# Patient Record
Sex: FEMALE | Race: BLACK OR AFRICAN AMERICAN | Employment: OTHER | URBAN - METROPOLITAN AREA
[De-identification: names, ages, dates, MRNs, and addresses within clinical notes are randomized per-mention and may not be internally consistent; named-entity substitution may affect disease eponyms.]

---

## 2017-08-08 ENCOUNTER — OP HISTORICAL/CONVERTED ENCOUNTER (OUTPATIENT)
Dept: OTHER | Age: 67
End: 2017-08-08

## 2019-10-24 ENCOUNTER — OP HISTORICAL/CONVERTED ENCOUNTER (OUTPATIENT)
Dept: OTHER | Age: 69
End: 2019-10-24

## 2020-10-26 ENCOUNTER — HOSPITAL ENCOUNTER (EMERGENCY)
Age: 70
Discharge: HOME | End: 2020-10-26
Attending: EMERGENCY MEDICINE
Payer: MEDICARE

## 2020-10-26 VITALS
RESPIRATION RATE: 16 BRPM | HEART RATE: 82 BPM | TEMPERATURE: 98 F | HEIGHT: 60 IN | WEIGHT: 130 LBS | OXYGEN SATURATION: 99 % | SYSTOLIC BLOOD PRESSURE: 158 MMHG | BODY MASS INDEX: 25.52 KG/M2 | DIASTOLIC BLOOD PRESSURE: 86 MMHG

## 2020-10-26 DIAGNOSIS — M54.50 ACUTE LEFT-SIDED LOW BACK PAIN WITHOUT SCIATICA: Primary | ICD-10-CM

## 2020-10-26 PROCEDURE — 74011250637 HC RX REV CODE- 250/637: Performed by: EMERGENCY MEDICINE

## 2020-10-26 PROCEDURE — 99283 EMERGENCY DEPT VISIT LOW MDM: CPT

## 2020-10-26 PROCEDURE — 74011000250 HC RX REV CODE- 250: Performed by: EMERGENCY MEDICINE

## 2020-10-26 RX ORDER — LIDOCAINE 4 G/100G
1 PATCH TOPICAL DAILY
Status: DISCONTINUED | OUTPATIENT
Start: 2020-10-26 | End: 2020-10-26 | Stop reason: HOSPADM

## 2020-10-26 RX ORDER — CLOPIDOGREL BISULFATE 75 MG/1
TABLET ORAL
COMMUNITY

## 2020-10-26 RX ORDER — BUDESONIDE AND FORMOTEROL FUMARATE DIHYDRATE 160; 4.5 UG/1; UG/1
AEROSOL RESPIRATORY (INHALATION)
COMMUNITY
Start: 2020-09-17

## 2020-10-26 RX ORDER — ROSUVASTATIN CALCIUM 40 MG/1
TABLET, COATED ORAL
COMMUNITY

## 2020-10-26 RX ORDER — LIDOCAINE 4 G/100G
1 PATCH TOPICAL EVERY 24 HOURS
Status: DISCONTINUED | OUTPATIENT
Start: 2020-10-26 | End: 2020-10-26

## 2020-10-26 RX ORDER — CARVEDILOL 25 MG/1
25 TABLET ORAL 2 TIMES DAILY WITH MEALS
COMMUNITY
Start: 2020-09-25

## 2020-10-26 RX ORDER — MIRTAZAPINE 15 MG/1
15 TABLET, FILM COATED ORAL
COMMUNITY
Start: 2020-10-04

## 2020-10-26 RX ORDER — CYPROHEPTADINE HYDROCHLORIDE 4 MG/1
4 TABLET ORAL 3 TIMES DAILY
COMMUNITY
Start: 2020-08-14 | End: 2022-09-22

## 2020-10-26 RX ORDER — LIDOCAINE 4 G/100G
1 PATCH TOPICAL DAILY
Status: DISCONTINUED | OUTPATIENT
Start: 2020-10-27 | End: 2020-10-26

## 2020-10-26 RX ORDER — DONEPEZIL HYDROCHLORIDE 10 MG/1
TABLET, FILM COATED ORAL
COMMUNITY

## 2020-10-26 RX ORDER — IBUPROFEN 600 MG/1
600 TABLET ORAL
Status: COMPLETED | OUTPATIENT
Start: 2020-10-26 | End: 2020-10-26

## 2020-10-26 RX ORDER — LISINOPRIL 10 MG/1
20 TABLET ORAL DAILY
COMMUNITY

## 2020-10-26 RX ORDER — DILTIAZEM HYDROCHLORIDE 240 MG/1
240 CAPSULE, COATED, EXTENDED RELEASE ORAL DAILY
COMMUNITY
Start: 2020-10-04

## 2020-10-26 RX ADMIN — IBUPROFEN 600 MG: 600 TABLET, FILM COATED ORAL at 09:06

## 2020-10-26 NOTE — ED PROVIDER NOTES
HPI   Patient reports mild to moderate left low back pain for the past 2 days. Worsened with certain positioning and ambulation and relieved by rest.  It does not radiate or migrate. Patient has not attempted any form of relief. She denies anterior pain specifically abdominal and  pain. Denies urinary symptoms and constitutional symptoms. Denies fall and trauma and rash. Past Medical History:   Diagnosis Date    CAD (coronary artery disease)     MI    Hyperlipidemia     Hypertension        History reviewed. No pertinent surgical history. History reviewed. No pertinent family history.     Social History     Socioeconomic History    Marital status: SINGLE     Spouse name: Not on file    Number of children: Not on file    Years of education: Not on file    Highest education level: Not on file   Occupational History    Not on file   Social Needs    Financial resource strain: Not on file    Food insecurity     Worry: Not on file     Inability: Not on file    Transportation needs     Medical: Not on file     Non-medical: Not on file   Tobacco Use    Smoking status: Current Every Day Smoker     Packs/day: 0.50    Smokeless tobacco: Never Used   Substance and Sexual Activity    Alcohol use: Never     Frequency: Never    Drug use: Never    Sexual activity: Not on file   Lifestyle    Physical activity     Days per week: Not on file     Minutes per session: Not on file    Stress: Not on file   Relationships    Social connections     Talks on phone: Not on file     Gets together: Not on file     Attends Rastafarian service: Not on file     Active member of club or organization: Not on file     Attends meetings of clubs or organizations: Not on file     Relationship status: Not on file    Intimate partner violence     Fear of current or ex partner: Not on file     Emotionally abused: Not on file     Physically abused: Not on file     Forced sexual activity: Not on file   Other Topics Concern    Not on file   Social History Narrative    Not on file         ALLERGIES: Patient has no known allergies. Review of Systems   Constitutional: Negative. Eyes: Negative. Respiratory: Negative. Cardiovascular: Negative. Gastrointestinal: Negative. Endocrine: Negative. Genitourinary: Negative. Musculoskeletal: Positive for back pain. Allergic/Immunologic: Negative. Neurological: Negative. Hematological: Negative. Psychiatric/Behavioral: Negative. All other systems reviewed and are negative. Vitals:    10/26/20 0845   BP: (!) 178/98   Pulse: 79   Resp: 18   Temp: 98 °F (36.7 °C)   SpO2: 99%   Weight: 59 kg (130 lb)   Height: 5' (1.524 m)            Physical Exam  Vitals signs and nursing note reviewed. Constitutional:       General: She is not in acute distress. Appearance: Normal appearance. She is normal weight. She is not ill-appearing, toxic-appearing or diaphoretic. HENT:      Head: Normocephalic and atraumatic. Nose: Nose normal.      Mouth/Throat:      Mouth: Mucous membranes are moist.   Eyes:      Extraocular Movements: Extraocular movements intact. Pupils: Pupils are equal, round, and reactive to light. Pulmonary:      Effort: Pulmonary effort is normal. No respiratory distress. Abdominal:      General: Abdomen is flat. There is no distension. Musculoskeletal: Normal range of motion. General: No swelling, tenderness, deformity or signs of injury. Right lower leg: No edema. Left lower leg: No edema. Skin:     General: Skin is warm and dry. Findings: No rash. Neurological:      General: No focal deficit present. Mental Status: She is alert and oriented to person, place, and time. Motor: No weakness. Gait: Gait normal.   Psychiatric:         Mood and Affect: Mood normal.         Behavior: Behavior normal.          MDM     This appears to be minor musculoskeletal lumbar pain.   Paresthesias or focal motor deficits, there is no anterior component to the pain, there are no rashes or lesions to suggest zoster or cellulitis. Okay for discharge with OTC remedies.     Procedures

## 2020-10-26 NOTE — ED TRIAGE NOTES
C/O back pain that started yesterday and has gotten worse today. Denies injury or lifting anything heavy. Denies burning with urination. Has not taken any meds OTC.

## 2021-01-15 ENCOUNTER — HOSPITAL ENCOUNTER (EMERGENCY)
Age: 71
Discharge: HOME | End: 2021-01-15
Attending: EMERGENCY MEDICINE
Payer: MEDICARE

## 2021-01-15 VITALS
BODY MASS INDEX: 22.25 KG/M2 | HEIGHT: 63 IN | HEART RATE: 76 BPM | RESPIRATION RATE: 16 BRPM | OXYGEN SATURATION: 97 % | DIASTOLIC BLOOD PRESSURE: 93 MMHG | TEMPERATURE: 98.5 F | SYSTOLIC BLOOD PRESSURE: 188 MMHG | WEIGHT: 125.6 LBS

## 2021-01-15 DIAGNOSIS — H10.32 ACUTE CONJUNCTIVITIS OF LEFT EYE, UNSPECIFIED ACUTE CONJUNCTIVITIS TYPE: Primary | ICD-10-CM

## 2021-01-15 PROCEDURE — 74011250637 HC RX REV CODE- 250/637: Performed by: EMERGENCY MEDICINE

## 2021-01-15 PROCEDURE — 99283 EMERGENCY DEPT VISIT LOW MDM: CPT

## 2021-01-15 RX ORDER — IBUPROFEN 600 MG/1
600 TABLET ORAL
Status: COMPLETED | OUTPATIENT
Start: 2021-01-15 | End: 2021-01-15

## 2021-01-15 RX ORDER — IBUPROFEN 600 MG/1
600 TABLET ORAL
Qty: 20 TAB | Refills: 0 | Status: SHIPPED | OUTPATIENT
Start: 2021-01-15 | End: 2021-10-27

## 2021-01-15 RX ORDER — MOXIFLOXACIN 5 MG/ML
1 SOLUTION/ DROPS OPHTHALMIC 3 TIMES DAILY
Qty: 1 BOTTLE | Refills: 0 | Status: SHIPPED | OUTPATIENT
Start: 2021-01-15 | End: 2021-01-22

## 2021-01-15 RX ADMIN — IBUPROFEN 600 MG: 600 TABLET, FILM COATED ORAL at 18:01

## 2021-01-15 NOTE — ED PROVIDER NOTES
EMERGENCY DEPARTMENT HISTORY AND PHYSICAL EXAM      Date: 1/15/2021  Patient Name: Mary Beth Dey    History of Presenting Illness     Chief Complaint   Patient presents with    Eye Pain     pt c/o pain, redness to L eye onset this AM; pt denies injury; pt reports using eye drops with no relief; pt AOx4; pt reports pain 10/10    Red Eye       History Provided By: Patient    HPI: Mary Beth Dey, 72 y.o. female with a past medical history significant hypertension presents to the ED with cc of left eye pain draining fluid when patient awoke this morning; complaint of periorbital pain and sensitivity to the light; denies any vision changes    There are no other complaints, changes, or physical findings at this time. PCP: Tho Pereyra NP    No current facility-administered medications on file prior to encounter. Current Outpatient Medications on File Prior to Encounter   Medication Sig Dispense Refill    aspirin-calcium carbonate 81 mg-300 mg calcium(777 mg) tab aspirin 81 mg tablet   Take by oral route.  Symbicort 160-4.5 mcg/actuation HFAA       carvediloL (COREG) 25 mg tablet       clopidogreL (PLAVIX) 75 mg tab clopidogrel 75 mg tablet   Take 1 tablet every day by oral route.  cyproheptadine (PERIACTIN) 4 mg tablet       dilTIAZem ER (CARDIZEM CD) 240 mg capsule       donepeziL (ARICEPT) 10 mg tablet donepezil 10 mg tablet   Take 1 tablet every day by oral route.  linaCLOtide (Linzess) 145 mcg cap capsule Linzess 145 mcg capsule   Take 1 capsule every day by oral route.  lisinopriL (PRINIVIL, ZESTRIL) 10 mg tablet lisinopril 10 mg tablet   Take 1 tablet every day by oral route.  mirtazapine (REMERON) 15 mg tablet       rosuvastatin (CRESTOR) 40 mg tablet rosuvastatin 40 mg tablet   Take 1 tablet every day by oral route.          Past History     Past Medical History:  Past Medical History:   Diagnosis Date    CAD (coronary artery disease)     MI    Hyperlipidemia     Hypertension        Past Surgical History:  Past Surgical History:   Procedure Laterality Date    HX ORTHOPAEDIC      R leg       Family History:  History reviewed. No pertinent family history. Social History:  Social History     Tobacco Use    Smoking status: Current Every Day Smoker     Packs/day: 0.50    Smokeless tobacco: Never Used   Substance Use Topics    Alcohol use: Never     Frequency: Never    Drug use: Never       Allergies:  No Known Allergies      Review of Systems     Review of Systems   Constitutional: Negative for chills and fever. HENT: Negative for rhinorrhea and sore throat. Eyes: Positive for photophobia, pain, discharge, redness and itching. Negative for visual disturbance. Respiratory: Negative for cough and shortness of breath. Cardiovascular: Negative for chest pain and leg swelling. Gastrointestinal: Negative for abdominal pain and vomiting. Endocrine: Negative for polydipsia and polyuria. Genitourinary: Negative for dysuria and urgency. Musculoskeletal: Negative for back pain and neck stiffness. Skin: Negative for color change and pallor. Neurological: Negative for weakness and numbness. Psychiatric/Behavioral: Negative. Physical Exam     Physical Exam  Vitals signs and nursing note reviewed. Constitutional:       Appearance: Normal appearance. HENT:      Head: Normocephalic and atraumatic. Comments: Right periorbital tenderness     Nose: Nose normal.      Mouth/Throat:      Mouth: Mucous membranes are moist.   Eyes:      General: Vision grossly intact. Extraocular Movements: Extraocular movements intact. Conjunctiva/sclera:      Right eye: No exudate. Left eye: Left conjunctiva is injected. No exudate. Pupils: Pupils are equal, round, and reactive to light. Comments: Serous drainage left eye   Neck:      Musculoskeletal: Normal range of motion and neck supple.    Cardiovascular:      Rate and Rhythm: Normal rate and regular rhythm. Pulses: Normal pulses. Heart sounds: Normal heart sounds. Pulmonary:      Effort: Pulmonary effort is normal.      Breath sounds: Normal breath sounds. Abdominal:      General: Bowel sounds are normal.      Palpations: Abdomen is soft. Musculoskeletal: Normal range of motion. General: No tenderness or signs of injury. Lymphadenopathy:      Cervical: No cervical adenopathy. Skin:     General: Skin is warm and dry. Capillary Refill: Capillary refill takes less than 2 seconds. Neurological:      General: No focal deficit present. Mental Status: She is alert and oriented to person, place, and time. Psychiatric:         Mood and Affect: Mood normal.         Behavior: Behavior normal.         Lab and Diagnostic Study Results     Labs -   No results found for this or any previous visit (from the past 12 hour(s)). Radiologic Studies -   @lastxrresult@  CT Results  (Last 48 hours)    None        CXR Results  (Last 48 hours)    None            Medical Decision Making   - I am the first provider for this patient. - I reviewed the vital signs, available nursing notes, past medical history, past surgical history, family history and social history. - Initial assessment performed. The patients presenting problems have been discussed, and they are in agreement with the care plan formulated and outlined with them. I have encouraged them to ask questions as they arise throughout their visit. Vital Signs-Reviewed the patient's vital signs. Patient Vitals for the past 12 hrs:   Temp Pulse Resp BP SpO2   01/15/21 1650 98.5 °F (36.9 °C) 80 16 (!) 188/93 96 %       Records Reviewed: Nursing Notes    The patient presents with left eye pain with a differential diagnosis of periorbital cellulitis, conjunctivitis, foreign body      ED Course:          Provider Notes (Medical Decision Making):      MDM       Procedures   Medical Decision Makingedical Decision Making  Performed by: Verner Kanner, MD  PROCEDURES:  Procedures       Disposition   Disposition: Condition stable and improved  DC- Adult Discharges: All of the diagnostic tests were reviewed and questions answered. Diagnosis, care plan and treatment options were discussed. The patient understands the instructions and will follow up as directed. The patients results have been reviewed with them. They have been counseled regarding their diagnosis. The patient verbally convey understanding and agreement of the signs, symptoms, diagnosis, treatment and prognosis and additionally agrees to follow up as recommended with their PCP in 24 - 48 hours. They also agree with the care-plan and convey that all of their questions have been answered. I have also put together some discharge instructions for them that include: 1) educational information regarding their diagnosis, 2) how to care for their diagnosis at home, as well a 3) list of reasons why they would want to return to the ED prior to their follow-up appointment, should their condition change. DISCHARGE PLAN:  1. Current Discharge Medication List      CONTINUE these medications which have NOT CHANGED    Details   aspirin-calcium carbonate 81 mg-300 mg calcium(777 mg) tab aspirin 81 mg tablet   Take by oral route. Symbicort 160-4.5 mcg/actuation HFAA       carvediloL (COREG) 25 mg tablet       clopidogreL (PLAVIX) 75 mg tab clopidogrel 75 mg tablet   Take 1 tablet every day by oral route. cyproheptadine (PERIACTIN) 4 mg tablet       dilTIAZem ER (CARDIZEM CD) 240 mg capsule       donepeziL (ARICEPT) 10 mg tablet donepezil 10 mg tablet   Take 1 tablet every day by oral route. linaCLOtide (Linzess) 145 mcg cap capsule Linzess 145 mcg capsule   Take 1 capsule every day by oral route. lisinopriL (PRINIVIL, ZESTRIL) 10 mg tablet lisinopril 10 mg tablet   Take 1 tablet every day by oral route.       mirtazapine (REMERON) 15 mg tablet rosuvastatin (CRESTOR) 40 mg tablet rosuvastatin 40 mg tablet   Take 1 tablet every day by oral route. 2.   Follow-up Information    None       3. Return to ED if worse   4. Current Discharge Medication List            Diagnosis     Clinical Impression: No diagnosis found. Attestations:    Loco Sena MD    Please note that this dictation was completed with Meitu, the computer voice recognition software. Quite often unanticipated grammatical, syntax, homophones, and other interpretive errors are inadvertently transcribed by the computer software. Please disregard these errors. Please excuse any errors that have escaped final proofreading. Thank you.

## 2021-07-13 ENCOUNTER — TRANSCRIBE ORDER (OUTPATIENT)
Dept: SCHEDULING | Age: 71
End: 2021-07-13

## 2021-07-13 DIAGNOSIS — Z12.31 VISIT FOR SCREENING MAMMOGRAM: Primary | ICD-10-CM

## 2021-07-21 ENCOUNTER — HOSPITAL ENCOUNTER (OUTPATIENT)
Dept: MAMMOGRAPHY | Age: 71
Discharge: HOME | End: 2021-07-21
Payer: MEDICARE

## 2021-07-21 DIAGNOSIS — Z12.31 VISIT FOR SCREENING MAMMOGRAM: ICD-10-CM

## 2021-07-21 PROCEDURE — 77067 SCR MAMMO BI INCL CAD: CPT

## 2021-09-11 PROBLEM — E78.5 HYPERLIPIDEMIA: Status: ACTIVE | Noted: 2021-09-11

## 2021-09-11 PROBLEM — E55.9 VITAMIN D DEFICIENCY: Status: ACTIVE | Noted: 2021-09-11

## 2021-09-11 PROBLEM — I11.9 HYPERTENSIVE HEART DISEASE WITHOUT CONGESTIVE HEART FAILURE: Status: ACTIVE | Noted: 2021-09-11

## 2021-09-11 PROBLEM — I10 HTN (HYPERTENSION): Status: ACTIVE | Noted: 2021-09-11

## 2021-09-11 PROBLEM — Z86.010 PERSONAL HISTORY OF COLONIC POLYPS: Status: ACTIVE | Noted: 2021-09-11

## 2021-09-11 PROBLEM — I65.29 CAROTID ARTERY STENOSIS: Status: ACTIVE | Noted: 2021-09-11

## 2021-09-11 PROBLEM — K59.00 CONSTIPATION: Status: ACTIVE | Noted: 2021-09-11

## 2021-09-11 PROBLEM — R63.4 ABNORMAL WEIGHT LOSS: Status: ACTIVE | Noted: 2021-09-11

## 2021-09-11 PROBLEM — M54.2 CERVICALGIA: Status: ACTIVE | Noted: 2021-09-11

## 2021-09-11 PROBLEM — I25.10 ATHEROSCLEROSIS OF NATIVE CORONARY ARTERY WITHOUT ANGINA PECTORIS: Status: ACTIVE | Noted: 2021-09-11

## 2021-09-16 ENCOUNTER — OFFICE VISIT (OUTPATIENT)
Dept: GASTROENTEROLOGY | Age: 71
End: 2021-09-16
Payer: MEDICARE

## 2021-09-16 VITALS
WEIGHT: 119.2 LBS | RESPIRATION RATE: 14 BRPM | BODY MASS INDEX: 21.12 KG/M2 | HEIGHT: 63 IN | OXYGEN SATURATION: 98 % | TEMPERATURE: 97.8 F | SYSTOLIC BLOOD PRESSURE: 120 MMHG | HEART RATE: 67 BPM | DIASTOLIC BLOOD PRESSURE: 60 MMHG

## 2021-09-16 DIAGNOSIS — K57.30 DIVERTICULAR DISEASE OF COLON: ICD-10-CM

## 2021-09-16 DIAGNOSIS — Z86.010 PERSONAL HISTORY OF COLONIC POLYPS: Primary | ICD-10-CM

## 2021-09-16 DIAGNOSIS — R63.4 ABNORMAL WEIGHT LOSS: ICD-10-CM

## 2021-09-16 PROCEDURE — G8427 DOCREV CUR MEDS BY ELIG CLIN: HCPCS | Performed by: INTERNAL MEDICINE

## 2021-09-16 PROCEDURE — 99204 OFFICE O/P NEW MOD 45 MIN: CPT | Performed by: INTERNAL MEDICINE

## 2021-09-16 PROCEDURE — 1101F PT FALLS ASSESS-DOCD LE1/YR: CPT | Performed by: INTERNAL MEDICINE

## 2021-09-16 PROCEDURE — G8510 SCR DEP NEG, NO PLAN REQD: HCPCS | Performed by: INTERNAL MEDICINE

## 2021-09-16 PROCEDURE — 1090F PRES/ABSN URINE INCON ASSESS: CPT | Performed by: INTERNAL MEDICINE

## 2021-09-16 PROCEDURE — 3017F COLORECTAL CA SCREEN DOC REV: CPT | Performed by: INTERNAL MEDICINE

## 2021-09-16 PROCEDURE — G8420 CALC BMI NORM PARAMETERS: HCPCS | Performed by: INTERNAL MEDICINE

## 2021-09-16 PROCEDURE — G8536 NO DOC ELDER MAL SCRN: HCPCS | Performed by: INTERNAL MEDICINE

## 2021-09-16 PROCEDURE — G8400 PT W/DXA NO RESULTS DOC: HCPCS | Performed by: INTERNAL MEDICINE

## 2021-09-16 PROCEDURE — G9899 SCRN MAM PERF RSLTS DOC: HCPCS | Performed by: INTERNAL MEDICINE

## 2021-09-16 RX ORDER — POLYETHYLENE GLYCOL 3350 17 G/17G
POWDER, FOR SOLUTION ORAL
Qty: 510 G | Refills: 0 | Status: SHIPPED | OUTPATIENT
Start: 2021-09-16 | End: 2021-10-05

## 2021-09-16 RX ORDER — CLONIDINE HYDROCHLORIDE 0.1 MG/1
0.1 TABLET ORAL 2 TIMES DAILY
COMMUNITY
Start: 2021-08-02

## 2021-09-16 RX ORDER — FERROUS SULFATE TAB 325 MG (65 MG ELEMENTAL FE) 325 (65 FE) MG
TAB ORAL
COMMUNITY
Start: 2021-09-02

## 2021-09-16 RX ORDER — ALBUTEROL SULFATE 90 UG/1
AEROSOL, METERED RESPIRATORY (INHALATION)
COMMUNITY
Start: 2021-08-18 | End: 2022-08-07

## 2021-09-16 NOTE — PROGRESS NOTES
1. Have you been to the ER, urgent care clinic since your last visit? Hospitalized since your last visit? NO    2. Have you seen or consulted any other health care providers outside of the [address] [place] [address] since your last visit? Include any pap smears or colon screening.  NO   Chief Complaint   Patient presents with    Colon Polyps     hx of colon polyps- last colonoscopy 2-     Visit Vitals  /60 (BP 1 Location: Left upper arm, BP Patient Position: Sitting, BP Cuff Size: Adult)   Pulse 67   Temp 97.8 °F (36.6 °C) (Temporal)   Resp 14   Ht 5' 3\" (1.6 m)   Wt 54.1 kg (119 lb 3.2 oz)   SpO2 98% Comment: room air   BMI 21.12 kg/m²

## 2021-09-16 NOTE — PROGRESS NOTES
Ozzy Redd is a 80 y.o. female who presents today for the following:  Chief Complaint   Patient presents with    Colon Polyps     hx of colon polyps- last colonoscopy 2-         No Known Allergies    Current Outpatient Medications   Medication Sig    cloNIDine HCL (CATAPRES) 0.1 mg tablet Take 0.1 mg by mouth two (2) times a day.  Ventolin HFA 90 mcg/actuation inhaler TAKE 2 PUFFS EVERY 6 HOURS AS NEEDED FOR WHEEZING    FeroSuL 325 mg (65 mg iron) tablet     polyethylene glycol (MIRALAX) 17 gram/dose powder Use as directed  Indications: emptying of the bowel    aspirin-calcium carbonate 81 mg-300 mg calcium(777 mg) tab aspirin 81 mg tablet   Take by oral route.  Symbicort 160-4.5 mcg/actuation HFAA     carvediloL (COREG) 25 mg tablet Take 25 mg by mouth two (2) times daily (with meals).  clopidogreL (PLAVIX) 75 mg tab clopidogrel 75 mg tablet   Take 1 tablet every day by oral route.  cyproheptadine (PERIACTIN) 4 mg tablet Take 4 mg by mouth three (3) times daily. For appetite    dilTIAZem ER (CARDIZEM CD) 240 mg capsule Take 240 mg by mouth daily.  donepeziL (ARICEPT) 10 mg tablet donepezil 10 mg tablet   Take 1 tablet every day by oral route.  linaCLOtide (Linzess) 145 mcg cap capsule Linzess 145 mcg capsule   Take 1 capsule every day by oral route.  lisinopriL (PRINIVIL, ZESTRIL) 10 mg tablet lisinopril 10 mg tablet   Take 1 tablet every day by oral route.  mirtazapine (REMERON) 15 mg tablet Take 15 mg by mouth nightly.  rosuvastatin (CRESTOR) 40 mg tablet rosuvastatin 40 mg tablet   Take 1 tablet every day by oral route.  ibuprofen (MOTRIN) 600 mg tablet Take 1 Tab by mouth every six (6) hours as needed for Pain. (Patient not taking: Reported on 9/16/2021)     No current facility-administered medications for this visit.        Past Medical History:   Diagnosis Date    Abnormal weight loss 9/11/2021    Atherosclerosis of native coronary artery without angina pectoris 9/11/2021    CAD (coronary artery disease)     MI    Carotid artery stenosis 9/11/2021    Cervicalgia 9/11/2021    Colon polyps     Constipation 9/11/2021    HTN (hypertension) 9/11/2021    Hyperlipidemia     Hypertension     Hypertensive heart disease without congestive heart failure 9/11/2021    Personal history of colonic polyps 9/11/2021    Vitamin D deficiency 9/11/2021       Past Surgical History:   Procedure Laterality Date    COLONOSCOPY  2015    HX BIV-IMPLANTABLE CARDIOVERTER DEFIBRILLATOR  09/16/2013    HX HEART CATHETERIZATION      X 3    HX ORTHOPAEDIC      R leg    HX OTHER SURGICAL      R CAROTID ENDORECTOMY 11-4-05,   L CAROTID ENDARECTOMY 11-6-2006    HX OTHER SURGICAL      BILAT LOWER EXTREM VASC BYPASS SURGERY       Family History   Problem Relation Age of Onset    Cancer Mother     Hypertension Mother        Social History     Socioeconomic History    Marital status: SINGLE     Spouse name: Not on file    Number of children: Not on file    Years of education: Not on file    Highest education level: Not on file   Occupational History    Not on file   Tobacco Use    Smoking status: Current Every Day Smoker     Packs/day: 0.50    Smokeless tobacco: Never Used   Substance and Sexual Activity    Alcohol use: Never    Drug use: Never    Sexual activity: Not on file   Other Topics Concern    Not on file   Social History Narrative    Not on file     Social Determinants of Health     Financial Resource Strain:     Difficulty of Paying Living Expenses:    Food Insecurity:     Worried About Running Out of Food in the Last Year:     [address] in the Last Year:    Transportation Needs:     Lack of Transportation (Medical):      Lack of Transportation (Non-Medical):    Physical Activity:     Days of Exercise per Week:     Minutes of Exercise per Session:    Stress:     Feeling of Stress :    Social Connections:     Frequency of Communication with Friends and Family:  Frequency of Social Gatherings with Friends and Family:     Attends Anabaptist Services:     Active Member of Clubs or Organizations:     Attends Club or Organization Meetings:     Marital Status:    Intimate Partner Violence:     Fear of Current or Ex-Partner:     Emotionally Abused:     Physically Abused:     Sexually Abused:          HPI  76-year-old female with history of hypertensive atherosclerotic cardiovascular disease, hyperlipidemia,. AICD, carotid artery disease, diverticular disease, and colon polyps who comes in for evaluation. Patient states she has been doing okay. No recent problems with her abdomen. She however has a poor appetite. He has been started on an appetite stimulant without much help so far. She has lost some weight. She has a problem with constipation and does take Linzess, but not every day. She has been on Plavix for a long time no gross GI bleeding. Patient does take iron therapy daily so her stools may be dark. Review of Systems   Constitutional: Positive for weight loss. HENT: Negative. Negative for nosebleeds. Eyes: Negative. Respiratory: Negative. Cardiovascular: Negative. Gastrointestinal: Positive for constipation. Negative for abdominal pain, blood in stool, diarrhea, heartburn, melena, nausea and vomiting. Genitourinary: Negative. Musculoskeletal: Negative. Skin: Negative. Neurological: Negative. Endo/Heme/Allergies: Negative. Psychiatric/Behavioral: Negative. All other systems reviewed and are negative. Visit Vitals  /60 (BP 1 Location: Left upper arm, BP Patient Position: Sitting, BP Cuff Size: Adult)   Pulse 67   Temp 97.8 °F (36.6 °C) (Temporal)   Resp 14   Ht 5' 3\" (1.6 m)   Wt 54.1 kg (119 lb 3.2 oz)   SpO2 98% Comment: room air   BMI 21.12 kg/m²     Physical Exam  Vitals and nursing note reviewed. Exam conducted with a chaperone present (Daughter present).    Constitutional:       Appearance: Normal appearance. She is normal weight. HENT:      Head: Normocephalic and atraumatic. Nose: Nose normal.      Mouth/Throat:      Mouth: Mucous membranes are moist.      Pharynx: Oropharynx is clear. Eyes:      General: No scleral icterus. Conjunctiva/sclera: Conjunctivae normal.      Pupils: Pupils are equal, round, and reactive to light. Cardiovascular:      Rate and Rhythm: Normal rate and regular rhythm. Pulses: Normal pulses. Heart sounds: Normal heart sounds. Pulmonary:      Effort: Pulmonary effort is normal.      Breath sounds: Normal breath sounds. Abdominal:      General: Bowel sounds are normal. There is no distension. Palpations: Abdomen is soft. There is no mass. Tenderness: There is no abdominal tenderness. There is no right CVA tenderness, left CVA tenderness, guarding or rebound. Hernia: No hernia is present. Musculoskeletal:         General: Normal range of motion. Cervical back: Normal range of motion and neck supple. Skin:     General: Skin is warm and dry. Coloration: Skin is not jaundiced. Neurological:      General: No focal deficit present. Mental Status: She is alert and oriented to person, place, and time. Psychiatric:         Mood and Affect: Mood normal.         Behavior: Behavior normal.         Thought Content: Thought content normal.         Judgment: Judgment normal.            1. Personal history of colonic polyps  Schedule patient for a surveillance colonoscopy. - COLONOSCOPY,DIAGNOSTIC; Future  - polyethylene glycol (MIRALAX) 17 gram/dose powder; Use as directed  Indications: emptying of the bowel  Dispense: 510 g; Refill: 0    2. Diverticular disease of colon      3.  Abnormal weight loss

## 2021-09-16 NOTE — PROGRESS NOTES
COLONOSCOPY IS SCHEDULED FOR 10-5-2021 AT 9:30 AM.  COVID TEST IS SCHEDULED FOR 10-1-2021.   PENDING Keaton Nguyen

## 2021-09-16 NOTE — H&P (VIEW-ONLY)
Berenice Finn is a 80 y.o. female who presents today for the following:  Chief Complaint   Patient presents with    Colon Polyps     hx of colon polyps- last colonoscopy 2-         No Known Allergies    Current Outpatient Medications   Medication Sig    cloNIDine HCL (CATAPRES) 0.1 mg tablet Take 0.1 mg by mouth two (2) times a day.  Ventolin HFA 90 mcg/actuation inhaler TAKE 2 PUFFS EVERY 6 HOURS AS NEEDED FOR WHEEZING    FeroSuL 325 mg (65 mg iron) tablet     polyethylene glycol (MIRALAX) 17 gram/dose powder Use as directed  Indications: emptying of the bowel    aspirin-calcium carbonate 81 mg-300 mg calcium(777 mg) tab aspirin 81 mg tablet   Take by oral route.  Symbicort 160-4.5 mcg/actuation HFAA     carvediloL (COREG) 25 mg tablet Take 25 mg by mouth two (2) times daily (with meals).  clopidogreL (PLAVIX) 75 mg tab clopidogrel 75 mg tablet   Take 1 tablet every day by oral route.  cyproheptadine (PERIACTIN) 4 mg tablet Take 4 mg by mouth three (3) times daily. For appetite    dilTIAZem ER (CARDIZEM CD) 240 mg capsule Take 240 mg by mouth daily.  donepeziL (ARICEPT) 10 mg tablet donepezil 10 mg tablet   Take 1 tablet every day by oral route.  linaCLOtide (Linzess) 145 mcg cap capsule Linzess 145 mcg capsule   Take 1 capsule every day by oral route.  lisinopriL (PRINIVIL, ZESTRIL) 10 mg tablet lisinopril 10 mg tablet   Take 1 tablet every day by oral route.  mirtazapine (REMERON) 15 mg tablet Take 15 mg by mouth nightly.  rosuvastatin (CRESTOR) 40 mg tablet rosuvastatin 40 mg tablet   Take 1 tablet every day by oral route.  ibuprofen (MOTRIN) 600 mg tablet Take 1 Tab by mouth every six (6) hours as needed for Pain. (Patient not taking: Reported on 9/16/2021)     No current facility-administered medications for this visit.        Past Medical History:   Diagnosis Date    Abnormal weight loss 9/11/2021    Atherosclerosis of native coronary artery without angina pectoris 9/11/2021    CAD (coronary artery disease)     MI    Carotid artery stenosis 9/11/2021    Cervicalgia 9/11/2021    Colon polyps     Constipation 9/11/2021    HTN (hypertension) 9/11/2021    Hyperlipidemia     Hypertension     Hypertensive heart disease without congestive heart failure 9/11/2021    Personal history of colonic polyps 9/11/2021    Vitamin D deficiency 9/11/2021       Past Surgical History:   Procedure Laterality Date    COLONOSCOPY  2015    HX BIV-IMPLANTABLE CARDIOVERTER DEFIBRILLATOR  09/16/2013    HX HEART CATHETERIZATION      X 3    HX ORTHOPAEDIC      R leg    HX OTHER SURGICAL      R CAROTID ENDORECTOMY 11-4-05,   L CAROTID ENDARECTOMY 11-6-2006    HX OTHER SURGICAL      BILAT LOWER EXTREM VASC BYPASS SURGERY       Family History   Problem Relation Age of Onset    Cancer Mother     Hypertension Mother        Social History     Socioeconomic History    Marital status: SINGLE     Spouse name: Not on file    Number of children: Not on file    Years of education: Not on file    Highest education level: Not on file   Occupational History    Not on file   Tobacco Use    Smoking status: Current Every Day Smoker     Packs/day: 0.50    Smokeless tobacco: Never Used   Substance and Sexual Activity    Alcohol use: Never    Drug use: Never    Sexual activity: Not on file   Other Topics Concern    Not on file   Social History Narrative    Not on file     Social Determinants of Health     Financial Resource Strain:     Difficulty of Paying Living Expenses:    Food Insecurity:     Worried About Running Out of Food in the Last Year:     [address] in the Last Year:    Transportation Needs:     Lack of Transportation (Medical):      Lack of Transportation (Non-Medical):    Physical Activity:     Days of Exercise per Week:     Minutes of Exercise per Session:    Stress:     Feeling of Stress :    Social Connections:     Frequency of Communication with Friends and Family:  Frequency of Social Gatherings with Friends and Family:     Attends Druze Services:     Active Member of Clubs or Organizations:     Attends Club or Organization Meetings:     Marital Status:    Intimate Partner Violence:     Fear of Current or Ex-Partner:     Emotionally Abused:     Physically Abused:     Sexually Abused:          HPI  78-year-old female with history of hypertensive atherosclerotic cardiovascular disease, hyperlipidemia,. AICD, carotid artery disease, diverticular disease, and colon polyps who comes in for evaluation. Patient states she has been doing okay. No recent problems with her abdomen. She however has a poor appetite. He has been started on an appetite stimulant without much help so far. She has lost some weight. She has a problem with constipation and does take Linzess, but not every day. She has been on Plavix for a long time no gross GI bleeding. Patient does take iron therapy daily so her stools may be dark. Review of Systems   Constitutional: Positive for weight loss. HENT: Negative. Negative for nosebleeds. Eyes: Negative. Respiratory: Negative. Cardiovascular: Negative. Gastrointestinal: Positive for constipation. Negative for abdominal pain, blood in stool, diarrhea, heartburn, melena, nausea and vomiting. Genitourinary: Negative. Musculoskeletal: Negative. Skin: Negative. Neurological: Negative. Endo/Heme/Allergies: Negative. Psychiatric/Behavioral: Negative. All other systems reviewed and are negative. Visit Vitals  /60 (BP 1 Location: Left upper arm, BP Patient Position: Sitting, BP Cuff Size: Adult)   Pulse 67   Temp 97.8 °F (36.6 °C) (Temporal)   Resp 14   Ht 5' 3\" (1.6 m)   Wt 54.1 kg (119 lb 3.2 oz)   SpO2 98% Comment: room air   BMI 21.12 kg/m²     Physical Exam  Vitals and nursing note reviewed. Exam conducted with a chaperone present (Daughter present).    Constitutional:       Appearance: Normal appearance. She is normal weight. HENT:      Head: Normocephalic and atraumatic. Nose: Nose normal.      Mouth/Throat:      Mouth: Mucous membranes are moist.      Pharynx: Oropharynx is clear. Eyes:      General: No scleral icterus. Conjunctiva/sclera: Conjunctivae normal.      Pupils: Pupils are equal, round, and reactive to light. Cardiovascular:      Rate and Rhythm: Normal rate and regular rhythm. Pulses: Normal pulses. Heart sounds: Normal heart sounds. Pulmonary:      Effort: Pulmonary effort is normal.      Breath sounds: Normal breath sounds. Abdominal:      General: Bowel sounds are normal. There is no distension. Palpations: Abdomen is soft. There is no mass. Tenderness: There is no abdominal tenderness. There is no right CVA tenderness, left CVA tenderness, guarding or rebound. Hernia: No hernia is present. Musculoskeletal:         General: Normal range of motion. Cervical back: Normal range of motion and neck supple. Skin:     General: Skin is warm and dry. Coloration: Skin is not jaundiced. Neurological:      General: No focal deficit present. Mental Status: She is alert and oriented to person, place, and time. Psychiatric:         Mood and Affect: Mood normal.         Behavior: Behavior normal.         Thought Content: Thought content normal.         Judgment: Judgment normal.            1. Personal history of colonic polyps  Schedule patient for a surveillance colonoscopy. - COLONOSCOPY,DIAGNOSTIC; Future  - polyethylene glycol (MIRALAX) 17 gram/dose powder; Use as directed  Indications: emptying of the bowel  Dispense: 510 g; Refill: 0    2. Diverticular disease of colon      3.  Abnormal weight loss

## 2021-10-01 ENCOUNTER — HOSPITAL ENCOUNTER (OUTPATIENT)
Dept: PREADMISSION TESTING | Age: 71
Discharge: HOME | End: 2021-10-01
Payer: MEDICARE

## 2021-10-01 LAB — SARS-COV-2, COV2: NORMAL

## 2021-10-01 PROCEDURE — U0003 INFECTIOUS AGENT DETECTION BY NUCLEIC ACID (DNA OR RNA); SEVERE ACUTE RESPIRATORY SYNDROME CORONAVIRUS 2 (SARS-COV-2) (CORONAVIRUS DISEASE [COVID-19]), AMPLIFIED PROBE TECHNIQUE, MAKING USE OF HIGH THROUGHPUT TECHNOLOGIES AS DESCRIBED BY CMS-2020-01-R: HCPCS

## 2021-10-02 LAB — SARS-COV-2, COV2NT: NOT DETECTED

## 2021-10-05 ENCOUNTER — ANESTHESIA EVENT (OUTPATIENT)
Dept: ENDOSCOPY | Age: 71
End: 2021-10-05
Payer: MEDICARE

## 2021-10-05 ENCOUNTER — ANESTHESIA (OUTPATIENT)
Dept: ENDOSCOPY | Age: 71
End: 2021-10-05
Payer: MEDICARE

## 2021-10-05 ENCOUNTER — HOSPITAL ENCOUNTER (OUTPATIENT)
Age: 71
Setting detail: OUTPATIENT SURGERY
Discharge: HOME | End: 2021-10-05
Attending: INTERNAL MEDICINE | Admitting: INTERNAL MEDICINE
Payer: MEDICARE

## 2021-10-05 VITALS
TEMPERATURE: 98 F | HEIGHT: 60 IN | RESPIRATION RATE: 18 BRPM | OXYGEN SATURATION: 99 % | WEIGHT: 114 LBS | BODY MASS INDEX: 22.38 KG/M2 | HEART RATE: 79 BPM | SYSTOLIC BLOOD PRESSURE: 206 MMHG | DIASTOLIC BLOOD PRESSURE: 109 MMHG

## 2021-10-05 PROCEDURE — 74011250636 HC RX REV CODE- 250/636: Performed by: NURSE ANESTHETIST, CERTIFIED REGISTERED

## 2021-10-05 PROCEDURE — 2709999900 HC NON-CHARGEABLE SUPPLY: Performed by: INTERNAL MEDICINE

## 2021-10-05 PROCEDURE — 74011000258 HC RX REV CODE- 258: Performed by: NURSE ANESTHETIST, CERTIFIED REGISTERED

## 2021-10-05 PROCEDURE — G0105 COLORECTAL SCRN; HI RISK IND: HCPCS | Performed by: INTERNAL MEDICINE

## 2021-10-05 PROCEDURE — 74011250636 HC RX REV CODE- 250/636: Performed by: INTERNAL MEDICINE

## 2021-10-05 PROCEDURE — 76040000007: Performed by: INTERNAL MEDICINE

## 2021-10-05 PROCEDURE — 76060000032 HC ANESTHESIA 0.5 TO 1 HR: Performed by: INTERNAL MEDICINE

## 2021-10-05 RX ORDER — SODIUM CHLORIDE 9 MG/ML
125 INJECTION, SOLUTION INTRAVENOUS CONTINUOUS
Status: DISCONTINUED | OUTPATIENT
Start: 2021-10-05 | End: 2021-10-05 | Stop reason: HOSPADM

## 2021-10-05 RX ORDER — SODIUM CHLORIDE 9 MG/ML
INJECTION, SOLUTION INTRAVENOUS
Status: DISCONTINUED | OUTPATIENT
Start: 2021-10-05 | End: 2021-10-05 | Stop reason: HOSPADM

## 2021-10-05 RX ORDER — SODIUM CHLORIDE 0.9 % (FLUSH) 0.9 %
5-40 SYRINGE (ML) INJECTION EVERY 8 HOURS
Status: DISCONTINUED | OUTPATIENT
Start: 2021-10-05 | End: 2021-10-05 | Stop reason: HOSPADM

## 2021-10-05 RX ORDER — SODIUM CHLORIDE 9 MG/ML
50 INJECTION, SOLUTION INTRAVENOUS CONTINUOUS
Status: DISCONTINUED | OUTPATIENT
Start: 2021-10-05 | End: 2021-10-05 | Stop reason: HOSPADM

## 2021-10-05 RX ORDER — PROPOFOL 10 MG/ML
INJECTION, EMULSION INTRAVENOUS AS NEEDED
Status: DISCONTINUED | OUTPATIENT
Start: 2021-10-05 | End: 2021-10-05 | Stop reason: HOSPADM

## 2021-10-05 RX ORDER — SODIUM CHLORIDE 0.9 % (FLUSH) 0.9 %
5-40 SYRINGE (ML) INJECTION AS NEEDED
Status: DISCONTINUED | OUTPATIENT
Start: 2021-10-05 | End: 2021-10-05 | Stop reason: HOSPADM

## 2021-10-05 RX ADMIN — SODIUM CHLORIDE: 9 INJECTION, SOLUTION INTRAVENOUS at 09:35

## 2021-10-05 RX ADMIN — PROPOFOL 50 MG: 10 INJECTION, EMULSION INTRAVENOUS at 09:50

## 2021-10-05 RX ADMIN — PROPOFOL 50 MG: 10 INJECTION, EMULSION INTRAVENOUS at 09:42

## 2021-10-05 RX ADMIN — PROPOFOL 50 MG: 10 INJECTION, EMULSION INTRAVENOUS at 09:40

## 2021-10-05 RX ADMIN — SODIUM CHLORIDE 50 ML/HR: 9 INJECTION, SOLUTION INTRAVENOUS at 08:31

## 2021-10-05 NOTE — INTERVAL H&P NOTE
Update History & Physical    The Patient's History and Physical of October 5, 2021,  was reviewed with the patient and I examined the patient. There was no change. The surgical site was confirmed by the patient and me. Plan:  The risk, benefits, expected outcome, and alternative to the recommended procedure have been discussed with the patient. Patient understands and wants to proceed with the procedure.     Electronically signed by Darci Ascencio MD on 10/5/2021 at 8:45 AM

## 2021-10-05 NOTE — ANESTHESIA POSTPROCEDURE EVALUATION
Procedure(s):  COLONOSCOPY ( T I V A).     total IV anesthesia    Anesthesia Post Evaluation      Multimodal analgesia: multimodal analgesia not used between 6 hours prior to anesthesia start to PACU discharge  Patient location during evaluation: PACU  Patient participation: complete - patient participated  Level of consciousness: sleepy but conscious  Pain score: 0  Pain management: adequate  Anesthetic complications: no  Cardiovascular status: acceptable and stable  Respiratory status: acceptable and room air  Hydration status: acceptable  Post anesthesia nausea and vomiting:  none  Final Post Anesthesia Temperature Assessment:  Normothermia (36.0-37.5 degrees C)      INITIAL Post-op Vital signs:   Vitals Value Taken Time   /70 10/05/21 1004   Temp 36.7 °C (98 °F) 10/05/21 1004   Pulse 77 10/05/21 1004   Resp 18 10/05/21 1004   SpO2 99 % 10/05/21 1004

## 2021-10-05 NOTE — OP NOTES
Colonoscopy Procedure Note      Patient: Isma Rodarte MRN: [id number]  SSN: [id number]    YOB: 1950  Age: 80 y.o. Sex: female      Date of Procedure: 10/5/2021  Date/Time:  10/5/2021 10:06 AM       IMPRESSION:     1. Normal colon to cecum         RECOMMENDATIONS:     1) Repeat colonoscopy in 5 years. INDICATION: History of colon polyps     PROCEDURE PERFORMED: Colonoscopy      DESCRIPTION OF PROCEDURE: An informed consent was obtained. The patient was placed in left lateral position. Perianal inspection and a digital rectal exam was performed. Video colonoscope was introduced into the rectum and advanced under direct vision up to the terminal ileum. With adequate insufflation and maneuvering of the withdrawing scope, the colonic mucosa was visualized carefully. Retroflexion was performed in the rectum to see the anorectum and also in the ascending colon to look behind the folds. Vital signs, pulse oximetry, single lead cardiac monitor were monitored throughout the procedure as the sedation was titrated to the desired effect ensuring patient comfort and safety. The patient tolerated the procedure very well and was transferred to the recovery area. Following is the summary of findings: No lesions were noted throughout the entire colon. ENDOSCOPIST: Ivelisse Starkey MD      ENDOSCOPE: Olympus video colonoscope     ASSISTANT:Circ-1: Ruben Silva              Scrub Tech-1: Mitchell Thomason     ANESTHESIA: TIVA      QUALITY OF PREPARATION: Good      FINDINGS:   1.  Normal colon to cecum       Complications: None     EBL: None     SPECIMENS: * No specimens in log *          Ivelisse Starkey MD  October 5, 2021  10:06 AM

## 2021-10-05 NOTE — DISCHARGE INSTRUCTIONS

## 2021-10-05 NOTE — ANESTHESIA PREPROCEDURE EVALUATION
Relevant Problems   No relevant active problems       Anesthetic History   No history of anesthetic complications  Other anesthesia complications          Review of Systems / Medical History  Patient summary reviewed, nursing notes reviewed and pertinent labs reviewed    Pulmonary  Within defined limits                 Neuro/Psych   Within defined limits           Cardiovascular  Within defined limits  Hypertension          CAD         GI/Hepatic/Renal  Within defined limits              Endo/Other  Within defined limits           Other Findings              Physical Exam    Airway  Mallampati: II  TM Distance: 4 - 6 cm  Neck ROM: normal range of motion        Cardiovascular    Rhythm: regular  Rate: normal         Dental  No notable dental hx       Pulmonary  Breath sounds clear to auscultation               Abdominal  Abdominal exam normal       Other Findings            Anesthetic Plan    ASA: 3  Anesthesia type: total IV anesthesia            Anesthetic plan and risks discussed with: Patient

## 2021-10-21 ENCOUNTER — HOSPITAL ENCOUNTER (EMERGENCY)
Age: 71
Discharge: HOME | End: 2021-10-21
Attending: EMERGENCY MEDICINE
Payer: MEDICARE

## 2021-10-21 VITALS
BODY MASS INDEX: 20.38 KG/M2 | OXYGEN SATURATION: 98 % | DIASTOLIC BLOOD PRESSURE: 89 MMHG | SYSTOLIC BLOOD PRESSURE: 135 MMHG | HEIGHT: 63 IN | RESPIRATION RATE: 18 BRPM | TEMPERATURE: 98 F | WEIGHT: 115 LBS | HEART RATE: 87 BPM

## 2021-10-21 DIAGNOSIS — N39.0 URINARY TRACT INFECTION WITHOUT HEMATURIA, SITE UNSPECIFIED: Primary | ICD-10-CM

## 2021-10-21 DIAGNOSIS — R63.4 WEIGHT LOSS: ICD-10-CM

## 2021-10-21 DIAGNOSIS — N93.9 VAGINAL BLEEDING: ICD-10-CM

## 2021-10-21 LAB
ALBUMIN SERPL-MCNC: 3.4 G/DL (ref 3.5–5)
ALBUMIN/GLOB SERPL: 1 {RATIO} (ref 1.1–2.2)
ALP SERPL-CCNC: 80 U/L (ref 45–117)
ALT SERPL-CCNC: 19 U/L (ref 12–78)
ANION GAP SERPL CALC-SCNC: 12 MMOL/L (ref 5–15)
APPEARANCE UR: CLEAR
AST SERPL W P-5'-P-CCNC: 16 U/L (ref 15–37)
BACTERIA URNS QL MICRO: ABNORMAL /HPF
BASOPHILS # BLD: 0.1 K/UL (ref 0–0.2)
BASOPHILS NFR BLD: 1 % (ref 0–2.5)
BILIRUB SERPL-MCNC: 0.5 MG/DL (ref 0.2–1)
BILIRUB UR QL: NEGATIVE
BUN SERPL-MCNC: 9 MG/DL (ref 6–20)
BUN/CREAT SERPL: 7 (ref 12–20)
CA-I BLD-MCNC: 9.3 MG/DL (ref 8.5–10.1)
CHLORIDE SERPL-SCNC: 106 MMOL/L (ref 97–108)
CO2 SERPL-SCNC: 26 MMOL/L (ref 21–32)
COLOR UR: ABNORMAL
CREAT SERPL-MCNC: 1.27 MG/DL (ref 0.55–1.02)
EOSINOPHIL # BLD: 0.1 K/UL (ref 0–0.7)
EOSINOPHIL NFR BLD: 1 % (ref 0.9–2.9)
ERYTHROCYTE [DISTWIDTH] IN BLOOD BY AUTOMATED COUNT: 13 % (ref 11.5–14.5)
GLOBULIN SER CALC-MCNC: 3.3 G/DL (ref 2–4)
GLUCOSE SERPL-MCNC: 138 MG/DL (ref 65–100)
GLUCOSE UR STRIP.AUTO-MCNC: NEGATIVE MG/DL
HCT VFR BLD AUTO: 39 % (ref 36–46)
HGB BLD-MCNC: 13.2 G/DL (ref 13.5–17.5)
HGB UR QL STRIP: ABNORMAL
KETONES UR QL STRIP.AUTO: NEGATIVE MG/DL
LEUKOCYTE ESTERASE UR QL STRIP.AUTO: ABNORMAL
LYMPHOCYTES # BLD: 2.9 K/UL (ref 1–4.8)
LYMPHOCYTES NFR BLD: 29 % (ref 20.5–51.1)
MCH RBC QN AUTO: 34.9 PG (ref 31–34)
MCHC RBC AUTO-ENTMCNC: 33.8 G/DL (ref 31–36)
MCV RBC AUTO: 103.1 FL (ref 80–100)
MONOCYTES # BLD: 0.8 K/UL (ref 0.2–2.4)
MONOCYTES NFR BLD: 8 % (ref 1.7–9.3)
NEUTS SEG # BLD: 6.1 K/UL (ref 1.8–7.7)
NEUTS SEG NFR BLD: 61 % (ref 42–75)
NITRITE UR QL STRIP.AUTO: NEGATIVE
NRBC # BLD: 0.01 K/UL
NRBC BLD-RTO: 0.1 PER 100 WBC
PH UR STRIP: 6.5 [PH] (ref 5–8)
PLATELET # BLD AUTO: 143 K/UL (ref 150–400)
PMV BLD AUTO: 9.4 FL (ref 6.5–11.5)
POTASSIUM SERPL-SCNC: 3.4 MMOL/L (ref 3.5–5.1)
PROT SERPL-MCNC: 6.7 G/DL (ref 6.4–8.2)
PROT UR STRIP-MCNC: ABNORMAL MG/DL
RBC # BLD AUTO: 3.78 M/UL (ref 4.5–5.9)
RBC #/AREA URNS HPF: ABNORMAL /HPF (ref 0–3)
SODIUM SERPL-SCNC: 144 MMOL/L (ref 136–145)
SP GR UR REFRACTOMETRY: 1.01 (ref 1–1.03)
UROBILINOGEN UR QL STRIP.AUTO: 1 EU/DL (ref 0.2–1)
WBC # BLD AUTO: 10.2 K/UL (ref 4.4–11.3)
WBC URNS QL MICRO: ABNORMAL /HPF (ref 0–5)

## 2021-10-21 PROCEDURE — 99283 EMERGENCY DEPT VISIT LOW MDM: CPT

## 2021-10-21 PROCEDURE — 80053 COMPREHEN METABOLIC PANEL: CPT

## 2021-10-21 PROCEDURE — 74011250637 HC RX REV CODE- 250/637: Performed by: EMERGENCY MEDICINE

## 2021-10-21 PROCEDURE — 81001 URINALYSIS AUTO W/SCOPE: CPT

## 2021-10-21 PROCEDURE — 85025 COMPLETE CBC W/AUTO DIFF WBC: CPT

## 2021-10-21 RX ORDER — SULFAMETHOXAZOLE AND TRIMETHOPRIM 800; 160 MG/1; MG/1
1 TABLET ORAL EVERY 12 HOURS
Status: DISCONTINUED | OUTPATIENT
Start: 2021-10-21 | End: 2021-10-21

## 2021-10-21 RX ORDER — SULFAMETHOXAZOLE AND TRIMETHOPRIM 800; 160 MG/1; MG/1
1 TABLET ORAL ONCE
Status: COMPLETED | OUTPATIENT
Start: 2021-10-21 | End: 2021-10-21

## 2021-10-21 RX ORDER — SULFAMETHOXAZOLE AND TRIMETHOPRIM 800; 160 MG/1; MG/1
1 TABLET ORAL 2 TIMES DAILY
Qty: 6 TABLET | Refills: 0 | Status: SHIPPED | OUTPATIENT
Start: 2021-10-21 | End: 2021-10-24

## 2021-10-21 RX ORDER — GUAIFENESIN 100 MG/5ML
LIQUID (ML) ORAL
COMMUNITY

## 2021-10-21 RX ADMIN — SULFAMETHOXAZOLE AND TRIMETHOPRIM 1 TABLET: 800; 160 TABLET ORAL at 15:35

## 2021-10-21 NOTE — ED PROVIDER NOTES
HPI 46-year-old female brought to the ED by family complaining of a small amount of vaginal bleeding noted today. Underlying dementia precludes detailed history she denies abdominal pain urinary symptoms fever nausea vomiting or diarrhea. Her family notes weight loss over the last several months which is actually being evaluated by her PCP Gardenia Chapa she had a colonoscopy last month which was normal multiple medical problems are listed below. No previous history of postmenopausal bleeding, GYN surgery or GYN malignancy.     Past Medical History:   Diagnosis Date    Abnormal weight loss 9/11/2021    Atherosclerosis of native coronary artery without angina pectoris 9/11/2021    CAD (coronary artery disease)     MI    Carotid artery stenosis 9/11/2021    Cervicalgia 9/11/2021    Colon polyps     Constipation 9/11/2021    HTN (hypertension) 9/11/2021    Hyperlipidemia     Hypertension     Hypertensive heart disease without congestive heart failure 9/11/2021    Personal history of colonic polyps 9/11/2021    Vitamin D deficiency 9/11/2021       Past Surgical History:   Procedure Laterality Date    COLONOSCOPY  2015    COLONOSCOPY N/A 10/5/2021    COLONOSCOPY ( T I V A) performed by Alondra Degroot MD at [facility], [place] ENDOSCOPY    HX BIV-IMPLANTABLE CARDIOVERTER DEFIBRILLATOR  09/16/2013    HX HEART CATHETERIZATION      X 3    HX ORTHOPAEDIC      R leg    HX OTHER SURGICAL      R CAROTID ENDORECTOMY 11-4-05,   L CAROTID ENDARECTOMY 11-6-2006    HX OTHER SURGICAL      BILAT LOWER EXTREM VASC BYPASS SURGERY    HX PACEMAKER      6 0r 7 years ago         Family History:   Problem Relation Age of Onset   [facility] Cancer Mother     Hypertension Mother     No Known Problems Father        Social History     Socioeconomic History    Marital status:      Spouse name: Not on file    Number of children: Not on file    Years of education: Not on file    Highest education level: Not on file   Occupational History    Not on file   Tobacco Use    Smoking status: Current Every Day Smoker     Packs/day: 0.50    Smokeless tobacco: Never Used   Substance and Sexual Activity    Alcohol use: Never    Drug use: Never    Sexual activity: Not on file   Other Topics Concern    Not on file   Social History Narrative    Not on file     Social Determinants of Health     Financial Resource Strain:     Difficulty of Paying Living Expenses:    Food Insecurity:     Worried About Running Out of Food in the Last Year:     [address] in the Last Year:    Transportation Needs:     Lack of Transportation (Medical):  Lack of Transportation (Non-Medical):    Physical Activity:     Days of Exercise per Week:     Minutes of Exercise per Session:    Stress:     Feeling of Stress :    Social Connections:     Frequency of Communication with Friends and Family:     Frequency of Social Gatherings with Friends and Family:     Attends Mu-ism Services:     Active Member of Clubs or Organizations:     Attends Club or Organization Meetings:     Marital Status:    Intimate Partner Violence:     Fear of Current or Ex-Partner:     Emotionally Abused:     Physically Abused:     Sexually Abused: ALLERGIES: Patient has no known allergies. Review of systems somewhat limited by mild dementia  Review of Systems   Constitutional: Negative. HENT: Negative. Eyes: Negative. Respiratory: Negative for cough, chest tightness, shortness of breath and wheezing. Cardiovascular: Negative for chest pain, palpitations and leg swelling. Gastrointestinal: Negative for abdominal distention, abdominal pain, blood in stool, constipation, diarrhea, nausea and vomiting. Endocrine: Negative. Genitourinary: Negative for difficulty urinating, dysuria, flank pain, frequency and hematuria. Musculoskeletal: Negative. Neurological: Negative for dizziness, seizures, speech difficulty, weakness and numbness. Hematological: Negative. Psychiatric/Behavioral: Negative. Vitals:    10/21/21 1158 10/21/21 1200   BP: (!) 154/68 135/89   Pulse: 77 87   Resp: 18 18   Temp: 98 °F (36.7 °C)    SpO2: 99% 98%   Weight: 52.2 kg (115 lb)    Height: 5' 3\" (1.6 m)           Thin elderly AA female in no acute distress  Physical Exam  Vitals and nursing note reviewed. Constitutional:       General: She is not in acute distress. Appearance: Normal appearance. She is not ill-appearing, toxic-appearing or diaphoretic. HENT:      Head: Normocephalic and atraumatic. Nose: Nose normal.      Mouth/Throat:      Mouth: Mucous membranes are moist.   Eyes:      Extraocular Movements: Extraocular movements intact. Conjunctiva/sclera: Conjunctivae normal.   Cardiovascular:      Rate and Rhythm: Normal rate and regular rhythm. Pulses: Normal pulses. Heart sounds: Normal heart sounds. No murmur heard. Pulmonary:      Effort: Pulmonary effort is normal. No respiratory distress. Breath sounds: Normal breath sounds. No wheezing, rhonchi or rales. Abdominal:      General: Abdomen is flat. Bowel sounds are normal. There is no distension. Palpations: Abdomen is soft. There is no mass. Tenderness: There is no abdominal tenderness. There is no right CVA tenderness, left CVA tenderness, guarding or rebound. Hernia: No hernia is present. Genitourinary:     General: Normal vulva. Vagina: No vaginal discharge. Comments: Speculum exam shows atrophic vaginal mucosa small telegentectasias are noted though no active bleeding cervix feels normal uterus is atrophic nontender and appropriate size there is no adnexal mass palpable no cervical motion tenderness  Musculoskeletal:         General: No swelling, tenderness, deformity or signs of injury. Normal range of motion. Cervical back: Normal range of motion and neck supple. No rigidity or tenderness. Right lower leg: No edema. Left lower leg: No edema. Lymphadenopathy:      Cervical: No cervical adenopathy. Skin:     General: Skin is warm and dry. Capillary Refill: Capillary refill takes less than 2 seconds. Findings: No erythema, lesion or rash. Neurological:      General: No focal deficit present. Mental Status: She is alert and oriented to person, place, and time. Mental status is at baseline. Cranial Nerves: No cranial nerve deficit. Sensory: No sensory deficit. Psychiatric:         Mood and Affect: Mood normal.         Behavior: Behavior normal.         Thought Content:  Thought content normal.          MDM urinalysis shows UTI no obvious source for vaginal bleeding will treat urinary tract infection/just follow-up with PCP in 2 days       Procedures

## 2021-10-27 ENCOUNTER — APPOINTMENT (OUTPATIENT)
Dept: CT IMAGING | Age: 71
End: 2021-10-27
Attending: EMERGENCY MEDICINE
Payer: MEDICARE

## 2021-10-27 ENCOUNTER — HOSPITAL ENCOUNTER (EMERGENCY)
Age: 71
Discharge: HOME | End: 2021-10-27
Attending: EMERGENCY MEDICINE
Payer: MEDICARE

## 2021-10-27 VITALS
DIASTOLIC BLOOD PRESSURE: 70 MMHG | HEART RATE: 72 BPM | OXYGEN SATURATION: 100 % | WEIGHT: 111 LBS | HEIGHT: 63 IN | TEMPERATURE: 97.9 F | SYSTOLIC BLOOD PRESSURE: 153 MMHG | RESPIRATION RATE: 18 BRPM | BODY MASS INDEX: 19.67 KG/M2

## 2021-10-27 DIAGNOSIS — M48.061 SPINAL STENOSIS OF LUMBAR REGION, UNSPECIFIED WHETHER NEUROGENIC CLAUDICATION PRESENT: Primary | ICD-10-CM

## 2021-10-27 LAB
ALBUMIN SERPL-MCNC: 3.5 G/DL (ref 3.5–5)
ALBUMIN/GLOB SERPL: 1 {RATIO} (ref 1.1–2.2)
ALP SERPL-CCNC: 71 U/L (ref 45–117)
ALT SERPL-CCNC: 20 U/L (ref 12–78)
ANION GAP SERPL CALC-SCNC: 12 MMOL/L (ref 5–15)
APPEARANCE UR: CLEAR
AST SERPL W P-5'-P-CCNC: 17 U/L (ref 15–37)
BACTERIA URNS QL MICRO: ABNORMAL /HPF
BASOPHILS # BLD: 0.1 K/UL (ref 0–0.2)
BASOPHILS NFR BLD: 1 % (ref 0–2.5)
BILIRUB SERPL-MCNC: 0.5 MG/DL (ref 0.2–1)
BILIRUB UR QL: NEGATIVE
BUN SERPL-MCNC: 12 MG/DL (ref 6–20)
BUN/CREAT SERPL: 10 (ref 12–20)
CA-I BLD-MCNC: 9.5 MG/DL (ref 8.5–10.1)
CHLORIDE SERPL-SCNC: 108 MMOL/L (ref 97–108)
CO2 SERPL-SCNC: 22 MMOL/L (ref 21–32)
COLOR UR: ABNORMAL
CREAT SERPL-MCNC: 1.15 MG/DL (ref 0.55–1.02)
EOSINOPHIL # BLD: 0.1 K/UL (ref 0–0.7)
EOSINOPHIL NFR BLD: 1 % (ref 0.9–2.9)
ERYTHROCYTE [DISTWIDTH] IN BLOOD BY AUTOMATED COUNT: 12.9 % (ref 11.5–14.5)
GLOBULIN SER CALC-MCNC: 3.6 G/DL (ref 2–4)
GLUCOSE SERPL-MCNC: 102 MG/DL (ref 65–100)
GLUCOSE UR STRIP.AUTO-MCNC: NEGATIVE MG/DL
HCT VFR BLD AUTO: 38.3 % (ref 36–46)
HGB BLD-MCNC: 12.9 G/DL (ref 13.5–17.5)
HGB UR QL STRIP: ABNORMAL
KETONES UR QL STRIP.AUTO: NEGATIVE MG/DL
LEUKOCYTE ESTERASE UR QL STRIP.AUTO: NEGATIVE
LYMPHOCYTES # BLD: 2.7 K/UL (ref 1–4.8)
LYMPHOCYTES NFR BLD: 27 % (ref 20.5–51.1)
MCH RBC QN AUTO: 35.1 PG (ref 31–34)
MCHC RBC AUTO-ENTMCNC: 33.8 G/DL (ref 31–36)
MCV RBC AUTO: 103.9 FL (ref 80–100)
MONOCYTES # BLD: 0.8 K/UL (ref 0.2–2.4)
MONOCYTES NFR BLD: 8 % (ref 1.7–9.3)
NEUTS SEG # BLD: 6.2 K/UL (ref 1.8–7.7)
NEUTS SEG NFR BLD: 63 % (ref 42–75)
NITRITE UR QL STRIP.AUTO: NEGATIVE
NRBC # BLD: 0.01 K/UL
NRBC BLD-RTO: 0.1 PER 100 WBC
PH UR STRIP: 7 [PH] (ref 5–8)
PLATELET # BLD AUTO: 145 K/UL (ref 150–400)
PMV BLD AUTO: 9.2 FL (ref 6.5–11.5)
POTASSIUM SERPL-SCNC: 3.7 MMOL/L (ref 3.5–5.1)
PROT SERPL-MCNC: 7.1 G/DL (ref 6.4–8.2)
PROT UR STRIP-MCNC: NEGATIVE MG/DL
RBC # BLD AUTO: 3.68 M/UL (ref 4.5–5.9)
RBC #/AREA URNS HPF: ABNORMAL /HPF (ref 0–3)
SODIUM SERPL-SCNC: 142 MMOL/L (ref 136–145)
SP GR UR REFRACTOMETRY: 1.01 (ref 1–1.03)
TSH SERPL DL<=0.05 MIU/L-ACNC: 1.14 UIU/ML (ref 0.36–3.74)
UROBILINOGEN UR QL STRIP.AUTO: 0.2 EU/DL (ref 0.2–1)
WBC # BLD AUTO: 9.9 K/UL (ref 4.4–11.3)
WBC URNS QL MICRO: ABNORMAL /HPF (ref 0–5)

## 2021-10-27 PROCEDURE — 74011250637 HC RX REV CODE- 250/637: Performed by: EMERGENCY MEDICINE

## 2021-10-27 PROCEDURE — 80053 COMPREHEN METABOLIC PANEL: CPT

## 2021-10-27 PROCEDURE — 36415 COLL VENOUS BLD VENIPUNCTURE: CPT

## 2021-10-27 PROCEDURE — 99284 EMERGENCY DEPT VISIT MOD MDM: CPT

## 2021-10-27 PROCEDURE — 84443 ASSAY THYROID STIM HORMONE: CPT

## 2021-10-27 PROCEDURE — 85025 COMPLETE CBC W/AUTO DIFF WBC: CPT

## 2021-10-27 PROCEDURE — 81001 URINALYSIS AUTO W/SCOPE: CPT

## 2021-10-27 PROCEDURE — 72131 CT LUMBAR SPINE W/O DYE: CPT

## 2021-10-27 RX ORDER — IBUPROFEN 200 MG
400 TABLET ORAL
Qty: 20 TABLET | Refills: 0 | Status: SHIPPED | OUTPATIENT
Start: 2021-10-27

## 2021-10-27 RX ORDER — IBUPROFEN 400 MG/1
400 TABLET ORAL ONCE
Status: COMPLETED | OUTPATIENT
Start: 2021-10-27 | End: 2021-10-27

## 2021-10-27 RX ADMIN — IBUPROFEN 400 MG: 400 TABLET, FILM COATED ORAL at 11:34

## 2021-10-27 NOTE — ED PROVIDER NOTES
HPI 73-year-old female brought to ED by  complaining of low lumbar back pain this is a chronic intermittent problem worse over the last 24 hours. There is been no fall injury. Pain is nonradiating worse with movement. Denies urinary symptoms. Multiple medical problems listed below apparently with relatively new onset dementia and some unspecified weight loss. Patient was seen in the ED 6 days ago by myself work-up then showed a urinary tract infection no other significant findings have suggested follow-up with PCP-unable to access today.   Colonoscopy approximately 3 weeks ago was normal    Past Medical History:   Diagnosis Date    Abnormal weight loss 9/11/2021    Atherosclerosis of native coronary artery without angina pectoris 9/11/2021    CAD (coronary artery disease)     MI    Carotid artery stenosis 9/11/2021    Cervicalgia 9/11/2021    Colon polyps     Constipation 9/11/2021    HTN (hypertension) 9/11/2021    Hyperlipidemia     Hypertension     Hypertensive heart disease without congestive heart failure 9/11/2021    Personal history of colonic polyps 9/11/2021    Vitamin D deficiency 9/11/2021       Past Surgical History:   Procedure Laterality Date    COLONOSCOPY  2015    COLONOSCOPY N/A 10/5/2021    COLONOSCOPY ( T I V A) performed by Cintia Torres MD at [facility] END[facility]COPY    HX BIV-IMPLANTABLE CARDIOVERTER DEFIBRILLATOR  09/16/2013    HX HEART CATHETERIZATION      X 3    HX ORTHOPAEDIC      R leg    HX OTHER SURGICAL      R CAROTID ENDORECTOMY 11-4-05,   L CAROTID ENDARECTOMY 11-6-2006    HX OTHER SURGICAL      BILAT LOWER EXTREM VASC BYPASS SURGERY    HX PACEMAKER      6 0r 7 years ago         Family History:   Problem Relation Age of Onset    Cancer Mother     Hypertension Mother     No Known Problems Father        Social History     Socioeconomic History    Marital status:      Spouse name: Not on file    Number of children: Not on file    Years of education: Not on file    Highest education level: Not on file   Occupational History    Not on file   Tobacco Use    Smoking status: Current Every Day Smoker     Packs/day: 0.50    Smokeless tobacco: Never Used   Substance and Sexual Activity    Alcohol use: Never    Drug use: Never    Sexual activity: Not on file   Other Topics Concern    Not on file   Social History Narrative    Not on file     Social Determinants of Health     Financial Resource Strain:     Difficulty of Paying Living Expenses:    Food Insecurity:     Worried About Running Out of Food in the Last Year:     [address] in the Last Year:    Transportation Needs:     Lack of Transportation (Medical):  Lack of Transportation (Non-Medical):    Physical Activity:     Days of Exercise per Week:     Minutes of Exercise per Session:    Stress:     Feeling of Stress :    Social Connections:     Frequency of Communication with Friends and Family:     Frequency of Social Gatherings with Friends and Family:     Attends [employer] Services:     Active Member of Clubs or Organizations:     Attends Club or Organization Meetings:     Marital Status:    Intimate Partner Violence:     Fear of Current or Ex-Partner:     Emotionally Abused:     Physically Abused:     Sexually Abused: ALLERGIES: Patient has no known allergies. Review of Systems   Constitutional: Positive for appetite change and fatigue. Negative for chills and fever. HENT: Negative. Eyes: Negative. Respiratory: Negative for cough, chest tightness, shortness of breath and wheezing. Cardiovascular: Negative for chest pain, palpitations and leg swelling. Gastrointestinal: Negative for abdominal distention, abdominal pain, blood in stool, constipation, diarrhea, nausea and vomiting. Endocrine: Negative. Genitourinary: Negative for difficulty urinating, dysuria, flank pain, frequency and hematuria. Musculoskeletal: Positive for back pain. Negative for gait problem, joint swelling and neck pain. Neurological: Negative for dizziness, seizures, speech difficulty, weakness and numbness. Hematological: Negative. Psychiatric/Behavioral: Negative. Vitals:    10/27/21 1023 10/27/21 1113 10/27/21 1135   BP: (!) 142/60 (!) 148/67 (!) 157/66   Pulse: 77 65    Resp: 18 18    Temp: 97.9 °F (36.6 °C)     SpO2: 95% 100% 100%   Weight: 50.3 kg (111 lb)     Height: 5' 3\" (1.6 m)              Physical Exam  Vitals and nursing note reviewed. Constitutional:       General: She is not in acute distress. Appearance: She is not ill-appearing, toxic-appearing or diaphoretic. HENT:      Head: Normocephalic and atraumatic. Nose: Nose normal.      Mouth/Throat:      Mouth: Mucous membranes are moist.   Eyes:      Extraocular Movements: Extraocular movements intact. Conjunctiva/sclera: Conjunctivae normal.   Cardiovascular:      Rate and Rhythm: Normal rate and regular rhythm. Pulses: Normal pulses. Heart sounds: Normal heart sounds. No murmur heard. Pulmonary:      Effort: Pulmonary effort is normal. No respiratory distress. Breath sounds: Normal breath sounds. No wheezing, rhonchi or rales. Abdominal:      General: Bowel sounds are normal. There is no distension. Palpations: Abdomen is soft. There is no mass. Tenderness: There is no abdominal tenderness. There is no right CVA tenderness, left CVA tenderness, guarding or rebound. Hernia: No hernia is present. Musculoskeletal:         General: Tenderness present. No swelling, deformity or signs of injury. Normal range of motion. Arms:       Cervical back: Normal range of motion and neck supple. No rigidity or tenderness. Right lower leg: No edema. Left lower leg: No edema.       Comments: Mild tenderness diffusely in the low lumbar back without deformity gross anatomic abnormality no CVA tenderness   Lymphadenopathy:      Cervical: No cervical adenopathy. Skin:     General: Skin is warm and dry. Capillary Refill: Capillary refill takes less than 2 seconds. Findings: No erythema, lesion or rash. Neurological:      General: No focal deficit present. Mental Status: She is alert and oriented to person, place, and time. Mental status is at baseline. Cranial Nerves: No cranial nerve deficit. Sensory: No sensory deficit. Motor: No weakness. Deep Tendon Reflexes: Reflexes normal.      Comments: No neurologic deficit of the lower extremities; pulses are 2+ in the feet bilaterally   Psychiatric:         Mood and Affect: Mood normal.         Behavior: Behavior normal.         Thought Content: Thought content normal.          MDM elderly female  Presents with lower back pain decreased appetite and unspecified weight loss CT of the lower spine shows some spinal stenosis some degenerative changes at multiple vertebrae. We will treat with ibuprofen for now and suggest PCP follow-up. Laboratory evaluations were essentially normal and UTI from visit 6 days ago is cleared.   Procedures

## 2021-11-03 ENCOUNTER — TRANSCRIBE ORDER (OUTPATIENT)
Dept: SCHEDULING | Age: 71
End: 2021-11-03

## 2021-11-03 DIAGNOSIS — R63.4 LOSS OF WEIGHT: Primary | ICD-10-CM

## 2022-03-18 PROBLEM — K59.00 CONSTIPATION: Status: ACTIVE | Noted: 2021-09-11

## 2022-03-18 PROBLEM — R63.4 ABNORMAL WEIGHT LOSS: Status: ACTIVE | Noted: 2021-09-11

## 2022-03-18 PROBLEM — I65.29 CAROTID ARTERY STENOSIS: Status: ACTIVE | Noted: 2021-09-11

## 2022-03-18 PROBLEM — I11.9 HYPERTENSIVE HEART DISEASE WITHOUT CONGESTIVE HEART FAILURE: Status: ACTIVE | Noted: 2021-09-11

## 2022-03-19 PROBLEM — Z86.010 PERSONAL HISTORY OF COLONIC POLYPS: Status: ACTIVE | Noted: 2021-09-11

## 2022-03-19 PROBLEM — I25.10 ATHEROSCLEROSIS OF NATIVE CORONARY ARTERY WITHOUT ANGINA PECTORIS: Status: ACTIVE | Noted: 2021-09-11

## 2022-03-19 PROBLEM — E55.9 VITAMIN D DEFICIENCY: Status: ACTIVE | Noted: 2021-09-11

## 2022-03-19 PROBLEM — Z86.0100 PERSONAL HISTORY OF COLONIC POLYPS: Status: ACTIVE | Noted: 2021-09-11

## 2022-03-19 PROBLEM — I10 HTN (HYPERTENSION): Status: ACTIVE | Noted: 2021-09-11

## 2022-03-19 PROBLEM — E78.5 HYPERLIPIDEMIA: Status: ACTIVE | Noted: 2021-09-11

## 2022-03-20 PROBLEM — M54.2 CERVICALGIA: Status: ACTIVE | Noted: 2021-09-11

## 2022-03-26 ENCOUNTER — HOSPITAL ENCOUNTER (EMERGENCY)
Age: 72
Discharge: HOME | End: 2022-03-26
Attending: EMERGENCY MEDICINE
Payer: MEDICARE

## 2022-03-26 VITALS
RESPIRATION RATE: 18 BRPM | BODY MASS INDEX: 20.91 KG/M2 | DIASTOLIC BLOOD PRESSURE: 81 MMHG | TEMPERATURE: 98.2 F | HEART RATE: 70 BPM | OXYGEN SATURATION: 97 % | SYSTOLIC BLOOD PRESSURE: 154 MMHG | HEIGHT: 63 IN | WEIGHT: 118 LBS

## 2022-03-26 DIAGNOSIS — H00.014 HORDEOLUM EXTERNUM OF LEFT UPPER EYELID: Primary | ICD-10-CM

## 2022-03-26 PROCEDURE — 74011250637 HC RX REV CODE- 250/637: Performed by: EMERGENCY MEDICINE

## 2022-03-26 PROCEDURE — 74011000250 HC RX REV CODE- 250: Performed by: EMERGENCY MEDICINE

## 2022-03-26 PROCEDURE — 99283 EMERGENCY DEPT VISIT LOW MDM: CPT

## 2022-03-26 RX ORDER — TETRACAINE HYDROCHLORIDE 5 MG/ML
1 SOLUTION OPHTHALMIC
Status: DISCONTINUED | OUTPATIENT
Start: 2022-03-26 | End: 2022-03-26 | Stop reason: HOSPADM

## 2022-03-26 RX ORDER — CEPHALEXIN 250 MG/1
500 CAPSULE ORAL
Status: COMPLETED | OUTPATIENT
Start: 2022-03-26 | End: 2022-03-26

## 2022-03-26 RX ORDER — CEPHALEXIN 500 MG/1
500 CAPSULE ORAL 3 TIMES DAILY
Qty: 21 CAPSULE | Refills: 0 | Status: SHIPPED | OUTPATIENT
Start: 2022-03-26 | End: 2022-04-02

## 2022-03-26 RX ADMIN — CEPHALEXIN 500 MG: 250 CAPSULE ORAL at 10:23

## 2022-03-26 RX ADMIN — FLUORESCEIN SODIUM 1 STRIP: 1 STRIP OPHTHALMIC at 10:23

## 2022-03-26 NOTE — ED PROVIDER NOTES
HPI 10year-old female presents the ED with left eye irritation beginning 2 days ago discomfort localized to the left upper lid. Denies foreign body injury to eye. No known exposure to conjunctivitis. No contact lenses no change in vision.     Past Medical History:   Diagnosis Date    Abnormal weight loss 9/11/2021    Atherosclerosis of native coronary artery without angina pectoris 9/11/2021    CAD (coronary artery disease)     MI    Carotid artery stenosis 9/11/2021    Cervicalgia 9/11/2021    Colon polyps     Constipation 9/11/2021    HTN (hypertension) 9/11/2021    Hyperlipidemia     Hypertension     Hypertensive heart disease without congestive heart failure 9/11/2021    Personal history of colonic polyps 9/11/2021    Vitamin D deficiency 9/11/2021       Past Surgical History:   Procedure Laterality Date    COLONOSCOPY  2015    COLONOSCOPY N/A 10/5/2021    COLONOSCOPY ( T I V A) performed by Olivia Rosas MD at [facility] ENDOSCOPY    HX BIV-IMPLANTABLE CARDIOVERTER DEFIBRILLATOR  09/16/2013    HX HEART CATHETERIZATION      X 3    HX ORTHOPAEDIC      R leg    HX OTHER SURGICAL      R CAROTID ENDORECTOMY 11-4-05,   L CAROTID ENDARECTOMY 11-6-2006    HX OTHER SURGICAL      BILAT LOWER EXTREM VASC BYPASS SURGERY    HX PACEMAKER      6 0r 7 years ago         Family History:   Problem Relation Age of Onset   Velasquez Cancer Mother     Hypertension Mother     No Known Problems Father        Social History     Socioeconomic History    Marital status:      Spouse name: Not on file    Number of children: Not on file    Years of education: Not on file    Highest education level: Not on file   Occupational History    Not on file   Tobacco Use    Smoking status: Current Every Day Smoker     Packs/day: 0.50    Smokeless tobacco: Never Used   Substance and Sexual Activity    Alcohol use: Never    Drug use: Never    Sexual activity: Not on file   Other Topics Concern    Not on file   Social History Narrative    Not on file     Social Determinants of Health     Financial Resource Strain:     Difficulty of Paying Living Expenses: Not on file   Food Insecurity:     Worried About Running Out of Food in the Last Year: Not on file    Marely of Food in the Last Year: Not on file   Transportation Needs:     Lack of Transportation (Medical): Not on file    Lack of Transportation (Non-Medical): Not on file   Physical Activity:     Days of Exercise per Week: Not on file    Minutes of Exercise per Session: Not on file   Stress:     Feeling of Stress : Not on file   Social Connections:     Frequency of Communication with Friends and Family: Not on file    Frequency of Social Gatherings with Friends and Family: Not on file    Attends Confucianism Services: Not on file    Active Member of [address] [place] [address] [employer] or Organizations: Not on file    Attends Club or Organization Meetings: Not on file    Marital Status: Not on file   Intimate Partner Violence:     Fear of Current or Ex-Partner: Not on file    Emotionally Abused: Not on file    Physically Abused: Not on file    Sexually Abused: Not on file   Housing Stability:     Unable to Pay for Housing in the Last Year: Not on file    Number of [place] in the Last Year: Not on file    Unstable Housing in the Last Year: Not on file         ALLERGIES: Patient has no known allergies. Review of Systems   Eyes: Positive for redness. Negative for photophobia, pain, discharge, itching and visual disturbance. All other systems reviewed and are negative. Vitals:    03/26/22 0956   BP: (!) 162/91   Pulse: 82   Resp: 18   Temp: 98.2 °F (36.8 °C)   SpO2: 99%   Weight: 53.5 kg (118 lb)   Height: 5' 3\" (1.6 m)          Pleasant elderly AA female no acute distress  Physical Exam  Vitals and nursing note reviewed. Constitutional:       Appearance: Normal appearance. HENT:      Head: Normocephalic and atraumatic.       Nose: Nose normal.      Mouth/Throat:      Mouth: Mucous membranes are moist.   Eyes:      General: Lids are everted, no foreign bodies appreciated. Vision grossly intact. Gaze aligned appropriately. No allergic shiner, visual field deficit or scleral icterus. Right eye: No discharge. Left eye: No discharge. Extraocular Movements: Extraocular movements intact. Right eye: Normal extraocular motion. Left eye: Normal extraocular motion. Conjunctiva/sclera:      Right eye: Right conjunctiva is not injected. No chemosis or exudate. Left eye: Left conjunctiva is injected. No chemosis, exudate or hemorrhage. Pupils: Pupils are equal, round, and reactive to light. Comments: Stye left upper lid lateral mild inflammation   Neurological:      Mental Status: She is alert.           MDM   Instructed warm soaks Keflex 500 3 times daily  Procedures

## 2022-08-03 ENCOUNTER — TRANSCRIBE ORDER (OUTPATIENT)
Dept: SCHEDULING | Age: 72
End: 2022-08-03

## 2022-08-03 DIAGNOSIS — Z12.31 ENCOUNTER FOR SCREENING MAMMOGRAM FOR MALIGNANT NEOPLASM OF BREAST: Primary | ICD-10-CM

## 2022-08-07 ENCOUNTER — HOSPITAL ENCOUNTER (EMERGENCY)
Age: 72
Discharge: HOME | End: 2022-08-07
Attending: EMERGENCY MEDICINE
Payer: MEDICARE

## 2022-08-07 ENCOUNTER — APPOINTMENT (OUTPATIENT)
Dept: GENERAL RADIOLOGY | Age: 72
End: 2022-08-07
Attending: EMERGENCY MEDICINE
Payer: MEDICARE

## 2022-08-07 VITALS
TEMPERATURE: 98.2 F | HEART RATE: 75 BPM | HEIGHT: 63 IN | BODY MASS INDEX: 20.91 KG/M2 | OXYGEN SATURATION: 100 % | WEIGHT: 118 LBS | SYSTOLIC BLOOD PRESSURE: 153 MMHG | DIASTOLIC BLOOD PRESSURE: 73 MMHG | RESPIRATION RATE: 16 BRPM

## 2022-08-07 DIAGNOSIS — J44.1 COPD EXACERBATION (HCC): Primary | ICD-10-CM

## 2022-08-07 PROCEDURE — 74011250637 HC RX REV CODE- 250/637: Performed by: EMERGENCY MEDICINE

## 2022-08-07 PROCEDURE — 74011250636 HC RX REV CODE- 250/636: Performed by: EMERGENCY MEDICINE

## 2022-08-07 PROCEDURE — 94640 AIRWAY INHALATION TREATMENT: CPT

## 2022-08-07 PROCEDURE — 99284 EMERGENCY DEPT VISIT MOD MDM: CPT

## 2022-08-07 PROCEDURE — 74011000250 HC RX REV CODE- 250: Performed by: EMERGENCY MEDICINE

## 2022-08-07 PROCEDURE — 96372 THER/PROPH/DIAG INJ SC/IM: CPT

## 2022-08-07 PROCEDURE — 71045 X-RAY EXAM CHEST 1 VIEW: CPT

## 2022-08-07 RX ORDER — DOXYCYCLINE HYCLATE 100 MG
100 TABLET ORAL 2 TIMES DAILY
Qty: 14 TABLET | Refills: 0 | Status: SHIPPED | OUTPATIENT
Start: 2022-08-07 | End: 2022-08-14

## 2022-08-07 RX ORDER — ALBUTEROL SULFATE 90 UG/1
2 AEROSOL, METERED RESPIRATORY (INHALATION)
Qty: 6.7 G | Refills: 0 | Status: SHIPPED | OUTPATIENT
Start: 2022-08-07

## 2022-08-07 RX ORDER — IBUPROFEN 800 MG/1
800 TABLET ORAL
Status: COMPLETED | OUTPATIENT
Start: 2022-08-07 | End: 2022-08-07

## 2022-08-07 RX ORDER — PREDNISONE 20 MG/1
20 TABLET ORAL DAILY
Qty: 10 TABLET | Refills: 0 | Status: SHIPPED | OUTPATIENT
Start: 2022-08-07 | End: 2022-08-17

## 2022-08-07 RX ORDER — IPRATROPIUM BROMIDE AND ALBUTEROL SULFATE 2.5; .5 MG/3ML; MG/3ML
3 SOLUTION RESPIRATORY (INHALATION)
Status: COMPLETED | OUTPATIENT
Start: 2022-08-07 | End: 2022-08-07

## 2022-08-07 RX ORDER — AMOXICILLIN AND CLAVULANATE POTASSIUM 875; 125 MG/1; MG/1
1 TABLET, FILM COATED ORAL
Status: COMPLETED | OUTPATIENT
Start: 2022-08-07 | End: 2022-08-07

## 2022-08-07 RX ADMIN — METHYLPREDNISOLONE SODIUM SUCCINATE 125 MG: 125 INJECTION, POWDER, FOR SOLUTION INTRAMUSCULAR; INTRAVENOUS at 12:47

## 2022-08-07 RX ADMIN — IPRATROPIUM BROMIDE AND ALBUTEROL SULFATE 3 ML: .5; 3 SOLUTION RESPIRATORY (INHALATION) at 12:39

## 2022-08-07 RX ADMIN — IBUPROFEN 800 MG: 800 TABLET, FILM COATED ORAL at 13:54

## 2022-08-07 RX ADMIN — AMOXICILLIN AND CLAVULANATE POTASSIUM 1 TABLET: 875; 125 TABLET, FILM COATED ORAL at 12:47

## 2022-08-07 NOTE — ED PROVIDER NOTES
EMERGENCY DEPARTMENT HISTORY AND PHYSICAL EXAM      Date: 8/7/2022  Patient Name: Sarah Mckeon    History of Presenting Illness     Chief Complaint   Patient presents with    Fatigue       History Provided By: Patient    HPI: Sarah Mckeon, 71 y.o. female with a significant past medical history of hypertension coronary artery disease, COPD, smoker presents to the ED with increased fatigue decreased appetite over the past 7 months, patient reports 26 pound weight loss over the last 6 months, patient seen by PCP last week presenting with same complaints patient states blood work was drawn urine check with no abnormal findings, today patient complains of a cough for last 2 days productive of white/clear phlegm denies shortness of breath no chest pains no nausea no vomiting no diarrhea    There are no other complaints, changes, or physical findings at this time. PCP: Alberto Snyder NP    No current facility-administered medications on file prior to encounter. Current Outpatient Medications on File Prior to Encounter   Medication Sig Dispense Refill    ibuprofen (MOTRIN) 200 mg tablet Take 2 Tablets by mouth every eight (8) hours as needed for Pain. 20 Tablet 0    aspirin (Terry Chewable Aspirin) 81 mg chewable tablet aspirin 81 mg tablet   Take by oral route. cloNIDine HCL (CATAPRES) 0.1 mg tablet Take 0.1 mg by mouth two (2) times a day. Ventolin HFA 90 mcg/actuation inhaler TAKE 2 PUFFS EVERY 6 HOURS AS NEEDED FOR WHEEZING      FeroSuL 325 mg (65 mg iron) tablet       aspirin-calcium carbonate 81 mg-300 mg calcium(777 mg) tab aspirin 81 mg tablet   Take by oral route. Symbicort 160-4.5 mcg/actuation HFAA       carvediloL (COREG) 25 mg tablet Take 25 mg by mouth two (2) times daily (with meals). clopidogreL (PLAVIX) 75 mg tab clopidogrel 75 mg tablet   Take 1 tablet every day by oral route. cyproheptadine (PERIACTIN) 4 mg tablet Take 4 mg by mouth three (3) times daily.  For appetite (Patient not taking: Reported on 10/5/2021)      dilTIAZem ER (CARDIZEM CD) 240 mg capsule Take 240 mg by mouth daily. donepeziL (ARICEPT) 10 mg tablet donepezil 10 mg tablet   Take 1 tablet every day by oral route. linaCLOtide (Linzess) 145 mcg cap capsule Linzess 145 mcg capsule   Take 1 capsule every day by oral route. lisinopriL (PRINIVIL, ZESTRIL) 10 mg tablet lisinopril 10 mg tablet   Take 1 tablet every day by oral route. mirtazapine (REMERON) 15 mg tablet Take 15 mg by mouth nightly. rosuvastatin (CRESTOR) 40 mg tablet rosuvastatin 40 mg tablet   Take 1 tablet every day by oral route.          Past History     Past Medical History:  Past Medical History:   Diagnosis Date    Abnormal weight loss 9/11/2021    Atherosclerosis of native coronary artery without angina pectoris 9/11/2021    CAD (coronary artery disease)     MI    Carotid artery stenosis 9/11/2021    Cervicalgia 9/11/2021    Colon polyps     Constipation 9/11/2021    HTN (hypertension) 9/11/2021    Hyperlipidemia     Hypertension     Hypertensive heart disease without congestive heart failure 9/11/2021    Personal history of colonic polyps 9/11/2021    Vitamin D deficiency 9/11/2021       Past Surgical History:  Past Surgical History:   Procedure Laterality Date    COLONOSCOPY  2015    COLONOSCOPY N/A 10/5/2021    COLONOSCOPY ( T I V A) performed by Emil Lim MD at [facility] ENDOSCOPY    HX BIV-IMPLANTABLE CARDIOVERTER DEFIBRILLATOR  09/16/2013    HX HEART CATHETERIZATION      X 3    HX ORTHOPAEDIC      R leg    HX OTHER SURGICAL      R CAROTID ENDORECTOMY 11-4-05,   L CAROTID ENDARECTOMY 11-6-2006    HX OTHER SURGICAL      BILAT LOWER EXTREM VASC BYPASS SURGERY    HX PACEMAKER      6 0r 7 years ago       Family History:  Family History   Problem Relation Age of Onset    Cancer Mother     Hypertension Mother     No Known Problems Father        Social History:  Social History     Tobacco Use    Smoking status: Every Day     Packs/day: 0.50     Types: Cigarettes    Smokeless tobacco: Never   Substance Use Topics    Alcohol use: Never    Drug use: Never       Allergies:  No Known Allergies    Review of Systems   Review of Systems   Constitutional:  Positive for appetite change, fatigue and unexpected weight change. Negative for chills and fever. HENT:  Negative for rhinorrhea and sore throat. Eyes:  Negative for pain and visual disturbance. Respiratory:  Positive for cough. Negative for choking, chest tightness, shortness of breath and wheezing. Cardiovascular:  Negative for chest pain and leg swelling. Gastrointestinal:  Negative for abdominal pain and vomiting. Endocrine: Negative for polydipsia and polyuria. Genitourinary:  Negative for dysuria and hematuria. Musculoskeletal:  Negative for back pain and neck pain. Skin:  Negative for color change and pallor. Neurological:  Negative for weakness and headaches. Psychiatric/Behavioral:  Negative for agitation and suicidal ideas. Physical Exam   Physical Exam  Vitals and nursing note reviewed. Constitutional:       General: She is not in acute distress. Appearance: She is not ill-appearing, toxic-appearing or diaphoretic. HENT:      Head: Normocephalic and atraumatic. Right Ear: Tympanic membrane normal.      Left Ear: Tympanic membrane normal.      Nose: Nose normal. No congestion. Mouth/Throat:      Mouth: Mucous membranes are moist.      Pharynx: Oropharynx is clear. Eyes:      Extraocular Movements: Extraocular movements intact. Conjunctiva/sclera: Conjunctivae normal.      Pupils: Pupils are equal, round, and reactive to light. Cardiovascular:      Rate and Rhythm: Normal rate and regular rhythm. Pulses: Normal pulses. Heart sounds: Normal heart sounds. Pulmonary:      Effort: Pulmonary effort is normal. No prolonged expiration or respiratory distress. Breath sounds: Normal air entry.  Examination of the left-lower field reveals rales. Rales present. No decreased breath sounds, wheezing or rhonchi. Abdominal:      General: Bowel sounds are normal.      Palpations: Abdomen is soft. Tenderness: There is no abdominal tenderness. Musculoskeletal:         General: No tenderness, deformity or signs of injury. Normal range of motion. Cervical back: Normal range of motion and neck supple. No rigidity or tenderness. Lymphadenopathy:      Cervical: No cervical adenopathy. Skin:     General: Skin is warm and dry. Capillary Refill: Capillary refill takes less than 2 seconds. Findings: No rash. Neurological:      General: No focal deficit present. Mental Status: She is alert and oriented to person, place, and time. Cranial Nerves: No cranial nerve deficit. Sensory: No sensory deficit. Psychiatric:         Mood and Affect: Mood normal.         Behavior: Behavior normal.       Lab and Diagnostic Study Results   Labs -   No results found for this or any previous visit (from the past 12 hour(s)). Radiologic Studies -   @lastxrresult@  CT Results  (Last 48 hours)      None          CXR Results  (Last 48 hours)      None            Medical Decision Making and ED Course   - I am the first provider for this patient. I reviewed the vital signs, available nursing notes, past medical history, past surgical history, family history and social history. - Initial assessment performed. The patients presenting problems have been discussed, and they are in agreement with the care plan formulated and outlined with them. I have encouraged them to ask questions as they arise throughout their visit. Vital Signs-Reviewed the patient's vital signs.   Patient Vitals for the past 12 hrs:   Temp Pulse Resp BP SpO2   08/07/22 1043 98.2 °F (36.8 °C) 75 16 (!) 144/92 95 %       Differential Diagnosis & Medical Decision Making Provider Note:   Cough DDx COPD, CHF, pneumonia  MDM       ED Course: TOBACCO COUNSELING: Upon evaluation, pt expressed that they are a current tobacco user. For approximately 10 minutes, pt has been counseled on the dangers of smoking and was encouraged to quit as soon as possible in order to decrease further risks to their health. Pt has conveyed their understanding of the risks involved should they continue to use tobacco products. and HYPERTENSION COUNSELING: Education was provided to the patient today regarding their hypertension. Patient is made aware of their elevated blood pressure and is instructed to follow up this week with their Primary Care for a recheck. Patient is counseled regarding consequences of chronic, uncontrolled hypertension including kidney disease, heart disease, stroke or even death. Patient states their understanding and agrees to follow up this week. Additionally, during their visit, I discussed sodium restriction, maintaining ideal body weight and regular exercise program as physiologic means to achieve blood pressure control. The patient will strive towards this. Procedures   Performed by: Samson Marquez MD  Procedures      Disposition   Disposition: Condition stable and improved  DC- Adult Discharges: All of the diagnostic tests were reviewed and questions answered. Diagnosis, care plan and treatment options were discussed. The patient understands the instructions and will follow up as directed. The patients results have been reviewed with them. They have been counseled regarding their diagnosis. The patient verbally convey understanding and agreement of the signs, symptoms, diagnosis, treatment and prognosis and additionally agrees to follow up as recommended with their PCP in 24 - 48 hours. They also agree with the care-plan and convey that all of their questions have been answered.   I have also put together some discharge instructions for them that include: 1) educational information regarding their diagnosis, 2) how to care for their diagnosis at home, as well a 3) list of reasons why they would want to return to the ED prior to their follow-up appointment, should their condition change. DISCHARGE PLAN:  1. Current Discharge Medication List        CONTINUE these medications which have NOT CHANGED    Details   ibuprofen (MOTRIN) 200 mg tablet Take 2 Tablets by mouth every eight (8) hours as needed for Pain. Qty: 20 Tablet, Refills: 0      aspirin (Terry Chewable Aspirin) 81 mg chewable tablet aspirin 81 mg tablet   Take by oral route. cloNIDine HCL (CATAPRES) 0.1 mg tablet Take 0.1 mg by mouth two (2) times a day. Ventolin HFA 90 mcg/actuation inhaler TAKE 2 PUFFS EVERY 6 HOURS AS NEEDED FOR WHEEZING      FeroSuL 325 mg (65 mg iron) tablet       aspirin-calcium carbonate 81 mg-300 mg calcium(777 mg) tab aspirin 81 mg tablet   Take by oral route. Symbicort 160-4.5 mcg/actuation HFAA       carvediloL (COREG) 25 mg tablet Take 25 mg by mouth two (2) times daily (with meals). clopidogreL (PLAVIX) 75 mg tab clopidogrel 75 mg tablet   Take 1 tablet every day by oral route. cyproheptadine (PERIACTIN) 4 mg tablet Take 4 mg by mouth three (3) times daily. For appetite      dilTIAZem ER (CARDIZEM CD) 240 mg capsule Take 240 mg by mouth daily. donepeziL (ARICEPT) 10 mg tablet donepezil 10 mg tablet   Take 1 tablet every day by oral route. linaCLOtide (Linzess) 145 mcg cap capsule Linzess 145 mcg capsule   Take 1 capsule every day by oral route. lisinopriL (PRINIVIL, ZESTRIL) 10 mg tablet lisinopril 10 mg tablet   Take 1 tablet every day by oral route. mirtazapine (REMERON) 15 mg tablet Take 15 mg by mouth nightly. rosuvastatin (CRESTOR) 40 mg tablet rosuvastatin 40 mg tablet   Take 1 tablet every day by oral route. 2.   Follow-up Information    None       3. Return to ED if worse   4.    Current Discharge Medication List         Remove if admitted/transferred    Diagnosis/Clinical Impression     Clinical Impression: No diagnosis found. Attestations: Merly PRUETT MD, am the primary clinician of record. Please note that this dictation was completed with [employer], the computer voice recognition software. Quite often unanticipated grammatical, syntax, homophones, and other interpretive errors are inadvertently transcribed by the computer software. Please disregard these errors. Please excuse any errors that have escaped final proofreading. Thank you.

## 2022-08-19 ENCOUNTER — APPOINTMENT (OUTPATIENT)
Dept: CT IMAGING | Age: 72
End: 2022-08-19
Attending: EMERGENCY MEDICINE
Payer: MEDICARE

## 2022-08-19 ENCOUNTER — HOSPITAL ENCOUNTER (EMERGENCY)
Age: 72
Discharge: HOME | End: 2022-08-19
Attending: EMERGENCY MEDICINE
Payer: MEDICARE

## 2022-08-19 VITALS
RESPIRATION RATE: 18 BRPM | HEART RATE: 71 BPM | OXYGEN SATURATION: 95 % | BODY MASS INDEX: 20.91 KG/M2 | WEIGHT: 118 LBS | DIASTOLIC BLOOD PRESSURE: 82 MMHG | HEIGHT: 63 IN | TEMPERATURE: 98.9 F | SYSTOLIC BLOOD PRESSURE: 164 MMHG

## 2022-08-19 DIAGNOSIS — R10.9 FLANK PAIN: Primary | ICD-10-CM

## 2022-08-19 DIAGNOSIS — I71.40 ABDOMINAL AORTIC ANEURYSM (AAA) WITHOUT RUPTURE: ICD-10-CM

## 2022-08-19 LAB
APPEARANCE UR: CLEAR
BACTERIA URNS QL MICRO: ABNORMAL /HPF
BILIRUB UR QL: NEGATIVE
COLOR UR: ABNORMAL
GLUCOSE UR STRIP.AUTO-MCNC: NEGATIVE MG/DL
HGB UR QL STRIP: ABNORMAL
KETONES UR QL STRIP.AUTO: NEGATIVE MG/DL
LEUKOCYTE ESTERASE UR QL STRIP.AUTO: NEGATIVE
NITRITE UR QL STRIP.AUTO: NEGATIVE
PH UR STRIP: 6 [PH] (ref 5–8)
PROT UR STRIP-MCNC: 30 MG/DL
RBC #/AREA URNS HPF: ABNORMAL /HPF (ref 0–3)
SP GR UR REFRACTOMETRY: 1.02 (ref 1–1.03)
UROBILINOGEN UR QL STRIP.AUTO: 1 EU/DL (ref 0.2–1)
WBC URNS QL MICRO: ABNORMAL /HPF (ref 0–5)

## 2022-08-19 PROCEDURE — 96372 THER/PROPH/DIAG INJ SC/IM: CPT

## 2022-08-19 PROCEDURE — 99284 EMERGENCY DEPT VISIT MOD MDM: CPT

## 2022-08-19 PROCEDURE — 81001 URINALYSIS AUTO W/SCOPE: CPT

## 2022-08-19 PROCEDURE — 74011250636 HC RX REV CODE- 250/636: Performed by: EMERGENCY MEDICINE

## 2022-08-19 PROCEDURE — 74176 CT ABD & PELVIS W/O CONTRAST: CPT

## 2022-08-19 RX ORDER — KETOROLAC TROMETHAMINE 30 MG/ML
15 INJECTION, SOLUTION INTRAMUSCULAR; INTRAVENOUS ONCE
Status: COMPLETED | OUTPATIENT
Start: 2022-08-19 | End: 2022-08-19

## 2022-08-19 RX ADMIN — KETOROLAC TROMETHAMINE 15 MG: 30 INJECTION, SOLUTION INTRAMUSCULAR at 16:23

## 2022-08-19 NOTE — ED PROVIDER NOTES
EMERGENCY DEPARTMENT HISTORY AND PHYSICAL EXAM      Date: 8/19/2022  Patient Name: Elmer Ojeda    History of Presenting Illness     Chief Complaint   Patient presents with    Flank Pain       History Provided By: Patient    HPI: Elmer Ojeda, 71 y.o. female with a significant past medical history of hypertension, coronary artery disease presents to the ED with right flank pain started last night current pain intensity 8/10 no shortness of breath no chest pain no vomiting no diarrhea    There are no other complaints, changes, or physical findings at this time. PCP: Lawence Area, NP    No current facility-administered medications on file prior to encounter. Current Outpatient Medications on File Prior to Encounter   Medication Sig Dispense Refill    albuterol (PROVENTIL HFA, VENTOLIN HFA, PROAIR HFA) 90 mcg/actuation inhaler Take 2 Puffs by inhalation every four (4) hours as needed for Wheezing or Shortness of Breath. 6.7 g 0    ibuprofen (MOTRIN) 200 mg tablet Take 2 Tablets by mouth every eight (8) hours as needed for Pain. 20 Tablet 0    aspirin (Terry Chewable Aspirin) 81 mg chewable tablet aspirin 81 mg tablet   Take by oral route. cloNIDine HCL (CATAPRES) 0.1 mg tablet Take 0.1 mg by mouth two (2) times a day. FeroSuL 325 mg (65 mg iron) tablet       aspirin-calcium carbonate 81 mg-300 mg calcium(777 mg) tab aspirin 81 mg tablet   Take by oral route. Symbicort 160-4.5 mcg/actuation HFAA       carvediloL (COREG) 25 mg tablet Take 25 mg by mouth two (2) times daily (with meals). clopidogreL (PLAVIX) 75 mg tab clopidogrel 75 mg tablet   Take 1 tablet every day by oral route. cyproheptadine (PERIACTIN) 4 mg tablet Take 4 mg by mouth three (3) times daily. For appetite (Patient not taking: Reported on 10/5/2021)      dilTIAZem ER (CARDIZEM CD) 240 mg capsule Take 240 mg by mouth daily.       donepeziL (ARICEPT) 10 mg tablet donepezil 10 mg tablet   Take 1 tablet every day by oral route. linaCLOtide (Linzess) 145 mcg cap capsule Linzess 145 mcg capsule   Take 1 capsule every day by oral route. lisinopriL (PRINIVIL, ZESTRIL) 10 mg tablet lisinopril 10 mg tablet   Take 1 tablet every day by oral route. mirtazapine (REMERON) 15 mg tablet Take 15 mg by mouth nightly. rosuvastatin (CRESTOR) 40 mg tablet rosuvastatin 40 mg tablet   Take 1 tablet every day by oral route.          Past History     Past Medical History:  Past Medical History:   Diagnosis Date    Abnormal weight loss 9/11/2021    Atherosclerosis of native coronary artery without angina pectoris 9/11/2021    CAD (coronary artery disease)     MI    Carotid artery stenosis 9/11/2021    Cervicalgia 9/11/2021    Colon polyps     Constipation 9/11/2021    HTN (hypertension) 9/11/2021    Hyperlipidemia     Hypertension     Hypertensive heart disease without congestive heart failure 9/11/2021    Personal history of colonic polyps 9/11/2021    Vitamin D deficiency 9/11/2021       Past Surgical History:  Past Surgical History:   Procedure Laterality Date    COLONOSCOPY  2015    COLONOSCOPY N/A 10/5/2021    COLONOSCOPY ( T I V A) performed by Jerrod Marcum MD at [facility] ENDOSCOPY    HX BIV-IMPLANTABLE CARDIOVERTER DEFIBRILLATOR  09/16/2013    HX HEART CATHETERIZATION      X 3    HX ORTHOPAEDIC      R leg    HX OTHER SURGICAL      R CAROTID ENDORECTOMY 11-4-05,   L CAROTID ENDARECTOMY 11-6-2006    HX OTHER SURGICAL      BILAT LOWER EXTREM VASC BYPASS SURGERY    HX PACEMAKER      6 0r 7 years ago       Family History:  Family History   Problem Relation Age of Onset    Cancer Mother     Hypertension Mother     No Known Problems Father        Social History:  Social History     Tobacco Use    Smoking status: Every Day     Packs/day: 0.50     Types: Cigarettes    Smokeless tobacco: Never   Substance Use Topics    Alcohol use: Never    Drug use: Never       Allergies:  No Known Allergies    Review of Systems   Review of Systems   Constitutional:  Negative for chills and fever. HENT:  Negative for rhinorrhea and sore throat. Eyes:  Negative for pain and visual disturbance. Respiratory:  Negative for cough and shortness of breath. Cardiovascular:  Negative for chest pain and leg swelling. Gastrointestinal:  Negative for abdominal pain and vomiting. Endocrine: Negative for polydipsia and polyuria. Genitourinary:  Positive for flank pain. Negative for dysuria and hematuria. Musculoskeletal:  Negative for back pain and neck pain. Skin:  Negative for color change and pallor. Neurological:  Negative for weakness and headaches. Psychiatric/Behavioral:  Negative for agitation and suicidal ideas. Physical Exam   Physical Exam  Vitals and nursing note reviewed. Constitutional:       General: She is not in acute distress. Appearance: She is not ill-appearing, toxic-appearing or diaphoretic. HENT:      Head: Normocephalic and atraumatic. Right Ear: Tympanic membrane normal.      Left Ear: Tympanic membrane normal.      Nose: Nose normal. No congestion. Mouth/Throat:      Mouth: Mucous membranes are moist.      Pharynx: Oropharynx is clear. Eyes:      Extraocular Movements: Extraocular movements intact. Conjunctiva/sclera: Conjunctivae normal.      Pupils: Pupils are equal, round, and reactive to light. Cardiovascular:      Rate and Rhythm: Normal rate and regular rhythm. Pulses: Normal pulses. Heart sounds: Normal heart sounds. Pulmonary:      Effort: Pulmonary effort is normal.      Breath sounds: Normal breath sounds. Abdominal:      General: Bowel sounds are normal.      Palpations: Abdomen is soft. Tenderness: There is no abdominal tenderness. There is no right CVA tenderness or left CVA tenderness. Musculoskeletal:         General: No deformity or signs of injury. Normal range of motion. Cervical back: Normal, normal range of motion and neck supple.  No rigidity or tenderness. Thoracic back: Normal.      Lumbar back: Tenderness present. Comments: Left paraspinal tenderness   Lymphadenopathy:      Cervical: No cervical adenopathy. Skin:     General: Skin is warm and dry. Capillary Refill: Capillary refill takes less than 2 seconds. Findings: No rash. Neurological:      General: No focal deficit present. Mental Status: She is alert and oriented to person, place, and time. Cranial Nerves: No cranial nerve deficit. Sensory: No sensory deficit. Psychiatric:         Mood and Affect: Mood normal.         Behavior: Behavior normal.       Lab and Diagnostic Study Results   Labs -   No results found for this or any previous visit (from the past 12 hour(s)). Radiologic Studies -   @lastxrresult@  CT Results  (Last 48 hours)      None          CXR Results  (Last 48 hours)      None            Medical Decision Making and ED Course   Differential Diagnosis & Medical Decision Making Provider Note:   Flank pain DDx UTI, nephrolithiasis, aortic dissection    - I am the first provider for this patient. I reviewed the vital signs, available nursing notes, past medical history, past surgical history, family history and social history. The patients presenting problems have been discussed, and they are in agreement with the care plan formulated and outlined with them. I have encouraged them to ask questions as they arise throughout their visit. Vital Signs-Reviewed the patient's vital signs. Patient Vitals for the past 12 hrs:   Temp Pulse Resp BP SpO2   08/19/22 1606 98.9 °F (37.2 °C) 71 18 (!) 164/82 95 %       ED Course:        HYPERTENSION COUNSELING: Education was provided to the patient today regarding their hypertension. Patient is made aware of their elevated blood pressure and is instructed to follow up this week with their Primary Care for a recheck.  Patient is counseled regarding consequences of chronic, uncontrolled hypertension including kidney disease, heart disease, stroke or even death. Patient states their understanding and agrees to follow up this week. Additionally, during their visit, I discussed sodium restriction, maintaining ideal body weight and regular exercise program as physiologic means to achieve blood pressure control. The patient will strive towards this. Procedures   Performed by: Nora Marin MD  Procedures      Disposition   Disposition: Condition stable and improved  DC- Adult Discharges: All of the diagnostic tests were reviewed and questions answered. Diagnosis, care plan and treatment options were discussed. The patient understands the instructions and will follow up as directed. The patients results have been reviewed with them. They have been counseled regarding their diagnosis. The patient verbally convey understanding and agreement of the signs, symptoms, diagnosis, treatment and prognosis and additionally agrees to follow up as recommended with their PCP in 24 - 48 hours. They also agree with the care-plan and convey that all of their questions have been answered. I have also put together some discharge instructions for them that include: 1) educational information regarding their diagnosis, 2) how to care for their diagnosis at home, as well a 3) list of reasons why they would want to return to the ED prior to their follow-up appointment, should their condition change. DISCHARGE PLAN:  1. Current Discharge Medication List        CONTINUE these medications which have NOT CHANGED    Details   albuterol (PROVENTIL HFA, VENTOLIN HFA, PROAIR HFA) 90 mcg/actuation inhaler Take 2 Puffs by inhalation every four (4) hours as needed for Wheezing or Shortness of Breath. Qty: 6.7 g, Refills: 0      ibuprofen (MOTRIN) 200 mg tablet Take 2 Tablets by mouth every eight (8) hours as needed for Pain.   Qty: 20 Tablet, Refills: 0      aspirin (Terry Chewable Aspirin) 81 mg chewable tablet aspirin 81 mg tablet   Take by oral route. cloNIDine HCL (CATAPRES) 0.1 mg tablet Take 0.1 mg by mouth two (2) times a day. FeroSuL 325 mg (65 mg iron) tablet       aspirin-calcium carbonate 81 mg-300 mg calcium(777 mg) tab aspirin 81 mg tablet   Take by oral route. Symbicort 160-4.5 mcg/actuation HFAA       carvediloL (COREG) 25 mg tablet Take 25 mg by mouth two (2) times daily (with meals). clopidogreL (PLAVIX) 75 mg tab clopidogrel 75 mg tablet   Take 1 tablet every day by oral route. cyproheptadine (PERIACTIN) 4 mg tablet Take 4 mg by mouth three (3) times daily. For appetite      dilTIAZem ER (CARDIZEM CD) 240 mg capsule Take 240 mg by mouth daily. donepeziL (ARICEPT) 10 mg tablet donepezil 10 mg tablet   Take 1 tablet every day by oral route. linaCLOtide (Linzess) 145 mcg cap capsule Linzess 145 mcg capsule   Take 1 capsule every day by oral route. lisinopriL (PRINIVIL, ZESTRIL) 10 mg tablet lisinopril 10 mg tablet   Take 1 tablet every day by oral route. mirtazapine (REMERON) 15 mg tablet Take 15 mg by mouth nightly. rosuvastatin (CRESTOR) 40 mg tablet rosuvastatin 40 mg tablet   Take 1 tablet every day by oral route. 2.   Follow-up Information    None       3. Return to ED if worse   4. Current Discharge Medication List         Remove if admitted/transferred    Diagnosis/Clinical Impression     Clinical Impression: No diagnosis found. Attestations: Tanya PRUETT MD, am the primary clinician of record. Please note that this dictation was completed with [employer] the [employer] voice recognition software. Quite often unanticipated grammatical, syntax, homophones, and other interpretive errors are inadvertently transcribed by the computer software. Please disregard these errors. Please excuse any errors that have escaped final proofreading. Thank you.

## 2022-09-08 ENCOUNTER — HOSPITAL ENCOUNTER (OUTPATIENT)
Dept: MAMMOGRAPHY | Age: 72
Discharge: HOME | End: 2022-09-08
Payer: MEDICARE

## 2022-09-08 DIAGNOSIS — Z12.31 ENCOUNTER FOR SCREENING MAMMOGRAM FOR MALIGNANT NEOPLASM OF BREAST: ICD-10-CM

## 2022-09-08 PROCEDURE — 77063 BREAST TOMOSYNTHESIS BI: CPT

## 2022-09-16 ENCOUNTER — OFFICE VISIT (OUTPATIENT)
Dept: GASTROENTEROLOGY | Age: 72
End: 2022-09-16
Payer: MEDICARE

## 2022-09-16 VITALS
TEMPERATURE: 98.4 F | RESPIRATION RATE: 18 BRPM | SYSTOLIC BLOOD PRESSURE: 114 MMHG | HEIGHT: 63 IN | OXYGEN SATURATION: 98 % | HEART RATE: 78 BPM | WEIGHT: 118 LBS | DIASTOLIC BLOOD PRESSURE: 62 MMHG | BODY MASS INDEX: 20.91 KG/M2

## 2022-09-16 DIAGNOSIS — Z86.010 PERSONAL HISTORY OF COLONIC POLYPS: ICD-10-CM

## 2022-09-16 DIAGNOSIS — R63.0 POOR APPETITE: ICD-10-CM

## 2022-09-16 DIAGNOSIS — R63.4 ABNORMAL WEIGHT LOSS: Primary | ICD-10-CM

## 2022-09-16 DIAGNOSIS — D50.9 IRON DEFICIENCY ANEMIA, UNSPECIFIED IRON DEFICIENCY ANEMIA TYPE: ICD-10-CM

## 2022-09-16 PROCEDURE — 3017F COLORECTAL CA SCREEN DOC REV: CPT | Performed by: INTERNAL MEDICINE

## 2022-09-16 PROCEDURE — G8400 PT W/DXA NO RESULTS DOC: HCPCS | Performed by: INTERNAL MEDICINE

## 2022-09-16 PROCEDURE — 1090F PRES/ABSN URINE INCON ASSESS: CPT | Performed by: INTERNAL MEDICINE

## 2022-09-16 PROCEDURE — G8754 DIAS BP LESS 90: HCPCS | Performed by: INTERNAL MEDICINE

## 2022-09-16 PROCEDURE — 99214 OFFICE O/P EST MOD 30 MIN: CPT | Performed by: INTERNAL MEDICINE

## 2022-09-16 PROCEDURE — 1101F PT FALLS ASSESS-DOCD LE1/YR: CPT | Performed by: INTERNAL MEDICINE

## 2022-09-16 PROCEDURE — G8427 DOCREV CUR MEDS BY ELIG CLIN: HCPCS | Performed by: INTERNAL MEDICINE

## 2022-09-16 PROCEDURE — G8510 SCR DEP NEG, NO PLAN REQD: HCPCS | Performed by: INTERNAL MEDICINE

## 2022-09-16 PROCEDURE — 1123F ACP DISCUSS/DSCN MKR DOCD: CPT | Performed by: INTERNAL MEDICINE

## 2022-09-16 PROCEDURE — G9899 SCRN MAM PERF RSLTS DOC: HCPCS | Performed by: INTERNAL MEDICINE

## 2022-09-16 PROCEDURE — G8420 CALC BMI NORM PARAMETERS: HCPCS | Performed by: INTERNAL MEDICINE

## 2022-09-16 PROCEDURE — G8752 SYS BP LESS 140: HCPCS | Performed by: INTERNAL MEDICINE

## 2022-09-16 PROCEDURE — G8536 NO DOC ELDER MAL SCRN: HCPCS | Performed by: INTERNAL MEDICINE

## 2022-09-16 NOTE — PROGRESS NOTES
Chief Complaint   Patient presents with    Weight Loss     C/o weight loss, poor appetite      1. Have you been to the ER, urgent care clinic since your last visit? Hospitalized since your last visit? No    2. Have you seen or consulted any other health care providers outside of the [address] [place] [address] since your last visit? Include any pap smears or colon screening. No  Visit Vitals  /62 (BP 1 Location: Left arm, BP Patient Position: Sitting, BP Cuff Size: Adult)   Pulse 78   Temp 98.4 °F (36.9 °C) (Oral)   Resp 18   Ht 5' 3\" (1.6 m)   Wt 53.5 kg (118 lb)   SpO2 98%   BMI 20.90 kg/m²     Patient seen by Dr Timothy Bardales and EGD ordered. Scheduled for September 22, 2022. She will arrive at 8 am for 9 am procedure. Instructions given. Patient has [employer] and pre-auth approved ref # [id number].

## 2022-09-17 NOTE — PROGRESS NOTES
Jeanne Iniguez is a 71 y.o. female who presents today for the following:  Chief Complaint   Patient presents with    Weight Loss     C/o weight loss, poor appetite          No Known Allergies    Current Outpatient Medications   Medication Sig    ibuprofen (MOTRIN) 200 mg tablet Take 2 Tablets by mouth every eight (8) hours as needed for Pain. aspirin 81 mg chewable tablet aspirin 81 mg tablet   Take by oral route. cloNIDine HCL (CATAPRES) 0.1 mg tablet Take 0.1 mg by mouth two (2) times a day. FeroSuL 325 mg (65 mg iron) tablet     aspirin-calcium carbonate 81 mg-300 mg calcium(777 mg) tab aspirin 81 mg tablet   Take by oral route. Symbicort 160-4.5 mcg/actuation HFAA     carvediloL (COREG) 25 mg tablet Take 25 mg by mouth two (2) times daily (with meals). clopidogreL (PLAVIX) 75 mg tab clopidogrel 75 mg tablet   Take 1 tablet every day by oral route. dilTIAZem ER (CARDIZEM CD) 240 mg capsule Take 240 mg by mouth daily. donepeziL (ARICEPT) 10 mg tablet donepezil 10 mg tablet   Take 1 tablet every day by oral route. linaCLOtide (LINZESS) 145 mcg cap capsule Linzess 145 mcg capsule   Take 1 capsule every day by oral route. lisinopriL (PRINIVIL, ZESTRIL) 10 mg tablet 20 mg daily. mirtazapine (REMERON) 15 mg tablet Take 15 mg by mouth nightly. rosuvastatin (CRESTOR) 40 mg tablet rosuvastatin 40 mg tablet   Take 1 tablet every day by oral route. albuterol (PROVENTIL HFA, VENTOLIN HFA, PROAIR HFA) 90 mcg/actuation inhaler Take 2 Puffs by inhalation every four (4) hours as needed for Wheezing or Shortness of Breath. cyproheptadine (PERIACTIN) 4 mg tablet Take 4 mg by mouth three (3) times daily. For appetite (Patient not taking: No sig reported)     No current facility-administered medications for this visit.        Past Medical History:   Diagnosis Date    Abnormal weight loss 09/11/2021    Atherosclerosis of native coronary artery without angina pectoris 09/11/2021    CAD (coronary artery disease)     MI    Carotid artery stenosis 09/11/2021    Cervicalgia 09/11/2021    Colon polyps     Constipation 09/11/2021    HTN (hypertension) 09/11/2021    Hyperlipidemia     Hypertension     Hypertensive heart disease without congestive heart failure 09/11/2021    Menopause     Personal history of colonic polyps 09/11/2021    Vitamin D deficiency 09/11/2021       Past Surgical History:   Procedure Laterality Date    COLONOSCOPY  2015    COLONOSCOPY N/A 10/5/2021    COLONOSCOPY ( T I V A) performed by Patricio Corrigan MD at [facility] ENDOSCOPY    HX BIV-IMPLANTABLE CARDIOVERTER DEFIBRILLATOR  09/16/2013    HX HEART CATHETERIZATION      X 3    HX ORTHOPAEDIC      R leg    HX OTHER SURGICAL      R CAROTID ENDORECTOMY 11-4-05,   L CAROTID ENDARECTOMY 11-6-2006    HX OTHER SURGICAL      BILAT LOWER EXTREM VASC BYPASS SURGERY    HX PACEMAKER      6 0r 7 years ago       Family History   Problem Relation Age of Onset    Cancer Mother     Hypertension Mother     No Known Problems Father        Social History     Socioeconomic History    Marital status:      Spouse name: Not on file    Number of children: Not on file    Years of education: Not on file    Highest education level: Not on file   Occupational History    Not on file   Tobacco Use    Smoking status: Every Day     Packs/day: 0.50     Types: Cigarettes    Smokeless tobacco: Never   Substance and Sexual Activity    Alcohol use: Never    Drug use: Never    Sexual activity: Not on file   Other Topics Concern    Not on file   Social History Narrative    Not on file     Social Determinants of Health     Financial Resource Strain: Not on file   Food Insecurity: Not on file   Transportation Needs: Not on file   Physical Activity: Not on file   Stress: Not on file   Social Connections: Not on file   Intimate Partner Violence: Not on file   Housing Stability: Not on file         HPI  78-year-old female with history of hypertensive atherosclerotic cardiovascular disease, hyperlipidemia, congestive heart failure, carotid artery disease, vitamin D deficiency, chronic back pain, and colonic polyps who comes in for evaluation of her weight loss. Patient states she has no appetite. No nausea or vomiting. She has pain across her lower back she has received an injection in the past.  Her bowel movements are regular and formed. No gross GI bleeding. Stools are black but this was felt to be secondary to her iron therapy. Patient last had a colonoscopy on 10/5/2021. Review of Systems   Constitutional: Negative. HENT: Negative. Negative for nosebleeds. Eyes: Negative. Respiratory: Negative. Cardiovascular: Negative. Gastrointestinal: Negative. Negative for abdominal pain, blood in stool, constipation, diarrhea, heartburn, melena, nausea and vomiting. Genitourinary: Negative. Musculoskeletal:  Positive for back pain and joint pain. Skin: Negative. Neurological: Negative. Endo/Heme/Allergies: Negative. Psychiatric/Behavioral: Negative. All other systems reviewed and are negative. Visit Vitals  /62 (BP 1 Location: Left arm, BP Patient Position: Sitting, BP Cuff Size: Adult)   Pulse 78   Temp 98.4 °F (36.9 °C) (Oral)   Resp 18   Ht 5' 3\" (1.6 m)   Wt 53.5 kg (118 lb)   SpO2 98%   BMI 20.90 kg/m²     Physical Exam  Vitals and nursing note reviewed. Constitutional:       Appearance: Normal appearance. HENT:      Head: Normocephalic and atraumatic. Nose: Nose normal.      Mouth/Throat:      Mouth: Mucous membranes are moist.      Pharynx: Oropharynx is clear. Eyes:      General: No scleral icterus. Conjunctiva/sclera: Conjunctivae normal.      Pupils: Pupils are equal, round, and reactive to light. Cardiovascular:      Rate and Rhythm: Normal rate and regular rhythm. Pulses: Normal pulses. Heart sounds: Normal heart sounds.    Pulmonary:      Effort: Pulmonary effort is normal.      Breath sounds: Normal breath sounds. Abdominal:      General: Bowel sounds are normal. There is no distension. Palpations: Abdomen is soft. There is no mass. Tenderness: There is no abdominal tenderness. There is no right CVA tenderness, left CVA tenderness, guarding or rebound. Hernia: No hernia is present. Musculoskeletal:         General: Normal range of motion. Cervical back: Normal range of motion and neck supple. Skin:     General: Skin is warm and dry. Coloration: Skin is not jaundiced. Neurological:      General: No focal deficit present. Mental Status: She is alert and oriented to person, place, and time. Psychiatric:         Mood and Affect: Mood normal.         Behavior: Behavior normal.         Thought Content: Thought content normal.         Judgment: Judgment normal.          1. Abnormal weight loss  Patient for an upper endoscopy to further evaluate the cause for her weight loss. She just had an colonoscopy 1 year ago so that test is not needed at this time. - UPPER GI ENDOSCOPY,DIAGNOSIS; Future    2. Poor appetite    - UPPER GI ENDOSCOPY,DIAGNOSIS; Future    3. Iron deficiency anemia, unspecified iron deficiency anemia type      4.  Personal history of colonic polyps

## 2022-09-17 NOTE — H&P (VIEW-ONLY)
Rose  is a 71 y.o. female who presents today for the following:  Chief Complaint   Patient presents with    Weight Loss     C/o weight loss, poor appetite          No Known Allergies    Current Outpatient Medications   Medication Sig    ibuprofen (MOTRIN) 200 mg tablet Take 2 Tablets by mouth every eight (8) hours as needed for Pain. aspirin 81 mg chewable tablet aspirin 81 mg tablet   Take by oral route. cloNIDine HCL (CATAPRES) 0.1 mg tablet Take 0.1 mg by mouth two (2) times a day. FeroSuL 325 mg (65 mg iron) tablet     aspirin-calcium carbonate 81 mg-300 mg calcium(777 mg) tab aspirin 81 mg tablet   Take by oral route. Symbicort 160-4.5 mcg/actuation HFAA     carvediloL (COREG) 25 mg tablet Take 25 mg by mouth two (2) times daily (with meals). clopidogreL (PLAVIX) 75 mg tab clopidogrel 75 mg tablet   Take 1 tablet every day by oral route. dilTIAZem ER (CARDIZEM CD) 240 mg capsule Take 240 mg by mouth daily. donepeziL (ARICEPT) 10 mg tablet donepezil 10 mg tablet   Take 1 tablet every day by oral route. linaCLOtide (LINZESS) 145 mcg cap capsule Linzess 145 mcg capsule   Take 1 capsule every day by oral route. lisinopriL (PRINIVIL, ZESTRIL) 10 mg tablet 20 mg daily. mirtazapine (REMERON) 15 mg tablet Take 15 mg by mouth nightly. rosuvastatin (CRESTOR) 40 mg tablet rosuvastatin 40 mg tablet   Take 1 tablet every day by oral route. albuterol (PROVENTIL HFA, VENTOLIN HFA, PROAIR HFA) 90 mcg/actuation inhaler Take 2 Puffs by inhalation every four (4) hours as needed for Wheezing or Shortness of Breath. cyproheptadine (PERIACTIN) 4 mg tablet Take 4 mg by mouth three (3) times daily. For appetite (Patient not taking: No sig reported)     No current facility-administered medications for this visit.        Past Medical History:   Diagnosis Date    Abnormal weight loss 09/11/2021    Atherosclerosis of native coronary artery without angina pectoris 09/11/2021    CAD (coronary artery disease)     MI    Carotid artery stenosis 09/11/2021    Cervicalgia 09/11/2021    Colon polyps     Constipation 09/11/2021    HTN (hypertension) 09/11/2021    Hyperlipidemia     Hypertension     Hypertensive heart disease without congestive heart failure 09/11/2021    Menopause     Personal history of colonic polyps 09/11/2021    Vitamin D deficiency 09/11/2021       Past Surgical History:   Procedure Laterality Date    COLONOSCOPY  2015    COLONOSCOPY N/A 10/5/2021    COLONOSCOPY ( T I V A) performed by Jovanni Aden MD at [facility] ENDOSCOPY    HX BIV-IMPLANTABLE CARDIOVERTER DEFIBRILLATOR  09/16/2013    HX HEART CATHETERIZATION      X 3    HX ORTHOPAEDIC      R leg    HX OTHER SURGICAL      R CAROTID ENDORECTOMY 11-4-05,   L CAROTID ENDARECTOMY 11-6-2006    HX OTHER SURGICAL      BILAT LOWER EXTREM VASC BYPASS SURGERY    HX PACEMAKER      6 0r 7 years ago       Family History   Problem Relation Age of Onset    Cancer Mother     Hypertension Mother     No Known Problems Father        Social History     Socioeconomic History    Marital status:      Spouse name: Not on file    Number of children: Not on file    Years of education: Not on file    Highest education level: Not on file   Occupational History    Not on file   Tobacco Use    Smoking status: Every Day     Packs/day: 0.50     Types: Cigarettes    Smokeless tobacco: Never   Substance and Sexual Activity    Alcohol use: Never    Drug use: Never    Sexual activity: Not on file   Other Topics Concern    Not on file   Social History Narrative    Not on file     Social Determinants of Health     Financial Resource Strain: Not on file   Food Insecurity: Not on file   Transportation Needs: Not on file   Physical Activity: Not on file   Stress: Not on file   Social Connections: Not on file   Intimate Partner Violence: Not on file   Housing Stability: Not on file         HPI  52-year-old female with history of hypertensive atherosclerotic cardiovascular disease, hyperlipidemia, congestive heart failure, carotid artery disease, vitamin D deficiency, chronic back pain, and colonic polyps who comes in for evaluation of her weight loss. Patient states she has no appetite. No nausea or vomiting. She has pain across her lower back she has received an injection in the past.  Her bowel movements are regular and formed. No gross GI bleeding. Stools are black but this was felt to be secondary to her iron therapy. Patient last had a colonoscopy on 10/5/2021. Review of Systems   Constitutional: Negative. HENT: Negative. Negative for nosebleeds. Eyes: Negative. Respiratory: Negative. Cardiovascular: Negative. Gastrointestinal: Negative. Negative for abdominal pain, blood in stool, constipation, diarrhea, heartburn, melena, nausea and vomiting. Genitourinary: Negative. Musculoskeletal:  Positive for back pain and joint pain. Skin: Negative. Neurological: Negative. Endo/Heme/Allergies: Negative. Psychiatric/Behavioral: Negative. All other systems reviewed and are negative. Visit Vitals  /62 (BP 1 Location: Left arm, BP Patient Position: Sitting, BP Cuff Size: Adult)   Pulse 78   Temp 98.4 °F (36.9 °C) (Oral)   Resp 18   Ht 5' 3\" (1.6 m)   Wt 53.5 kg (118 lb)   SpO2 98%   BMI 20.90 kg/m²     Physical Exam  Vitals and nursing note reviewed. Constitutional:       Appearance: Normal appearance. HENT:      Head: Normocephalic and atraumatic. Nose: Nose normal.      Mouth/Throat:      Mouth: Mucous membranes are moist.      Pharynx: Oropharynx is clear. Eyes:      General: No scleral icterus. Conjunctiva/sclera: Conjunctivae normal.      Pupils: Pupils are equal, round, and reactive to light. Cardiovascular:      Rate and Rhythm: Normal rate and regular rhythm. Pulses: Normal pulses. Heart sounds: Normal heart sounds.    Pulmonary:      Effort: Pulmonary effort is normal.      Breath sounds: Normal breath sounds. Abdominal:      General: Bowel sounds are normal. There is no distension. Palpations: Abdomen is soft. There is no mass. Tenderness: There is no abdominal tenderness. There is no right CVA tenderness, left CVA tenderness, guarding or rebound. Hernia: No hernia is present. Musculoskeletal:         General: Normal range of motion. Cervical back: Normal range of motion and neck supple. Skin:     General: Skin is warm and dry. Coloration: Skin is not jaundiced. Neurological:      General: No focal deficit present. Mental Status: She is alert and oriented to person, place, and time. Psychiatric:         Mood and Affect: Mood normal.         Behavior: Behavior normal.         Thought Content: Thought content normal.         Judgment: Judgment normal.          1. Abnormal weight loss  Patient for an upper endoscopy to further evaluate the cause for her weight loss. She just had an colonoscopy 1 year ago so that test is not needed at this time. - UPPER GI ENDOSCOPY,DIAGNOSIS; Future    2. Poor appetite    - UPPER GI ENDOSCOPY,DIAGNOSIS; Future    3. Iron deficiency anemia, unspecified iron deficiency anemia type      4.  Personal history of colonic polyps

## 2022-09-22 ENCOUNTER — HOSPITAL ENCOUNTER (OUTPATIENT)
Age: 72
Setting detail: OUTPATIENT SURGERY
Discharge: HOME | End: 2022-09-22
Attending: INTERNAL MEDICINE | Admitting: INTERNAL MEDICINE
Payer: MEDICARE

## 2022-09-22 ENCOUNTER — ANESTHESIA EVENT (OUTPATIENT)
Dept: ENDOSCOPY | Age: 72
End: 2022-09-22
Payer: MEDICARE

## 2022-09-22 ENCOUNTER — ANESTHESIA (OUTPATIENT)
Dept: ENDOSCOPY | Age: 72
End: 2022-09-22
Payer: MEDICARE

## 2022-09-22 VITALS
HEART RATE: 82 BPM | HEIGHT: 62 IN | WEIGHT: 117 LBS | SYSTOLIC BLOOD PRESSURE: 145 MMHG | OXYGEN SATURATION: 100 % | DIASTOLIC BLOOD PRESSURE: 69 MMHG | RESPIRATION RATE: 18 BRPM | TEMPERATURE: 98.5 F | BODY MASS INDEX: 21.53 KG/M2

## 2022-09-22 DIAGNOSIS — K29.50 CHRONIC GASTRITIS WITHOUT BLEEDING, UNSPECIFIED GASTRITIS TYPE: Primary | ICD-10-CM

## 2022-09-22 PROCEDURE — 74011250636 HC RX REV CODE- 250/636

## 2022-09-22 PROCEDURE — 76060000031 HC ANESTHESIA FIRST 0.5 HR: Performed by: INTERNAL MEDICINE

## 2022-09-22 PROCEDURE — 74011000250 HC RX REV CODE- 250

## 2022-09-22 PROCEDURE — 77030021593 HC FCPS BIOP ENDOSC BSC -A: Performed by: INTERNAL MEDICINE

## 2022-09-22 PROCEDURE — 74011250636 HC RX REV CODE- 250/636: Performed by: NURSE ANESTHETIST, CERTIFIED REGISTERED

## 2022-09-22 PROCEDURE — 74011000250 HC RX REV CODE- 250: Performed by: NURSE ANESTHETIST, CERTIFIED REGISTERED

## 2022-09-22 PROCEDURE — 43239 EGD BIOPSY SINGLE/MULTIPLE: CPT | Performed by: INTERNAL MEDICINE

## 2022-09-22 PROCEDURE — 74011250636 HC RX REV CODE- 250/636: Performed by: INTERNAL MEDICINE

## 2022-09-22 PROCEDURE — 76040000019: Performed by: INTERNAL MEDICINE

## 2022-09-22 PROCEDURE — 88305 TISSUE EXAM BY PATHOLOGIST: CPT

## 2022-09-22 PROCEDURE — 2709999900 HC NON-CHARGEABLE SUPPLY: Performed by: INTERNAL MEDICINE

## 2022-09-22 RX ORDER — SODIUM CHLORIDE 9 MG/ML
INJECTION, SOLUTION INTRAVENOUS
Status: DISCONTINUED | OUTPATIENT
Start: 2022-09-22 | End: 2022-09-22 | Stop reason: HOSPADM

## 2022-09-22 RX ORDER — SODIUM CHLORIDE 9 MG/ML
125 INJECTION, SOLUTION INTRAVENOUS CONTINUOUS
Status: DISCONTINUED | OUTPATIENT
Start: 2022-09-22 | End: 2022-09-22 | Stop reason: HOSPADM

## 2022-09-22 RX ORDER — SODIUM CHLORIDE 0.9 % (FLUSH) 0.9 %
5-40 SYRINGE (ML) INJECTION EVERY 8 HOURS
Status: DISCONTINUED | OUTPATIENT
Start: 2022-09-22 | End: 2022-09-22 | Stop reason: HOSPADM

## 2022-09-22 RX ORDER — SODIUM CHLORIDE 0.9 % (FLUSH) 0.9 %
5-40 SYRINGE (ML) INJECTION AS NEEDED
Status: DISCONTINUED | OUTPATIENT
Start: 2022-09-22 | End: 2022-09-22 | Stop reason: HOSPADM

## 2022-09-22 RX ORDER — PROPOFOL 10 MG/ML
INJECTION, EMULSION INTRAVENOUS AS NEEDED
Status: DISCONTINUED | OUTPATIENT
Start: 2022-09-22 | End: 2022-09-22 | Stop reason: HOSPADM

## 2022-09-22 RX ORDER — PANTOPRAZOLE SODIUM 40 MG/1
40 TABLET, DELAYED RELEASE ORAL DAILY
Qty: 30 TABLET | Refills: 5 | Status: SHIPPED | OUTPATIENT
Start: 2022-09-22

## 2022-09-22 RX ORDER — GLYCOPYRROLATE 0.2 MG/ML
INJECTION INTRAMUSCULAR; INTRAVENOUS AS NEEDED
Status: DISCONTINUED | OUTPATIENT
Start: 2022-09-22 | End: 2022-09-22 | Stop reason: HOSPADM

## 2022-09-22 RX ORDER — SODIUM CHLORIDE 9 MG/ML
150 INJECTION, SOLUTION INTRAVENOUS CONTINUOUS
Status: DISCONTINUED | OUTPATIENT
Start: 2022-09-22 | End: 2022-09-22 | Stop reason: HOSPADM

## 2022-09-22 RX ADMIN — TOPICAL ANESTHETIC 1 SPRAY: 200 SPRAY DENTAL; PERIODONTAL at 10:01

## 2022-09-22 RX ADMIN — PROPOFOL 50 MG: 10 INJECTION, EMULSION INTRAVENOUS at 10:06

## 2022-09-22 RX ADMIN — GLYCOPYRROLATE 0.2 MG: 0.2 INJECTION INTRAMUSCULAR; INTRAVENOUS at 10:02

## 2022-09-22 RX ADMIN — PROPOFOL 50 MG: 10 INJECTION, EMULSION INTRAVENOUS at 10:12

## 2022-09-22 RX ADMIN — SODIUM CHLORIDE: 9 INJECTION, SOLUTION INTRAVENOUS at 10:00

## 2022-09-22 NOTE — ANESTHESIA PREPROCEDURE EVALUATION
Relevant Problems   CARDIOVASCULAR   (+) Atherosclerosis of native coronary artery without angina pectoris   (+) HTN (hypertension)       Anesthetic History   No history of anesthetic complications  Other anesthesia complications          Review of Systems / Medical History  Patient summary reviewed, nursing notes reviewed and pertinent labs reviewed    Pulmonary  Within defined limits                 Neuro/Psych   Within defined limits           Cardiovascular  Within defined limits  Hypertension          CAD         GI/Hepatic/Renal  Within defined limits              Endo/Other  Within defined limits           Other Findings              Physical Exam    Airway  Mallampati: II  TM Distance: 4 - 6 cm  Neck ROM: normal range of motion        Cardiovascular    Rhythm: regular  Rate: normal         Dental    Dentition: Edentulous     Pulmonary  Breath sounds clear to auscultation               Abdominal  Abdominal exam normal       Other Findings            Anesthetic Plan    ASA: 3  Anesthesia type: total IV anesthesia            Anesthetic plan and risks discussed with: Patient

## 2022-09-22 NOTE — OP NOTES
EGD Procedure Note        Patient: Pedro Westbrook MRN: [id number]  SSN: [id number]    YOB: 1950  Age: 71 y.o. Sex: female        Date/Time:  9/22/2022 10:17 AM         IMPRESSION:       Antral gastritis with erosions  Duodenitis (Bulb) with healed ulcerations       RECOMMENDATIONS:    Check biopsy results. Pantoprazole 40mg daily. Patient should avoid use of NSAIDs which may be the underlying cause for the patient's disease. Procedure: Esophagogastroduodenoscopy with cold biopsies    Indication: Poor appetite, weight loss    Endoscopist:  Thais Closs, MD    Referring Provider:   Nacho Roper NP    History: The history and physical exam were reviewed and updated. Endoscope: GIF H190 Olympus video endoscope    Extent of Exam: second portion of the duodenum    ASA: ASA 2 - Patient with mild systemic disease with no functional limitations    Anethesia/Sedation:  TIVA    Description of the procedure: The procedure was discussed with the patient including risks, benefits, alternatives including risks of iv sedation, bleeding, perforation and aspiration. A safety timeout was performed. The patient was placed in the left lateral decubitus position. A bite block was placed. The patient was using standard protocol. The patients vital signs were monitored at all times including heart rate/rhythm, blood pressure and oxygen saturation. The endoscope was then passed under direct visualization to the second portion of the duodenum. The endoscope was then slowly withdrawn while visualizing the mucosa. In the stomach a retroflexion was performed and gastric fundus and cardia visualized. The patient was then transferred to recovery in stable condition. Findings:   Esophagus: The esophageal mucosa was normal with no ulceration, mass or stricture. There was no evidence of Simon's esophagus or reflux esophagitis. Stomach:  The gastric mucosa was inflamed throughout the gastric antrum with areas of increased erythema and erosions. Multiple biopsies were taken there. .   Duodenum: The duodenum mucosa was in the duodenal bulb wall large numbers\denuded areas throughout the region consistent with healed bleeding triggers rations in that area. .     Therapies:  None    Specimens:   ID Type Source Tests Collected by Time Destination   1 : gastric antrum Preservative   Gomez Pabon MD 9/22/2022 1008 Pathology       Assistants: Yann Lopes           EBL:Minimal    Complications:   None; patient tolerated the procedure well.      Implants: None    Discharge disposition:  Out of the recovery area when discharge criteria met         Nicole Moralez MD  September 22, 2022  10:17 AM

## 2022-09-22 NOTE — DISCHARGE INSTRUCTIONS

## 2022-09-22 NOTE — ANESTHESIA POSTPROCEDURE EVALUATION
Procedure(s):  ESOPHAGOGASTRODUODENOSCOPY (EGD) (TIVA).     total IV anesthesia    Anesthesia Post Evaluation      Multimodal analgesia: multimodal analgesia used between 6 hours prior to anesthesia start to PACU discharge  Patient location during evaluation: PACU  Patient participation: complete - patient participated  Pain management: adequate  Airway patency: patent  Anesthetic complications: no  Cardiovascular status: acceptable and stable  Respiratory status: acceptable and room air  Hydration status: acceptable  Post anesthesia nausea and vomiting:  none  Final Post Anesthesia Temperature Assessment:  Normothermia (36.0-37.5 degrees C)      INITIAL Post-op Vital signs:   Vitals Value Taken Time   /61 09/22/22 1016   Temp 36.9 °C (98.5 °F) 09/22/22 1016   Pulse 70 09/22/22 1016   Resp 16 09/22/22 1016   SpO2 100 % 09/22/22 1016

## 2022-09-22 NOTE — INTERVAL H&P NOTE
Update History & Physical    The Patient's History and Physical of September 22, 2022 was reviewed with the patient and I examined the patient. There was no change. The surgical site was confirmed by the patient and me. Plan:  The risk, benefits, expected outcome, and alternative to the recommended procedure have been discussed with the patient. Patient understands and wants to proceed with the procedure.     Electronically signed by Sarah Elliott MD on 9/22/2022 at 9:54 AM

## 2022-09-24 ENCOUNTER — HOSPITAL ENCOUNTER (EMERGENCY)
Age: 72
Discharge: HOME | End: 2022-09-24
Attending: EMERGENCY MEDICINE
Payer: MEDICARE

## 2022-09-24 VITALS
HEART RATE: 93 BPM | BODY MASS INDEX: 21.53 KG/M2 | OXYGEN SATURATION: 100 % | SYSTOLIC BLOOD PRESSURE: 184 MMHG | HEIGHT: 62 IN | DIASTOLIC BLOOD PRESSURE: 87 MMHG | WEIGHT: 117 LBS | TEMPERATURE: 98 F | RESPIRATION RATE: 16 BRPM

## 2022-09-24 DIAGNOSIS — R10.31 GROIN PAIN, RIGHT: Primary | ICD-10-CM

## 2022-09-24 LAB
ALBUMIN SERPL-MCNC: 3.4 G/DL (ref 3.5–5)
ALBUMIN/GLOB SERPL: 1 {RATIO} (ref 1.1–2.2)
ALP SERPL-CCNC: 70 U/L (ref 45–117)
ALT SERPL-CCNC: 13 U/L (ref 12–78)
AMPHET UR QL SCN: NEGATIVE
ANION GAP SERPL CALC-SCNC: 12 MMOL/L (ref 5–15)
APPEARANCE UR: CLEAR
AST SERPL W P-5'-P-CCNC: 16 U/L (ref 15–37)
BACTERIA URNS QL MICRO: ABNORMAL /HPF
BARBITURATES UR QL SCN: NEGATIVE
BASOPHILS # BLD: 0.1 K/UL (ref 0–0.2)
BASOPHILS NFR BLD: 1 % (ref 0–2.5)
BENZODIAZ UR QL: NEGATIVE
BILIRUB SERPL-MCNC: 0.4 MG/DL (ref 0.2–1)
BILIRUB UR QL: NEGATIVE
BUN SERPL-MCNC: 27 MG/DL (ref 6–20)
BUN/CREAT SERPL: 32 (ref 12–20)
CA-I BLD-MCNC: 9 MG/DL (ref 8.5–10.1)
CANNABINOIDS UR QL SCN: NEGATIVE
CHLORIDE SERPL-SCNC: 103 MMOL/L (ref 97–108)
CO2 SERPL-SCNC: 23 MMOL/L (ref 21–32)
COCAINE UR QL SCN: NEGATIVE
COLOR UR: ABNORMAL
CREAT SERPL-MCNC: 0.85 MG/DL (ref 0.55–1.02)
DRUG SCRN COMMENT,DRGCM: NORMAL
ECSTASY, ECST: NEGATIVE
EOSINOPHIL # BLD: 0 K/UL (ref 0–0.7)
EOSINOPHIL NFR BLD: 0 % (ref 0.9–2.9)
ERYTHROCYTE [DISTWIDTH] IN BLOOD BY AUTOMATED COUNT: 13.3 % (ref 11.5–14.5)
GLOBULIN SER CALC-MCNC: 3.3 G/DL (ref 2–4)
GLUCOSE SERPL-MCNC: 105 MG/DL (ref 65–100)
GLUCOSE UR STRIP.AUTO-MCNC: NEGATIVE MG/DL
HCT VFR BLD AUTO: 33.9 % (ref 36–46)
HGB BLD-MCNC: 11.2 G/DL (ref 13.5–17.5)
HGB UR QL STRIP: ABNORMAL
INR PPP: 1.2 (ref 0.9–1.1)
KETONES UR QL STRIP.AUTO: NEGATIVE MG/DL
LEUKOCYTE ESTERASE UR QL STRIP.AUTO: NEGATIVE
LYMPHOCYTES # BLD: 1.9 K/UL (ref 1–4.8)
LYMPHOCYTES NFR BLD: 20 % (ref 20.5–51.1)
MCH RBC QN AUTO: 33.9 PG (ref 31–34)
MCHC RBC AUTO-ENTMCNC: 33.1 G/DL (ref 31–36)
MCV RBC AUTO: 102.4 FL (ref 80–100)
METHADONE UR QL: NEGATIVE
MONOCYTES # BLD: 0.6 K/UL (ref 0.2–2.4)
MONOCYTES NFR BLD: 7 % (ref 1.7–9.3)
NEUTS SEG # BLD: 6.9 K/UL (ref 1.8–7.7)
NEUTS SEG NFR BLD: 72 % (ref 42–75)
NITRITE UR QL STRIP.AUTO: NEGATIVE
NRBC # BLD: 0.01 K/UL
NRBC BLD-RTO: 0.1 PER 100 WBC
OPIATES UR QL: NEGATIVE
PCP UR QL: NEGATIVE
PH UR STRIP: 6 [PH] (ref 5–8)
PLATELET # BLD AUTO: 116 K/UL (ref 150–400)
PMV BLD AUTO: 9.9 FL (ref 6.5–11.5)
POTASSIUM SERPL-SCNC: 3.4 MMOL/L (ref 3.5–5.1)
PROT SERPL-MCNC: 6.7 G/DL (ref 6.4–8.2)
PROT UR STRIP-MCNC: NEGATIVE MG/DL
PROTHROMBIN TIME: 14.3 SEC (ref 11.9–14.6)
RBC # BLD AUTO: 3.31 M/UL (ref 4.5–5.9)
RBC #/AREA URNS HPF: ABNORMAL /HPF (ref 0–3)
SODIUM SERPL-SCNC: 138 MMOL/L (ref 136–145)
SP GR UR REFRACTOMETRY: <1.005 (ref 1–1.03)
UROBILINOGEN UR QL STRIP.AUTO: 0.2 EU/DL (ref 0.2–1)
WBC # BLD AUTO: 9.5 K/UL (ref 4.4–11.3)
WBC URNS QL MICRO: ABNORMAL /HPF (ref 0–5)

## 2022-09-24 PROCEDURE — 85610 PROTHROMBIN TIME: CPT

## 2022-09-24 PROCEDURE — 85025 COMPLETE CBC W/AUTO DIFF WBC: CPT

## 2022-09-24 PROCEDURE — 80307 DRUG TEST PRSMV CHEM ANLYZR: CPT

## 2022-09-24 PROCEDURE — 36415 COLL VENOUS BLD VENIPUNCTURE: CPT

## 2022-09-24 PROCEDURE — 81001 URINALYSIS AUTO W/SCOPE: CPT

## 2022-09-24 PROCEDURE — 74011250637 HC RX REV CODE- 250/637: Performed by: EMERGENCY MEDICINE

## 2022-09-24 PROCEDURE — 80053 COMPREHEN METABOLIC PANEL: CPT

## 2022-09-24 PROCEDURE — 99284 EMERGENCY DEPT VISIT MOD MDM: CPT

## 2022-09-24 PROCEDURE — 93005 ELECTROCARDIOGRAM TRACING: CPT

## 2022-09-24 RX ORDER — HYDROCODONE BITARTRATE AND ACETAMINOPHEN 5; 325 MG/1; MG/1
1 TABLET ORAL ONCE
Status: COMPLETED | OUTPATIENT
Start: 2022-09-24 | End: 2022-09-24

## 2022-09-24 RX ORDER — ACETAMINOPHEN 325 MG/1
650 TABLET ORAL
Qty: 20 TABLET | Refills: 0 | Status: SHIPPED | OUTPATIENT
Start: 2022-09-24

## 2022-09-24 RX ADMIN — HYDROCODONE BITARTRATE AND ACETAMINOPHEN 1 TABLET: 5; 325 TABLET ORAL at 09:33

## 2022-09-24 NOTE — ED PROVIDER NOTES
EMERGENCY DEPARTMENT HISTORY AND PHYSICAL EXAM      Date: 9/24/2022  Patient Name: Manju Gee    History of Presenting Illness   No chief complaint on file. History Provided By: Patient    HPI: Manju Gee, 71 y.o. female past medical history significant for hypertension, CHF, and yesterday had upper endoscopy where tissue samples were taken, patient presents complaining of right lower quadrant pain that is described as sharp achy pain with pain intensity of 5/10 patient denies any heavy lifting, patient states during the procedure she laid on her left side, denies any shortness of breath no nausea no vomiting    There are no other complaints, changes, or physical findings at this time. PCP: Gabby Restrepo NP    No current facility-administered medications on file prior to encounter. Current Outpatient Medications on File Prior to Encounter   Medication Sig Dispense Refill    pantoprazole (PROTONIX) 40 mg tablet Take 1 Tablet by mouth daily. Indications: gastroesophageal reflux disease 30 Tablet 5    albuterol (PROVENTIL HFA, VENTOLIN HFA, PROAIR HFA) 90 mcg/actuation inhaler Take 2 Puffs by inhalation every four (4) hours as needed for Wheezing or Shortness of Breath. 6.7 g 0    ibuprofen (MOTRIN) 200 mg tablet Take 2 Tablets by mouth every eight (8) hours as needed for Pain. 20 Tablet 0    aspirin 81 mg chewable tablet aspirin 81 mg tablet   Take by oral route.  cloNIDine HCL (CATAPRES) 0.1 mg tablet Take 0.1 mg by mouth two (2) times a day.  FeroSuL 325 mg (65 mg iron) tablet       aspirin-calcium carbonate 81 mg-300 mg calcium(777 mg) tab aspirin 81 mg tablet   Take by oral route.  Symbicort 160-4.5 mcg/actuation HFAA       carvediloL (COREG) 25 mg tablet Take 25 mg by mouth two (2) times daily (with meals).  clopidogreL (PLAVIX) 75 mg tab clopidogrel 75 mg tablet   Take 1 tablet every day by oral route.       dilTIAZem ER (CARDIZEM CD) 240 mg capsule Take 240 mg by mouth daily.  donepeziL (ARICEPT) 10 mg tablet donepezil 10 mg tablet   Take 1 tablet every day by oral route.  linaCLOtide (LINZESS) 145 mcg cap capsule Linzess 145 mcg capsule   Take 1 capsule every day by oral route.  lisinopriL (PRINIVIL, ZESTRIL) 10 mg tablet 20 mg daily.  mirtazapine (REMERON) 15 mg tablet Take 15 mg by mouth nightly.  rosuvastatin (CRESTOR) 40 mg tablet rosuvastatin 40 mg tablet   Take 1 tablet every day by oral route.          Past History     Past Medical History:  Past Medical History:   Diagnosis Date    Abnormal weight loss 09/11/2021    Atherosclerosis of native coronary artery without angina pectoris 09/11/2021    CAD (coronary artery disease)     MI    Carotid artery stenosis 09/11/2021    Cervicalgia 09/11/2021    Colon polyps     Constipation 09/11/2021    HTN (hypertension) 09/11/2021    Hyperlipidemia     Hypertension     Hypertensive heart disease without congestive heart failure 09/11/2021    Menopause     Personal history of colonic polyps 09/11/2021    Vitamin D deficiency 09/11/2021       Past Surgical History:  Past Surgical History:   Procedure Laterality Date    COLONOSCOPY  2015    COLONOSCOPY N/A 10/5/2021    COLONOSCOPY ( T I V A) performed by Leonor Wiley MD at [facility] ENDOSCOPY    HX BIV-IMPLANTABLE CARDIOVERTER DEFIBRILLATOR  09/16/2013    HX HEART CATHETERIZATION      X 3    HX ORTHOPAEDIC      R leg    HX OTHER SURGICAL      R CAROTID ENDORECTOMY 11-4-05,   L CAROTID ENDARECTOMY 11-6-2006    HX OTHER SURGICAL      BILAT LOWER EXTREM VASC BYPASS SURGERY    HX PACEMAKER      6 0r 7 years ago       Family History:  Family History   Problem Relation Age of Onset    Cancer Mother     Hypertension Mother     No Known Problems Father        Social History:  Social History     Tobacco Use    Smoking status: Every Day     Packs/day: 0.50     Types: Cigarettes    Smokeless tobacco: Never   Vaping Use    Vaping Use: Never used   Substance Use Topics    Alcohol use: Never    Drug use: Never       Allergies:  No Known Allergies    Review of Systems   Review of Systems   Constitutional:  Negative for chills and fever. HENT:  Negative for rhinorrhea and sore throat. Eyes:  Negative for pain and visual disturbance. Respiratory:  Negative for cough and shortness of breath. Cardiovascular:  Negative for chest pain and leg swelling. Gastrointestinal:  Positive for abdominal pain. Negative for vomiting. Endocrine: Negative for polydipsia and polyuria. Genitourinary:  Negative for dysuria and hematuria. Musculoskeletal:  Negative for back pain and neck pain. Skin:  Negative for color change and pallor. Neurological:  Negative for weakness and headaches. Psychiatric/Behavioral:  Negative for agitation and suicidal ideas. Physical Exam   Physical Exam  Vitals and nursing note reviewed. Constitutional:       General: She is not in acute distress. Appearance: She is not ill-appearing, toxic-appearing or diaphoretic. HENT:      Head: Normocephalic and atraumatic. Right Ear: Tympanic membrane normal.      Left Ear: Tympanic membrane normal.      Nose: Nose normal. No congestion. Mouth/Throat:      Mouth: Mucous membranes are moist.      Pharynx: Oropharynx is clear. Eyes:      Extraocular Movements: Extraocular movements intact. Conjunctiva/sclera: Conjunctivae normal.      Pupils: Pupils are equal, round, and reactive to light. Cardiovascular:      Rate and Rhythm: Normal rate and regular rhythm. Pulses: Normal pulses. Heart sounds: Normal heart sounds. Pulmonary:      Effort: Pulmonary effort is normal.      Breath sounds: Normal breath sounds. Abdominal:      General: Bowel sounds are normal.      Palpations: Abdomen is soft. Tenderness: There is abdominal tenderness in the right lower quadrant. There is no guarding.  Negative signs include Isaacs's sign and McBurney's sign. Hernia: There is no hernia in the right femoral area or right inguinal area. Musculoskeletal:         General: No tenderness, deformity or signs of injury. Normal range of motion. Cervical back: Normal range of motion and neck supple. No rigidity or tenderness. Lymphadenopathy:      Cervical: No cervical adenopathy. Skin:     General: Skin is warm and dry. Capillary Refill: Capillary refill takes less than 2 seconds. Findings: No rash. Neurological:      General: No focal deficit present. Mental Status: She is alert and oriented to person, place, and time. Cranial Nerves: No cranial nerve deficit. Sensory: No sensory deficit. Psychiatric:         Mood and Affect: Mood normal.         Behavior: Behavior normal.       Lab and Diagnostic Study Results   Labs -     Recent Results (from the past 12 hour(s))   EKG, 12 LEAD, INITIAL    Collection Time: 09/24/22  8:17 AM   Result Value Ref Range    Ventricular Rate 88 BPM    Atrial Rate 88 BPM    P-R Interval 208 ms    QRS Duration 101 ms    Q-T Interval 329 ms    QTC Calculation (Bezet) 398 ms    Calculated P Axis 52 degrees    Calculated R Axis -11 degrees    Calculated T Axis -16 degrees    Diagnosis       Sinus rhythm  Ventricular premature complex  Left ventricular hypertrophy  Inferior infarct, old  Lateral leads are also involved  Baseline wander in lead(s) I,III,aVL         Radiologic Studies -   @lastxrresult@  CT Results  (Last 48 hours)      None          CXR Results  (Last 48 hours)      None            Medical Decision Making and ED Course   Differential Diagnosis & Medical Decision Making Provider Note:   Weakness DDx anemia, UTI, hypovolemia    - I am the first provider for this patient. I reviewed the vital signs, available nursing notes, past medical history, past surgical history, family history and social history.  The patients presenting problems have been discussed, and they are in agreement with the care plan formulated and outlined with them. I have encouraged them to ask questions as they arise throughout their visit. Vital Signs-Reviewed the patient's vital signs. Patient Vitals for the past 12 hrs:   Temp Pulse Resp BP SpO2   09/24/22 0818 98 °F (36.7 °C) 93 16 (!) 184/87 100 %       ED Course:   EKG shows sinus rhythm rate 88 no acute ischemic changes, left ventricular hypertrophy    HYPERTENSION COUNSELING: Education was provided to the patient today regarding their hypertension. Patient is made aware of their elevated blood pressure and is instructed to follow up this week with their Primary Care for a recheck. Patient is counseled regarding consequences of chronic, uncontrolled hypertension including kidney disease, heart disease, stroke or even death. Patient states their understanding and agrees to follow up this week. Additionally, during their visit, I discussed sodium restriction, maintaining ideal body weight and regular exercise program as physiologic means to achieve blood pressure control. The patient will strive towards this. Procedures   Performed by: Ladonna Lopez MD  Procedures      Disposition   Disposition: Condition stable and improved  DC- Adult Discharges: All of the diagnostic tests were reviewed and questions answered. Diagnosis, care plan and treatment options were discussed. The patient understands the instructions and will follow up as directed. The patients results have been reviewed with them. They have been counseled regarding their diagnosis. The patient verbally convey understanding and agreement of the signs, symptoms, diagnosis, treatment and prognosis and additionally agrees to follow up as recommended with their PCP in 24 - 48 hours. They also agree with the care-plan and convey that all of their questions have been answered.   I have also put together some discharge instructions for them that include: 1) educational information regarding their diagnosis, 2) how to care for their diagnosis at home, as well a 3) list of reasons why they would want to return to the ED prior to their follow-up appointment, should their condition change. DISCHARGE PLAN:  1. Current Discharge Medication List        CONTINUE these medications which have NOT CHANGED    Details   pantoprazole (PROTONIX) 40 mg tablet Take 1 Tablet by mouth daily. Indications: gastroesophageal reflux disease  Qty: 30 Tablet, Refills: 5    Associated Diagnoses: Chronic gastritis without bleeding, unspecified gastritis type      albuterol (PROVENTIL HFA, VENTOLIN HFA, PROAIR HFA) 90 mcg/actuation inhaler Take 2 Puffs by inhalation every four (4) hours as needed for Wheezing or Shortness of Breath. Qty: 6.7 g, Refills: 0      ibuprofen (MOTRIN) 200 mg tablet Take 2 Tablets by mouth every eight (8) hours as needed for Pain. Qty: 20 Tablet, Refills: 0      aspirin 81 mg chewable tablet aspirin 81 mg tablet   Take by oral route. cloNIDine HCL (CATAPRES) 0.1 mg tablet Take 0.1 mg by mouth two (2) times a day. FeroSuL 325 mg (65 mg iron) tablet       aspirin-calcium carbonate 81 mg-300 mg calcium(777 mg) tab aspirin 81 mg tablet   Take by oral route. Symbicort 160-4.5 mcg/actuation HFAA       carvediloL (COREG) 25 mg tablet Take 25 mg by mouth two (2) times daily (with meals). clopidogreL (PLAVIX) 75 mg tab clopidogrel 75 mg tablet   Take 1 tablet every day by oral route. dilTIAZem ER (CARDIZEM CD) 240 mg capsule Take 240 mg by mouth daily. donepeziL (ARICEPT) 10 mg tablet donepezil 10 mg tablet   Take 1 tablet every day by oral route. linaCLOtide (LINZESS) 145 mcg cap capsule Linzess 145 mcg capsule   Take 1 capsule every day by oral route. lisinopriL (PRINIVIL, ZESTRIL) 10 mg tablet 20 mg daily. mirtazapine (REMERON) 15 mg tablet Take 15 mg by mouth nightly.       rosuvastatin (CRESTOR) 40 mg tablet rosuvastatin 40 mg tablet   Take 1 tablet every day by oral route. 2.   Follow-up Information    None       3. Return to ED if worse   4. Current Discharge Medication List         Remove if admitted/transferred    Diagnosis/Clinical Impression     Clinical Impression: No diagnosis found. Attestations: Padmini PRUETT MD, am the primary clinician of record. Please note that this dictation was completed with [employer], the computer voice recognition software. Quite often unanticipated grammatical, syntax, homophones, and other interpretive errors are inadvertently transcribed by the computer software. Please disregard these errors. Please excuse any errors that have escaped final proofreading. Thank you.

## 2022-09-24 NOTE — ED TRIAGE NOTES
Pt reports overall weakness x2 days after endoscopy. Pt denies pain. Pt states she's been eating small amounts.

## 2022-09-26 LAB
ATRIAL RATE: 88 BPM
CALCULATED P AXIS, ECG09: 52 DEGREES
CALCULATED R AXIS, ECG10: -11 DEGREES
CALCULATED T AXIS, ECG11: -16 DEGREES
DIAGNOSIS, 93000: NORMAL
P-R INTERVAL, ECG05: 208 MS
Q-T INTERVAL, ECG07: 329 MS
QRS DURATION, ECG06: 101 MS
QTC CALCULATION (BEZET), ECG08: 398 MS
VENTRICULAR RATE, ECG03: 88 BPM

## 2022-09-27 NOTE — PROGRESS NOTES
Patient that the biopsies taken and her stomach showed gastritis/inflammation. No infection was noted. Continue the pantoprazole 40 mg daily. Continue to avoid use of NSAIDs.

## 2022-09-28 ENCOUNTER — TELEPHONE (OUTPATIENT)
Dept: GASTROENTEROLOGY | Age: 72
End: 2022-09-28

## 2022-09-28 NOTE — TELEPHONE ENCOUNTER
----- Message from Sapphire Henry MD sent at 9/26/2022 11:06 PM EDT -----  Patient that the biopsies taken and her stomach showed gastritis/inflammation. No infection was noted. Continue the pantoprazole 40 mg daily. Continue to avoid use of NSAIDs.

## 2022-09-28 NOTE — TELEPHONE ENCOUNTER
Patient returned call, I explained her test showed gastritis, no infection. Continue pantoprazole, avoid NSAIDS. She said ok. She is better.

## 2022-09-28 NOTE — TELEPHONE ENCOUNTER
Patient returned call, I explained her test showed gastritis, no infection. Continue Pantoprazole to controll acid. Avoid NSAIDS. She said ok. She was doing better.

## 2022-09-28 NOTE — TELEPHONE ENCOUNTER
----- Message from Irina Felton MD sent at 9/26/2022 11:06 PM EDT -----  Patient that the biopsies taken and her stomach showed gastritis/inflammation. No infection was noted. Continue the pantoprazole 40 mg daily. Continue to avoid use of NSAIDs.

## 2022-12-07 ENCOUNTER — APPOINTMENT (OUTPATIENT)
Dept: GENERAL RADIOLOGY | Age: 72
End: 2022-12-07
Attending: EMERGENCY MEDICINE
Payer: MEDICARE

## 2022-12-07 ENCOUNTER — HOSPITAL ENCOUNTER (EMERGENCY)
Age: 72
Discharge: HOME | End: 2022-12-07
Attending: EMERGENCY MEDICINE
Payer: MEDICARE

## 2022-12-07 ENCOUNTER — APPOINTMENT (OUTPATIENT)
Dept: CT IMAGING | Age: 72
End: 2022-12-07
Attending: EMERGENCY MEDICINE
Payer: MEDICARE

## 2022-12-07 VITALS
WEIGHT: 117 LBS | TEMPERATURE: 97.5 F | SYSTOLIC BLOOD PRESSURE: 172 MMHG | HEIGHT: 62 IN | OXYGEN SATURATION: 99 % | RESPIRATION RATE: 18 BRPM | HEART RATE: 78 BPM | DIASTOLIC BLOOD PRESSURE: 92 MMHG | BODY MASS INDEX: 21.53 KG/M2

## 2022-12-07 DIAGNOSIS — M25.551 RIGHT HIP PAIN: Primary | ICD-10-CM

## 2022-12-07 DIAGNOSIS — I77.89 ECTASIA OF ARTERY (HCC): ICD-10-CM

## 2022-12-07 LAB
APPEARANCE UR: CLEAR
BACTERIA URNS QL MICRO: ABNORMAL /HPF
BILIRUB UR QL: NEGATIVE
COLOR UR: ABNORMAL
GLUCOSE UR STRIP.AUTO-MCNC: NEGATIVE MG/DL
HGB UR QL STRIP: ABNORMAL
KETONES UR QL STRIP.AUTO: NEGATIVE MG/DL
LEUKOCYTE ESTERASE UR QL STRIP.AUTO: NEGATIVE
NITRITE UR QL STRIP.AUTO: NEGATIVE
PH UR STRIP: 6 [PH] (ref 5–8)
PROT UR STRIP-MCNC: 100 MG/DL
RBC #/AREA URNS HPF: ABNORMAL /HPF (ref 0–3)
SP GR UR REFRACTOMETRY: 1.02 (ref 1–1.03)
UROBILINOGEN UR QL STRIP.AUTO: 1 EU/DL (ref 0.2–1)
WBC URNS QL MICRO: ABNORMAL /HPF (ref 0–5)

## 2022-12-07 PROCEDURE — 81001 URINALYSIS AUTO W/SCOPE: CPT

## 2022-12-07 PROCEDURE — 72170 X-RAY EXAM OF PELVIS: CPT

## 2022-12-07 PROCEDURE — 74011250637 HC RX REV CODE- 250/637: Performed by: EMERGENCY MEDICINE

## 2022-12-07 PROCEDURE — 74176 CT ABD & PELVIS W/O CONTRAST: CPT

## 2022-12-07 PROCEDURE — 99284 EMERGENCY DEPT VISIT MOD MDM: CPT

## 2022-12-07 RX ORDER — HYDROCODONE BITARTRATE AND ACETAMINOPHEN 7.5; 325 MG/1; MG/1
1 TABLET ORAL ONCE
Status: COMPLETED | OUTPATIENT
Start: 2022-12-07 | End: 2022-12-07

## 2022-12-07 RX ORDER — NAPROXEN 375 MG/1
375 TABLET ORAL ONCE
Status: COMPLETED | OUTPATIENT
Start: 2022-12-07 | End: 2022-12-07

## 2022-12-07 RX ORDER — ACETAMINOPHEN 325 MG/1
650 TABLET ORAL
Qty: 20 TABLET | Refills: 0 | Status: SHIPPED | OUTPATIENT
Start: 2022-12-07

## 2022-12-07 RX ORDER — CYCLOBENZAPRINE HCL 5 MG
5 TABLET ORAL
Qty: 20 TABLET | Refills: 0 | Status: SHIPPED | OUTPATIENT
Start: 2022-12-07

## 2022-12-07 RX ORDER — IBUPROFEN 400 MG/1
400 TABLET ORAL
Qty: 20 TABLET | Refills: 0 | Status: SHIPPED | OUTPATIENT
Start: 2022-12-07

## 2022-12-07 RX ORDER — OXYCODONE HYDROCHLORIDE 5 MG/1
5 TABLET ORAL
Qty: 6 TABLET | Refills: 0 | Status: SHIPPED | OUTPATIENT
Start: 2022-12-07 | End: 2022-12-10

## 2022-12-07 RX ORDER — ACETAMINOPHEN 325 MG/1
650 TABLET ORAL ONCE
Status: COMPLETED | OUTPATIENT
Start: 2022-12-07 | End: 2022-12-07

## 2022-12-07 RX ADMIN — HYDROCODONE BITARTRATE AND ACETAMINOPHEN 1 TABLET: 7.5; 325 TABLET ORAL at 08:16

## 2022-12-07 RX ADMIN — NAPROXEN 375 MG: 375 TABLET ORAL at 08:16

## 2022-12-07 RX ADMIN — ACETAMINOPHEN 650 MG: 325 TABLET, FILM COATED ORAL at 08:16

## 2022-12-07 NOTE — ED PROVIDER NOTES
EMERGENCY DEPARTMENT HISTORY AND PHYSICAL EXAM  ?    Date: 12/7/2022  Patient Name: Rob Espinoza    History of Presenting Illness    Patient presents with:  Flank Pain      History Provided By: Patient    HPI: Rob Espinoza, 71 y.o. female with a past medical history significant for hypertension, hyperlipidemia, and CAD, abdominal aortic aneurysm, presents to the ED with cc of right pelvic iliac pain started yesterday. No associated injury or trauma. Pain does not radiate. Patient reports pain is 10 out of 10. At onset it was 8 out of 10. She denies dysuria, back pain or flank pain. Pain is not worse with range of motion of the hip. There are no other complaints, changes, or physical findings at this time. PCP: Sridhar Hernandez NP    No current facility-administered medications on file prior to encounter. Current Outpatient Medications on File Prior to Encounter:  acetaminophen (TYLENOL) 325 mg tablet, Take 2 Tablets by mouth every four (4) hours as needed for Pain., Disp: 20 Tablet, Rfl: 0  pantoprazole (PROTONIX) 40 mg tablet, Take 1 Tablet by mouth daily.  Indications: gastroesophageal reflux disease, Disp: 30 Tablet, Rfl: 5  albuterol (PROVENTIL HFA, VENTOLIN HFA, PROAIR HFA) 90 mcg/actuation inhaler, Take 2 Puffs by inhalation every four (4) hours as needed for Wheezing or Shortness of Breath., Disp: 6.7 g, Rfl: 0  ibuprofen (MOTRIN) 200 mg tablet, Take 2 Tablets by mouth every eight (8) hours as needed for Pain., Disp: 20 Tablet, Rfl: 0  aspirin 81 mg chewable tablet, aspirin 81 mg tablet Take by oral route., Disp: , Rfl:   cloNIDine HCL (CATAPRES) 0.1 mg tablet, Take 0.1 mg by mouth two (2) times a day., Disp: , Rfl:   FeroSuL 325 mg (65 mg iron) tablet, , Disp: , Rfl:   aspirin-calcium carbonate 81 mg-300 mg calcium(777 mg) tab, aspirin 81 mg tablet  Take by oral route., Disp: , Rfl:   Symbicort 160-4.5 mcg/actuation HFAA, , Disp: , Rfl:   carvediloL (COREG) 25 mg tablet, Take 25 mg by mouth two (2) times daily (with meals). , Disp: , Rfl:   clopidogreL (PLAVIX) 75 mg tab, clopidogrel 75 mg tablet Take 1 tablet every day by oral route., Disp: , Rfl:   dilTIAZem ER (CARDIZEM CD) 240 mg capsule, Take 240 mg by mouth daily. , Disp: , Rfl:   donepeziL (ARICEPT) 10 mg tablet, donepezil 10 mg tablet Take 1 tablet every day by oral route., Disp: , Rfl:   linaCLOtide (LINZESS) 145 mcg cap capsule, Linzess 145 mcg capsule Take 1 capsule every day by oral route., Disp: , Rfl:   lisinopriL (PRINIVIL, ZESTRIL) 10 mg tablet, 20 mg daily. , Disp: , Rfl:   mirtazapine (REMERON) 15 mg tablet, Take 15 mg by mouth nightly., Disp: , Rfl:   rosuvastatin (CRESTOR) 40 mg tablet, rosuvastatin 40 mg tablet Take 1 tablet every day by oral route., Disp: , Rfl:         Past History    Past Medical History:  Past Medical History:  09/11/2021: Abnormal weight loss  09/11/2021:  Atherosclerosis of native coronary artery without angina   pectoris  No date: CAD (coronary artery disease)      Comment:  MI  09/11/2021: Carotid artery stenosis  09/11/2021: Cervicalgia  No date: Colon polyps  09/11/2021: Constipation  09/11/2021: HTN (hypertension)  No date: Hyperlipidemia  No date: Hypertension  09/11/2021: Hypertensive heart disease without congestive heart   failure  No date: Menopause  09/11/2021: Personal history of colonic polyps  09/11/2021: Vitamin D deficiency    Past Surgical History:  Past Surgical History:  2015: COLONOSCOPY  10/5/2021: COLONOSCOPY; N/A      Comment:  COLONOSCOPY ( T I V A) performed by Agatha Talley MD at [facility] END[facility]COPY  09/16/2013: HX BIV-IMPLANTABLE CARDIOVERTER DEFIBRILLATOR  No date: HX HEART CATHETERIZATION      Comment:  X 3  No date: HX ORTHOPAEDIC      Comment:  R leg  No date: HX OTHER SURGICAL      Comment:  R CAROTID ENDORECTOMY 11-4-05,   L CAROTID ENDARECTOMY                11-6-2006  No date: HX OTHER SURGICAL      Comment:  BILAT LOWER EXTREM VASC BYPASS SURGERY  No date: HX PACEMAKER      Comment:  6 0r 7 years ago    Family History:  Review of patient's family history indicates:  Problem: Cancer      Relation: Mother          Age of Onset: (Not Specified)  Problem: Hypertension      Relation: Mother          Age of Onset: (Not Specified)  Problem: No Known Problems      Relation: Father          Age of Onset: (Not Specified)      Social History:  Social History    Tobacco Use      Smoking status: Every Day        Packs/day: 0.50        Types: Cigarettes      Smokeless tobacco: Never    Vaping Use      Vaping Use: Never used    Alcohol use: Never    Drug use: Never      Allergies:  No Known Allergies      Review of Systems  @[facility]@    Physical Exam  @[facility]@    Diagnostic Study Results    Labs -   Recent Results (from the past 12 hour(s))  -URINALYSIS W/ RFLX MICROSCOPIC:   Collection Time: 12/07/22  7:14 AM       Result                      Value             Ref Range           Color                       Yellow/Straw                          Appearance                  Clear             Clear               Specific gravity            1.025             1.003 - 1.03*       pH (UA)                     6.0               5.0 - 8.0           Protein                     100 (A)           Negative mg/*       Glucose                     Negative          Negative mg/*       Ketone                      Negative          Negative mg/*       Bilirubin                   Negative          Negative            Blood                       Moderate (A)      Negative            Urobilinogen                1.0               0.2 - 1.0 EU*       Nitrites                    Negative          Negative            Leukocyte Esterase          Negative          Negative       -URINE MICROSCOPIC:   Collection Time: 12/07/22  7:14 AM       Result                      Value             Ref Range           WBC                         0-4               0 - 5 /hpf          RBC                         5-10 0 - 3 /hpf          Bacteria                    3+ (A)            Negative /hpf    Radiologic Studies -   No orders to display  CT Results  (Last 48 hours)    None      CXR Results  (Last 48 hours)    None          Medical Decision Making  I am the first provider for this patient. I reviewed the vital signs, available nursing notes, past medical history, past surgical history, family history and social history. Vital Signs-Reviewed the patient's vital signs. Empty flowsheet group. Records Reviewed: Nursing Notes and Old Medical Records    Provider Notes (Medical Decision Making): Check urine and pelvis x-ray. ED Course:     Initial assessment performed. The patients presenting problems have been discussed, and they are in agreement with the care plan formulated and outlined with them. I have encouraged them to ask questions as they arise throughout their visit. ED Course as of 12/07/22 0934  ------------------------------------------------------------  Time: 12/07 0925  Comment: CT results discussed with patientand family. Follow up with Vascular surgery encouraged though I suspect patient's current symptoms are related to arthritis of the hip. Advised that if pain presists additiaonl evaluation with CTA may be warranted. DP and pt tpulses intact. By: Darcy Adamson MD           PLAN:  1. Current Discharge Medication List      2. Follow-up Information    None     Return to ED if worse     Diagnosis    Clinical Impression: No diagnosis found.       ?          Past Medical History:   Diagnosis Date    Abnormal weight loss 09/11/2021    Atherosclerosis of native coronary artery without angina pectoris 09/11/2021    CAD (coronary artery disease)     MI    Carotid artery stenosis 09/11/2021    Cervicalgia 09/11/2021    Colon polyps     Constipation 09/11/2021    HTN (hypertension) 09/11/2021    Hyperlipidemia     Hypertension     Hypertensive heart disease without congestive heart failure 09/11/2021    Menopause     Personal history of colonic polyps 09/11/2021    Vitamin D deficiency 09/11/2021       Past Surgical History:   Procedure Laterality Date    COLONOSCOPY  2015    COLONOSCOPY N/A 10/5/2021    COLONOSCOPY ( T I V A) performed by Anil Elizondo MD at [facility] ENDOSCOPY    HX BIV-IMPLANTABLE CARDIOVERTER DEFIBRILLATOR  09/16/2013    HX HEART CATHETERIZATION      X 3    HX ORTHOPAEDIC      R leg    HX OTHER SURGICAL      R CAROTID ENDORECTOMY 11-4-05,   L CAROTID ENDARECTOMY 11-6-2006    HX OTHER SURGICAL      BILAT LOWER EXTREM VASC BYPASS SURGERY    HX PACEMAKER      6 0r 7 years ago         Family History:   Problem Relation Age of Onset    Cancer Mother     Hypertension Mother     No Known Problems Father        Social History     Socioeconomic History    Marital status:      Spouse name: Not on file    Number of children: Not on file    Years of education: Not on file    Highest education level: Not on file   Occupational History    Not on file   Tobacco Use    Smoking status: Every Day     Packs/day: 0.50     Types: Cigarettes    Smokeless tobacco: Never   Vaping Use    Vaping Use: Never used   Substance and Sexual Activity    Alcohol use: Never    Drug use: Never    Sexual activity: Not on file   Other Topics Concern    Not on file   Social History Narrative    Not on file     Social Determinants of Health     Financial Resource Strain: Not on file   Food Insecurity: Not on file   Transportation Needs: Not on file   Physical Activity: Not on file   Stress: Not on file   Social Connections: Not on file   Intimate Partner Violence: Not on file   Housing Stability: Not on file         ALLERGIES: Patient has no known allergies. Review of Systems   Constitutional: Negative. HENT: Negative. Eyes: Negative. Respiratory: Negative. Cardiovascular: Negative. Gastrointestinal: Negative. Endocrine: Negative. Genitourinary: Negative.     Musculoskeletal: Negative. Right pelvic iliac pain   Skin: Negative. Neurological: Negative. Hematological: Negative. Vitals:    12/07/22 0707   BP: (!) 188/98   Pulse: 80   Resp: 18   Temp: 97.5 °F (36.4 °C)   SpO2: 99%   Weight: 53.1 kg (117 lb)   Height: 5' 2\" (1.575 m)            Physical Exam  Vitals and nursing note reviewed. Constitutional:       Appearance: Normal appearance. HENT:      Head: Normocephalic and atraumatic. Left Ear: Ear canal normal.      Nose: Nose normal.   Eyes:      Extraocular Movements: Extraocular movements intact. Conjunctiva/sclera: Conjunctivae normal.      Pupils: Pupils are equal, round, and reactive to light. Cardiovascular:      Rate and Rhythm: Normal rate and regular rhythm. Pulses: Normal pulses. Heart sounds: Normal heart sounds. Pulmonary:      Effort: Pulmonary effort is normal.      Breath sounds: Normal breath sounds. Abdominal:      General: Abdomen is flat. Bowel sounds are normal.      Palpations: Abdomen is soft. Musculoskeletal:         General: Normal range of motion. Cervical back: Normal range of motion and neck supple. Skin:     General: Skin is warm and dry. Neurological:      General: No focal deficit present. Mental Status: She is alert and oriented to person, place, and time. Psychiatric:         Mood and Affect: Mood normal.         Behavior: Behavior normal.        MDM     Amount and/or Complexity of Data Reviewed  Clinical lab tests: reviewed  Tests in the radiology section of CPT®: reviewed      ED Course as of 12/07/22 0934   Wed Dec 07, 2022   7840 CT results discussed with patientand family. Follow up with Vascular surgery encouraged though I suspect patient's current symptoms are related to arthritis of the hip. Advised that if pain presists additiaonl evaluation with CTA may be warranted. DP and pt tpulses intact.   [MC]      ED Course User Index  [MC] Zoran Zazueta MD Procedures

## 2022-12-07 NOTE — ED TRIAGE NOTES
C/o right flank pain that is constant for past 2 days, denies n/v/d/vaginal bleeding/urinary symptoms

## 2023-03-17 DIAGNOSIS — K29.50 CHRONIC GASTRITIS WITHOUT BLEEDING, UNSPECIFIED GASTRITIS TYPE: ICD-10-CM

## 2023-03-17 RX ORDER — PANTOPRAZOLE SODIUM 40 MG/1
TABLET, DELAYED RELEASE ORAL
Qty: 90 TABLET | Refills: 3 | Status: SHIPPED | OUTPATIENT
Start: 2023-03-17

## 2023-05-22 RX ORDER — CLONIDINE HYDROCHLORIDE 0.1 MG/1
0.1 TABLET ORAL 2 TIMES DAILY
COMMUNITY
Start: 2021-08-02

## 2023-05-22 RX ORDER — MIRTAZAPINE 15 MG/1
15 TABLET, FILM COATED ORAL NIGHTLY
COMMUNITY
Start: 2020-10-04

## 2023-05-22 RX ORDER — IBUPROFEN 400 MG/1
400 TABLET ORAL EVERY 6 HOURS PRN
COMMUNITY
Start: 2022-12-07

## 2023-05-22 RX ORDER — ASPIRIN 81 MG/1
TABLET, CHEWABLE ORAL
COMMUNITY

## 2023-05-22 RX ORDER — ALBUTEROL SULFATE 90 UG/1
2 AEROSOL, METERED RESPIRATORY (INHALATION) EVERY 4 HOURS PRN
COMMUNITY
Start: 2022-08-07

## 2023-05-22 RX ORDER — DILTIAZEM HYDROCHLORIDE 240 MG/1
240 CAPSULE, EXTENDED RELEASE ORAL DAILY
COMMUNITY
Start: 2020-10-04

## 2023-05-22 RX ORDER — CYCLOBENZAPRINE HCL 5 MG
5 TABLET ORAL 3 TIMES DAILY PRN
COMMUNITY
Start: 2022-12-07

## 2023-05-22 RX ORDER — PANTOPRAZOLE SODIUM 40 MG/1
TABLET, DELAYED RELEASE ORAL
COMMUNITY
Start: 2023-03-17

## 2023-05-22 RX ORDER — ROSUVASTATIN CALCIUM 40 MG/1
TABLET, COATED ORAL
COMMUNITY

## 2023-05-22 RX ORDER — FERROUS SULFATE 325(65) MG
TABLET ORAL
COMMUNITY
Start: 2021-09-02

## 2023-05-22 RX ORDER — ACETAMINOPHEN 325 MG/1
650 TABLET ORAL EVERY 4 HOURS PRN
COMMUNITY
Start: 2022-12-07

## 2023-05-22 RX ORDER — CLOPIDOGREL BISULFATE 75 MG/1
TABLET ORAL
COMMUNITY

## 2023-05-22 RX ORDER — LISINOPRIL 10 MG/1
20 TABLET ORAL DAILY
COMMUNITY

## 2023-05-22 RX ORDER — BUDESONIDE AND FORMOTEROL FUMARATE DIHYDRATE 160; 4.5 UG/1; UG/1
AEROSOL RESPIRATORY (INHALATION)
COMMUNITY
Start: 2020-09-17

## 2023-05-22 RX ORDER — CARVEDILOL 25 MG/1
25 TABLET ORAL 2 TIMES DAILY WITH MEALS
COMMUNITY
Start: 2020-09-25

## 2023-05-22 RX ORDER — DONEPEZIL HYDROCHLORIDE 10 MG/1
TABLET, FILM COATED ORAL
COMMUNITY

## 2023-08-24 ENCOUNTER — TRANSCRIBE ORDERS (OUTPATIENT)
Facility: HOSPITAL | Age: 73
End: 2023-08-24

## 2023-08-24 DIAGNOSIS — Z12.31 VISIT FOR SCREENING MAMMOGRAM: Primary | ICD-10-CM

## 2023-09-14 ENCOUNTER — HOSPITAL ENCOUNTER (OUTPATIENT)
Facility: HOSPITAL | Age: 73
Discharge: HOME | End: 2023-09-14
Payer: COMMERCIAL

## 2023-09-14 VITALS — HEIGHT: 63 IN | WEIGHT: 102 LBS | BODY MASS INDEX: 18.07 KG/M2

## 2023-09-14 DIAGNOSIS — Z12.31 VISIT FOR SCREENING MAMMOGRAM: ICD-10-CM

## 2023-09-14 PROCEDURE — 77063 BREAST TOMOSYNTHESIS BI: CPT

## 2023-10-31 RX ORDER — LISINOPRIL 20 MG/1
TABLET ORAL
COMMUNITY
Start: 2023-08-24

## 2023-10-31 RX ORDER — DILTIAZEM HYDROCHLORIDE 240 MG/1
CAPSULE, COATED, EXTENDED RELEASE ORAL
COMMUNITY
Start: 2023-07-27

## 2023-11-01 ENCOUNTER — OFFICE VISIT (OUTPATIENT)
Age: 73
End: 2023-11-01
Payer: MEDICARE

## 2023-11-01 VITALS
HEART RATE: 70 BPM | RESPIRATION RATE: 14 BRPM | DIASTOLIC BLOOD PRESSURE: 60 MMHG | OXYGEN SATURATION: 97 % | WEIGHT: 95.2 LBS | SYSTOLIC BLOOD PRESSURE: 100 MMHG | BODY MASS INDEX: 16.87 KG/M2 | TEMPERATURE: 97.2 F | HEIGHT: 63 IN

## 2023-11-01 DIAGNOSIS — R63.0 ANOREXIA: ICD-10-CM

## 2023-11-01 DIAGNOSIS — R63.4 ABNORMAL WEIGHT LOSS: Primary | ICD-10-CM

## 2023-11-01 DIAGNOSIS — K59.04 CHRONIC IDIOPATHIC CONSTIPATION: ICD-10-CM

## 2023-11-01 DIAGNOSIS — K59.09 CHRONIC CONSTIPATION: ICD-10-CM

## 2023-11-01 DIAGNOSIS — Z86.010 PERSONAL HISTORY OF COLONIC POLYPS: ICD-10-CM

## 2023-11-01 PROCEDURE — 3017F COLORECTAL CA SCREEN DOC REV: CPT | Performed by: INTERNAL MEDICINE

## 2023-11-01 PROCEDURE — G8400 PT W/DXA NO RESULTS DOC: HCPCS | Performed by: INTERNAL MEDICINE

## 2023-11-01 PROCEDURE — 99214 OFFICE O/P EST MOD 30 MIN: CPT | Performed by: INTERNAL MEDICINE

## 2023-11-01 PROCEDURE — 1090F PRES/ABSN URINE INCON ASSESS: CPT | Performed by: INTERNAL MEDICINE

## 2023-11-01 PROCEDURE — 3078F DIAST BP <80 MM HG: CPT | Performed by: INTERNAL MEDICINE

## 2023-11-01 PROCEDURE — G8419 CALC BMI OUT NRM PARAM NOF/U: HCPCS | Performed by: INTERNAL MEDICINE

## 2023-11-01 PROCEDURE — 4004F PT TOBACCO SCREEN RCVD TLK: CPT | Performed by: INTERNAL MEDICINE

## 2023-11-01 PROCEDURE — G8484 FLU IMMUNIZE NO ADMIN: HCPCS | Performed by: INTERNAL MEDICINE

## 2023-11-01 PROCEDURE — 3074F SYST BP LT 130 MM HG: CPT | Performed by: INTERNAL MEDICINE

## 2023-11-01 PROCEDURE — G8427 DOCREV CUR MEDS BY ELIG CLIN: HCPCS | Performed by: INTERNAL MEDICINE

## 2023-11-01 PROCEDURE — 1123F ACP DISCUSS/DSCN MKR DOCD: CPT | Performed by: INTERNAL MEDICINE

## 2023-11-01 RX ORDER — MEGESTROL ACETATE 40 MG/1
40 TABLET ORAL DAILY
Qty: 30 TABLET | Refills: 3 | Status: SHIPPED | OUTPATIENT
Start: 2023-11-01

## 2023-11-01 RX ORDER — LACTULOSE 10 G/15ML
20 SOLUTION ORAL EVERY EVENING
Qty: 946 ML | Refills: 1 | Status: SHIPPED | OUTPATIENT
Start: 2023-11-01

## 2023-11-01 RX ORDER — CYPROHEPTADINE HYDROCHLORIDE 4 MG/1
4 TABLET ORAL 3 TIMES DAILY
COMMUNITY

## 2023-11-01 ASSESSMENT — PATIENT HEALTH QUESTIONNAIRE - PHQ9
2. FEELING DOWN, DEPRESSED OR HOPELESS: 0
SUM OF ALL RESPONSES TO PHQ QUESTIONS 1-9: 0
SUM OF ALL RESPONSES TO PHQ9 QUESTIONS 1 & 2: 0
1. LITTLE INTEREST OR PLEASURE IN DOING THINGS: 0
SUM OF ALL RESPONSES TO PHQ QUESTIONS 1-9: 0

## 2023-11-11 ASSESSMENT — ENCOUNTER SYMPTOMS
ABDOMINAL PAIN: 0
ANAL BLEEDING: 0
BLOOD IN STOOL: 0
CONSTIPATION: 1
RESPIRATORY NEGATIVE: 1
ABDOMINAL DISTENTION: 0
ALLERGIC/IMMUNOLOGIC NEGATIVE: 1

## 2024-01-17 ENCOUNTER — OFFICE VISIT (OUTPATIENT)
Age: 74
End: 2024-01-17
Payer: MEDICARE

## 2024-01-17 VITALS
WEIGHT: 95 LBS | TEMPERATURE: 97.4 F | HEIGHT: 63 IN | OXYGEN SATURATION: 100 % | HEART RATE: 82 BPM | BODY MASS INDEX: 16.83 KG/M2 | RESPIRATION RATE: 14 BRPM | SYSTOLIC BLOOD PRESSURE: 120 MMHG | DIASTOLIC BLOOD PRESSURE: 70 MMHG

## 2024-01-17 DIAGNOSIS — R63.4 ABNORMAL WEIGHT LOSS: ICD-10-CM

## 2024-01-17 DIAGNOSIS — Z86.010 HISTORY OF COLON POLYPS: ICD-10-CM

## 2024-01-17 DIAGNOSIS — K59.04 CHRONIC IDIOPATHIC CONSTIPATION: Primary | ICD-10-CM

## 2024-01-17 DIAGNOSIS — R63.0 ANOREXIA: ICD-10-CM

## 2024-01-17 PROCEDURE — G8419 CALC BMI OUT NRM PARAM NOF/U: HCPCS | Performed by: INTERNAL MEDICINE

## 2024-01-17 PROCEDURE — 1090F PRES/ABSN URINE INCON ASSESS: CPT | Performed by: INTERNAL MEDICINE

## 2024-01-17 PROCEDURE — 99214 OFFICE O/P EST MOD 30 MIN: CPT | Performed by: INTERNAL MEDICINE

## 2024-01-17 PROCEDURE — G8427 DOCREV CUR MEDS BY ELIG CLIN: HCPCS | Performed by: INTERNAL MEDICINE

## 2024-01-17 PROCEDURE — 3017F COLORECTAL CA SCREEN DOC REV: CPT | Performed by: INTERNAL MEDICINE

## 2024-01-17 PROCEDURE — 3074F SYST BP LT 130 MM HG: CPT | Performed by: INTERNAL MEDICINE

## 2024-01-17 PROCEDURE — G8484 FLU IMMUNIZE NO ADMIN: HCPCS | Performed by: INTERNAL MEDICINE

## 2024-01-17 PROCEDURE — G8400 PT W/DXA NO RESULTS DOC: HCPCS | Performed by: INTERNAL MEDICINE

## 2024-01-17 PROCEDURE — 4004F PT TOBACCO SCREEN RCVD TLK: CPT | Performed by: INTERNAL MEDICINE

## 2024-01-17 PROCEDURE — 1123F ACP DISCUSS/DSCN MKR DOCD: CPT | Performed by: INTERNAL MEDICINE

## 2024-01-17 PROCEDURE — 3078F DIAST BP <80 MM HG: CPT | Performed by: INTERNAL MEDICINE

## 2024-01-17 RX ORDER — MEGESTROL ACETATE 40 MG/1
40 TABLET ORAL 2 TIMES DAILY
Qty: 60 TABLET | Refills: 3 | Status: SHIPPED | OUTPATIENT
Start: 2024-01-17

## 2024-01-17 ASSESSMENT — PATIENT HEALTH QUESTIONNAIRE - PHQ9
1. LITTLE INTEREST OR PLEASURE IN DOING THINGS: 0
SUM OF ALL RESPONSES TO PHQ QUESTIONS 1-9: 0
2. FEELING DOWN, DEPRESSED OR HOPELESS: 0
SUM OF ALL RESPONSES TO PHQ QUESTIONS 1-9: 0
SUM OF ALL RESPONSES TO PHQ9 QUESTIONS 1 & 2: 0
SUM OF ALL RESPONSES TO PHQ QUESTIONS 1-9: 0
SUM OF ALL RESPONSES TO PHQ QUESTIONS 1-9: 0

## 2024-01-17 NOTE — PROGRESS NOTES
1. Have you been to the ER, urgent care clinic since your last visit?  Hospitalized since your last visit? no    2. Have you seen or consulted any other health care providers outside of the [facility] since your last visit?  Include any pap smears or colon screening.  No   Chief Complaint   Patient presents with    Follow-up    abnormal weight loss     /70 (Site: Left Upper Arm, Position: Sitting, Cuff Size: Medium Adult)   Pulse 82   Temp 97.4 °F (36.3 °C) (Temporal)   Resp 14   Ht 1.6 m (5' 3\")   Wt 43.1 kg (95 lb)   SpO2 100% Comment: room air  BMI 16.83 kg/m²

## 2024-01-29 DIAGNOSIS — R63.0 ANOREXIA: ICD-10-CM

## 2024-01-29 DIAGNOSIS — R63.4 ABNORMAL WEIGHT LOSS: ICD-10-CM

## 2024-01-29 RX ORDER — MEGESTROL ACETATE 40 MG/1
40 TABLET ORAL DAILY
Qty: 90 TABLET | Refills: 1 | OUTPATIENT
Start: 2024-01-29

## 2024-03-22 DIAGNOSIS — K29.50 UNSPECIFIED CHRONIC GASTRITIS WITHOUT BLEEDING: ICD-10-CM

## 2024-03-22 RX ORDER — PANTOPRAZOLE SODIUM 40 MG/1
TABLET, DELAYED RELEASE ORAL
Qty: 90 TABLET | Refills: 3 | Status: SHIPPED | OUTPATIENT
Start: 2024-03-22

## 2024-04-07 ENCOUNTER — HOSPITAL ENCOUNTER (EMERGENCY)
Facility: HOSPITAL | Age: 74
Discharge: HOME | End: 2024-04-07
Attending: EMERGENCY MEDICINE
Payer: MEDICARE

## 2024-04-07 ENCOUNTER — APPOINTMENT (OUTPATIENT)
Facility: HOSPITAL | Age: 74
End: 2024-04-07
Attending: EMERGENCY MEDICINE
Payer: MEDICARE

## 2024-04-07 VITALS
TEMPERATURE: 97.7 F | HEART RATE: 84 BPM | HEIGHT: 62 IN | OXYGEN SATURATION: 99 % | BODY MASS INDEX: 17.48 KG/M2 | WEIGHT: 95 LBS | RESPIRATION RATE: 16 BRPM | DIASTOLIC BLOOD PRESSURE: 80 MMHG | SYSTOLIC BLOOD PRESSURE: 168 MMHG

## 2024-04-07 DIAGNOSIS — R10.9 FLANK PAIN: Primary | ICD-10-CM

## 2024-04-07 LAB
ALBUMIN SERPL-MCNC: 3.8 G/DL (ref 3.5–5)
ALBUMIN/GLOB SERPL: 1 (ref 1.1–2.2)
ALP SERPL-CCNC: 70 U/L (ref 45–117)
ALT SERPL-CCNC: 24 U/L (ref 12–78)
ANION GAP SERPL CALC-SCNC: 13 MMOL/L (ref 5–15)
APPEARANCE UR: CLEAR
AST SERPL W P-5'-P-CCNC: 25 U/L (ref 15–37)
BACTERIA URNS QL MICRO: ABNORMAL /HPF
BASOPHILS # BLD: 0 K/UL (ref 0–0.1)
BASOPHILS NFR BLD: 0 % (ref 0–1)
BILIRUB SERPL-MCNC: 0.5 MG/DL (ref 0.2–1)
BILIRUB UR QL: NEGATIVE
BUN SERPL-MCNC: 19 MG/DL (ref 6–20)
BUN/CREAT SERPL: 12 (ref 12–20)
CA-I BLD-MCNC: 9.1 MG/DL (ref 8.5–10.1)
CHLORIDE SERPL-SCNC: 105 MMOL/L (ref 97–108)
CO2 SERPL-SCNC: 22 MMOL/L (ref 21–32)
COLOR UR: ABNORMAL
CREAT SERPL-MCNC: 1.61 MG/DL (ref 0.55–1.02)
DIFFERENTIAL METHOD BLD: ABNORMAL
EOSINOPHIL # BLD: 0.1 K/UL (ref 0–0.4)
EOSINOPHIL NFR BLD: 1 % (ref 0–7)
ERYTHROCYTE [DISTWIDTH] IN BLOOD BY AUTOMATED COUNT: 12.6 % (ref 11.5–14.5)
GLOBULIN SER CALC-MCNC: 4 G/DL (ref 2–4)
GLUCOSE SERPL-MCNC: 73 MG/DL (ref 65–100)
GLUCOSE UR STRIP.AUTO-MCNC: NEGATIVE MG/DL
HCT VFR BLD AUTO: 35.1 % (ref 35–47)
HGB BLD-MCNC: 12.2 G/DL (ref 11.5–16)
HGB UR QL STRIP: ABNORMAL
IMM GRANULOCYTES # BLD AUTO: 0 K/UL (ref 0–0.04)
IMM GRANULOCYTES NFR BLD AUTO: 0 % (ref 0–0.5)
KETONES UR QL STRIP.AUTO: NEGATIVE MG/DL
LEUKOCYTE ESTERASE UR QL STRIP.AUTO: NEGATIVE
LYMPHOCYTES # BLD: 1.9 K/UL (ref 0.8–3.5)
LYMPHOCYTES NFR BLD: 20 % (ref 12–49)
MAGNESIUM SERPL-MCNC: 2.2 MG/DL (ref 1.6–2.4)
MCH RBC QN AUTO: 35.4 PG (ref 26–34)
MCHC RBC AUTO-ENTMCNC: 34.8 G/DL (ref 30–36.5)
MCV RBC AUTO: 101.7 FL (ref 80–99)
MONOCYTES # BLD: 0.8 K/UL (ref 0–1)
MONOCYTES NFR BLD: 8 % (ref 5–13)
NEUTS SEG # BLD: 6.7 K/UL (ref 1.8–8)
NEUTS SEG NFR BLD: 71 % (ref 32–75)
NITRITE UR QL STRIP.AUTO: NEGATIVE
NRBC # BLD: 0 K/UL (ref 0–0.01)
NRBC BLD-RTO: 0 PER 100 WBC
PH UR STRIP: 6 (ref 5–8)
PLATELET # BLD AUTO: 91 K/UL (ref 150–400)
PMV BLD AUTO: 12.8 FL (ref 8.9–12.9)
POTASSIUM SERPL-SCNC: 3.6 MMOL/L (ref 3.5–5.1)
PROT SERPL-MCNC: 7.8 G/DL (ref 6.4–8.2)
PROT UR STRIP-MCNC: ABNORMAL MG/DL
RBC # BLD AUTO: 3.45 M/UL (ref 3.8–5.2)
RBC #/AREA URNS HPF: ABNORMAL /HPF (ref 0–3)
SODIUM SERPL-SCNC: 140 MMOL/L (ref 136–145)
SP GR UR REFRACTOMETRY: <1.005 (ref 1–1.03)
TROPONIN I SERPL HS-MCNC: 16 NG/L (ref 0–51)
URINE CULTURE IF INDICATED: ABNORMAL
UROBILINOGEN UR QL STRIP.AUTO: 0.2 EU/DL (ref 0.2–1)
WBC # BLD AUTO: 9.5 K/UL (ref 3.6–11)
WBC URNS QL MICRO: ABNORMAL /HPF (ref 0–5)

## 2024-04-07 PROCEDURE — 93005 ELECTROCARDIOGRAM TRACING: CPT | Performed by: EMERGENCY MEDICINE

## 2024-04-07 PROCEDURE — 96361 HYDRATE IV INFUSION ADD-ON: CPT

## 2024-04-07 PROCEDURE — 73502 X-RAY EXAM HIP UNI 2-3 VIEWS: CPT

## 2024-04-07 PROCEDURE — 80053 COMPREHEN METABOLIC PANEL: CPT

## 2024-04-07 PROCEDURE — 81001 URINALYSIS AUTO W/SCOPE: CPT

## 2024-04-07 PROCEDURE — 83735 ASSAY OF MAGNESIUM: CPT

## 2024-04-07 PROCEDURE — 2580000003 HC RX 258: Performed by: EMERGENCY MEDICINE

## 2024-04-07 PROCEDURE — 96360 HYDRATION IV INFUSION INIT: CPT

## 2024-04-07 PROCEDURE — 84484 ASSAY OF TROPONIN QUANT: CPT

## 2024-04-07 PROCEDURE — 99285 EMERGENCY DEPT VISIT HI MDM: CPT

## 2024-04-07 PROCEDURE — 85025 COMPLETE CBC W/AUTO DIFF WBC: CPT

## 2024-04-07 RX ORDER — 0.9 % SODIUM CHLORIDE 0.9 %
1000 INTRAVENOUS SOLUTION INTRAVENOUS ONCE
Status: COMPLETED | OUTPATIENT
Start: 2024-04-07 | End: 2024-04-07

## 2024-04-07 RX ADMIN — SODIUM CHLORIDE 1000 ML: 9 INJECTION, SOLUTION INTRAVENOUS at 17:18

## 2024-04-07 NOTE — ED PROVIDER NOTES
[facility] EMERGENCY DEPT  EMERGENCY DEPARTMENT HISTORY AND PHYSICAL EXAM      Date: 4/7/2024  Patient Name: Saranya Hightower  MRN: [id number]  YOB: 1950  Date of evaluation: 4/7/2024  Provider: Gloria Tyler MD   Note Started: 4:05 PM EDT 4/7/24    HISTORY OF PRESENT ILLNESS     Chief Complaint   Patient presents with    Flank Pain       History Provided By: Patient    HPI: Saranya Hightower is a 74 y.o. female     PAST MEDICAL HISTORY   Past Medical History:  Past Medical History:   Diagnosis Date    Abnormal weight loss 09/11/2021    Atherosclerosis of native coronary artery without angina pectoris 09/11/2021    CAD (coronary artery disease)     MI    Carotid artery stenosis 09/11/2021    Cervicalgia 09/11/2021    Colon polyps     Constipation 09/11/2021    HTN (hypertension) 09/11/2021    Hyperlipidemia     Hypertension     Hypertensive heart disease without congestive heart failure 09/11/2021    Menopause     Personal history of colonic polyps 09/11/2021    Vitamin D deficiency 09/11/2021       Past Surgical History:  Past Surgical History:   Procedure Laterality Date    CARDIAC CATHETERIZATION      X 3    CARDIAC DEFIBRILLATOR PLACEMENT  09/16/2013    COLONOSCOPY N/A 10/5/2021    COLONOSCOPY ( T I V A) performed by Talib Ortega MD at [facility] ENDOSCOPY    COLONOSCOPY  2015    ORTHOPEDIC SURGERY      R leg    OTHER SURGICAL HISTORY      BILAT LOWER EXTREM VASC BYPASS SURGERY    OTHER SURGICAL HISTORY      R CAROTID ENDORECTOMY 11-4-05,   L CAROTID ENDARECTOMY 11-6-2006    PACEMAKER      6 0r 7 years ago       Family History:  Family History   Problem Relation Age of Onset    No Known Problems Father     Cancer Mother     Hypertension Mother        Social History:  Social History     Tobacco Use    Smoking status: Every Day     Current packs/day: 0.50     Types: Cigarettes    Smokeless tobacco: Never   Vaping Use    Vaping Use: Never used   Substance Use Topics    Alcohol use: Never    Drug use: Never

## 2024-04-08 LAB
EKG ATRIAL RATE: 79 BPM
EKG DIAGNOSIS: NORMAL
EKG P AXIS: 36 DEGREES
EKG P-R INTERVAL: 223 MS
EKG Q-T INTERVAL: 349 MS
EKG QRS DURATION: 97 MS
EKG QTC CALCULATION (BAZETT): 401 MS
EKG R AXIS: -10 DEGREES
EKG T AXIS: -12 DEGREES
EKG VENTRICULAR RATE: 79 BPM

## 2024-04-15 DIAGNOSIS — R63.4 ABNORMAL WEIGHT LOSS: ICD-10-CM

## 2024-04-15 DIAGNOSIS — R63.0 ANOREXIA: ICD-10-CM

## 2024-04-16 DIAGNOSIS — R63.0 ANOREXIA: ICD-10-CM

## 2024-04-16 DIAGNOSIS — R63.4 ABNORMAL WEIGHT LOSS: ICD-10-CM

## 2024-04-16 RX ORDER — MEGESTROL ACETATE 40 MG/1
40 TABLET ORAL 2 TIMES DAILY
Qty: 60 TABLET | Refills: 3 | Status: CANCELLED | OUTPATIENT
Start: 2024-04-16

## 2024-04-17 ENCOUNTER — OFFICE VISIT (OUTPATIENT)
Age: 74
End: 2024-04-17
Payer: MEDICARE

## 2024-04-17 VITALS
DIASTOLIC BLOOD PRESSURE: 60 MMHG | SYSTOLIC BLOOD PRESSURE: 120 MMHG | OXYGEN SATURATION: 98 % | BODY MASS INDEX: 18.9 KG/M2 | HEIGHT: 62 IN | WEIGHT: 102.69 LBS | HEART RATE: 73 BPM | TEMPERATURE: 97.9 F | RESPIRATION RATE: 14 BRPM

## 2024-04-17 DIAGNOSIS — R63.0 ANOREXIA: Primary | ICD-10-CM

## 2024-04-17 DIAGNOSIS — R63.4 WEIGHT LOSS: ICD-10-CM

## 2024-04-17 DIAGNOSIS — K59.04 CHRONIC IDIOPATHIC CONSTIPATION: ICD-10-CM

## 2024-04-17 PROCEDURE — G8427 DOCREV CUR MEDS BY ELIG CLIN: HCPCS | Performed by: INTERNAL MEDICINE

## 2024-04-17 PROCEDURE — 3078F DIAST BP <80 MM HG: CPT | Performed by: INTERNAL MEDICINE

## 2024-04-17 PROCEDURE — 1123F ACP DISCUSS/DSCN MKR DOCD: CPT | Performed by: INTERNAL MEDICINE

## 2024-04-17 PROCEDURE — 99214 OFFICE O/P EST MOD 30 MIN: CPT | Performed by: INTERNAL MEDICINE

## 2024-04-17 PROCEDURE — 4004F PT TOBACCO SCREEN RCVD TLK: CPT | Performed by: INTERNAL MEDICINE

## 2024-04-17 PROCEDURE — 3017F COLORECTAL CA SCREEN DOC REV: CPT | Performed by: INTERNAL MEDICINE

## 2024-04-17 PROCEDURE — 3074F SYST BP LT 130 MM HG: CPT | Performed by: INTERNAL MEDICINE

## 2024-04-17 PROCEDURE — G8420 CALC BMI NORM PARAMETERS: HCPCS | Performed by: INTERNAL MEDICINE

## 2024-04-17 PROCEDURE — 1090F PRES/ABSN URINE INCON ASSESS: CPT | Performed by: INTERNAL MEDICINE

## 2024-04-17 PROCEDURE — G8400 PT W/DXA NO RESULTS DOC: HCPCS | Performed by: INTERNAL MEDICINE

## 2024-04-17 RX ORDER — MEGESTROL ACETATE 40 MG/1
40 TABLET ORAL 2 TIMES DAILY
Qty: 180 TABLET | Refills: 1 | Status: SHIPPED | OUTPATIENT
Start: 2024-04-17

## 2024-04-17 ASSESSMENT — PATIENT HEALTH QUESTIONNAIRE - PHQ9
2. FEELING DOWN, DEPRESSED OR HOPELESS: NOT AT ALL
SUM OF ALL RESPONSES TO PHQ QUESTIONS 1-9: 0
SUM OF ALL RESPONSES TO PHQ QUESTIONS 1-9: 0
SUM OF ALL RESPONSES TO PHQ9 QUESTIONS 1 & 2: 0
1. LITTLE INTEREST OR PLEASURE IN DOING THINGS: NOT AT ALL
SUM OF ALL RESPONSES TO PHQ QUESTIONS 1-9: 0
SUM OF ALL RESPONSES TO PHQ QUESTIONS 1-9: 0

## 2024-04-17 NOTE — PROGRESS NOTES
1. Have you been to the ER, urgent care clinic since your last visit?  Hospitalized since your last visit?yes 4-7-24  l side pain    2. Have you seen or consulted any other health care providers outside of the [facility] since your last visit?  Include any pap smears or colon screening.  No   Chief Complaint   Patient presents with    Follow-up     3 month follow up    Constipation     /60 (Site: Left Upper Arm, Position: Sitting, Cuff Size: Medium Adult)   Pulse 73   Temp 97.9 °F (36.6 °C) (Temporal)   Resp 14   Ht 1.575 m (5' 2\")   Wt 46.6 kg (102 lb 11 oz)   SpO2 98% Comment: room air  BMI 18.78 kg/m²

## 2024-05-18 ENCOUNTER — HOSPITAL ENCOUNTER (EMERGENCY)
Facility: HOSPITAL | Age: 74
Discharge: HOME | End: 2024-05-18
Attending: EMERGENCY MEDICINE
Payer: MEDICARE

## 2024-05-18 ENCOUNTER — APPOINTMENT (OUTPATIENT)
Facility: HOSPITAL | Age: 74
End: 2024-05-18
Attending: EMERGENCY MEDICINE
Payer: MEDICARE

## 2024-05-18 VITALS
HEIGHT: 62 IN | HEART RATE: 99 BPM | DIASTOLIC BLOOD PRESSURE: 99 MMHG | BODY MASS INDEX: 18.78 KG/M2 | TEMPERATURE: 97.3 F | RESPIRATION RATE: 27 BRPM | OXYGEN SATURATION: 96 % | SYSTOLIC BLOOD PRESSURE: 185 MMHG

## 2024-05-18 DIAGNOSIS — K29.50 UNSPECIFIED CHRONIC GASTRITIS WITHOUT BLEEDING: ICD-10-CM

## 2024-05-18 DIAGNOSIS — R10.13 ABDOMINAL PAIN, EPIGASTRIC: Primary | ICD-10-CM

## 2024-05-18 DIAGNOSIS — R93.89 ABNORMAL FINDING ON CT SCAN: ICD-10-CM

## 2024-05-18 LAB
ALBUMIN SERPL-MCNC: 3.8 G/DL (ref 3.5–5)
ALBUMIN/GLOB SERPL: 1 (ref 1.1–2.2)
ALP SERPL-CCNC: 65 U/L (ref 45–117)
ALT SERPL-CCNC: 21 U/L (ref 12–78)
ANION GAP SERPL CALC-SCNC: 9 MMOL/L (ref 5–15)
APPEARANCE UR: CLEAR
AST SERPL W P-5'-P-CCNC: 20 U/L (ref 15–37)
BACTERIA URNS QL MICRO: ABNORMAL /HPF
BASOPHILS # BLD: 0.1 K/UL (ref 0–0.1)
BASOPHILS NFR BLD: 1 % (ref 0–1)
BILIRUB SERPL-MCNC: 0.7 MG/DL (ref 0.2–1)
BILIRUB UR QL: NEGATIVE
BUN SERPL-MCNC: 15 MG/DL (ref 6–20)
BUN/CREAT SERPL: 12 (ref 12–20)
CA-I BLD-MCNC: 9.9 MG/DL (ref 8.5–10.1)
CHLORIDE SERPL-SCNC: 105 MMOL/L (ref 97–108)
CO2 SERPL-SCNC: 25 MMOL/L (ref 21–32)
COLOR UR: ABNORMAL
CREAT SERPL-MCNC: 1.25 MG/DL (ref 0.55–1.02)
DIFFERENTIAL METHOD BLD: ABNORMAL
EKG ATRIAL RATE: 100 BPM
EKG DIAGNOSIS: NORMAL
EKG P AXIS: -58 DEGREES
EKG P-R INTERVAL: 186 MS
EKG Q-T INTERVAL: 346 MS
EKG QRS DURATION: 113 MS
EKG QTC CALCULATION (BAZETT): 442 MS
EKG R AXIS: -7 DEGREES
EKG T AXIS: 51 DEGREES
EKG VENTRICULAR RATE: 98 BPM
EOSINOPHIL # BLD: 0.1 K/UL (ref 0–0.4)
EOSINOPHIL NFR BLD: 1 % (ref 0–7)
ERYTHROCYTE [DISTWIDTH] IN BLOOD BY AUTOMATED COUNT: 12.9 % (ref 11.5–14.5)
GLOBULIN SER CALC-MCNC: 3.7 G/DL (ref 2–4)
GLUCOSE SERPL-MCNC: 83 MG/DL (ref 65–100)
GLUCOSE UR STRIP.AUTO-MCNC: NEGATIVE MG/DL
HCT VFR BLD AUTO: 39 % (ref 35–47)
HGB BLD-MCNC: 13.6 G/DL (ref 11.5–16)
HGB UR QL STRIP: ABNORMAL
IMM GRANULOCYTES # BLD AUTO: 0 K/UL (ref 0–0.04)
IMM GRANULOCYTES NFR BLD AUTO: 0 % (ref 0–0.5)
KETONES UR QL STRIP.AUTO: NEGATIVE MG/DL
LEUKOCYTE ESTERASE UR QL STRIP.AUTO: NEGATIVE
LIPASE SERPL-CCNC: 118 U/L (ref 13–75)
LYMPHOCYTES # BLD: 2.1 K/UL (ref 0.8–3.5)
LYMPHOCYTES NFR BLD: 20 % (ref 12–49)
MCH RBC QN AUTO: 35.3 PG (ref 26–34)
MCHC RBC AUTO-ENTMCNC: 34.9 G/DL (ref 30–36.5)
MCV RBC AUTO: 101.3 FL (ref 80–99)
MONOCYTES # BLD: 1 K/UL (ref 0–1)
MONOCYTES NFR BLD: 9 % (ref 5–13)
NEUTS SEG # BLD: 7.3 K/UL (ref 1.8–8)
NEUTS SEG NFR BLD: 69 % (ref 32–75)
NITRITE UR QL STRIP.AUTO: NEGATIVE
NRBC # BLD: 0 K/UL (ref 0–0.01)
NRBC BLD-RTO: 0 PER 100 WBC
PH UR STRIP: 6 (ref 5–8)
PLATELET # BLD AUTO: 99 K/UL (ref 150–400)
PMV BLD AUTO: 12.2 FL (ref 8.9–12.9)
POTASSIUM SERPL-SCNC: 5 MMOL/L (ref 3.5–5.1)
PROT SERPL-MCNC: 7.5 G/DL (ref 6.4–8.2)
PROT UR STRIP-MCNC: ABNORMAL MG/DL
RBC # BLD AUTO: 3.85 M/UL (ref 3.8–5.2)
RBC #/AREA URNS HPF: ABNORMAL /HPF (ref 0–3)
SODIUM SERPL-SCNC: 139 MMOL/L (ref 136–145)
SP GR UR REFRACTOMETRY: 1.01 (ref 1–1.03)
TROPONIN I SERPL HS-MCNC: 19 NG/L (ref 0–51)
TROPONIN I SERPL HS-MCNC: 20 NG/L (ref 0–51)
UROBILINOGEN UR QL STRIP.AUTO: 0.2 EU/DL (ref 0.2–1)
WBC # BLD AUTO: 10.6 K/UL (ref 3.6–11)
WBC URNS QL MICRO: ABNORMAL /HPF (ref 0–5)

## 2024-05-18 PROCEDURE — 85025 COMPLETE CBC W/AUTO DIFF WBC: CPT

## 2024-05-18 PROCEDURE — 83690 ASSAY OF LIPASE: CPT

## 2024-05-18 PROCEDURE — 93005 ELECTROCARDIOGRAM TRACING: CPT | Performed by: EMERGENCY MEDICINE

## 2024-05-18 PROCEDURE — 80053 COMPREHEN METABOLIC PANEL: CPT

## 2024-05-18 PROCEDURE — 99284 EMERGENCY DEPT VISIT MOD MDM: CPT

## 2024-05-18 PROCEDURE — 96375 TX/PRO/DX INJ NEW DRUG ADDON: CPT

## 2024-05-18 PROCEDURE — 2580000003 HC RX 258: Performed by: EMERGENCY MEDICINE

## 2024-05-18 PROCEDURE — 81001 URINALYSIS AUTO W/SCOPE: CPT

## 2024-05-18 PROCEDURE — 36415 COLL VENOUS BLD VENIPUNCTURE: CPT

## 2024-05-18 PROCEDURE — C9113 INJ PANTOPRAZOLE SODIUM, VIA: HCPCS | Performed by: EMERGENCY MEDICINE

## 2024-05-18 PROCEDURE — 6360000002 HC RX W HCPCS: Performed by: EMERGENCY MEDICINE

## 2024-05-18 PROCEDURE — 96361 HYDRATE IV INFUSION ADD-ON: CPT

## 2024-05-18 PROCEDURE — 96374 THER/PROPH/DIAG INJ IV PUSH: CPT

## 2024-05-18 PROCEDURE — 84484 ASSAY OF TROPONIN QUANT: CPT

## 2024-05-18 PROCEDURE — A4216 STERILE WATER/SALINE, 10 ML: HCPCS | Performed by: EMERGENCY MEDICINE

## 2024-05-18 PROCEDURE — 74176 CT ABD & PELVIS W/O CONTRAST: CPT

## 2024-05-18 RX ORDER — 0.9 % SODIUM CHLORIDE 0.9 %
1000 INTRAVENOUS SOLUTION INTRAVENOUS ONCE
Status: COMPLETED | OUTPATIENT
Start: 2024-05-18 | End: 2024-05-18

## 2024-05-18 RX ORDER — PANTOPRAZOLE SODIUM 40 MG/1
40 TABLET, DELAYED RELEASE ORAL DAILY
Qty: 90 TABLET | Refills: 4 | Status: SHIPPED | OUTPATIENT
Start: 2024-05-18

## 2024-05-18 RX ORDER — ONDANSETRON 4 MG/1
4 TABLET, FILM COATED ORAL 3 TIMES DAILY PRN
Qty: 15 TABLET | Refills: 0 | Status: SHIPPED | OUTPATIENT
Start: 2024-05-18

## 2024-05-18 RX ORDER — ONDANSETRON 2 MG/ML
4 INJECTION INTRAMUSCULAR; INTRAVENOUS ONCE
Status: COMPLETED | OUTPATIENT
Start: 2024-05-18 | End: 2024-05-18

## 2024-05-18 RX ADMIN — ONDANSETRON 4 MG: 2 INJECTION INTRAMUSCULAR; INTRAVENOUS at 08:27

## 2024-05-18 RX ADMIN — PANTOPRAZOLE SODIUM 40 MG: 40 INJECTION, POWDER, FOR SOLUTION INTRAVENOUS at 08:27

## 2024-05-18 RX ADMIN — SODIUM CHLORIDE 1000 ML: 9 INJECTION, SOLUTION INTRAVENOUS at 08:28

## 2024-05-18 ASSESSMENT — PAIN - FUNCTIONAL ASSESSMENT: PAIN_FUNCTIONAL_ASSESSMENT: 0-10

## 2024-05-18 ASSESSMENT — PAIN SCALES - GENERAL: PAINLEVEL_OUTOF10: 10

## 2024-05-18 NOTE — ED NOTES
Patient stable at time of discharge. Education,followup recommendations, and discharge paperwork is reviewed with patient who verbalizes understanding of same. Provider aware of HTN, is okay with discharge. Patient ambulates from ED with  and belongings.

## 2024-05-18 NOTE — ED TRIAGE NOTES
Pt reports upper abd/epigastric pain that began about 3 days ago. Denies n/v/d. Pt rates pain 10/10.

## 2024-05-18 NOTE — ED PROVIDER NOTES
tenderness, left CVA tenderness, guarding or rebound. Negative signs include Hurley's sign and McBurney's sign.      Hernia: No hernia is present.   Musculoskeletal:         General: No swelling, tenderness, deformity or signs of injury. Normal range of motion.      Cervical back: Normal range of motion and neck supple. No rigidity or tenderness.      Right lower leg: No edema.      Left lower leg: No edema.   Skin:     General: Skin is warm and dry.      Capillary Refill: Capillary refill takes less than 2 seconds.      Findings: No erythema or rash.   Neurological:      General: No focal deficit present.      Mental Status: She is alert and oriented to person, place, and time.      Cranial Nerves: No cranial nerve deficit.      Sensory: No sensory deficit.      Motor: No weakness.      Coordination: Coordination normal.      Gait: Gait normal.   Psychiatric:         Mood and Affect: Mood normal.         Behavior: Behavior normal.         Thought Content: Thought content normal.         DIAGNOSTIC RESULTS     EKG: All EKG's are interpreted by the Emergency Department Physician who either signs or Co-signs this chart in the absence of a cardiologist.    EKG performed at 0759 interpreted 0811 shows a rhythm at 98 possibly sinus possibly MAT with PVCs there is evidence of old inferior MI and old anterior septal MI WV interval is prolonged and some complexes and normal and others QRS duration is prolonged axis is mildly leftward  RADIOLOGY:   Non-plain film images such as CT, Ultrasound and MRI are read by the radiologist. Plain radiographic images are visualized and preliminarily interpreted by the emergency physician with the below findings:        Interpretation per the Radiologist below, if available at the time of this note:    CT ABDOMEN PELVIS WO CONTRAST Additional Contrast? None    (Results Pending)         ED BEDSIDE ULTRASOUND:   Performed by ED Physician - none    LABS:  Labs Reviewed   CBC WITH AUTO  DIFFERENTIAL - Abnormal; Notable for the following components:       Result Value    .3 (*)     MCH 35.3 (*)     Platelets 99 (*)     All other components within normal limits   LIPASE - Abnormal; Notable for the following components:    Lipase 118 (*)     All other components within normal limits   COMPREHENSIVE METABOLIC PANEL   TROPONIN   TROPONIN   URINALYSIS WITH MICROSCOPIC       All other labs were within normal range or not returned as of this dictation.    EMERGENCY DEPARTMENT COURSE and DIFFERENTIAL DIAGNOSIS/MDM:   Vitals:    Vitals:    05/18/24 0805   BP: (!) 192/131   Pulse: 100   Resp: 18   Temp: 97.3 °F (36.3 °C)   SpO2: 98%   Height: 1.575 m (5' 2\")           Medical Decision Making  Amount and/or Complexity of Data Reviewed  Labs: ordered.  Radiology: ordered.  ECG/medicine tests: ordered.    Risk  Prescription drug management.    Much improved after IV Protonix Zofran and IV fluids CT of the abdomen shows no acute abnormality there is widening at the aortic anastomosis of her previous bypass so this is not the source of her pain.  There is no sign of acute rupture no tenderness to lower abdomen good pulses distally.  This is discussed with patient and son plan for follow-up with her vascular surgeon .  Unsure if patient is taking her Protonix as previously prescribed refill sent recommend increase to twice daily may need follow-up with Dr. Ortega for repeat endoscopy will follow-up with PCP in 2 days        REASSESSMENT          CRITICAL CARE TIME   Total Critical Care time was      minutes, excluding separately reportable procedures.  There was a high probability of clinically significant/life threatening deterioration in the patient's condition which required my urgent intervention.   CONSULTS:  None    PROCEDURES:  Unless otherwise noted below, none     Procedures    FINAL IMPRESSION    No diagnosis found.      DISPOSITION/PLAN   DISPOSITION        PATIENT REFERRED TO:  No

## 2024-05-20 LAB
EKG ATRIAL RATE: 100 BPM
EKG DIAGNOSIS: NORMAL
EKG P AXIS: -58 DEGREES
EKG P-R INTERVAL: 186 MS
EKG Q-T INTERVAL: 346 MS
EKG QRS DURATION: 113 MS
EKG QTC CALCULATION (BAZETT): 442 MS
EKG R AXIS: -7 DEGREES
EKG T AXIS: 51 DEGREES
EKG VENTRICULAR RATE: 98 BPM

## 2024-06-17 DIAGNOSIS — K59.09 CHRONIC CONSTIPATION: ICD-10-CM

## 2024-06-20 RX ORDER — LACTULOSE 10 G/15ML
20 SOLUTION ORAL EVERY EVENING
Qty: 946 ML | Refills: 1 | Status: SHIPPED | OUTPATIENT
Start: 2024-06-20

## 2024-07-06 ENCOUNTER — APPOINTMENT (OUTPATIENT)
Facility: HOSPITAL | Age: 74
End: 2024-07-06
Payer: MEDICARE

## 2024-07-06 ENCOUNTER — HOSPITAL ENCOUNTER (EMERGENCY)
Facility: HOSPITAL | Age: 74
Discharge: HOME | End: 2024-07-06
Attending: EMERGENCY MEDICINE
Payer: MEDICARE

## 2024-07-06 ENCOUNTER — HOSPITAL ENCOUNTER (EMERGENCY)
Facility: HOSPITAL | Age: 74
Discharge: ANOTHER HOSPITAL | End: 2024-07-06
Attending: EMERGENCY MEDICINE
Payer: MEDICARE

## 2024-07-06 VITALS
SYSTOLIC BLOOD PRESSURE: 159 MMHG | HEIGHT: 62 IN | TEMPERATURE: 98.3 F | DIASTOLIC BLOOD PRESSURE: 84 MMHG | OXYGEN SATURATION: 97 % | BODY MASS INDEX: 18.95 KG/M2 | RESPIRATION RATE: 17 BRPM | WEIGHT: 103 LBS | HEART RATE: 96 BPM

## 2024-07-06 VITALS
BODY MASS INDEX: 19.01 KG/M2 | TEMPERATURE: 98 F | OXYGEN SATURATION: 94 % | RESPIRATION RATE: 18 BRPM | SYSTOLIC BLOOD PRESSURE: 178 MMHG | HEIGHT: 62 IN | DIASTOLIC BLOOD PRESSURE: 84 MMHG | WEIGHT: 103.3 LBS | HEART RATE: 81 BPM

## 2024-07-06 DIAGNOSIS — J81.0 ACUTE PULMONARY EDEMA (HCC): ICD-10-CM

## 2024-07-06 DIAGNOSIS — I16.1 HYPERTENSIVE EMERGENCY: Primary | ICD-10-CM

## 2024-07-06 DIAGNOSIS — J44.1 COPD EXACERBATION (HCC): ICD-10-CM

## 2024-07-06 DIAGNOSIS — R07.89 OTHER CHEST PAIN: Primary | ICD-10-CM

## 2024-07-06 LAB
ALBUMIN SERPL-MCNC: 3.7 G/DL (ref 3.5–5)
ALBUMIN SERPL-MCNC: 3.8 G/DL (ref 3.5–5)
ALBUMIN/GLOB SERPL: 0.9 (ref 1.1–2.2)
ALBUMIN/GLOB SERPL: 1.1 (ref 1.1–2.2)
ALP SERPL-CCNC: 62 U/L (ref 45–117)
ALP SERPL-CCNC: 73 U/L (ref 45–117)
ALT SERPL-CCNC: 21 U/L (ref 12–78)
ALT SERPL-CCNC: 28 U/L (ref 12–78)
AMPHET UR QL SCN: NEGATIVE
ANION GAP SERPL CALC-SCNC: 10 MMOL/L (ref 5–15)
ANION GAP SERPL CALC-SCNC: 12 MMOL/L (ref 5–15)
APPEARANCE UR: CLEAR
ARTERIAL PATENCY WRIST A: YES
AST SERPL W P-5'-P-CCNC: 18 U/L (ref 15–37)
AST SERPL W P-5'-P-CCNC: 26 U/L (ref 15–37)
BACTERIA URNS QL MICRO: NEGATIVE /HPF
BARBITURATES UR QL SCN: NEGATIVE
BASE DEFICIT BLDA-SCNC: 5 MMOL/L
BASOPHILS # BLD: 0.1 K/UL (ref 0–0.1)
BASOPHILS # BLD: 0.1 K/UL (ref 0–0.1)
BASOPHILS NFR BLD: 1 % (ref 0–1)
BASOPHILS NFR BLD: 1 % (ref 0–1)
BDY SITE: ABNORMAL
BENZODIAZ UR QL: NEGATIVE
BILIRUB SERPL-MCNC: 0.6 MG/DL (ref 0.2–1)
BILIRUB SERPL-MCNC: 0.6 MG/DL (ref 0.2–1)
BILIRUB UR QL: NEGATIVE
BNP SERPL-MCNC: ABNORMAL PG/ML
BUN SERPL-MCNC: 24 MG/DL (ref 6–20)
BUN SERPL-MCNC: 25 MG/DL (ref 6–20)
BUN/CREAT SERPL: 12 (ref 12–20)
BUN/CREAT SERPL: 14 (ref 12–20)
CA-I BLD-MCNC: 9.1 MG/DL (ref 8.5–10.1)
CA-I BLD-MCNC: 9.3 MG/DL (ref 8.5–10.1)
CANNABINOIDS UR QL SCN: NEGATIVE
CHLORIDE SERPL-SCNC: 103 MMOL/L (ref 97–108)
CHLORIDE SERPL-SCNC: 103 MMOL/L (ref 97–108)
CO2 SERPL-SCNC: 21 MMOL/L (ref 21–32)
CO2 SERPL-SCNC: 22 MMOL/L (ref 21–32)
COCAINE UR QL SCN: NEGATIVE
COHGB MFR BLD: 1.3 % (ref 1–2)
COLOR UR: ABNORMAL
CREAT SERPL-MCNC: 1.77 MG/DL (ref 0.55–1.02)
CREAT SERPL-MCNC: 1.97 MG/DL (ref 0.55–1.02)
DIFFERENTIAL METHOD BLD: ABNORMAL
DIFFERENTIAL METHOD BLD: ABNORMAL
EKG ATRIAL RATE: 101 BPM
EKG ATRIAL RATE: 82 BPM
EKG DIAGNOSIS: NORMAL
EKG DIAGNOSIS: NORMAL
EKG P AXIS: 68 DEGREES
EKG P AXIS: 80 DEGREES
EKG P-R INTERVAL: 200 MS
EKG P-R INTERVAL: 206 MS
EKG Q-T INTERVAL: 340 MS
EKG Q-T INTERVAL: 385 MS
EKG QRS DURATION: 101 MS
EKG QRS DURATION: 99 MS
EKG QTC CALCULATION (BAZETT): 441 MS
EKG QTC CALCULATION (BAZETT): 450 MS
EKG R AXIS: 17 DEGREES
EKG R AXIS: 24 DEGREES
EKG T AXIS: 18 DEGREES
EKG T AXIS: 66 DEGREES
EKG VENTRICULAR RATE: 101 BPM
EKG VENTRICULAR RATE: 82 BPM
EOSINOPHIL # BLD: 0.1 K/UL (ref 0–0.4)
EOSINOPHIL # BLD: 0.1 K/UL (ref 0–0.4)
EOSINOPHIL NFR BLD: 1 % (ref 0–7)
EOSINOPHIL NFR BLD: 1 % (ref 0–7)
ERYTHROCYTE [DISTWIDTH] IN BLOOD BY AUTOMATED COUNT: 13.5 % (ref 11.5–14.5)
ERYTHROCYTE [DISTWIDTH] IN BLOOD BY AUTOMATED COUNT: 14 % (ref 11.5–14.5)
FIO2 ON VENT: 40 %
GLOBULIN SER CALC-MCNC: 3.4 G/DL (ref 2–4)
GLOBULIN SER CALC-MCNC: 4.3 G/DL (ref 2–4)
GLUCOSE BLD STRIP.AUTO-MCNC: 133 MG/DL (ref 65–100)
GLUCOSE SERPL-MCNC: 100 MG/DL (ref 65–100)
GLUCOSE SERPL-MCNC: 132 MG/DL (ref 65–100)
GLUCOSE UR STRIP.AUTO-MCNC: NEGATIVE MG/DL
HCO3 BLDA-SCNC: 20 MMOL/L (ref 22–26)
HCT VFR BLD AUTO: 31 % (ref 35–47)
HCT VFR BLD AUTO: 33.5 % (ref 35–47)
HGB BLD-MCNC: 10.7 G/DL (ref 11.5–16)
HGB BLD-MCNC: 11.3 G/DL (ref 11.5–16)
HGB UR QL STRIP: ABNORMAL
IMM GRANULOCYTES # BLD AUTO: 0 K/UL (ref 0–0.04)
IMM GRANULOCYTES # BLD AUTO: 0.1 K/UL (ref 0–0.04)
IMM GRANULOCYTES NFR BLD AUTO: 0 % (ref 0–0.5)
IMM GRANULOCYTES NFR BLD AUTO: 1 % (ref 0–0.5)
IPAP/PIP: 12
KETONES UR QL STRIP.AUTO: NEGATIVE MG/DL
LACTATE SERPL-SCNC: 1.7 MMOL/L (ref 0.4–2)
LEUKOCYTE ESTERASE UR QL STRIP.AUTO: NEGATIVE
LYMPHOCYTES # BLD: 1.6 K/UL (ref 0.8–3.5)
LYMPHOCYTES # BLD: 2.9 K/UL (ref 0.8–3.5)
LYMPHOCYTES NFR BLD: 19 % (ref 12–49)
LYMPHOCYTES NFR BLD: 26 % (ref 12–49)
Lab: NORMAL
MAGNESIUM SERPL-MCNC: 1.8 MG/DL (ref 1.6–2.4)
MCH RBC QN AUTO: 33.5 PG (ref 26–34)
MCH RBC QN AUTO: 34 PG (ref 26–34)
MCHC RBC AUTO-ENTMCNC: 33.7 G/DL (ref 30–36.5)
MCHC RBC AUTO-ENTMCNC: 34.5 G/DL (ref 30–36.5)
MCV RBC AUTO: 98.4 FL (ref 80–99)
MCV RBC AUTO: 99.4 FL (ref 80–99)
MDMA URINE: NEGATIVE
METHADONE UR QL: NEGATIVE
METHGB MFR BLD: 0.2 % (ref 0–1.4)
MONOCYTES # BLD: 0.8 K/UL (ref 0–1)
MONOCYTES # BLD: 1 K/UL (ref 0–1)
MONOCYTES NFR BLD: 10 % (ref 5–13)
MONOCYTES NFR BLD: 9 % (ref 5–13)
NEUTS SEG # BLD: 5.7 K/UL (ref 1.8–8)
NEUTS SEG # BLD: 6.9 K/UL (ref 1.8–8)
NEUTS SEG NFR BLD: 63 % (ref 32–75)
NEUTS SEG NFR BLD: 68 % (ref 32–75)
NITRITE UR QL STRIP.AUTO: NEGATIVE
NRBC # BLD: 0 K/UL (ref 0–0.01)
NRBC # BLD: 0 K/UL (ref 0–0.01)
NRBC BLD-RTO: 0 PER 100 WBC
NRBC BLD-RTO: 0 PER 100 WBC
OPIATES UR QL: NEGATIVE
OXYHGB MFR BLD: 93.1 % (ref 95–99)
PCO2 BLDA: 36 MMHG (ref 35–45)
PCP UR QL: NEGATIVE
PEEP RESPIRATORY: 6
PERFORMED BY:: ABNORMAL
PERFORMED BY:: ABNORMAL
PH BLDA: 7.36 (ref 7.35–7.45)
PH UR STRIP: 6 (ref 5–8)
PLATELET # BLD AUTO: 145 K/UL (ref 150–400)
PLATELET # BLD AUTO: 190 K/UL (ref 150–400)
PMV BLD AUTO: 11.6 FL (ref 8.9–12.9)
PMV BLD AUTO: 11.6 FL (ref 8.9–12.9)
PO2 BLDA: 78 MMHG (ref 80–100)
POTASSIUM SERPL-SCNC: 3.3 MMOL/L (ref 3.5–5.1)
POTASSIUM SERPL-SCNC: 4 MMOL/L (ref 3.5–5.1)
PROT SERPL-MCNC: 7.2 G/DL (ref 6.4–8.2)
PROT SERPL-MCNC: 8 G/DL (ref 6.4–8.2)
PROT UR STRIP-MCNC: 100 MG/DL
RBC # BLD AUTO: 3.15 M/UL (ref 3.8–5.2)
RBC # BLD AUTO: 3.37 M/UL (ref 3.8–5.2)
RBC #/AREA URNS HPF: ABNORMAL /HPF (ref 0–3)
RBC MORPH BLD: ABNORMAL
RBC MORPH BLD: ABNORMAL
SAO2% DEVICE SAO2% SENSOR NAME: ABNORMAL
SODIUM SERPL-SCNC: 134 MMOL/L (ref 136–145)
SODIUM SERPL-SCNC: 137 MMOL/L (ref 136–145)
SP GR UR REFRACTOMETRY: 1.01 (ref 1–1.03)
SPECIMEN SITE: ABNORMAL
TROPONIN I SERPL HS-MCNC: 33 NG/L (ref 0–51)
TROPONIN I SERPL HS-MCNC: 39 NG/L (ref 0–51)
TROPONIN I SERPL HS-MCNC: 40 NG/L (ref 0–51)
URINE CULTURE IF INDICATED: ABNORMAL
UROBILINOGEN UR QL STRIP.AUTO: 0.2 EU/DL (ref 0.2–1)
WBC # BLD AUTO: 11 K/UL (ref 3.6–11)
WBC # BLD AUTO: 8.4 K/UL (ref 3.6–11)
WBC URNS QL MICRO: ABNORMAL /HPF (ref 0–5)

## 2024-07-06 PROCEDURE — 84484 ASSAY OF TROPONIN QUANT: CPT

## 2024-07-06 PROCEDURE — 93005 ELECTROCARDIOGRAM TRACING: CPT | Performed by: EMERGENCY MEDICINE

## 2024-07-06 PROCEDURE — 80307 DRUG TEST PRSMV CHEM ANLYZR: CPT

## 2024-07-06 PROCEDURE — 99285 EMERGENCY DEPT VISIT HI MDM: CPT

## 2024-07-06 PROCEDURE — 6360000002 HC RX W HCPCS: Performed by: EMERGENCY MEDICINE

## 2024-07-06 PROCEDURE — 80053 COMPREHEN METABOLIC PANEL: CPT

## 2024-07-06 PROCEDURE — 36415 COLL VENOUS BLD VENIPUNCTURE: CPT

## 2024-07-06 PROCEDURE — 83605 ASSAY OF LACTIC ACID: CPT

## 2024-07-06 PROCEDURE — 6370000000 HC RX 637 (ALT 250 FOR IP): Performed by: EMERGENCY MEDICINE

## 2024-07-06 PROCEDURE — 71045 X-RAY EXAM CHEST 1 VIEW: CPT

## 2024-07-06 PROCEDURE — 83735 ASSAY OF MAGNESIUM: CPT

## 2024-07-06 PROCEDURE — 94660 CPAP INITIATION&MGMT: CPT

## 2024-07-06 PROCEDURE — 85025 COMPLETE CBC W/AUTO DIFF WBC: CPT

## 2024-07-06 PROCEDURE — 36600 WITHDRAWAL OF ARTERIAL BLOOD: CPT

## 2024-07-06 PROCEDURE — 94640 AIRWAY INHALATION TREATMENT: CPT

## 2024-07-06 PROCEDURE — 82803 BLOOD GASES ANY COMBINATION: CPT

## 2024-07-06 PROCEDURE — 82962 GLUCOSE BLOOD TEST: CPT

## 2024-07-06 PROCEDURE — 83880 ASSAY OF NATRIURETIC PEPTIDE: CPT

## 2024-07-06 PROCEDURE — 81001 URINALYSIS AUTO W/SCOPE: CPT

## 2024-07-06 PROCEDURE — 2500000003 HC RX 250 WO HCPCS: Performed by: EMERGENCY MEDICINE

## 2024-07-06 RX ORDER — IPRATROPIUM BROMIDE AND ALBUTEROL SULFATE 2.5; .5 MG/3ML; MG/3ML
1 SOLUTION RESPIRATORY (INHALATION)
Status: COMPLETED | OUTPATIENT
Start: 2024-07-06 | End: 2024-07-06

## 2024-07-06 RX ORDER — NITROGLYCERIN 0.3 MG/1
0.3 TABLET SUBLINGUAL EVERY 5 MIN PRN
Qty: 12 TABLET | Refills: 0 | Status: SHIPPED | OUTPATIENT
Start: 2024-07-06

## 2024-07-06 RX ORDER — FUROSEMIDE 10 MG/ML
40 INJECTION INTRAMUSCULAR; INTRAVENOUS
Status: COMPLETED | OUTPATIENT
Start: 2024-07-06 | End: 2024-07-06

## 2024-07-06 RX ORDER — MAGNESIUM SULFATE IN WATER 40 MG/ML
2000 INJECTION, SOLUTION INTRAVENOUS ONCE
Status: COMPLETED | OUTPATIENT
Start: 2024-07-06 | End: 2024-07-06

## 2024-07-06 RX ORDER — ASPIRIN 81 MG/1
324 TABLET, CHEWABLE ORAL
Status: COMPLETED | OUTPATIENT
Start: 2024-07-06 | End: 2024-07-06

## 2024-07-06 RX ORDER — ENALAPRILAT 1.25 MG/ML
1.25 INJECTION INTRAVENOUS
Status: COMPLETED | OUTPATIENT
Start: 2024-07-06 | End: 2024-07-06

## 2024-07-06 RX ORDER — NITROGLYCERIN 0.4 MG/1
0.4 TABLET SUBLINGUAL EVERY 5 MIN PRN
Status: DISCONTINUED | OUTPATIENT
Start: 2024-07-06 | End: 2024-07-06 | Stop reason: HOSPADM

## 2024-07-06 RX ORDER — NITROGLYCERIN 20 MG/100ML
5 INJECTION INTRAVENOUS CONTINUOUS
Status: DISCONTINUED | OUTPATIENT
Start: 2024-07-06 | End: 2024-07-07 | Stop reason: HOSPADM

## 2024-07-06 RX ADMIN — ENALAPRILAT 1.25 MG: 1.25 INJECTION INTRAVENOUS at 19:49

## 2024-07-06 RX ADMIN — IPRATROPIUM BROMIDE AND ALBUTEROL SULFATE 1 DOSE: .5; 3 SOLUTION RESPIRATORY (INHALATION) at 19:58

## 2024-07-06 RX ADMIN — MAGNESIUM SULFATE HEPTAHYDRATE 2000 MG: 40 INJECTION, SOLUTION INTRAVENOUS at 20:00

## 2024-07-06 RX ADMIN — IPRATROPIUM BROMIDE AND ALBUTEROL SULFATE 1 DOSE: .5; 3 SOLUTION RESPIRATORY (INHALATION) at 19:48

## 2024-07-06 RX ADMIN — IPRATROPIUM BROMIDE AND ALBUTEROL SULFATE 1 DOSE: .5; 3 SOLUTION RESPIRATORY (INHALATION) at 19:38

## 2024-07-06 RX ADMIN — FUROSEMIDE 40 MG: 10 INJECTION, SOLUTION INTRAMUSCULAR; INTRAVENOUS at 19:47

## 2024-07-06 RX ADMIN — ASPIRIN 324 MG: 81 TABLET, CHEWABLE ORAL at 10:06

## 2024-07-06 RX ADMIN — NITROGLYCERIN 0.4 MG: 0.4 TABLET, ORALLY DISINTEGRATING SUBLINGUAL at 10:08

## 2024-07-06 RX ADMIN — POTASSIUM BICARBONATE 40 MEQ: 782 TABLET, EFFERVESCENT ORAL at 12:09

## 2024-07-06 RX ADMIN — NITROGLYCERIN 5 MCG/MIN: 20 INJECTION INTRAVENOUS at 20:02

## 2024-07-06 RX ADMIN — NITROGLYCERIN 1 INCH: 20 OINTMENT TOPICAL at 19:38

## 2024-07-06 ASSESSMENT — PAIN - FUNCTIONAL ASSESSMENT
PAIN_FUNCTIONAL_ASSESSMENT: 0-10
PAIN_FUNCTIONAL_ASSESSMENT: NONE - DENIES PAIN
PAIN_FUNCTIONAL_ASSESSMENT: 0-10

## 2024-07-06 ASSESSMENT — ENCOUNTER SYMPTOMS
GASTROINTESTINAL NEGATIVE: 1
EYES NEGATIVE: 1
ALLERGIC/IMMUNOLOGIC NEGATIVE: 1
SHORTNESS OF BREATH: 1

## 2024-07-06 ASSESSMENT — PAIN SCALES - GENERAL
PAINLEVEL_OUTOF10: 3
PAINLEVEL_OUTOF10: 0

## 2024-07-06 ASSESSMENT — PAIN DESCRIPTION - ORIENTATION: ORIENTATION: LOWER

## 2024-07-06 ASSESSMENT — LIFESTYLE VARIABLES
HOW OFTEN DO YOU HAVE A DRINK CONTAINING ALCOHOL: NEVER
HOW MANY STANDARD DRINKS CONTAINING ALCOHOL DO YOU HAVE ON A TYPICAL DAY: PATIENT DOES NOT DRINK

## 2024-07-06 ASSESSMENT — PAIN DESCRIPTION - LOCATION: LOCATION: CHEST

## 2024-07-06 ASSESSMENT — HEART SCORE: ECG: NORMAL

## 2024-07-06 NOTE — ED NOTES
Patient reports she has no pain following the first SL nitro given in the ED (two 0.4mg doses in total this morning). Patient resting in bed,  at bedside.

## 2024-07-06 NOTE — ED NOTES
Patient stable at time of discharge. Education and discharge paperwork is reviewed with patient who verbalizes understanding of same. Patient ambulates from ED with belongings and family member.

## 2024-07-06 NOTE — ED TRIAGE NOTES
Pt arrives with her  after being discharged earlier this afternoon with chest pain. Pt arrives with C/o SOB, unable to speak in full sentences. Audible wheezes

## 2024-07-06 NOTE — ED PROVIDER NOTES
[facility] EMERGENCY DEPT  EMERGENCY DEPARTMENT HISTORY AND PHYSICAL EXAM      Date: 7/6/2024  Patient Name: Saranya Hightower  MRN: [id number]  YOB: 1950  Date of evaluation: 7/6/2024  Provider: Gloria Tyler MD   Note Started: 9:53 AM EDT 7/6/24    HISTORY OF PRESENT ILLNESS     Chief Complaint   Patient presents with    Chest Pain       History Provided By: Patient    HPI: Saranya Hightower is a 74 y.o. female     PAST MEDICAL HISTORY   Past Medical History:  Past Medical History:   Diagnosis Date    Abnormal weight loss 09/11/2021    Atherosclerosis of native coronary artery without angina pectoris 09/11/2021    CAD (coronary artery disease)     MI    Carotid artery stenosis 09/11/2021    Cervicalgia 09/11/2021    Colon polyps     Constipation 09/11/2021    HTN (hypertension) 09/11/2021    Hyperlipidemia     Hypertension     Hypertensive heart disease without congestive heart failure 09/11/2021    Menopause     Personal history of colonic polyps 09/11/2021    Vitamin D deficiency 09/11/2021       Past Surgical History:  Past Surgical History:   Procedure Laterality Date    CARDIAC CATHETERIZATION      X 3    CARDIAC DEFIBRILLATOR PLACEMENT  09/16/2013    COLONOSCOPY N/A 10/5/2021    COLONOSCOPY ( T I V A) performed by Talib Ortega MD at [facility] ENDOSCOPY    COLONOSCOPY  2015    ORTHOPEDIC SURGERY      R leg    OTHER SURGICAL HISTORY      BILAT LOWER EXTREM VASC BYPASS SURGERY    OTHER SURGICAL HISTORY      R CAROTID ENDORECTOMY 11-4-05,   L CAROTID ENDARECTOMY 11-6-2006    PACEMAKER      6 0r 7 years ago       Family History:  Family History   Problem Relation Age of Onset    No Known Problems Father     Cancer Mother     Hypertension Mother        Social History:  Social History     Tobacco Use    Smoking status: Every Day     Current packs/day: 0.50     Types: Cigarettes    Smokeless tobacco: Never   Vaping Use    Vaping Use: Never used   Substance Use Topics    Alcohol use: Never    Drug use: Never  prior to arrival, acknowledges history of coronary artery disease    Records Reviewed (source and summary of external notes): Prior medical records and Nursing notes    Vitals:    Vitals:    07/06/24 0951   BP: (!) 181/104   Pulse: 83   Temp: 98 °F (36.7 °C)   Weight: 46.9 kg (103 lb 4.8 oz)   Height: 1.575 m (5' 2\")        ED COURSE  Cardiac screening  Labs  EKG  Chest x-ray  Aspirin p.o.  ED Course as of 07/06/24 1211   Sat Jul 06, 2024   1206 Patient declined offer for admission, patient is pain-free, patient requesting prescription for additional nitroglycerin pills until seen by her cardiologist [SB]      ED Course User Index  [SB] Gloria Tyler MD       Disposition Considerations (Tests not done, Shared Decision Making, Pt Expectation of Test or Treatment.):  Pulmonary edema significantly improved from 2 days ago which showed small to moderate right pleural effusion, patient declined admission, will discharge    Patient was given the following medications:  Medications - No data to display    CONSULTS: (Who and What was discussed)  None     Social Determinants affecting Dx or Tx: None    Smoking Cessation: Not Applicable    PROCEDURES   Unless otherwise noted above, none.  Procedures      CRITICAL CARE TIME   Patient does not meet Critical Care Time, 0 minutes    FINAL IMPRESSION   No diagnosis found.      DISPOSITION/PLAN   DISPOSITION      Discharge Note: The patient is stable for discharge home. The signs, symptoms, diagnosis, and discharge instructions have been discussed, understanding conveyed, and agreed upon. The patient is to follow up as recommended or return to ER should their symptoms worsen.      PATIENT REFERRED TO:  No follow-up provider specified.      DISCHARGE MEDICATIONS:     Medication List        ASK your doctor about these medications      acetaminophen 325 MG tablet  Commonly known as: TYLENOL     albuterol sulfate  (90 Base) MCG/ACT inhaler  Commonly known as:

## 2024-07-06 NOTE — ED TRIAGE NOTES
Patient arrives to ED with complaint of chest pain that began approximately an hour and half ago while lying in bed. Pain is substernal, aching in nature 3/10. Patient reports pain was worse but she took one 0.4mg Nitro SL prior to arrival. Hx of CHF, denies shortness of breath. No radiation of pain.

## 2024-07-07 ENCOUNTER — HOSPITAL ENCOUNTER (INPATIENT)
Facility: HOSPITAL | Age: 74
LOS: 7 days | Discharge: HOME | DRG: 291 | End: 2024-07-14
Attending: STUDENT IN AN ORGANIZED HEALTH CARE EDUCATION/TRAINING PROGRAM | Admitting: STUDENT IN AN ORGANIZED HEALTH CARE EDUCATION/TRAINING PROGRAM
Payer: MEDICARE

## 2024-07-07 DIAGNOSIS — J81.0 ACUTE PULMONARY EDEMA (HCC): Primary | ICD-10-CM

## 2024-07-07 DIAGNOSIS — I20.0 UNSTABLE ANGINA PECTORIS (HCC): ICD-10-CM

## 2024-07-07 DIAGNOSIS — I50.9 CONGESTIVE HEART FAILURE, UNSPECIFIED HF CHRONICITY, UNSPECIFIED HEART FAILURE TYPE (HCC): ICD-10-CM

## 2024-07-07 DIAGNOSIS — I16.1 HYPERTENSIVE EMERGENCY: ICD-10-CM

## 2024-07-07 DIAGNOSIS — R06.02 SHORTNESS OF BREATH: ICD-10-CM

## 2024-07-07 PROBLEM — I50.1 PULMONARY EDEMA CARDIAC CAUSE (HCC): Status: ACTIVE | Noted: 2024-07-07

## 2024-07-07 LAB
EKG ATRIAL RATE: 99 BPM
EKG DIAGNOSIS: NORMAL
EKG P AXIS: 66 DEGREES
EKG P-R INTERVAL: 182 MS
EKG Q-T INTERVAL: 362 MS
EKG QRS DURATION: 90 MS
EKG QTC CALCULATION (BAZETT): 464 MS
EKG R AXIS: 10 DEGREES
EKG T AXIS: 56 DEGREES
EKG VENTRICULAR RATE: 99 BPM
MRSA DNA SPEC QL NAA+PROBE: NOT DETECTED
PROCALCITONIN SERPL-MCNC: 1.03 NG/ML
TROPONIN I SERPL HS-MCNC: 40 NG/L (ref 0–51)

## 2024-07-07 PROCEDURE — 96365 THER/PROPH/DIAG IV INF INIT: CPT

## 2024-07-07 PROCEDURE — 6370000000 HC RX 637 (ALT 250 FOR IP): Performed by: STUDENT IN AN ORGANIZED HEALTH CARE EDUCATION/TRAINING PROGRAM

## 2024-07-07 PROCEDURE — 2580000003 HC RX 258: Performed by: STUDENT IN AN ORGANIZED HEALTH CARE EDUCATION/TRAINING PROGRAM

## 2024-07-07 PROCEDURE — 6360000002 HC RX W HCPCS: Performed by: STUDENT IN AN ORGANIZED HEALTH CARE EDUCATION/TRAINING PROGRAM

## 2024-07-07 PROCEDURE — 84145 PROCALCITONIN (PCT): CPT

## 2024-07-07 PROCEDURE — 2700000000 HC OXYGEN THERAPY PER DAY

## 2024-07-07 PROCEDURE — 93005 ELECTROCARDIOGRAM TRACING: CPT | Performed by: STUDENT IN AN ORGANIZED HEALTH CARE EDUCATION/TRAINING PROGRAM

## 2024-07-07 PROCEDURE — 87641 MR-STAPH DNA AMP PROBE: CPT

## 2024-07-07 PROCEDURE — 2060000000 HC ICU INTERMEDIATE R&B

## 2024-07-07 PROCEDURE — 94640 AIRWAY INHALATION TREATMENT: CPT

## 2024-07-07 PROCEDURE — 2580000003 HC RX 258: Performed by: PHYSICIAN ASSISTANT

## 2024-07-07 PROCEDURE — 94761 N-INVAS EAR/PLS OXIMETRY MLT: CPT

## 2024-07-07 PROCEDURE — 84484 ASSAY OF TROPONIN QUANT: CPT

## 2024-07-07 PROCEDURE — 96366 THER/PROPH/DIAG IV INF ADDON: CPT

## 2024-07-07 PROCEDURE — 99285 EMERGENCY DEPT VISIT HI MDM: CPT

## 2024-07-07 PROCEDURE — 6360000002 HC RX W HCPCS: Performed by: PHYSICIAN ASSISTANT

## 2024-07-07 PROCEDURE — 87086 URINE CULTURE/COLONY COUNT: CPT

## 2024-07-07 PROCEDURE — 6370000000 HC RX 637 (ALT 250 FOR IP): Performed by: INTERNAL MEDICINE

## 2024-07-07 PROCEDURE — 6370000000 HC RX 637 (ALT 250 FOR IP): Performed by: PHYSICIAN ASSISTANT

## 2024-07-07 RX ORDER — CLONIDINE HYDROCHLORIDE 0.1 MG/1
0.2 TABLET ORAL
Status: COMPLETED | OUTPATIENT
Start: 2024-07-07 | End: 2024-07-07

## 2024-07-07 RX ORDER — LACTULOSE 10 G/15ML
20 SOLUTION ORAL EVERY EVENING
Status: DISCONTINUED | OUTPATIENT
Start: 2024-07-07 | End: 2024-07-14 | Stop reason: HOSPADM

## 2024-07-07 RX ORDER — ACETAMINOPHEN 325 MG/1
650 TABLET ORAL EVERY 6 HOURS PRN
Status: DISCONTINUED | OUTPATIENT
Start: 2024-07-07 | End: 2024-07-14 | Stop reason: HOSPADM

## 2024-07-07 RX ORDER — POLYETHYLENE GLYCOL 3350 17 G/17G
17 POWDER, FOR SOLUTION ORAL DAILY PRN
Status: DISCONTINUED | OUTPATIENT
Start: 2024-07-07 | End: 2024-07-14 | Stop reason: HOSPADM

## 2024-07-07 RX ORDER — ONDANSETRON 2 MG/ML
4 INJECTION INTRAMUSCULAR; INTRAVENOUS EVERY 6 HOURS PRN
Status: DISCONTINUED | OUTPATIENT
Start: 2024-07-07 | End: 2024-07-14 | Stop reason: HOSPADM

## 2024-07-07 RX ORDER — MEGESTROL ACETATE 20 MG/1
40 TABLET ORAL 2 TIMES DAILY
Status: DISCONTINUED | OUTPATIENT
Start: 2024-07-07 | End: 2024-07-14 | Stop reason: HOSPADM

## 2024-07-07 RX ORDER — ACETAMINOPHEN 650 MG/1
650 SUPPOSITORY RECTAL EVERY 6 HOURS PRN
Status: DISCONTINUED | OUTPATIENT
Start: 2024-07-07 | End: 2024-07-14 | Stop reason: HOSPADM

## 2024-07-07 RX ORDER — FUROSEMIDE 10 MG/ML
40 INJECTION INTRAMUSCULAR; INTRAVENOUS 2 TIMES DAILY
Status: DISCONTINUED | OUTPATIENT
Start: 2024-07-07 | End: 2024-07-14 | Stop reason: HOSPADM

## 2024-07-07 RX ORDER — NITROGLYCERIN 0.4 MG/1
0.4 TABLET SUBLINGUAL EVERY 5 MIN PRN
Status: DISCONTINUED | OUTPATIENT
Start: 2024-07-07 | End: 2024-07-14 | Stop reason: HOSPADM

## 2024-07-07 RX ORDER — ONDANSETRON 4 MG/1
4 TABLET, ORALLY DISINTEGRATING ORAL EVERY 8 HOURS PRN
Status: DISCONTINUED | OUTPATIENT
Start: 2024-07-07 | End: 2024-07-14 | Stop reason: HOSPADM

## 2024-07-07 RX ORDER — HYDRALAZINE HYDROCHLORIDE 20 MG/ML
10 INJECTION INTRAMUSCULAR; INTRAVENOUS EVERY 6 HOURS PRN
Status: DISCONTINUED | OUTPATIENT
Start: 2024-07-07 | End: 2024-07-14 | Stop reason: HOSPADM

## 2024-07-07 RX ORDER — ASPIRIN 81 MG/1
81 TABLET, CHEWABLE ORAL DAILY
Status: DISCONTINUED | OUTPATIENT
Start: 2024-07-07 | End: 2024-07-14 | Stop reason: HOSPADM

## 2024-07-07 RX ORDER — CYPROHEPTADINE HYDROCHLORIDE 4 MG/1
4 TABLET ORAL 3 TIMES DAILY
Status: DISCONTINUED | OUTPATIENT
Start: 2024-07-07 | End: 2024-07-14 | Stop reason: HOSPADM

## 2024-07-07 RX ORDER — ROSUVASTATIN CALCIUM 5 MG/1
20 TABLET, COATED ORAL NIGHTLY
Status: DISCONTINUED | OUTPATIENT
Start: 2024-07-07 | End: 2024-07-14 | Stop reason: HOSPADM

## 2024-07-07 RX ORDER — CARVEDILOL 12.5 MG/1
25 TABLET ORAL 2 TIMES DAILY WITH MEALS
Status: DISCONTINUED | OUTPATIENT
Start: 2024-07-07 | End: 2024-07-10

## 2024-07-07 RX ORDER — FERROUS SULFATE 325(65) MG
325 TABLET ORAL EVERY OTHER DAY
Status: DISCONTINUED | OUTPATIENT
Start: 2024-07-07 | End: 2024-07-14 | Stop reason: HOSPADM

## 2024-07-07 RX ORDER — SODIUM CHLORIDE 9 MG/ML
INJECTION, SOLUTION INTRAVENOUS PRN
Status: DISCONTINUED | OUTPATIENT
Start: 2024-07-07 | End: 2024-07-14 | Stop reason: HOSPADM

## 2024-07-07 RX ORDER — PANTOPRAZOLE SODIUM 40 MG/1
40 TABLET, DELAYED RELEASE ORAL DAILY
Status: DISCONTINUED | OUTPATIENT
Start: 2024-07-07 | End: 2024-07-14 | Stop reason: HOSPADM

## 2024-07-07 RX ORDER — HYDRALAZINE HYDROCHLORIDE 10 MG/1
10 TABLET, FILM COATED ORAL EVERY 6 HOURS PRN
Status: DISCONTINUED | OUTPATIENT
Start: 2024-07-07 | End: 2024-07-07

## 2024-07-07 RX ORDER — BUDESONIDE AND FORMOTEROL FUMARATE DIHYDRATE 160; 4.5 UG/1; UG/1
2 AEROSOL RESPIRATORY (INHALATION)
Status: DISCONTINUED | OUTPATIENT
Start: 2024-07-07 | End: 2024-07-14 | Stop reason: HOSPADM

## 2024-07-07 RX ORDER — CARVEDILOL 12.5 MG/1
25 TABLET ORAL ONCE
Status: COMPLETED | OUTPATIENT
Start: 2024-07-07 | End: 2024-07-07

## 2024-07-07 RX ORDER — NITROGLYCERIN 20 MG/100ML
5-200 INJECTION INTRAVENOUS CONTINUOUS
Status: DISCONTINUED | OUTPATIENT
Start: 2024-07-07 | End: 2024-07-07

## 2024-07-07 RX ORDER — HYDRALAZINE HYDROCHLORIDE 25 MG/1
25 TABLET, FILM COATED ORAL
Status: COMPLETED | OUTPATIENT
Start: 2024-07-07 | End: 2024-07-07

## 2024-07-07 RX ORDER — SODIUM CHLORIDE 0.9 % (FLUSH) 0.9 %
5-40 SYRINGE (ML) INJECTION PRN
Status: DISCONTINUED | OUTPATIENT
Start: 2024-07-07 | End: 2024-07-14 | Stop reason: HOSPADM

## 2024-07-07 RX ORDER — CLONIDINE HYDROCHLORIDE 0.1 MG/1
0.1 TABLET ORAL 2 TIMES DAILY
Status: DISCONTINUED | OUTPATIENT
Start: 2024-07-07 | End: 2024-07-12

## 2024-07-07 RX ORDER — LISINOPRIL 20 MG/1
20 TABLET ORAL DAILY
Status: DISCONTINUED | OUTPATIENT
Start: 2024-07-07 | End: 2024-07-13

## 2024-07-07 RX ORDER — IPRATROPIUM BROMIDE AND ALBUTEROL SULFATE 2.5; .5 MG/3ML; MG/3ML
1 SOLUTION RESPIRATORY (INHALATION)
Status: DISCONTINUED | OUTPATIENT
Start: 2024-07-07 | End: 2024-07-08

## 2024-07-07 RX ORDER — CLOPIDOGREL BISULFATE 75 MG/1
75 TABLET ORAL DAILY
Status: DISCONTINUED | OUTPATIENT
Start: 2024-07-07 | End: 2024-07-14 | Stop reason: HOSPADM

## 2024-07-07 RX ORDER — DILTIAZEM HYDROCHLORIDE 120 MG/1
240 CAPSULE, COATED, EXTENDED RELEASE ORAL DAILY
Status: DISCONTINUED | OUTPATIENT
Start: 2024-07-07 | End: 2024-07-11

## 2024-07-07 RX ORDER — MIRTAZAPINE 15 MG/1
15 TABLET, FILM COATED ORAL NIGHTLY
Status: DISCONTINUED | OUTPATIENT
Start: 2024-07-07 | End: 2024-07-14 | Stop reason: HOSPADM

## 2024-07-07 RX ORDER — BUDESONIDE AND FORMOTEROL FUMARATE DIHYDRATE 160; 4.5 UG/1; UG/1
1 AEROSOL RESPIRATORY (INHALATION)
Status: DISCONTINUED | OUTPATIENT
Start: 2024-07-07 | End: 2024-07-07

## 2024-07-07 RX ORDER — HEPARIN SODIUM 5000 [USP'U]/ML
5000 INJECTION, SOLUTION INTRAVENOUS; SUBCUTANEOUS 2 TIMES DAILY
Status: DISCONTINUED | OUTPATIENT
Start: 2024-07-07 | End: 2024-07-14 | Stop reason: HOSPADM

## 2024-07-07 RX ORDER — SODIUM CHLORIDE 0.9 % (FLUSH) 0.9 %
5-40 SYRINGE (ML) INJECTION EVERY 12 HOURS SCHEDULED
Status: DISCONTINUED | OUTPATIENT
Start: 2024-07-07 | End: 2024-07-14 | Stop reason: HOSPADM

## 2024-07-07 RX ADMIN — HYDRALAZINE HYDROCHLORIDE 25 MG: 25 TABLET ORAL at 05:47

## 2024-07-07 RX ADMIN — CLONIDINE HYDROCHLORIDE 0.1 MG: 0.1 TABLET ORAL at 20:19

## 2024-07-07 RX ADMIN — IPRATROPIUM BROMIDE AND ALBUTEROL SULFATE 1 DOSE: 2.5; .5 SOLUTION RESPIRATORY (INHALATION) at 21:01

## 2024-07-07 RX ADMIN — SODIUM CHLORIDE, PRESERVATIVE FREE 10 ML: 5 INJECTION INTRAVENOUS at 20:20

## 2024-07-07 RX ADMIN — AZITHROMYCIN MONOHYDRATE 500 MG: 500 INJECTION, POWDER, LYOPHILIZED, FOR SOLUTION INTRAVENOUS at 14:51

## 2024-07-07 RX ADMIN — HEPARIN SODIUM 5000 UNITS: 5000 INJECTION INTRAVENOUS; SUBCUTANEOUS at 20:19

## 2024-07-07 RX ADMIN — FUROSEMIDE 40 MG: 10 INJECTION, SOLUTION INTRAMUSCULAR; INTRAVENOUS at 17:37

## 2024-07-07 RX ADMIN — LISINOPRIL 20 MG: 20 TABLET ORAL at 09:47

## 2024-07-07 RX ADMIN — DILTIAZEM HYDROCHLORIDE 240 MG: 120 CAPSULE, EXTENDED RELEASE ORAL at 09:46

## 2024-07-07 RX ADMIN — CEFTRIAXONE SODIUM 1000 MG: 1 INJECTION, POWDER, FOR SOLUTION INTRAMUSCULAR; INTRAVENOUS at 14:52

## 2024-07-07 RX ADMIN — CYPROHEPTADINE HYDROCHLORIDE 4 MG: 4 TABLET ORAL at 13:32

## 2024-07-07 RX ADMIN — CARVEDILOL 25 MG: 12.5 TABLET, FILM COATED ORAL at 09:46

## 2024-07-07 RX ADMIN — CYPROHEPTADINE HYDROCHLORIDE 4 MG: 4 TABLET ORAL at 20:19

## 2024-07-07 RX ADMIN — CLOPIDOGREL BISULFATE 75 MG: 75 TABLET ORAL at 13:32

## 2024-07-07 RX ADMIN — Medication 2 PUFF: at 21:01

## 2024-07-07 RX ADMIN — MEGESTROL ACETATE 40 MG: 20 TABLET ORAL at 22:46

## 2024-07-07 RX ADMIN — PANTOPRAZOLE SODIUM 40 MG: 40 TABLET, DELAYED RELEASE ORAL at 09:47

## 2024-07-07 RX ADMIN — ACETAMINOPHEN 650 MG: 325 TABLET ORAL at 10:02

## 2024-07-07 RX ADMIN — ASPIRIN 81 MG 81 MG: 81 TABLET ORAL at 09:46

## 2024-07-07 RX ADMIN — HEPARIN SODIUM 5000 UNITS: 5000 INJECTION INTRAVENOUS; SUBCUTANEOUS at 09:47

## 2024-07-07 RX ADMIN — CARVEDILOL 25 MG: 12.5 TABLET, FILM COATED ORAL at 16:38

## 2024-07-07 RX ADMIN — FERROUS SULFATE TAB 325 MG (65 MG ELEMENTAL FE) 325 MG: 325 (65 FE) TAB at 09:46

## 2024-07-07 RX ADMIN — ROSUVASTATIN CALCIUM 20 MG: 5 TABLET, COATED ORAL at 20:19

## 2024-07-07 RX ADMIN — CARVEDILOL 25 MG: 12.5 TABLET, FILM COATED ORAL at 00:47

## 2024-07-07 RX ADMIN — SODIUM CHLORIDE, PRESERVATIVE FREE 20 ML: 5 INJECTION INTRAVENOUS at 10:40

## 2024-07-07 RX ADMIN — CLONIDINE HYDROCHLORIDE 0.1 MG: 0.1 TABLET ORAL at 09:46

## 2024-07-07 RX ADMIN — MIRTAZAPINE 15 MG: 15 TABLET, FILM COATED ORAL at 20:19

## 2024-07-07 RX ADMIN — CLONIDINE HYDROCHLORIDE 0.2 MG: 0.1 TABLET ORAL at 00:47

## 2024-07-07 RX ADMIN — FUROSEMIDE 40 MG: 10 INJECTION, SOLUTION INTRAMUSCULAR; INTRAVENOUS at 09:47

## 2024-07-07 RX ADMIN — IPRATROPIUM BROMIDE AND ALBUTEROL SULFATE 1 DOSE: 2.5; .5 SOLUTION RESPIRATORY (INHALATION) at 15:25

## 2024-07-07 ASSESSMENT — PAIN SCALES - GENERAL
PAINLEVEL_OUTOF10: 3
PAINLEVEL_OUTOF10: 0
PAINLEVEL_OUTOF10: 0
PAINLEVEL_OUTOF10: 6
PAINLEVEL_OUTOF10: 0

## 2024-07-07 ASSESSMENT — PAIN - FUNCTIONAL ASSESSMENT
PAIN_FUNCTIONAL_ASSESSMENT: 0-10
PAIN_FUNCTIONAL_ASSESSMENT: 0-10
PAIN_FUNCTIONAL_ASSESSMENT: PREVENTS OR INTERFERES SOME ACTIVE ACTIVITIES AND ADLS

## 2024-07-07 ASSESSMENT — PAIN DESCRIPTION - ORIENTATION: ORIENTATION: LEFT

## 2024-07-07 ASSESSMENT — PAIN DESCRIPTION - LOCATION
LOCATION: HIP
LOCATION: ABDOMEN

## 2024-07-07 ASSESSMENT — PAIN DESCRIPTION - DESCRIPTORS: DESCRIPTORS: SHARP

## 2024-07-07 NOTE — ED PROVIDER NOTES
Metabolic Panel    Collection Time: 07/06/24  7:44 PM   Result Value Ref Range    Sodium 134 (L) 136 - 145 mmol/L    Potassium 4.0 3.5 - 5.1 mmol/L    Chloride 103 97 - 108 mmol/L    CO2 21 21 - 32 mmol/L    Anion Gap 10 5 - 15 mmol/L    Glucose 132 (H) 65 - 100 mg/dL    BUN 25 (H) 6 - 20 mg/dL    Creatinine 1.77 (H) 0.55 - 1.02 mg/dL    BUN/Creatinine Ratio 14 12 - 20      Est, Glom Filt Rate 30 (L) >60 ml/min/1.73m2    Calcium 9.1 8.5 - 10.1 mg/dL    Total Bilirubin 0.6 0.2 - 1.0 mg/dL    AST 26 15 - 37 U/L    ALT 28 12 - 78 U/L    Alk Phosphatase 73 45 - 117 U/L    Total Protein 8.0 6.4 - 8.2 g/dL    Albumin 3.7 3.5 - 5.0 g/dL    Globulin 4.3 (H) 2.0 - 4.0 g/dL    Albumin/Globulin Ratio 0.9 (L) 1.1 - 2.2     Troponin    Collection Time: 07/06/24  7:44 PM   Result Value Ref Range    Troponin, High Sensitivity 33 0 - 51 ng/L   Brain Natriuretic Peptide    Collection Time: 07/06/24  7:44 PM   Result Value Ref Range    NT Pro-BNP 11,037 (H) <125 pg/mL   Urine Drug Screen    Collection Time: 07/06/24  7:49 PM   Result Value Ref Range    Amphetamine, Urine Negative Negative      Barbiturates, Urine Negative Negative      Benzodiazepines, Urine Negative Negative      Cocaine, Urine Negative Negative      MDMA, Urine Negative Negative      Methadone, Urine Negative Negative      Opiates, Urine Negative Negative      Phencyclidine, Urine Negative Negative      THC, TH-Cannabinol, Urine Negative Negative      Comments: PH=6.0    Lactic Acid    Collection Time: 07/06/24  8:00 PM   Result Value Ref Range    Lactic Acid, Plasma 1.7 0.4 - 2.0 mmol/L   Blood Gas, Arterial    Collection Time: 07/06/24  8:34 PM   Result Value Ref Range    pH, Arterial 7.36 7.35 - 7.45      pCO2, Arterial 36 35 - 45 mmHg    pO2, Arterial 78 (L) 80 - 100 mmHg    HCO3, Arterial 20 (L) 22 - 26 mmol/L    Base deficit, arterial blood 5.0 mmol/L    O2 Method BiPAP      FIO2 Arterial 40 %    IPAP/PIP 12      POC PEEP/CPA 6.0      Source Arterial

## 2024-07-07 NOTE — H&P
Hospital Medicine History & Physical      Date of Admission: 7/7/2024    Date of Service:  7/7/2024    [x]Admitted to Inpatient with expected LOS greater than two midnights due to medical therapy.  []Placed in Observation status.    Chief Admission Complaint: Chest pain/shortness of breath    Presenting Admission History: This is a 74-year-old female with past medical history of CAD s/p PCI, hypertension, hyperlipidemia, diastolic heart dysfunction who presented to the emergency department with chest discomfort.  Patient was initially seen at [facility] for shortness of breath and chest discomfort.  Patient was placed on BiPAP initially.  Further workup revealed pulmonary edema.  Patient was placed on nitro for elevated blood pressure was sent to our emergency department.  No emergency department patient was placed on nitro drip.  She was able to be weaned off of BiPAP now on 4 L of nasal cannula comfortable.  States her chest discomfort has subsided    Initially in hypertensive urgency.  Blood pressure has been better controlled.  Now on 4 L of nasal cannula oxygen.  Laboratory results showing elevated creatinine, elevated proBNP, opponents essentially negative.  UA was unremarkable.  ABG 7.3 6/36/78 on 40 L of FiO2.  Chest x-ray was consistent with pulmonary edema    Assessment/Plan:      #Hypertensive urgency  Continue nitro drip  Wean with blood pressure  Restart home BP meds  Admit to ICU for close monitoring    #Pulmonary edema  #HFpEF  Previous echocardiogram with preserved EF moderate diastolic dysfunction  Repeat echocardiogram  Continue IV diuretics Lasix 40 mg IV twice daily  Strict ins and outs/fluid restriction  Cardiology consult    # CAD s/p PCI  Chest pain has subsided-no ST or T wave changes on EKG troponins flat  Continue dual antiplatelet therapy/statin    # COPD-continue inhaler therapy    #GERD-continue PPI    # Hyperlipidemia-continue statin            Physical Exam

## 2024-07-07 NOTE — ED TRIAGE NOTES
Patient transfer from [facility] via EMS.   PER EMS: patient went to [facility] for being diaphoretic experiencing chest pain and shortness of breath. Patient dx with hypertensive emergency with COPD exacerbation and acute pulmonary edema. Patient placed on Bipap at [facility], but weaned off prior to being transported here; pt currently on 4L NC and is on a nitro drip running at 5mcg/min. Nitro paste is still present on the right side of the chest.      Patient in no acute respiratory distress at this time; pt denies any chest pain or SOB at this time.

## 2024-07-07 NOTE — ED PROVIDER NOTES
N/A 10/5/2021    COLONOSCOPY ( T I V A) performed by Talib Ortega MD at [facility] ENDOSCOPY    COLONOSCOPY  2015    ORTHOPEDIC SURGERY      R leg    OTHER SURGICAL HISTORY      BILAT LOWER EXTREM VASC BYPASS SURGERY    OTHER SURGICAL HISTORY      R CAROTID ENDORECTOMY 11-4-05,   L CAROTID ENDARECTOMY 11-6-2006    PACEMAKER      6 0r 7 years ago         CURRENT MEDICATIONS       Previous Medications    ACETAMINOPHEN (TYLENOL) 325 MG TABLET    Take 2 tablets by mouth every 4 hours as needed    ALBUTEROL SULFATE HFA (PROVENTIL;VENTOLIN;PROAIR) 108 (90 BASE) MCG/ACT INHALER    Inhale 2 puffs into the lungs every 4 hours as needed    ASPIRIN 81 MG CHEWABLE TABLET    aspirin 81 mg tablet   Take by oral route.    BUDESONIDE-FORMOTEROL (SYMBICORT) 160-4.5 MCG/ACT AERO    ceived the following from [facility]: Outside name: Symbicort 160-4.5 mcg/actuation HFAA    CARVEDILOL (COREG) 25 MG TABLET    Take 1 tablet by mouth 2 times daily (with meals)    CLONIDINE (CATAPRES) 0.1 MG TABLET    Take 1 tablet by mouth 2 times daily    CLOPIDOGREL (PLAVIX) 75 MG TABLET    clopidogrel 75 mg tablet   Take 1 tablet every day by oral route.    CYPROHEPTADINE (PERIACTIN) 4 MG TABLET    Take 1 tablet by mouth 3 times daily    DILTIAZEM (CARDIZEM CD) 240 MG EXTENDED RELEASE CAPSULE    1 capsule daily    FERROUS SULFATE (IRON 325) 325 (65 FE) MG TABLET    Take 1 tablet by mouth every other day ceived the following from [facility]: Outside name: FeroSuL 325 mg (65 mg iron) tablet    LACTULOSE (CHRONULAC) 10 GM/15ML SOLUTION    TAKE 30 MLS BY MOUTH EVERY EVENING    LISINOPRIL (PRINIVIL;ZESTRIL) 20 MG TABLET        MEGESTROL (MEGACE) 40 MG TABLET    TAKE 1 TABLET BY MOUTH TWICE A DAY    MIRTAZAPINE (REMERON) 15 MG TABLET    Take 1 tablet by mouth nightly    NITROGLYCERIN (NITROSTAT) 0.3 MG SL TABLET    Place 1 tablet under the tongue every 5 minutes as needed for Chest pain up to max of 3 total doses. If no

## 2024-07-07 NOTE — ED NOTES
Assumed care from night shift nurse, patient laying in bed, patient is alert, oxygen at 2l min via nasal cannula. Stretcher in lowest position.

## 2024-07-08 ENCOUNTER — APPOINTMENT (OUTPATIENT)
Facility: HOSPITAL | Age: 74
DRG: 291 | End: 2024-07-08
Payer: MEDICARE

## 2024-07-08 LAB
ALBUMIN SERPL-MCNC: 3.6 G/DL (ref 3.5–5)
ALBUMIN/GLOB SERPL: 1.1 (ref 1.1–2.2)
ALP SERPL-CCNC: 53 U/L (ref 45–117)
ALT SERPL-CCNC: 20 U/L (ref 12–78)
ANION GAP SERPL CALC-SCNC: 10 MMOL/L (ref 5–15)
AST SERPL W P-5'-P-CCNC: 20 U/L (ref 15–37)
BACTERIA SPEC CULT: NORMAL
BASOPHILS # BLD: 0 K/UL (ref 0–0.1)
BASOPHILS NFR BLD: 1 % (ref 0–1)
BILIRUB SERPL-MCNC: 0.7 MG/DL (ref 0.2–1)
BUN SERPL-MCNC: 32 MG/DL (ref 6–20)
BUN/CREAT SERPL: 11 (ref 12–20)
CA-I BLD-MCNC: 9.5 MG/DL (ref 8.5–10.1)
CHLORIDE SERPL-SCNC: 106 MMOL/L (ref 97–108)
CO2 SERPL-SCNC: 23 MMOL/L (ref 21–32)
CREAT SERPL-MCNC: 2.8 MG/DL (ref 0.55–1.02)
DIFFERENTIAL METHOD BLD: ABNORMAL
EKG ATRIAL RATE: 101 BPM
EKG ATRIAL RATE: 82 BPM
EKG DIAGNOSIS: NORMAL
EKG DIAGNOSIS: NORMAL
EKG P AXIS: 68 DEGREES
EKG P AXIS: 80 DEGREES
EKG P-R INTERVAL: 200 MS
EKG P-R INTERVAL: 206 MS
EKG Q-T INTERVAL: 340 MS
EKG Q-T INTERVAL: 385 MS
EKG QRS DURATION: 101 MS
EKG QRS DURATION: 99 MS
EKG QTC CALCULATION (BAZETT): 441 MS
EKG QTC CALCULATION (BAZETT): 450 MS
EKG R AXIS: 17 DEGREES
EKG R AXIS: 24 DEGREES
EKG T AXIS: 18 DEGREES
EKG T AXIS: 66 DEGREES
EKG VENTRICULAR RATE: 101 BPM
EKG VENTRICULAR RATE: 82 BPM
EOSINOPHIL # BLD: 0 K/UL (ref 0–0.4)
EOSINOPHIL NFR BLD: 0 % (ref 0–7)
ERYTHROCYTE [DISTWIDTH] IN BLOOD BY AUTOMATED COUNT: 13.2 % (ref 11.5–14.5)
GLOBULIN SER CALC-MCNC: 3.4 G/DL (ref 2–4)
GLUCOSE SERPL-MCNC: 69 MG/DL (ref 65–100)
HCT VFR BLD AUTO: 31.7 % (ref 35–47)
HGB BLD-MCNC: 10.9 G/DL (ref 11.5–16)
IMM GRANULOCYTES # BLD AUTO: 0 K/UL (ref 0–0.04)
IMM GRANULOCYTES NFR BLD AUTO: 0 % (ref 0–0.5)
LYMPHOCYTES # BLD: 0.6 K/UL (ref 0.8–3.5)
LYMPHOCYTES NFR BLD: 8 % (ref 12–49)
Lab: NORMAL
MCH RBC QN AUTO: 33.3 PG (ref 26–34)
MCHC RBC AUTO-ENTMCNC: 34.4 G/DL (ref 30–36.5)
MCV RBC AUTO: 96.9 FL (ref 80–99)
MONOCYTES # BLD: 0.9 K/UL (ref 0–1)
MONOCYTES NFR BLD: 13 % (ref 5–13)
NEUTS SEG # BLD: 5.7 K/UL (ref 1.8–8)
NEUTS SEG NFR BLD: 78 % (ref 32–75)
NRBC # BLD: 0 K/UL (ref 0–0.01)
NRBC BLD-RTO: 0 PER 100 WBC
PLATELET # BLD AUTO: 151 K/UL (ref 150–400)
PMV BLD AUTO: 12.1 FL (ref 8.9–12.9)
POTASSIUM SERPL-SCNC: 3.5 MMOL/L (ref 3.5–5.1)
PROT SERPL-MCNC: 7 G/DL (ref 6.4–8.2)
RBC # BLD AUTO: 3.27 M/UL (ref 3.8–5.2)
SODIUM SERPL-SCNC: 139 MMOL/L (ref 136–145)
WBC # BLD AUTO: 7.3 K/UL (ref 3.6–11)

## 2024-07-08 PROCEDURE — 6370000000 HC RX 637 (ALT 250 FOR IP): Performed by: PHYSICIAN ASSISTANT

## 2024-07-08 PROCEDURE — 2580000003 HC RX 258: Performed by: STUDENT IN AN ORGANIZED HEALTH CARE EDUCATION/TRAINING PROGRAM

## 2024-07-08 PROCEDURE — 6370000000 HC RX 637 (ALT 250 FOR IP): Performed by: STUDENT IN AN ORGANIZED HEALTH CARE EDUCATION/TRAINING PROGRAM

## 2024-07-08 PROCEDURE — 94640 AIRWAY INHALATION TREATMENT: CPT

## 2024-07-08 PROCEDURE — 2060000000 HC ICU INTERMEDIATE R&B

## 2024-07-08 PROCEDURE — 97530 THERAPEUTIC ACTIVITIES: CPT

## 2024-07-08 PROCEDURE — 6360000002 HC RX W HCPCS: Performed by: STUDENT IN AN ORGANIZED HEALTH CARE EDUCATION/TRAINING PROGRAM

## 2024-07-08 PROCEDURE — 97161 PT EVAL LOW COMPLEX 20 MIN: CPT

## 2024-07-08 PROCEDURE — 71045 X-RAY EXAM CHEST 1 VIEW: CPT

## 2024-07-08 PROCEDURE — 85025 COMPLETE CBC W/AUTO DIFF WBC: CPT

## 2024-07-08 PROCEDURE — 2580000003 HC RX 258: Performed by: PHYSICIAN ASSISTANT

## 2024-07-08 PROCEDURE — 93306 TTE W/DOPPLER COMPLETE: CPT

## 2024-07-08 PROCEDURE — 6360000002 HC RX W HCPCS: Performed by: PHYSICIAN ASSISTANT

## 2024-07-08 PROCEDURE — 80053 COMPREHEN METABOLIC PANEL: CPT

## 2024-07-08 PROCEDURE — 2580000003 HC RX 258: Performed by: HOSPITALIST

## 2024-07-08 PROCEDURE — 36415 COLL VENOUS BLD VENIPUNCTURE: CPT

## 2024-07-08 PROCEDURE — 6370000000 HC RX 637 (ALT 250 FOR IP): Performed by: INTERNAL MEDICINE

## 2024-07-08 RX ORDER — IPRATROPIUM BROMIDE AND ALBUTEROL SULFATE 2.5; .5 MG/3ML; MG/3ML
1 SOLUTION RESPIRATORY (INHALATION) EVERY 4 HOURS PRN
Status: DISCONTINUED | OUTPATIENT
Start: 2024-07-08 | End: 2024-07-14 | Stop reason: HOSPADM

## 2024-07-08 RX ORDER — 0.9 % SODIUM CHLORIDE 0.9 %
250 INTRAVENOUS SOLUTION INTRAVENOUS ONCE
Status: COMPLETED | OUTPATIENT
Start: 2024-07-08 | End: 2024-07-08

## 2024-07-08 RX ADMIN — CLOPIDOGREL BISULFATE 75 MG: 75 TABLET ORAL at 08:23

## 2024-07-08 RX ADMIN — CYPROHEPTADINE HYDROCHLORIDE 4 MG: 4 TABLET ORAL at 08:23

## 2024-07-08 RX ADMIN — CYPROHEPTADINE HYDROCHLORIDE 4 MG: 4 TABLET ORAL at 16:14

## 2024-07-08 RX ADMIN — FUROSEMIDE 40 MG: 10 INJECTION, SOLUTION INTRAMUSCULAR; INTRAVENOUS at 08:23

## 2024-07-08 RX ADMIN — Medication 2 PUFF: at 19:33

## 2024-07-08 RX ADMIN — CLONIDINE HYDROCHLORIDE 0.1 MG: 0.1 TABLET ORAL at 08:23

## 2024-07-08 RX ADMIN — MEGESTROL ACETATE 40 MG: 20 TABLET ORAL at 21:40

## 2024-07-08 RX ADMIN — CEFTRIAXONE SODIUM 1000 MG: 1 INJECTION, POWDER, FOR SOLUTION INTRAMUSCULAR; INTRAVENOUS at 13:13

## 2024-07-08 RX ADMIN — ASPIRIN 81 MG 81 MG: 81 TABLET ORAL at 08:23

## 2024-07-08 RX ADMIN — CARVEDILOL 25 MG: 12.5 TABLET, FILM COATED ORAL at 16:14

## 2024-07-08 RX ADMIN — Medication 2 PUFF: at 06:16

## 2024-07-08 RX ADMIN — HEPARIN SODIUM 5000 UNITS: 5000 INJECTION INTRAVENOUS; SUBCUTANEOUS at 21:40

## 2024-07-08 RX ADMIN — IPRATROPIUM BROMIDE AND ALBUTEROL SULFATE 1 DOSE: 2.5; .5 SOLUTION RESPIRATORY (INHALATION) at 06:16

## 2024-07-08 RX ADMIN — HEPARIN SODIUM 5000 UNITS: 5000 INJECTION INTRAVENOUS; SUBCUTANEOUS at 08:23

## 2024-07-08 RX ADMIN — ROSUVASTATIN CALCIUM 20 MG: 5 TABLET, COATED ORAL at 21:39

## 2024-07-08 RX ADMIN — MIRTAZAPINE 15 MG: 15 TABLET, FILM COATED ORAL at 21:40

## 2024-07-08 RX ADMIN — LISINOPRIL 20 MG: 20 TABLET ORAL at 08:23

## 2024-07-08 RX ADMIN — SODIUM CHLORIDE, PRESERVATIVE FREE 10 ML: 5 INJECTION INTRAVENOUS at 21:40

## 2024-07-08 RX ADMIN — SODIUM CHLORIDE 250 ML: 9 INJECTION, SOLUTION INTRAVENOUS at 10:49

## 2024-07-08 RX ADMIN — AZITHROMYCIN MONOHYDRATE 500 MG: 500 INJECTION, POWDER, LYOPHILIZED, FOR SOLUTION INTRAVENOUS at 13:13

## 2024-07-08 RX ADMIN — SODIUM CHLORIDE, PRESERVATIVE FREE 10 ML: 5 INJECTION INTRAVENOUS at 08:22

## 2024-07-08 RX ADMIN — CYPROHEPTADINE HYDROCHLORIDE 4 MG: 4 TABLET ORAL at 21:39

## 2024-07-08 RX ADMIN — MEGESTROL ACETATE 40 MG: 20 TABLET ORAL at 10:12

## 2024-07-08 RX ADMIN — CARVEDILOL 25 MG: 12.5 TABLET, FILM COATED ORAL at 08:23

## 2024-07-08 RX ADMIN — PANTOPRAZOLE SODIUM 40 MG: 40 TABLET, DELAYED RELEASE ORAL at 08:23

## 2024-07-08 NOTE — CARE COORDINATION
07/08/24 1358   Service Assessment   Patient Orientation Alert and Oriented   Cognition Alert   History Provided By Patient   Primary Caregiver Self   Support Systems Spouse/Significant Other   Patient's Healthcare Decision Maker is: Legal Next of Kin   PCP Verified by CM Yes   Last Visit to PCP Within last 3 months   Prior Functional Level Independent in ADLs/IADLs   Current Functional Level Independent in ADLs/IADLs   Can patient return to prior living arrangement Yes   Ability to make needs known: Good   Family able to assist with home care needs: Yes   Would you like for me to discuss the discharge plan with any other family members/significant others, and if so, who? No   Financial Resources Medicare   Community Resources None   Social/Functional History   Lives With Spouse   Type of Home House   Home Layout One level   Home Access Stairs to enter with rails   Entrance Stairs - Number of Steps 7   Bathroom Toilet Standard   Bathroom Equipment None   Home Equipment Cane;Walker - Standard   Receives Help From Family   Discharge Planning   Type of Residence House   Living Arrangements Spouse/Significant Other   Current Services Prior To Admission None   Potential Assistance Needed N/A   DME Ordered? No   Patient expects to be discharged to: House   Services At/After Discharge   Transition of Care Consult (CM Consult) N/A   Services At/After Discharge None   Mode of Transport at Discharge Self   Confirm Follow Up Transport Family     CM assessment complete and demographics confirmed. Patient states that she lives with her  and states that she is independent in her ADLs. Patient lives in a single story home with 7 stairs to entrance. No history of HH. DME available is cane and walker.     Preferred pharmacy is [facility]. No interest in meds to beds.     Advance Care Planning     General Advance Care Planning (ACP) Conversation    Date of Conversation: 7/8/2024  Conducted with: Patient with Decision

## 2024-07-08 NOTE — CONSULTS
Nephrology Consultation          Patient: Saranya Hightower MRN: [id number]  SSN: [id number]    YOB: 1950  Age: 74 y.o.  Sex: female      Subjective:   Reason for the consultation.  Acute kidney injury on chronic kidney disease stage III  History of present illness.  The patient is 74-year-old woman with underlying history of coronary artery disease status post PCI stents, hypertension, hyperlipidemia, LV diastolic dysfunction was hospitalized for acute hypoxic respiratory failure due to pulmonary edema and malignant hypertension.  She was try to diurese and responded clinically.  She was also put on Rocephin and azithromycin considering possible pneumonia.  On admission her creatinine was elevated at 1.9 and bumped to 2.8 which prompted nephrology consultation.  She is noticed to have lowish blood pressures.  She is on nasal cannula O2 now.  No noticed lower extremity swelling.  She received normal saline to 50 cc bolus.  Her diuretics were appropriately held.    Past Medical History:   Diagnosis Date    Abnormal weight loss 09/11/2021    Atherosclerosis of native coronary artery without angina pectoris 09/11/2021    CAD (coronary artery disease)     MI    Carotid artery stenosis 09/11/2021    Cervicalgia 09/11/2021    Colon polyps     Constipation 09/11/2021    HTN (hypertension) 09/11/2021    Hyperlipidemia     Hypertension     Hypertensive heart disease without congestive heart failure 09/11/2021    Menopause     Personal history of colonic polyps 09/11/2021    Vitamin D deficiency 09/11/2021     Past Surgical History:   Procedure Laterality Date    CARDIAC CATHETERIZATION      X 3    CARDIAC DEFIBRILLATOR PLACEMENT  09/16/2013    COLONOSCOPY N/A 10/5/2021    COLONOSCOPY ( T I V A) performed by Talib Ortega MD at [facility] ENDOSCOPY    COLONOSCOPY  2015    ORTHOPEDIC SURGERY      R leg    OTHER SURGICAL HISTORY      BILAT LOWER EXTREM VASC BYPASS SURGERY    OTHER SURGICAL HISTORY      R 
PM    HCT 33.5 07/06/2024 07:44 PM     07/06/2024 07:44 PM    MCV 99.4 07/06/2024 07:44 PM     Lab Results   Component Value Date/Time     07/06/2024 07:44 PM    K 4.0 07/06/2024 07:44 PM     07/06/2024 07:44 PM    CO2 21 07/06/2024 07:44 PM    BUN 25 07/06/2024 07:44 PM    GFRAA >60 09/24/2022 09:13 AM      Last Lipid:  No results found for: \"CHOL\", \"CHLST\", \"CHOLV\", \"HDL\", \"HDLC\", \"LDL\"  No results found for: \"CPK\", \"CKMB\", \"BNP\"   Lab Results   Component Value Date/Time    TSH 1.14 10/27/2021 11:22 AM          Imaging & Acillary Studies     Last EKG    Encounter Date: 07/07/24   EKG 12 Lead   Result Value    Ventricular Rate 99    Atrial Rate 99    P-R Interval 182    QRS Duration 90    Q-T Interval 362    QTc Calculation (Bazett) 464    P Axis 66    R Axis 10    T Axis 56    Diagnosis      Normal sinus rhythm  Inferior infarct , age undetermined  Abnormal ECG  When compared with ECG of 06-JUL-2024 19:32,  PREVIOUS ECG IS PRESENT  Confirmed by MEHRDAD ISSA MD ([id number]) on 7/7/2024 12:46:12 PM          XR CHEST PORTABLE    (Results Pending)       Cardiac cath:    No results found for this or any previous visit.       Echo:     No results found for this or any previous visit.        Total time spent:  60 minutes.      Dr. Mehrdad Issa MD.PhD. FACC

## 2024-07-08 NOTE — CARE COORDINATION
CM reviewed chart.     DCP is Home pending PT/OT recs.    Patient is pending echo and stress test today.    CM will continue to follow.

## 2024-07-09 ENCOUNTER — APPOINTMENT (OUTPATIENT)
Facility: HOSPITAL | Age: 74
DRG: 291 | End: 2024-07-09
Payer: MEDICARE

## 2024-07-09 ENCOUNTER — HOSPITAL ENCOUNTER (INPATIENT)
Facility: HOSPITAL | Age: 74
Discharge: HOME | DRG: 291 | End: 2024-07-11
Payer: MEDICARE

## 2024-07-09 VITALS
BODY MASS INDEX: 18.22 KG/M2 | WEIGHT: 99 LBS | DIASTOLIC BLOOD PRESSURE: 84 MMHG | HEART RATE: 90 BPM | HEIGHT: 62 IN | SYSTOLIC BLOOD PRESSURE: 172 MMHG

## 2024-07-09 LAB
ECHO AO ASC DIAM: 2.7 CM
ECHO AO ASCENDING AORTA INDEX: 1.88 CM/M2
ECHO AO ROOT DIAM: 2.8 CM
ECHO AO ROOT INDEX: 1.94 CM/M2
ECHO AV AREA PEAK VELOCITY: 2 CM2
ECHO AV AREA/BSA PEAK VELOCITY: 1.4 CM2/M2
ECHO AV PEAK GRADIENT: 4 MMHG
ECHO AV PEAK VELOCITY: 1.1 M/S
ECHO AV VELOCITY RATIO: 0.64
ECHO BSA: 1.4 M2
ECHO BSA: 1.43 M2
ECHO EST RA PRESSURE: 3 MMHG
ECHO IVC EXP: 1.6 CM
ECHO LA AREA 2C: 11.9 CM2
ECHO LA AREA 4C: 10.2 CM2
ECHO LA DIAMETER INDEX: 2.22 CM/M2
ECHO LA DIAMETER: 3.2 CM
ECHO LA MAJOR AXIS: 4 CM
ECHO LA MINOR AXIS: 4.6 CM
ECHO LA TO AORTIC ROOT RATIO: 1.14
ECHO LA VOL BP: 24 ML (ref 22–52)
ECHO LA VOL MOD A2C: 26 ML (ref 22–52)
ECHO LA VOL MOD A4C: 20 ML (ref 22–52)
ECHO LA VOL/BSA BIPLANE: 17 ML/M2 (ref 16–34)
ECHO LA VOLUME INDEX MOD A2C: 18 ML/M2 (ref 16–34)
ECHO LA VOLUME INDEX MOD A4C: 14 ML/M2 (ref 16–34)
ECHO LV E' LATERAL VELOCITY: 4 CM/S
ECHO LV E' SEPTAL VELOCITY: 3 CM/S
ECHO LV FRACTIONAL SHORTENING: 13 % (ref 28–44)
ECHO LV INTERNAL DIMENSION DIASTOLE INDEX: 2.71 CM/M2
ECHO LV INTERNAL DIMENSION DIASTOLIC: 3.9 CM (ref 3.9–5.3)
ECHO LV INTERNAL DIMENSION SYSTOLIC INDEX: 2.36 CM/M2
ECHO LV INTERNAL DIMENSION SYSTOLIC: 3.4 CM
ECHO LV IVSD: 1.5 CM (ref 0.6–0.9)
ECHO LV MASS 2D: 212.9 G (ref 67–162)
ECHO LV MASS INDEX 2D: 147.8 G/M2 (ref 43–95)
ECHO LV POSTERIOR WALL DIASTOLIC: 1.4 CM (ref 0.6–0.9)
ECHO LV RELATIVE WALL THICKNESS RATIO: 0.72
ECHO LVOT AREA: 3.1 CM2
ECHO LVOT DIAM: 2 CM
ECHO LVOT PEAK GRADIENT: 2 MMHG
ECHO LVOT PEAK VELOCITY: 0.7 M/S
ECHO MV A VELOCITY: 0.87 M/S
ECHO MV E DECELERATION TIME (DT): 184 MS
ECHO MV E VELOCITY: 0.56 M/S
ECHO MV E/A RATIO: 0.64
ECHO MV E/E' LATERAL: 14
ECHO MV E/E' RATIO (AVERAGED): 16.33
ECHO MV E/E' SEPTAL: 18.67
ECHO PV ACCELERATION TIME (AT): 124 MS
ECHO PV MAX VELOCITY: 1.1 M/S
ECHO PV PEAK GRADIENT: 5 MMHG
ECHO RA AREA 4C: 7.7 CM2
ECHO RA END SYSTOLIC VOLUME APICAL 4 CHAMBER INDEX BSA: 11 ML/M2
ECHO RA VOLUME: 16 ML
ECHO RIGHT VENTRICULAR SYSTOLIC PRESSURE (RVSP): 17 MMHG
ECHO RV BASAL DIMENSION: 2.7 CM
ECHO RV FREE WALL PEAK S': 12 CM/S
ECHO RV TAPSE: 1.2 CM (ref 1.7–?)
ECHO TV REGURGITANT MAX VELOCITY: 1.9 M/S
ECHO TV REGURGITANT PEAK GRADIENT: 14 MMHG
STRESS BASELINE DIAS BP: 84 MMHG
STRESS BASELINE HR: 90 BPM
STRESS BASELINE SYS BP: 172 MMHG
STRESS STAGE 1 BP: NORMAL MMHG
STRESS STAGE 1 DURATION: NORMAL MIN:SEC
STRESS STAGE 1 HR: 121 BPM
STRESS STAGE 2 DURATION: NORMAL MIN:SEC
STRESS STAGE 2 HR: 116 BPM
STRESS STAGE 3 BP: NORMAL MMHG
STRESS STAGE 3 DURATION: NORMAL MIN:SEC
STRESS STAGE 3 HR: 112 BPM
STRESS STAGE 4 BP: NORMAL MMHG
STRESS STAGE 4 DURATION: NORMAL MIN:SEC
STRESS STAGE 4 HR: 111 BPM
STRESS STAGE 5 DURATION: 5 MIN:SEC
STRESS STAGE 5 HR: 110 BPM
STRESS STAGE 6 BP: NORMAL MMHG
STRESS STAGE 6 DURATION: 6 MIN:SEC
STRESS STAGE 6 HR: 106 BPM
STRESS STAGE 7 BP: NORMAL MMHG
STRESS STAGE 7 DURATION: 9 MIN:SEC
STRESS STAGE 7 HR: 110 BPM
STRESS TARGET HR: 147 BPM

## 2024-07-09 PROCEDURE — 97530 THERAPEUTIC ACTIVITIES: CPT

## 2024-07-09 PROCEDURE — 78452 HT MUSCLE IMAGE SPECT MULT: CPT

## 2024-07-09 PROCEDURE — 6360000002 HC RX W HCPCS: Performed by: INTERNAL MEDICINE

## 2024-07-09 PROCEDURE — 2580000003 HC RX 258: Performed by: STUDENT IN AN ORGANIZED HEALTH CARE EDUCATION/TRAINING PROGRAM

## 2024-07-09 PROCEDURE — 6360000002 HC RX W HCPCS: Performed by: PHYSICIAN ASSISTANT

## 2024-07-09 PROCEDURE — 2060000000 HC ICU INTERMEDIATE R&B

## 2024-07-09 PROCEDURE — 94761 N-INVAS EAR/PLS OXIMETRY MLT: CPT

## 2024-07-09 PROCEDURE — 2700000000 HC OXYGEN THERAPY PER DAY

## 2024-07-09 PROCEDURE — 3430000000 HC RX DIAGNOSTIC RADIOPHARMACEUTICAL: Performed by: INTERNAL MEDICINE

## 2024-07-09 PROCEDURE — 6360000002 HC RX W HCPCS: Performed by: STUDENT IN AN ORGANIZED HEALTH CARE EDUCATION/TRAINING PROGRAM

## 2024-07-09 PROCEDURE — 97165 OT EVAL LOW COMPLEX 30 MIN: CPT

## 2024-07-09 PROCEDURE — 6370000000 HC RX 637 (ALT 250 FOR IP): Performed by: INTERNAL MEDICINE

## 2024-07-09 PROCEDURE — 94640 AIRWAY INHALATION TREATMENT: CPT

## 2024-07-09 PROCEDURE — 6370000000 HC RX 637 (ALT 250 FOR IP): Performed by: STUDENT IN AN ORGANIZED HEALTH CARE EDUCATION/TRAINING PROGRAM

## 2024-07-09 PROCEDURE — 6360000002 HC RX W HCPCS

## 2024-07-09 PROCEDURE — A9500 TC99M SESTAMIBI: HCPCS | Performed by: INTERNAL MEDICINE

## 2024-07-09 RX ORDER — REGADENOSON 0.08 MG/ML
INJECTION, SOLUTION INTRAVENOUS
Status: COMPLETED
Start: 2024-07-09 | End: 2024-07-09

## 2024-07-09 RX ORDER — TETRAKIS(2-METHOXYISOBUTYLISOCYANIDE)COPPER(I) TETRAFLUOROBORATE 1 MG/ML
9.75 INJECTION, POWDER, LYOPHILIZED, FOR SOLUTION INTRAVENOUS
Status: COMPLETED | OUTPATIENT
Start: 2024-07-09 | End: 2024-07-09

## 2024-07-09 RX ORDER — TETRAKIS(2-METHOXYISOBUTYLISOCYANIDE)COPPER(I) TETRAFLUOROBORATE 1 MG/ML
27.5 INJECTION, POWDER, LYOPHILIZED, FOR SOLUTION INTRAVENOUS
Status: COMPLETED | OUTPATIENT
Start: 2024-07-09 | End: 2024-07-09

## 2024-07-09 RX ORDER — AMINOPHYLLINE 25 MG/ML
100 INJECTION, SOLUTION INTRAVENOUS ONCE
Status: COMPLETED | OUTPATIENT
Start: 2024-07-09 | End: 2024-07-09

## 2024-07-09 RX ADMIN — LACTULOSE 20 G: 20 SOLUTION ORAL at 17:07

## 2024-07-09 RX ADMIN — SODIUM CHLORIDE, PRESERVATIVE FREE 10 ML: 5 INJECTION INTRAVENOUS at 21:23

## 2024-07-09 RX ADMIN — TETRAKIS(2-METHOXYISOBUTYLISOCYANIDE)COPPER(I) TETRAFLUOROBORATE 27.5 MILLICURIE: 1 INJECTION, POWDER, LYOPHILIZED, FOR SOLUTION INTRAVENOUS at 11:45

## 2024-07-09 RX ADMIN — ROSUVASTATIN CALCIUM 20 MG: 5 TABLET, COATED ORAL at 21:22

## 2024-07-09 RX ADMIN — REGADENOSON 0.4 MG: 0.08 INJECTION, SOLUTION INTRAVENOUS at 11:43

## 2024-07-09 RX ADMIN — HEPARIN SODIUM 5000 UNITS: 5000 INJECTION INTRAVENOUS; SUBCUTANEOUS at 21:22

## 2024-07-09 RX ADMIN — MIRTAZAPINE 15 MG: 15 TABLET, FILM COATED ORAL at 21:22

## 2024-07-09 RX ADMIN — TETRAKIS(2-METHOXYISOBUTYLISOCYANIDE)COPPER(I) TETRAFLUOROBORATE 9.75 MILLICURIE: 1 INJECTION, POWDER, LYOPHILIZED, FOR SOLUTION INTRAVENOUS at 08:20

## 2024-07-09 RX ADMIN — Medication 2 PUFF: at 07:14

## 2024-07-09 RX ADMIN — HYDRALAZINE HYDROCHLORIDE 10 MG: 20 INJECTION INTRAMUSCULAR; INTRAVENOUS at 04:24

## 2024-07-09 RX ADMIN — Medication 2 PUFF: at 20:18

## 2024-07-09 RX ADMIN — CARVEDILOL 25 MG: 12.5 TABLET, FILM COATED ORAL at 17:07

## 2024-07-09 RX ADMIN — CYPROHEPTADINE HYDROCHLORIDE 4 MG: 4 TABLET ORAL at 14:44

## 2024-07-09 RX ADMIN — CYPROHEPTADINE HYDROCHLORIDE 4 MG: 4 TABLET ORAL at 21:22

## 2024-07-09 RX ADMIN — MEGESTROL ACETATE 40 MG: 20 TABLET ORAL at 21:23

## 2024-07-09 RX ADMIN — AMINOPHYLLINE 100 MG: 25 INJECTION, SOLUTION INTRAVENOUS at 11:55

## 2024-07-09 ASSESSMENT — HEART SCORE: ECG: NORMAL

## 2024-07-09 NOTE — CARE COORDINATION
0745: Chart reviewed.    Per notes; patient on IV ABX followed via cardiology and nephrology.    PT recs for SNF noted and to be discussed with patient.    OT eval pending.    Insurance will require AUTH.    CM will continue to follow patient and recs of medical team.    1055: CM met with Mr. Hightower in room discussing therapy SNF recs.     Mr. Hightower shared he was just on the phone with his son and they both feel patient should go to rehab to get stronger.     CM will speak with the patient when she returns on the unit.    1330: CM met with patient and spouse at bedside upon her return to the unit to discuss SNF recs.    Patient declined SNF recs after discussion stating she will agree to home health offered.    Choice letter received, placed on chart and referral sent.    1343: [facility] accepted patient with anticipated SOC 07/12/2024.    Home health information provided to couple in writing.

## 2024-07-10 ENCOUNTER — APPOINTMENT (OUTPATIENT)
Facility: HOSPITAL | Age: 74
DRG: 291 | End: 2024-07-10
Payer: MEDICARE

## 2024-07-10 LAB
ALBUMIN SERPL-MCNC: 3.5 G/DL (ref 3.5–5)
ANION GAP SERPL CALC-SCNC: 8 MMOL/L (ref 5–15)
BACTERIA SPEC CULT: NORMAL
BUN SERPL-MCNC: 48 MG/DL (ref 6–20)
BUN/CREAT SERPL: 12 (ref 12–20)
CA-I BLD-MCNC: 9.2 MG/DL (ref 8.5–10.1)
CHLORIDE SERPL-SCNC: 110 MMOL/L (ref 97–108)
CO2 SERPL-SCNC: 21 MMOL/L (ref 21–32)
CREAT SERPL-MCNC: 3.9 MG/DL (ref 0.55–1.02)
EKG ATRIAL RATE: 85 BPM
EKG DIAGNOSIS: NORMAL
EKG P AXIS: 59 DEGREES
EKG P-R INTERVAL: 176 MS
EKG Q-T INTERVAL: 402 MS
EKG QRS DURATION: 90 MS
EKG QTC CALCULATION (BAZETT): 478 MS
EKG R AXIS: 2 DEGREES
EKG T AXIS: 46 DEGREES
EKG VENTRICULAR RATE: 85 BPM
GLUCOSE SERPL-MCNC: 74 MG/DL (ref 65–100)
Lab: NORMAL
MAGNESIUM SERPL-MCNC: 2.5 MG/DL (ref 1.6–2.4)
PHOSPHATE SERPL-MCNC: 5.9 MG/DL (ref 2.6–4.7)
POTASSIUM SERPL-SCNC: 3.6 MMOL/L (ref 3.5–5.1)
SODIUM SERPL-SCNC: 139 MMOL/L (ref 136–145)
TROPONIN I SERPL HS-MCNC: 48 NG/L (ref 0–51)

## 2024-07-10 PROCEDURE — 6370000000 HC RX 637 (ALT 250 FOR IP): Performed by: HOSPITALIST

## 2024-07-10 PROCEDURE — 94761 N-INVAS EAR/PLS OXIMETRY MLT: CPT

## 2024-07-10 PROCEDURE — 97530 THERAPEUTIC ACTIVITIES: CPT

## 2024-07-10 PROCEDURE — 93005 ELECTROCARDIOGRAM TRACING: CPT | Performed by: HOSPITALIST

## 2024-07-10 PROCEDURE — 2060000000 HC ICU INTERMEDIATE R&B

## 2024-07-10 PROCEDURE — 83735 ASSAY OF MAGNESIUM: CPT

## 2024-07-10 PROCEDURE — 36415 COLL VENOUS BLD VENIPUNCTURE: CPT

## 2024-07-10 PROCEDURE — 94640 AIRWAY INHALATION TREATMENT: CPT

## 2024-07-10 PROCEDURE — 2580000003 HC RX 258: Performed by: INTERNAL MEDICINE

## 2024-07-10 PROCEDURE — 2580000003 HC RX 258: Performed by: STUDENT IN AN ORGANIZED HEALTH CARE EDUCATION/TRAINING PROGRAM

## 2024-07-10 PROCEDURE — 84484 ASSAY OF TROPONIN QUANT: CPT

## 2024-07-10 PROCEDURE — 6370000000 HC RX 637 (ALT 250 FOR IP): Performed by: INTERNAL MEDICINE

## 2024-07-10 PROCEDURE — 6360000002 HC RX W HCPCS: Performed by: STUDENT IN AN ORGANIZED HEALTH CARE EDUCATION/TRAINING PROGRAM

## 2024-07-10 PROCEDURE — 6370000000 HC RX 637 (ALT 250 FOR IP): Performed by: STUDENT IN AN ORGANIZED HEALTH CARE EDUCATION/TRAINING PROGRAM

## 2024-07-10 PROCEDURE — 51798 US URINE CAPACITY MEASURE: CPT

## 2024-07-10 PROCEDURE — 80069 RENAL FUNCTION PANEL: CPT

## 2024-07-10 PROCEDURE — 76770 US EXAM ABDO BACK WALL COMP: CPT

## 2024-07-10 RX ORDER — CARVEDILOL 3.12 MG/1
6.25 TABLET ORAL 2 TIMES DAILY WITH MEALS
Status: DISCONTINUED | OUTPATIENT
Start: 2024-07-10 | End: 2024-07-13

## 2024-07-10 RX ORDER — SODIUM CHLORIDE 9 MG/ML
INJECTION, SOLUTION INTRAVENOUS CONTINUOUS
Status: DISCONTINUED | OUTPATIENT
Start: 2024-07-10 | End: 2024-07-12

## 2024-07-10 RX ADMIN — MEGESTROL ACETATE 40 MG: 20 TABLET ORAL at 08:13

## 2024-07-10 RX ADMIN — MIRTAZAPINE 15 MG: 15 TABLET, FILM COATED ORAL at 22:04

## 2024-07-10 RX ADMIN — NITROGLYCERIN 0.4 MG: 0.4 TABLET, ORALLY DISINTEGRATING SUBLINGUAL at 09:54

## 2024-07-10 RX ADMIN — Medication 2 PUFF: at 20:36

## 2024-07-10 RX ADMIN — ROSUVASTATIN CALCIUM 20 MG: 5 TABLET, COATED ORAL at 22:04

## 2024-07-10 RX ADMIN — LACTULOSE 20 G: 20 SOLUTION ORAL at 17:05

## 2024-07-10 RX ADMIN — HEPARIN SODIUM 5000 UNITS: 5000 INJECTION INTRAVENOUS; SUBCUTANEOUS at 08:13

## 2024-07-10 RX ADMIN — PANTOPRAZOLE SODIUM 40 MG: 40 TABLET, DELAYED RELEASE ORAL at 08:13

## 2024-07-10 RX ADMIN — CARVEDILOL 6.25 MG: 3.12 TABLET, FILM COATED ORAL at 17:05

## 2024-07-10 RX ADMIN — SODIUM CHLORIDE, PRESERVATIVE FREE 10 ML: 5 INJECTION INTRAVENOUS at 08:17

## 2024-07-10 RX ADMIN — MEGESTROL ACETATE 40 MG: 20 TABLET ORAL at 22:04

## 2024-07-10 RX ADMIN — CLOPIDOGREL BISULFATE 75 MG: 75 TABLET ORAL at 08:13

## 2024-07-10 RX ADMIN — NITROGLYCERIN 0.4 MG: 0.4 TABLET, ORALLY DISINTEGRATING SUBLINGUAL at 15:42

## 2024-07-10 RX ADMIN — SODIUM CHLORIDE: 9 INJECTION, SOLUTION INTRAVENOUS at 11:06

## 2024-07-10 RX ADMIN — ASPIRIN 81 MG 81 MG: 81 TABLET ORAL at 08:13

## 2024-07-10 RX ADMIN — CYPROHEPTADINE HYDROCHLORIDE 4 MG: 4 TABLET ORAL at 22:04

## 2024-07-10 RX ADMIN — HEPARIN SODIUM 5000 UNITS: 5000 INJECTION INTRAVENOUS; SUBCUTANEOUS at 22:04

## 2024-07-10 RX ADMIN — Medication 2 PUFF: at 09:02

## 2024-07-10 RX ADMIN — CYPROHEPTADINE HYDROCHLORIDE 4 MG: 4 TABLET ORAL at 08:13

## 2024-07-10 RX ADMIN — SODIUM CHLORIDE, PRESERVATIVE FREE 10 ML: 5 INJECTION INTRAVENOUS at 22:05

## 2024-07-10 RX ADMIN — CARVEDILOL 25 MG: 12.5 TABLET, FILM COATED ORAL at 08:13

## 2024-07-10 RX ADMIN — SODIUM CHLORIDE: 9 INJECTION, SOLUTION INTRAVENOUS at 21:12

## 2024-07-10 ASSESSMENT — PAIN SCALES - GENERAL
PAINLEVEL_OUTOF10: 7
PAINLEVEL_OUTOF10: 7
PAINLEVEL_OUTOF10: 0
PAINLEVEL_OUTOF10: 5

## 2024-07-10 ASSESSMENT — PAIN DESCRIPTION - DESCRIPTORS: DESCRIPTORS: ACHING

## 2024-07-10 ASSESSMENT — PAIN DESCRIPTION - LOCATION: LOCATION: CHEST

## 2024-07-10 NOTE — CARE COORDINATION
0735: Chart reviewed.     Per notes; patient followed via cardiology and nephrology.    PT recs for SNF: patient declined.    OT recs for home health.    [facility] accepted patient with anticipated SOC 07/12/2024.    HH information provided to couple 07/09/2024.    Updates attached in [facility].    CM will continue to follow patient and recs of medical team.    1400: Updates attached in [facility].

## 2024-07-11 LAB
ALBUMIN SERPL-MCNC: 3.2 G/DL (ref 3.5–5)
ANION GAP SERPL CALC-SCNC: 9 MMOL/L (ref 5–15)
BASOPHILS # BLD: 0 K/UL (ref 0–0.1)
BASOPHILS NFR BLD: 1 % (ref 0–1)
BUN SERPL-MCNC: 47 MG/DL (ref 6–20)
BUN/CREAT SERPL: 15 (ref 12–20)
CA-I BLD-MCNC: 8.8 MG/DL (ref 8.5–10.1)
CHLORIDE SERPL-SCNC: 114 MMOL/L (ref 97–108)
CO2 SERPL-SCNC: 20 MMOL/L (ref 21–32)
CREAT SERPL-MCNC: 3.12 MG/DL (ref 0.55–1.02)
DIFFERENTIAL METHOD BLD: ABNORMAL
EOSINOPHIL # BLD: 0 K/UL (ref 0–0.4)
EOSINOPHIL NFR BLD: 1 % (ref 0–7)
ERYTHROCYTE [DISTWIDTH] IN BLOOD BY AUTOMATED COUNT: 13.3 % (ref 11.5–14.5)
GLUCOSE SERPL-MCNC: 78 MG/DL (ref 65–100)
HCT VFR BLD AUTO: 31.6 % (ref 35–47)
HGB BLD-MCNC: 10.2 G/DL (ref 11.5–16)
IMM GRANULOCYTES # BLD AUTO: 0 K/UL (ref 0–0.04)
IMM GRANULOCYTES NFR BLD AUTO: 0 % (ref 0–0.5)
LYMPHOCYTES # BLD: 1.8 K/UL (ref 0.8–3.5)
LYMPHOCYTES NFR BLD: 30 % (ref 12–49)
MAGNESIUM SERPL-MCNC: 2.1 MG/DL (ref 1.6–2.4)
MCH RBC QN AUTO: 32.6 PG (ref 26–34)
MCHC RBC AUTO-ENTMCNC: 32.3 G/DL (ref 30–36.5)
MCV RBC AUTO: 101 FL (ref 80–99)
MONOCYTES # BLD: 0.7 K/UL (ref 0–1)
MONOCYTES NFR BLD: 11 % (ref 5–13)
NEUTS SEG # BLD: 3.4 K/UL (ref 1.8–8)
NEUTS SEG NFR BLD: 57 % (ref 32–75)
NRBC # BLD: 0 K/UL (ref 0–0.01)
NRBC BLD-RTO: 0 PER 100 WBC
PHOSPHATE SERPL-MCNC: 4.1 MG/DL (ref 2.6–4.7)
PLATELET # BLD AUTO: 168 K/UL (ref 150–400)
PMV BLD AUTO: 11.9 FL (ref 8.9–12.9)
POTASSIUM SERPL-SCNC: 3.4 MMOL/L (ref 3.5–5.1)
RBC # BLD AUTO: 3.13 M/UL (ref 3.8–5.2)
SODIUM SERPL-SCNC: 143 MMOL/L (ref 136–145)
WBC # BLD AUTO: 6 K/UL (ref 3.6–11)

## 2024-07-11 PROCEDURE — 94640 AIRWAY INHALATION TREATMENT: CPT

## 2024-07-11 PROCEDURE — 94761 N-INVAS EAR/PLS OXIMETRY MLT: CPT

## 2024-07-11 PROCEDURE — 2580000003 HC RX 258: Performed by: INTERNAL MEDICINE

## 2024-07-11 PROCEDURE — 6370000000 HC RX 637 (ALT 250 FOR IP): Performed by: HOSPITALIST

## 2024-07-11 PROCEDURE — 80069 RENAL FUNCTION PANEL: CPT

## 2024-07-11 PROCEDURE — 83735 ASSAY OF MAGNESIUM: CPT

## 2024-07-11 PROCEDURE — 6360000002 HC RX W HCPCS: Performed by: STUDENT IN AN ORGANIZED HEALTH CARE EDUCATION/TRAINING PROGRAM

## 2024-07-11 PROCEDURE — 2580000003 HC RX 258: Performed by: STUDENT IN AN ORGANIZED HEALTH CARE EDUCATION/TRAINING PROGRAM

## 2024-07-11 PROCEDURE — 6370000000 HC RX 637 (ALT 250 FOR IP): Performed by: STUDENT IN AN ORGANIZED HEALTH CARE EDUCATION/TRAINING PROGRAM

## 2024-07-11 PROCEDURE — 85025 COMPLETE CBC W/AUTO DIFF WBC: CPT

## 2024-07-11 PROCEDURE — 2060000000 HC ICU INTERMEDIATE R&B

## 2024-07-11 PROCEDURE — 6370000000 HC RX 637 (ALT 250 FOR IP): Performed by: INTERNAL MEDICINE

## 2024-07-11 PROCEDURE — 36415 COLL VENOUS BLD VENIPUNCTURE: CPT

## 2024-07-11 RX ORDER — POTASSIUM CHLORIDE 20 MEQ/1
40 TABLET, EXTENDED RELEASE ORAL ONCE
Status: COMPLETED | OUTPATIENT
Start: 2024-07-11 | End: 2024-07-11

## 2024-07-11 RX ORDER — DILTIAZEM HYDROCHLORIDE 120 MG/1
240 CAPSULE, COATED, EXTENDED RELEASE ORAL DAILY
Status: DISCONTINUED | OUTPATIENT
Start: 2024-07-11 | End: 2024-07-14 | Stop reason: HOSPADM

## 2024-07-11 RX ORDER — BENZONATATE 100 MG/1
100 CAPSULE ORAL 3 TIMES DAILY
Status: DISCONTINUED | OUTPATIENT
Start: 2024-07-11 | End: 2024-07-14 | Stop reason: HOSPADM

## 2024-07-11 RX ADMIN — SODIUM CHLORIDE, PRESERVATIVE FREE 10 ML: 5 INJECTION INTRAVENOUS at 20:42

## 2024-07-11 RX ADMIN — PANTOPRAZOLE SODIUM 40 MG: 40 TABLET, DELAYED RELEASE ORAL at 09:50

## 2024-07-11 RX ADMIN — FERROUS SULFATE TAB 325 MG (65 MG ELEMENTAL FE) 325 MG: 325 (65 FE) TAB at 09:51

## 2024-07-11 RX ADMIN — BENZONATATE 100 MG: 100 CAPSULE ORAL at 09:56

## 2024-07-11 RX ADMIN — CYPROHEPTADINE HYDROCHLORIDE 4 MG: 4 TABLET ORAL at 09:54

## 2024-07-11 RX ADMIN — CARVEDILOL 6.25 MG: 3.12 TABLET, FILM COATED ORAL at 09:51

## 2024-07-11 RX ADMIN — HEPARIN SODIUM 5000 UNITS: 5000 INJECTION INTRAVENOUS; SUBCUTANEOUS at 09:50

## 2024-07-11 RX ADMIN — BENZONATATE 100 MG: 100 CAPSULE ORAL at 14:16

## 2024-07-11 RX ADMIN — HEPARIN SODIUM 5000 UNITS: 5000 INJECTION INTRAVENOUS; SUBCUTANEOUS at 20:41

## 2024-07-11 RX ADMIN — CARVEDILOL 6.25 MG: 3.12 TABLET, FILM COATED ORAL at 17:40

## 2024-07-11 RX ADMIN — POTASSIUM CHLORIDE 40 MEQ: 1500 TABLET, EXTENDED RELEASE ORAL at 18:05

## 2024-07-11 RX ADMIN — MEGESTROL ACETATE 40 MG: 20 TABLET ORAL at 20:41

## 2024-07-11 RX ADMIN — BENZONATATE 100 MG: 100 CAPSULE ORAL at 20:41

## 2024-07-11 RX ADMIN — CLOPIDOGREL BISULFATE 75 MG: 75 TABLET ORAL at 09:50

## 2024-07-11 RX ADMIN — MIRTAZAPINE 15 MG: 15 TABLET, FILM COATED ORAL at 20:41

## 2024-07-11 RX ADMIN — CYPROHEPTADINE HYDROCHLORIDE 4 MG: 4 TABLET ORAL at 14:16

## 2024-07-11 RX ADMIN — SODIUM CHLORIDE, PRESERVATIVE FREE 10 ML: 5 INJECTION INTRAVENOUS at 09:51

## 2024-07-11 RX ADMIN — ROSUVASTATIN CALCIUM 20 MG: 5 TABLET, COATED ORAL at 20:41

## 2024-07-11 RX ADMIN — LACTULOSE 20 G: 20 SOLUTION ORAL at 17:40

## 2024-07-11 RX ADMIN — SODIUM CHLORIDE: 9 INJECTION, SOLUTION INTRAVENOUS at 06:12

## 2024-07-11 RX ADMIN — Medication 2 PUFF: at 19:24

## 2024-07-11 RX ADMIN — ASPIRIN 81 MG 81 MG: 81 TABLET ORAL at 09:50

## 2024-07-11 RX ADMIN — Medication 2 PUFF: at 08:11

## 2024-07-11 RX ADMIN — SODIUM CHLORIDE: 9 INJECTION, SOLUTION INTRAVENOUS at 17:45

## 2024-07-11 RX ADMIN — DILTIAZEM HYDROCHLORIDE 240 MG: 120 CAPSULE, COATED, EXTENDED RELEASE ORAL at 11:39

## 2024-07-11 RX ADMIN — MEGESTROL ACETATE 40 MG: 20 TABLET ORAL at 11:40

## 2024-07-11 RX ADMIN — CYPROHEPTADINE HYDROCHLORIDE 4 MG: 4 TABLET ORAL at 20:41

## 2024-07-11 NOTE — CARE COORDINATION
0735: Chart reviewed.    Per notes; patient followed via cardiology and nephrology.    [facility] HH accepted patient with anticipated SOC 07/12/2024.     HH information provided to couple 07/09/2024.     Update attached in [facility].     CM will continue to follow patient and recs of medical team.    1125: CM met with patient and spouse at bedside per request.    Mr. Hightower stated patient wants to go to a facility for rehab now. Patient spoke up saying no she does not and wants to keep with the plan for home with home health.    CM explained again insurance will require AUTH for SNF and to please discuss for a decision.    Understanding verbalized.    1325: Updates attached in [facility].

## 2024-07-12 ENCOUNTER — APPOINTMENT (OUTPATIENT)
Facility: HOSPITAL | Age: 74
DRG: 291 | End: 2024-07-12
Payer: MEDICARE

## 2024-07-12 LAB
ALBUMIN SERPL-MCNC: 3.2 G/DL (ref 3.5–5)
ANION GAP SERPL CALC-SCNC: 7 MMOL/L (ref 5–15)
BUN SERPL-MCNC: 38 MG/DL (ref 6–20)
BUN/CREAT SERPL: 18 (ref 12–20)
CA-I BLD-MCNC: 8.8 MG/DL (ref 8.5–10.1)
CHLORIDE SERPL-SCNC: 117 MMOL/L (ref 97–108)
CO2 SERPL-SCNC: 17 MMOL/L (ref 21–32)
CREAT SERPL-MCNC: 2.1 MG/DL (ref 0.55–1.02)
GLUCOSE SERPL-MCNC: 77 MG/DL (ref 65–100)
MAGNESIUM SERPL-MCNC: 1.9 MG/DL (ref 1.6–2.4)
PHOSPHATE SERPL-MCNC: 2.4 MG/DL (ref 2.6–4.7)
POTASSIUM SERPL-SCNC: 4.2 MMOL/L (ref 3.5–5.1)
SODIUM SERPL-SCNC: 141 MMOL/L (ref 136–145)

## 2024-07-12 PROCEDURE — 2500000003 HC RX 250 WO HCPCS: Performed by: INTERNAL MEDICINE

## 2024-07-12 PROCEDURE — 6360000002 HC RX W HCPCS: Performed by: STUDENT IN AN ORGANIZED HEALTH CARE EDUCATION/TRAINING PROGRAM

## 2024-07-12 PROCEDURE — 83735 ASSAY OF MAGNESIUM: CPT

## 2024-07-12 PROCEDURE — 36415 COLL VENOUS BLD VENIPUNCTURE: CPT

## 2024-07-12 PROCEDURE — 6370000000 HC RX 637 (ALT 250 FOR IP): Performed by: STUDENT IN AN ORGANIZED HEALTH CARE EDUCATION/TRAINING PROGRAM

## 2024-07-12 PROCEDURE — 2060000000 HC ICU INTERMEDIATE R&B

## 2024-07-12 PROCEDURE — 94640 AIRWAY INHALATION TREATMENT: CPT

## 2024-07-12 PROCEDURE — 6370000000 HC RX 637 (ALT 250 FOR IP): Performed by: HOSPITALIST

## 2024-07-12 PROCEDURE — 6360000002 HC RX W HCPCS: Performed by: HOSPITALIST

## 2024-07-12 PROCEDURE — 6370000000 HC RX 637 (ALT 250 FOR IP): Performed by: INTERNAL MEDICINE

## 2024-07-12 PROCEDURE — 71045 X-RAY EXAM CHEST 1 VIEW: CPT

## 2024-07-12 PROCEDURE — 97535 SELF CARE MNGMENT TRAINING: CPT

## 2024-07-12 PROCEDURE — 80069 RENAL FUNCTION PANEL: CPT

## 2024-07-12 PROCEDURE — 2580000003 HC RX 258: Performed by: INTERNAL MEDICINE

## 2024-07-12 PROCEDURE — 94761 N-INVAS EAR/PLS OXIMETRY MLT: CPT

## 2024-07-12 RX ORDER — FUROSEMIDE 10 MG/ML
20 INJECTION INTRAMUSCULAR; INTRAVENOUS ONCE
Status: COMPLETED | OUTPATIENT
Start: 2024-07-12 | End: 2024-07-12

## 2024-07-12 RX ORDER — SODIUM BICARBONATE 650 MG/1
650 TABLET ORAL
Status: DISCONTINUED | OUTPATIENT
Start: 2024-07-12 | End: 2024-07-14 | Stop reason: HOSPADM

## 2024-07-12 RX ORDER — CLONIDINE HYDROCHLORIDE 0.1 MG/1
0.1 TABLET ORAL 2 TIMES DAILY
Status: DISCONTINUED | OUTPATIENT
Start: 2024-07-12 | End: 2024-07-14 | Stop reason: HOSPADM

## 2024-07-12 RX ADMIN — CARVEDILOL 6.25 MG: 3.12 TABLET, FILM COATED ORAL at 15:46

## 2024-07-12 RX ADMIN — CYPROHEPTADINE HYDROCHLORIDE 4 MG: 4 TABLET ORAL at 21:01

## 2024-07-12 RX ADMIN — CLONIDINE HYDROCHLORIDE 0.1 MG: 0.1 TABLET ORAL at 21:01

## 2024-07-12 RX ADMIN — CYPROHEPTADINE HYDROCHLORIDE 4 MG: 4 TABLET ORAL at 12:43

## 2024-07-12 RX ADMIN — HEPARIN SODIUM 5000 UNITS: 5000 INJECTION INTRAVENOUS; SUBCUTANEOUS at 08:08

## 2024-07-12 RX ADMIN — ROSUVASTATIN CALCIUM 20 MG: 5 TABLET, COATED ORAL at 21:01

## 2024-07-12 RX ADMIN — Medication 2 PUFF: at 08:51

## 2024-07-12 RX ADMIN — SODIUM BICARBONATE 650 MG: 650 TABLET ORAL at 15:46

## 2024-07-12 RX ADMIN — DILTIAZEM HYDROCHLORIDE 240 MG: 120 CAPSULE, COATED, EXTENDED RELEASE ORAL at 08:08

## 2024-07-12 RX ADMIN — CLOPIDOGREL BISULFATE 75 MG: 75 TABLET ORAL at 08:07

## 2024-07-12 RX ADMIN — CARVEDILOL 6.25 MG: 3.12 TABLET, FILM COATED ORAL at 08:07

## 2024-07-12 RX ADMIN — ASPIRIN 81 MG 81 MG: 81 TABLET ORAL at 08:07

## 2024-07-12 RX ADMIN — NITROGLYCERIN 0.4 MG: 0.4 TABLET, ORALLY DISINTEGRATING SUBLINGUAL at 15:46

## 2024-07-12 RX ADMIN — BENZONATATE 100 MG: 100 CAPSULE ORAL at 21:01

## 2024-07-12 RX ADMIN — SODIUM BICARBONATE 650 MG: 650 TABLET ORAL at 09:08

## 2024-07-12 RX ADMIN — MEGESTROL ACETATE 40 MG: 20 TABLET ORAL at 21:05

## 2024-07-12 RX ADMIN — MEGESTROL ACETATE 40 MG: 20 TABLET ORAL at 08:08

## 2024-07-12 RX ADMIN — CLONIDINE HYDROCHLORIDE 0.1 MG: 0.1 TABLET ORAL at 12:43

## 2024-07-12 RX ADMIN — IPRATROPIUM BROMIDE AND ALBUTEROL SULFATE 1 DOSE: .5; 3 SOLUTION RESPIRATORY (INHALATION) at 20:50

## 2024-07-12 RX ADMIN — SODIUM BICARBONATE: 84 INJECTION, SOLUTION INTRAVENOUS at 09:09

## 2024-07-12 RX ADMIN — PANTOPRAZOLE SODIUM 40 MG: 40 TABLET, DELAYED RELEASE ORAL at 08:07

## 2024-07-12 RX ADMIN — CYPROHEPTADINE HYDROCHLORIDE 4 MG: 4 TABLET ORAL at 08:07

## 2024-07-12 RX ADMIN — HEPARIN SODIUM 5000 UNITS: 5000 INJECTION INTRAVENOUS; SUBCUTANEOUS at 21:01

## 2024-07-12 RX ADMIN — Medication 2 PUFF: at 20:46

## 2024-07-12 RX ADMIN — FUROSEMIDE 20 MG: 10 INJECTION, SOLUTION INTRAMUSCULAR; INTRAVENOUS at 23:45

## 2024-07-12 RX ADMIN — SODIUM BICARBONATE 650 MG: 650 TABLET ORAL at 11:25

## 2024-07-12 RX ADMIN — MIRTAZAPINE 15 MG: 15 TABLET, FILM COATED ORAL at 21:01

## 2024-07-12 RX ADMIN — BENZONATATE 100 MG: 100 CAPSULE ORAL at 12:43

## 2024-07-12 RX ADMIN — BENZONATATE 100 MG: 100 CAPSULE ORAL at 08:07

## 2024-07-12 ASSESSMENT — PAIN SCALES - GENERAL
PAINLEVEL_OUTOF10: 0
PAINLEVEL_OUTOF10: 0

## 2024-07-12 NOTE — CARE COORDINATION
0730: Chart reviewed.     Per notes; patient followed via cardiology and nephrology.     [facility] HH accepted patient with anticipated SOC 07/12/2024.     HH information provided to couple 07/09/2024.     Nephrology note attached in [facility].     CM will continue to follow patient and recs of medical team.     1330: Updates attached in [facility].

## 2024-07-13 LAB
ALBUMIN SERPL-MCNC: 3.2 G/DL (ref 3.5–5)
ANION GAP SERPL CALC-SCNC: 5 MMOL/L (ref 5–15)
BUN SERPL-MCNC: 36 MG/DL (ref 6–20)
BUN/CREAT SERPL: 19 (ref 12–20)
CA-I BLD-MCNC: 9.2 MG/DL (ref 8.5–10.1)
CHLORIDE SERPL-SCNC: 113 MMOL/L (ref 97–108)
CO2 SERPL-SCNC: 24 MMOL/L (ref 21–32)
CREAT SERPL-MCNC: 1.87 MG/DL (ref 0.55–1.02)
GLUCOSE SERPL-MCNC: 97 MG/DL (ref 65–100)
MAGNESIUM SERPL-MCNC: 1.6 MG/DL (ref 1.6–2.4)
PHOSPHATE SERPL-MCNC: 1.7 MG/DL (ref 2.6–4.7)
POTASSIUM SERPL-SCNC: 3.7 MMOL/L (ref 3.5–5.1)
SODIUM SERPL-SCNC: 142 MMOL/L (ref 136–145)

## 2024-07-13 PROCEDURE — 2060000000 HC ICU INTERMEDIATE R&B

## 2024-07-13 PROCEDURE — 97530 THERAPEUTIC ACTIVITIES: CPT

## 2024-07-13 PROCEDURE — 2580000003 HC RX 258: Performed by: INTERNAL MEDICINE

## 2024-07-13 PROCEDURE — 6370000000 HC RX 637 (ALT 250 FOR IP): Performed by: HOSPITALIST

## 2024-07-13 PROCEDURE — 83735 ASSAY OF MAGNESIUM: CPT

## 2024-07-13 PROCEDURE — 6370000000 HC RX 637 (ALT 250 FOR IP): Performed by: INTERNAL MEDICINE

## 2024-07-13 PROCEDURE — 6360000002 HC RX W HCPCS: Performed by: PHYSICIAN ASSISTANT

## 2024-07-13 PROCEDURE — 36415 COLL VENOUS BLD VENIPUNCTURE: CPT

## 2024-07-13 PROCEDURE — 80069 RENAL FUNCTION PANEL: CPT

## 2024-07-13 PROCEDURE — 2580000003 HC RX 258: Performed by: STUDENT IN AN ORGANIZED HEALTH CARE EDUCATION/TRAINING PROGRAM

## 2024-07-13 PROCEDURE — 6360000002 HC RX W HCPCS: Performed by: STUDENT IN AN ORGANIZED HEALTH CARE EDUCATION/TRAINING PROGRAM

## 2024-07-13 PROCEDURE — 94640 AIRWAY INHALATION TREATMENT: CPT

## 2024-07-13 PROCEDURE — 6370000000 HC RX 637 (ALT 250 FOR IP): Performed by: STUDENT IN AN ORGANIZED HEALTH CARE EDUCATION/TRAINING PROGRAM

## 2024-07-13 RX ORDER — CARVEDILOL 12.5 MG/1
12.5 TABLET ORAL 2 TIMES DAILY WITH MEALS
Status: DISCONTINUED | OUTPATIENT
Start: 2024-07-13 | End: 2024-07-14 | Stop reason: HOSPADM

## 2024-07-13 RX ORDER — LISINOPRIL 5 MG/1
10 TABLET ORAL DAILY
Status: DISCONTINUED | OUTPATIENT
Start: 2024-07-13 | End: 2024-07-14

## 2024-07-13 RX ORDER — SODIUM CHLORIDE 450 MG/100ML
INJECTION, SOLUTION INTRAVENOUS CONTINUOUS
Status: DISCONTINUED | OUTPATIENT
Start: 2024-07-13 | End: 2024-07-14 | Stop reason: HOSPADM

## 2024-07-13 RX ADMIN — Medication 2 PUFF: at 20:02

## 2024-07-13 RX ADMIN — PANTOPRAZOLE SODIUM 40 MG: 40 TABLET, DELAYED RELEASE ORAL at 08:41

## 2024-07-13 RX ADMIN — ASPIRIN 81 MG 81 MG: 81 TABLET ORAL at 08:41

## 2024-07-13 RX ADMIN — LACTULOSE 20 G: 20 SOLUTION ORAL at 17:38

## 2024-07-13 RX ADMIN — BENZONATATE 100 MG: 100 CAPSULE ORAL at 14:46

## 2024-07-13 RX ADMIN — ROSUVASTATIN CALCIUM 20 MG: 5 TABLET, COATED ORAL at 20:22

## 2024-07-13 RX ADMIN — CYPROHEPTADINE HYDROCHLORIDE 4 MG: 4 TABLET ORAL at 08:42

## 2024-07-13 RX ADMIN — MEGESTROL ACETATE 40 MG: 20 TABLET ORAL at 20:23

## 2024-07-13 RX ADMIN — CLOPIDOGREL BISULFATE 75 MG: 75 TABLET ORAL at 08:42

## 2024-07-13 RX ADMIN — BENZONATATE 100 MG: 100 CAPSULE ORAL at 08:42

## 2024-07-13 RX ADMIN — CYPROHEPTADINE HYDROCHLORIDE 4 MG: 4 TABLET ORAL at 14:46

## 2024-07-13 RX ADMIN — CLONIDINE HYDROCHLORIDE 0.1 MG: 0.1 TABLET ORAL at 08:42

## 2024-07-13 RX ADMIN — SODIUM BICARBONATE 650 MG: 650 TABLET ORAL at 12:34

## 2024-07-13 RX ADMIN — SODIUM CHLORIDE, PRESERVATIVE FREE 10 ML: 5 INJECTION INTRAVENOUS at 08:42

## 2024-07-13 RX ADMIN — CARVEDILOL 12.5 MG: 12.5 TABLET, FILM COATED ORAL at 17:37

## 2024-07-13 RX ADMIN — MIRTAZAPINE 15 MG: 15 TABLET, FILM COATED ORAL at 20:22

## 2024-07-13 RX ADMIN — DILTIAZEM HYDROCHLORIDE 240 MG: 120 CAPSULE, COATED, EXTENDED RELEASE ORAL at 08:42

## 2024-07-13 RX ADMIN — SODIUM BICARBONATE 650 MG: 650 TABLET ORAL at 17:37

## 2024-07-13 RX ADMIN — HYDRALAZINE HYDROCHLORIDE 10 MG: 20 INJECTION INTRAMUSCULAR; INTRAVENOUS at 04:01

## 2024-07-13 RX ADMIN — MEGESTROL ACETATE 40 MG: 20 TABLET ORAL at 08:41

## 2024-07-13 RX ADMIN — HEPARIN SODIUM 5000 UNITS: 5000 INJECTION INTRAVENOUS; SUBCUTANEOUS at 08:42

## 2024-07-13 RX ADMIN — HEPARIN SODIUM 5000 UNITS: 5000 INJECTION INTRAVENOUS; SUBCUTANEOUS at 20:22

## 2024-07-13 RX ADMIN — LISINOPRIL 10 MG: 5 TABLET ORAL at 12:35

## 2024-07-13 RX ADMIN — CLONIDINE HYDROCHLORIDE 0.1 MG: 0.1 TABLET ORAL at 20:22

## 2024-07-13 RX ADMIN — BENZONATATE 100 MG: 100 CAPSULE ORAL at 20:22

## 2024-07-13 RX ADMIN — CARVEDILOL 6.25 MG: 3.12 TABLET, FILM COATED ORAL at 08:42

## 2024-07-13 RX ADMIN — CYPROHEPTADINE HYDROCHLORIDE 4 MG: 4 TABLET ORAL at 20:22

## 2024-07-13 RX ADMIN — FERROUS SULFATE TAB 325 MG (65 MG ELEMENTAL FE) 325 MG: 325 (65 FE) TAB at 08:42

## 2024-07-13 RX ADMIN — SODIUM BICARBONATE 650 MG: 650 TABLET ORAL at 08:41

## 2024-07-13 RX ADMIN — SODIUM CHLORIDE: 4.5 INJECTION, SOLUTION INTRAVENOUS at 12:34

## 2024-07-13 ASSESSMENT — PAIN SCALES - GENERAL
PAINLEVEL_OUTOF10: 0

## 2024-07-14 VITALS
HEART RATE: 77 BPM | DIASTOLIC BLOOD PRESSURE: 62 MMHG | TEMPERATURE: 98.1 F | SYSTOLIC BLOOD PRESSURE: 119 MMHG | WEIGHT: 98.33 LBS | OXYGEN SATURATION: 98 % | HEIGHT: 62 IN | RESPIRATION RATE: 18 BRPM | BODY MASS INDEX: 18.09 KG/M2

## 2024-07-14 LAB
ALBUMIN SERPL-MCNC: 3.1 G/DL (ref 3.5–5)
ANION GAP SERPL CALC-SCNC: 7 MMOL/L (ref 5–15)
BUN SERPL-MCNC: 36 MG/DL (ref 6–20)
BUN/CREAT SERPL: 17 (ref 12–20)
CA-I BLD-MCNC: 9.2 MG/DL (ref 8.5–10.1)
CHLORIDE SERPL-SCNC: 112 MMOL/L (ref 97–108)
CO2 SERPL-SCNC: 23 MMOL/L (ref 21–32)
CREAT SERPL-MCNC: 2.12 MG/DL (ref 0.55–1.02)
GLUCOSE SERPL-MCNC: 78 MG/DL (ref 65–100)
PHOSPHATE SERPL-MCNC: 2.2 MG/DL (ref 2.6–4.7)
POTASSIUM SERPL-SCNC: 3.5 MMOL/L (ref 3.5–5.1)
SODIUM SERPL-SCNC: 142 MMOL/L (ref 136–145)

## 2024-07-14 PROCEDURE — 80069 RENAL FUNCTION PANEL: CPT

## 2024-07-14 PROCEDURE — 2580000003 HC RX 258: Performed by: INTERNAL MEDICINE

## 2024-07-14 PROCEDURE — 6370000000 HC RX 637 (ALT 250 FOR IP): Performed by: INTERNAL MEDICINE

## 2024-07-14 PROCEDURE — 2580000003 HC RX 258: Performed by: STUDENT IN AN ORGANIZED HEALTH CARE EDUCATION/TRAINING PROGRAM

## 2024-07-14 PROCEDURE — 94761 N-INVAS EAR/PLS OXIMETRY MLT: CPT

## 2024-07-14 PROCEDURE — 6370000000 HC RX 637 (ALT 250 FOR IP): Performed by: STUDENT IN AN ORGANIZED HEALTH CARE EDUCATION/TRAINING PROGRAM

## 2024-07-14 PROCEDURE — 97530 THERAPEUTIC ACTIVITIES: CPT

## 2024-07-14 PROCEDURE — 36415 COLL VENOUS BLD VENIPUNCTURE: CPT

## 2024-07-14 PROCEDURE — 94640 AIRWAY INHALATION TREATMENT: CPT

## 2024-07-14 PROCEDURE — 6370000000 HC RX 637 (ALT 250 FOR IP): Performed by: HOSPITALIST

## 2024-07-14 PROCEDURE — 6360000002 HC RX W HCPCS: Performed by: STUDENT IN AN ORGANIZED HEALTH CARE EDUCATION/TRAINING PROGRAM

## 2024-07-14 RX ORDER — LISINOPRIL 20 MG/1
20 TABLET ORAL DAILY
Status: DISCONTINUED | OUTPATIENT
Start: 2024-07-15 | End: 2024-07-14 | Stop reason: HOSPADM

## 2024-07-14 RX ORDER — BENZONATATE 100 MG/1
100 CAPSULE ORAL 3 TIMES DAILY
Qty: 21 CAPSULE | Refills: 0 | Status: SHIPPED | OUTPATIENT
Start: 2024-07-14 | End: 2024-07-21

## 2024-07-14 RX ADMIN — Medication 2 PUFF: at 06:18

## 2024-07-14 RX ADMIN — CLONIDINE HYDROCHLORIDE 0.1 MG: 0.1 TABLET ORAL at 08:09

## 2024-07-14 RX ADMIN — SODIUM CHLORIDE, PRESERVATIVE FREE 10 ML: 5 INJECTION INTRAVENOUS at 08:14

## 2024-07-14 RX ADMIN — CLOPIDOGREL BISULFATE 75 MG: 75 TABLET ORAL at 08:09

## 2024-07-14 RX ADMIN — SODIUM BICARBONATE 650 MG: 650 TABLET ORAL at 08:09

## 2024-07-14 RX ADMIN — DILTIAZEM HYDROCHLORIDE 240 MG: 120 CAPSULE, COATED, EXTENDED RELEASE ORAL at 08:09

## 2024-07-14 RX ADMIN — ASPIRIN 81 MG 81 MG: 81 TABLET ORAL at 08:09

## 2024-07-14 RX ADMIN — MEGESTROL ACETATE 40 MG: 20 TABLET ORAL at 08:13

## 2024-07-14 RX ADMIN — PANTOPRAZOLE SODIUM 40 MG: 40 TABLET, DELAYED RELEASE ORAL at 08:09

## 2024-07-14 RX ADMIN — CYPROHEPTADINE HYDROCHLORIDE 4 MG: 4 TABLET ORAL at 08:09

## 2024-07-14 RX ADMIN — SODIUM CHLORIDE: 4.5 INJECTION, SOLUTION INTRAVENOUS at 08:25

## 2024-07-14 RX ADMIN — HEPARIN SODIUM 5000 UNITS: 5000 INJECTION INTRAVENOUS; SUBCUTANEOUS at 08:09

## 2024-07-14 RX ADMIN — CARVEDILOL 12.5 MG: 12.5 TABLET, FILM COATED ORAL at 08:10

## 2024-07-14 RX ADMIN — LISINOPRIL 10 MG: 5 TABLET ORAL at 08:09

## 2024-07-14 RX ADMIN — BENZONATATE 100 MG: 100 CAPSULE ORAL at 08:10

## 2024-07-14 ASSESSMENT — PAIN SCALES - GENERAL
PAINLEVEL_OUTOF10: 0
PAINLEVEL_OUTOF10: 0

## 2024-07-14 NOTE — PLAN OF CARE
PHYSICAL THERAPY TREATMENT     Patient: Saranya Hightower (74 y.o. female)  Date: 7/10/2024  Diagnosis: Acute pulmonary edema (HCC) [J81.0]  Pulmonary edema cardiac cause (HCC) [I50.1]  Hypertensive emergency [I16.1]  Congestive heart failure, unspecified HF chronicity, unspecified heart failure type (HCC) [I50.9] Pulmonary edema cardiac cause (HCC)      Precautions: Fall Risk                      Recommendations for nursing mobility: Encourage HEP in prep for ADLs/mobility; see handout for details and Frequent repositioning to prevent skin breakdown    In place during session: External Catheter and EKG/telemetry   Chart, physical therapy assessment, plan of care and goals were reviewed.  ASSESSMENT  Patient continues with skilled PT services and is slowly progressing towards goals. Pt semi supine upon PT arrival, agreeable to session. Pt A&O x 4. (See below for objective details and assist levels).     Overall pt tolerated session fair today with PT.patient physically MI for the activities.BP taken supine 147/76,sitting,124/73, standing 84/56.cont to remain orthostatic, so not safe to get away from the bed.RN informed.  Will continue to benefit from skilled PT services, and will continue to progress as tolerated. Potential barriers for safe discharge: pt is a high fall risk. Current PT DC recommendation Home with Home Health Therapy and family assist once medically appropriate.      GOALS:    Problem: Physical Therapy - Adult  Goal: By Discharge: Performs mobility at highest level of function for planned discharge setting.  See evaluation for individualized goals.  Description: FUNCTIONAL STATUS PRIOR TO ADMISSION: Patient was modified independent using a NO AD as per patient until she got sick  for functional mobility.    HOME SUPPORT PRIOR TO ADMISSION: The patient lived with spouse and son and required minimal assistance for ADLs.    Physical Therapy Goals  Initiated 7/8/2024  Pt stated goal: Get better  Pt 
  Problem: Discharge Planning  Goal: Discharge to home or other facility with appropriate resources  7/10/2024 0741 by Alayna Purvis, RN  Outcome: Progressing  7/10/2024 0247 by Ora Mcmanus, RN  Outcome: Progressing     Problem: Pain  Goal: Verbalizes/displays adequate comfort level or baseline comfort level  Outcome: Progressing     Problem: Skin/Tissue Integrity  Goal: Absence of new skin breakdown  Description: 1.  Monitor for areas of redness and/or skin breakdown  2.  Assess vascular access sites hourly  3.  Every 4-6 hours minimum:  Change oxygen saturation probe site  4.  Every 4-6 hours:  If on nasal continuous positive airway pressure, respiratory therapy assess nares and determine need for appliance change or resting period.  Outcome: Progressing     Problem: ABCDS Injury Assessment  Goal: Absence of physical injury  Outcome: Progressing     Problem: Safety - Adult  Goal: Free from fall injury  Outcome: Progressing     
  Problem: Discharge Planning  Goal: Discharge to home or other facility with appropriate resources  7/12/2024 0921 by Augie Moreno RN  Outcome: Progressing  7/11/2024 2141 by Ofe Shannon RN  Outcome: Progressing  Flowsheets (Taken 7/11/2024 2137)  Discharge to home or other facility with appropriate resources: Identify barriers to discharge with patient and caregiver     Problem: Pain  Goal: Verbalizes/displays adequate comfort level or baseline comfort level  7/12/2024 0921 by Augie Moreno RN  Outcome: Progressing  7/11/2024 2141 by Ofe Shannon RN  Outcome: Progressing     Problem: Skin/Tissue Integrity  Goal: Absence of new skin breakdown  Description: 1.  Monitor for areas of redness and/or skin breakdown  2.  Assess vascular access sites hourly  3.  Every 4-6 hours minimum:  Change oxygen saturation probe site  4.  Every 4-6 hours:  If on nasal continuous positive airway pressure, respiratory therapy assess nares and determine need for appliance change or resting period.  7/11/2024 2141 by Ofe Shannon RN  Outcome: Progressing     Problem: ABCDS Injury Assessment  Goal: Absence of physical injury  7/11/2024 2141 by Ofe Shannon RN  Outcome: Progressing     Problem: Safety - Adult  Goal: Free from fall injury  7/11/2024 2141 by Ofe Shannon RN  Outcome: Progressing     
  Problem: Discharge Planning  Goal: Discharge to home or other facility with appropriate resources  7/14/2024 0815 by Rafael Box RN  Outcome: Progressing  7/13/2024 1948 by Marjan Valencia RN  Outcome: Progressing     Problem: Pain  Goal: Verbalizes/displays adequate comfort level or baseline comfort level  7/14/2024 0815 by Rafael Box RN  Outcome: Progressing  7/13/2024 1948 by Marjan Valencia RN  Outcome: Progressing     Problem: Skin/Tissue Integrity  Goal: Absence of new skin breakdown  Description: 1.  Monitor for areas of redness and/or skin breakdown  2.  Assess vascular access sites hourly  3.  Every 4-6 hours minimum:  Change oxygen saturation probe site  4.  Every 4-6 hours:  If on nasal continuous positive airway pressure, respiratory therapy assess nares and determine need for appliance change or resting period.  7/14/2024 0815 by Rafael Box RN  Outcome: Progressing  7/13/2024 1948 by Marjan Valencia RN  Outcome: Progressing     Problem: ABCDS Injury Assessment  Goal: Absence of physical injury  7/14/2024 0815 by Rafael Box RN  Outcome: Progressing  7/13/2024 1948 by Marjan Valencia RN  Outcome: Progressing     Problem: Safety - Adult  Goal: Free from fall injury  7/14/2024 0815 by Rafael Box RN  Outcome: Progressing  7/13/2024 1948 by Marjan Valencia RN  Outcome: Progressing     
  Problem: Discharge Planning  Goal: Discharge to home or other facility with appropriate resources  7/8/2024 0752 by Naomi Cole, RN  Outcome: Progressing  Flowsheets (Taken 7/8/2024 0790)  Discharge to home or other facility with appropriate resources: Identify barriers to discharge with patient and caregiver  7/8/2024 0144 by Ora Mcmanus, RN  Outcome: Progressing     Problem: Pain  Goal: Verbalizes/displays adequate comfort level or baseline comfort level  Outcome: Progressing     Problem: Skin/Tissue Integrity  Goal: Absence of new skin breakdown  Description: 1.  Monitor for areas of redness and/or skin breakdown  2.  Assess vascular access sites hourly  3.  Every 4-6 hours minimum:  Change oxygen saturation probe site  4.  Every 4-6 hours:  If on nasal continuous positive airway pressure, respiratory therapy assess nares and determine need for appliance change or resting period.  Outcome: Progressing     Problem: ABCDS Injury Assessment  Goal: Absence of physical injury  Outcome: Progressing     Problem: Safety - Adult  Goal: Free from fall injury  Outcome: Progressing     
  Problem: Discharge Planning  Goal: Discharge to home or other facility with appropriate resources  Outcome: Progressing  Flowsheets (Taken 7/11/2024 2137 by Ofe Shannon, RN)  Discharge to home or other facility with appropriate resources: Identify barriers to discharge with patient and caregiver     Problem: Pain  Goal: Verbalizes/displays adequate comfort level or baseline comfort level  Outcome: Progressing  Flowsheets (Taken 7/13/2024 0035)  Verbalizes/displays adequate comfort level or baseline comfort level:   Encourage patient to monitor pain and request assistance   Administer analgesics based on type and severity of pain and evaluate response   Assess pain using appropriate pain scale   Implement non-pharmacological measures as appropriate and evaluate response   Consider cultural and social influences on pain and pain management   Notify Licensed Independent Practitioner if interventions unsuccessful or patient reports new pain     Problem: Skin/Tissue Integrity  Goal: Absence of new skin breakdown  Description: 1.  Monitor for areas of redness and/or skin breakdown  2.  Assess vascular access sites hourly  3.  Every 4-6 hours minimum:  Change oxygen saturation probe site  4.  Every 4-6 hours:  If on nasal continuous positive airway pressure, respiratory therapy assess nares and determine need for appliance change or resting period.  Outcome: Progressing     Problem: ABCDS Injury Assessment  Goal: Absence of physical injury  Outcome: Progressing  Flowsheets (Taken 7/13/2024 0035)  Absence of Physical Injury: Implement safety measures based on patient assessment     
  Problem: Physical Therapy - Adult  Goal: By Discharge: Performs mobility at highest level of function for planned discharge setting.  See evaluation for individualized goals.  Description: FUNCTIONAL STATUS PRIOR TO ADMISSION: Patient was modified independent using a NO AD as per patient until she got sick  for functional mobility.    HOME SUPPORT PRIOR TO ADMISSION: The patient lived with spouse and son and required minimal assistance for ADLs.    Physical Therapy Goals  Initiated 7/8/2024  Pt stated goal: Get better  Pt will be I with LE HEP in 7 days.  Pt will perform bed mobility with Stand by Assist in 7 days.  Pt will perform transfers with Stand by Assist in 7 days.   Pt will amb 50 feet with LRAD safely with Stand by Assist in 7 days.  Pt will verbalize and demonstrate compliance with fall precautions  in 7 days.   Pt will demonstrate improvement in standing/gait balance from fair to good in 7 days.    7/10/2024 1527 by Georgia Otero, PT  Outcome: Not Progressing     
  Problem: Physical Therapy - Adult  Goal: By Discharge: Performs mobility at highest level of function for planned discharge setting.  See evaluation for individualized goals.  Description: FUNCTIONAL STATUS PRIOR TO ADMISSION: Patient was modified independent using a NO AD as per patient until she got sick  for functional mobility.    HOME SUPPORT PRIOR TO ADMISSION: The patient lived with spouse and son and required minimal assistance for ADLs.    Physical Therapy Goals  Initiated 7/8/2024  Pt stated goal: Get better  Pt will be I with LE HEP in 7 days.  Pt will perform bed mobility with Stand by Assist in 7 days.  Pt will perform transfers with Stand by Assist in 7 days.   Pt will amb 50 feet with LRAD safely with Stand by Assist in 7 days.  Pt will verbalize and demonstrate compliance with fall precautions  in 7 days.   Pt will demonstrate improvement in standing/gait balance from fair to good in 7 days.    7/14/2024 1048 by Naomi Michel, PTA  Outcome: Progressing     
  Problem: Physical Therapy - Adult  Goal: By Discharge: Performs mobility at highest level of function for planned discharge setting.  See evaluation for individualized goals.  Description: FUNCTIONAL STATUS PRIOR TO ADMISSION: Patient was modified independent using a NO AD as per patient until she got sick  for functional mobility.    HOME SUPPORT PRIOR TO ADMISSION: The patient lived with spouse and son and required minimal assistance for ADLs.    Physical Therapy Goals  Initiated 7/8/2024  Pt stated goal: Get better  Pt will be I with LE HEP in 7 days.  Pt will perform bed mobility with Stand by Assist in 7 days.  Pt will perform transfers with Stand by Assist in 7 days.   Pt will amb 50 feet with LRAD safely with Stand by Assist in 7 days.  Pt will verbalize and demonstrate compliance with fall precautions  in 7 days.   Pt will demonstrate improvement in standing/gait balance from fair to good in 7 days.    Outcome: Progressing            PHYSICAL THERAPY TREATMENT     Patient: Saranya Hightower (74 y.o. female)  Date: 7/13/2024  Diagnosis: Acute pulmonary edema (HCC) [J81.0]  Pulmonary edema cardiac cause (HCC) [I50.1]  Hypertensive emergency [I16.1]  Congestive heart failure, unspecified HF chronicity, unspecified heart failure type (HCC) [I50.9] Pulmonary edema cardiac cause (HCC)      Precautions: Fall Risk                      Recommendations for nursing mobility: Out of bed to chair for meals, Encourage HEP in prep for ADLs/mobility; see handout for details, Use of bed/chair alarm for safety, AD and gt belt for bed to chair , Amb to bathroom with AD and gait belt, and Assist x1    In place during session: External Catheter and EKG/telemetry   Chart, physical therapy assessment, plan of care and goals were reviewed.  ASSESSMENT  Patient continues with skilled PT services and is progressing towards goals. Pt semi-supine upon PT arrival, agreeable to session. Pt A&O x 4. Nurse present in room at start of session 
  Problem: Physical Therapy - Adult  Goal: By Discharge: Performs mobility at highest level of function for planned discharge setting.  See evaluation for individualized goals.  Description: FUNCTIONAL STATUS PRIOR TO ADMISSION: Patient was modified independent using a NO AD as per patient until she got sick  for functional mobility.    HOME SUPPORT PRIOR TO ADMISSION: The patient lived with spouse and son and required minimal assistance for ADLs.    Physical Therapy Goals  Initiated 7/8/2024  Pt stated goal: Get better  Pt will be I with LE HEP in 7 days.  Pt will perform bed mobility with Stand by Assist in 7 days.  Pt will perform transfers with Stand by Assist in 7 days.   Pt will amb 50 feet with LRAD safely with Stand by Assist in 7 days.  Pt will verbalize and demonstrate compliance with fall precautions  in 7 days.   Pt will demonstrate improvement in standing/gait balance from fair to good in 7 days.    Outcome: Progressing            PHYSICAL THERAPY TREATMENT     Patient: Saranya Hightower (74 y.o. female)  Date: 7/14/2024  Diagnosis: Acute pulmonary edema (HCC) [J81.0]  Pulmonary edema cardiac cause (HCC) [I50.1]  Hypertensive emergency [I16.1]  Congestive heart failure, unspecified HF chronicity, unspecified heart failure type (HCC) [I50.9] Pulmonary edema cardiac cause (HCC)      Precautions: Fall Risk                      Recommendations for nursing mobility: Out of bed to chair for meals, Encourage HEP in prep for ADLs/mobility; see handout for details, AD and gt belt for bed to chair , Amb to bathroom with AD and gait belt, and Assist x1    In place during session: Peripheral IV, External Catheter, and EKG/telemetry   Chart, physical therapy assessment, plan of care and goals were reviewed.  ASSESSMENT  Patient continues with skilled PT services and is progressing towards goals. Pt semi-supine upon PT arrival, agreeable to session. Pt A&O x 4.  present in room for entire session with permission 
(including adjusting bedclothes, sheets and blankets)?   [] 1   [] 2   [] 3   [x] 4   2.  Sitting down on and standing up from a chair with arms ( e.g., wheelchair, bedside commode, etc.)   [] 1   [] 2   [] 3   [x] 4   3.  Moving from lying on back to sitting on the side of the bed?   [] 1   [] 2   [x] 3   [] 4          How much help from another person does the patient currently need... Total A Lot A Little None   4.  Moving to and from a bed to a chair (including a wheelchair)?   [] 1   [] 2   [x] 3   [] 4   5.  Need to walk in hospital room?   [] 1   [] 2   [x] 3   [] 4   6.  Climbing 3-5 steps with a railing?   [] 1   [] 2   [x] 3   [] 4   © , Trustees of [facility], under license to [employer]. All rights reserved     Score:  Initial:  Most Recent: X (Date: 2024 )   Interpretation of Tool:  Represents activities that are increasingly more difficult (i.e. Bed mobility, Transfers, Gait).  Score 24 23 22-20 19-15 14-10 9-7 6   Modifier CH CI CJ CK CL CM CN         Physical Therapy Evaluation Charge Determination   History Examination Presentation Decision-Making   LOW Complexity : Zero comorbidities / personal factors that will impact the outcome / POC LOW Complexity : 1-2 Standardized tests and measures addressing body structure, function, activity limitation and / or participation in recreation  LOW Complexity : Stable, uncomplicated  Other outcome measures [facility] 6  LOW      Based on the above components, the patient evaluation is determined to be of the following complexity level: LOW    Pain Ratin/10   Pain Intervention(s):       Activity Tolerance:   Fair     After treatment patient left in no apparent distress:   Bed locked and in lowest position Patient left in no apparent distress in bed, Call bell within reach, Bed/ chair alarm activated, Caregiver / family present, and Heels elevated for pressure relief and nsg updated.    COMMUNICATION/EDUCATION:   The patient’s plan of care 
using toilet, bedpan or urinal? [] 1 []  2 [x]  3 []  4   4.  Putting on and taking off regular upper body clothing? []  1 []  2 [x]  3 []  4   5.  Taking care of personal grooming such as brushing teeth? []  1 []  2 [x]  3 []  4   6.  Eating meals? []  1 []  2 [x]  3 []  4   © , Trustees of [facility], under license to [employer]. All rights reserved     Score: 1824     Interpretation of Tool:  Represents clinically-significant functional categories (i.e. Activities of daily living).  Percentage of Impairment CH    0%   CI    1-19% CJ    20-39% CK    40-59% CL    60-79% CM    80-99% CN     100%   [facility]  Score 6-24 24 23 20-22 15-19 10-14 7-9 6     Occupational Therapy Evaluation Charge Determination   History Examination Decision-Making   LOW Complexity : Brief history review  LOW Complexity: 1-3 Performance deficits relating to physical, cognitive, or psychosocial skills that result in activity limitations and/or participation restrictions LOW Complexity: No comorbidities that affect functional and  no verbal  or physical assist needed to complete eval tasks      Based on the above components, the patient evaluation is determined to be of the following complexity level: Low    Pain Ratin/10     Activity Tolerance:   Poor and signs and symptoms of orthostatic hypotension    After treatment patient left in no apparent distress:    Patient left in no apparent distress in bed, Call bell within reach, Bed/ chair alarm activated, Caregiver / family present, and Side rails x3, bed locked and in lowest position    COMMUNICATION/EDUCATION:   The patient’s plan of care was discussed with: Physical therapist and Registered nurse    Patient Education  Education Given To: Patient;Family  Education Provided: Role of Therapy;Plan of Care;Precautions  Education Provided Comments: ORTHOSTATIC-EDU  Education Method: Demonstration;Verbal  Barriers to Learning: None  Education Outcome: Verbalized

## 2024-07-14 NOTE — DISCHARGE INSTRUCTIONS
Diet-cardiac  Activity-slowly increase to levels before  Hold Lasix until seen at nephrology office in 3 to 5 days  Check labs at nephrology office in 3 to 5 days CBC/BMP/imaging  Return to emergency call ambulance immediately symptoms recur or get worse

## 2024-07-14 NOTE — PROGRESS NOTES
Elian Duncan MD   12.5 mg at 07/14/24 0810    0.45 % sodium chloride infusion   IntraVENous Continuous Elian Duncan MD 50 mL/hr at 07/14/24 0825 New Bag at 07/14/24 0825    sodium bicarbonate tablet 650 mg  650 mg Oral TID WC Elian Duncan MD   650 mg at 07/14/24 0809    cloNIDine (CATAPRES) tablet 0.1 mg  0.1 mg Oral BID Corky Ram MD   0.1 mg at 07/14/24 0809    benzonatate (TESSALON) capsule 100 mg  100 mg Oral TID Corky Ram MD   100 mg at 07/14/24 0810    dilTIAZem (CARDIZEM CD) extended release capsule 240 mg  240 mg Oral Daily Elian Duncan MD   240 mg at 07/14/24 0809    ipratropium 0.5 mg-albuterol 2.5 mg (DUONEB) nebulizer solution 1 Dose  1 Dose Inhalation Q4H PRN Corky Ram MD   1 Dose at 07/12/24 2050    aspirin chewable tablet 81 mg  81 mg Oral Daily Juan Dominguez MD   81 mg at 07/14/24 0809    clopidogrel (PLAVIX) tablet 75 mg  75 mg Oral Daily Juan Dominguez MD   75 mg at 07/14/24 0809    cyproheptadine (PERIACTIN) 4 MG tablet 4 mg  4 mg Oral TID Juan Dominguez MD   4 mg at 07/14/24 0809    ferrous sulfate (IRON 325) tablet 325 mg  325 mg Oral Every Other Day Juan Dominguez MD   325 mg at 07/13/24 0842    lactulose (CHRONULAC) 10 GM/15ML solution 20 g  20 g Oral QPM Juan Dominguez MD   20 g at 07/13/24 1738    megestrol (MEGACE) tablet 40 mg  40 mg Oral BID Juan Dominguez MD   40 mg at 07/14/24 0813    mirtazapine (REMERON) tablet 15 mg  15 mg Oral Nightly Juan Dominguez MD   15 mg at 07/13/24 2022    nitroGLYCERIN (NITROSTAT) SL tablet 0.4 mg  0.4 mg SubLINGual Q5 Min PRN Juan Dominguez MD   0.4 mg at 07/12/24 1546    pantoprazole (PROTONIX) tablet 40 mg  40 mg Oral Daily Juan Dominguez MD   40 mg at 07/14/24 0809    rosuvastatin (CRESTOR) tablet 20 mg  20 mg Oral Nightly Juan Dominguez MD   20 mg at 07/13/24 2022    sodium chloride flush 0.9 % injection 5-40 mL  5-40 mL IntraVENous 2 times per day Juan Dominguez MD   10 mL at 07/14/24 0814    sodium chloride flush

## 2024-07-14 NOTE — DISCHARGE SUMMARY
Hospitalist Discharge Summary     Patient ID:  Saranya Hightower  [id number]  74 y.o.  1950 7/7/2024    PCP on record: Nubia Davila APRN - NP    Admit date: 7/7/2024  Discharge date and time: 7/14/2024    DISCHARGE DIAGNOSIS:  MAKENZIE on CKD stage III  Acute on chronic systolic heart failure EF 35%  Known CAD  Hypertensive emergency  COPD not in exacerbation      CONSULTATIONS:  IP CONSULT TO CARDIOLOGY  IP CONSULT TO NEPHROLOGY    Excerpted HPI from H&P of Juan Dominguez MD:   74-year-old female admitted on 7/7/2024 with a history of coronary artery disease status post PCI, primary cardiologist Dr. Levy, essential hypertension, hyperlipidemia, diastolic heart dysfunction who presented with chest discomfort and acute respiratory failure with hypoxia. Patient was placed on BiPAP and started on nitro drip for the chest pain and the high blood pressure greater than 200 systolic. Patient was diuresed and with control of blood pressure patient's respiratory distress has improved. She remains on oxygen. Troponins have been normal x 3. BNP was greater than 10,000. Chest x-ray consistent with pulmonary edema and possible right lower lobe airspace disease. Have added Rocephin and azithromycin prophylactically. Cardiology consultation pending.      7/8/24-patient seen for the first time, chart was reviewed.  Patient had a syncopal event with orthostatic positive with PT session.  Blood pressure dropped to low 100s.  Creatinine has bumped to 2.8, IV diuretics held, BP meds held at this time.  Gave to 250 cc NS bolus.  No other acute issues reported to me by patient or staff at this time.     7/9/24-patient seen and examined, chart was reviewed.  Case discussed with  at bedside.  Patient BP has improved.  Meds have been adjusted.  Appreciate consultant input.  No other acute issues reported to me by staff.  PT OT recommending skilled nursing placement.  Labs pending from today.     7/10/24-patient

## 2024-07-15 NOTE — PROGRESS NOTES
Nephrology follow-up          Patient: Saranya Hightower MRN: [id number]  SSN: [id number]    YOB: 1950  Age: 74 y.o.  Sex: female      Subjective:   I have seen patient at the bedside  She looks comfortable  Bps elevated  On nasal cannula 2 L  resolving MAKENZIE     Past Medical History:   Diagnosis Date    Abnormal weight loss 09/11/2021    Atherosclerosis of native coronary artery without angina pectoris 09/11/2021    CAD (coronary artery disease)     MI    Carotid artery stenosis 09/11/2021    Cervicalgia 09/11/2021    Colon polyps     Constipation 09/11/2021    HTN (hypertension) 09/11/2021    Hyperlipidemia     Hypertension     Hypertensive heart disease without congestive heart failure 09/11/2021    Menopause     Personal history of colonic polyps 09/11/2021    Vitamin D deficiency 09/11/2021     Past Surgical History:   Procedure Laterality Date    CARDIAC CATHETERIZATION      X 3    CARDIAC DEFIBRILLATOR PLACEMENT  09/16/2013    COLONOSCOPY N/A 10/5/2021    COLONOSCOPY ( T I V A) performed by Talib Ortega MD at [facility] ENDOSCOPY    COLONOSCOPY  2015    ORTHOPEDIC SURGERY      R leg    OTHER SURGICAL HISTORY      BILAT LOWER EXTREM VASC BYPASS SURGERY    OTHER SURGICAL HISTORY      R CAROTID ENDORECTOMY 11-4-05,   L CAROTID ENDARECTOMY 11-6-2006    PACEMAKER      6 0r 7 years ago      Family History   Problem Relation Age of Onset    No Known Problems Father     Cancer Mother     Hypertension Mother      Social History     Tobacco Use    Smoking status: Every Day     Current packs/day: 0.50     Types: Cigarettes    Smokeless tobacco: Never   Substance Use Topics    Alcohol use: Never      Current Facility-Administered Medications   Medication Dose Route Frequency Provider Last Rate Last Admin    lisinopril (PRINIVIL;ZESTRIL) tablet 10 mg  10 mg Oral Daily Jesse Levy MD        sodium bicarbonate 100 mEq in sodium chloride 0.45 % 1,000 mL infusion   IntraVENous Continuous Elian Duncan MD 
         Nephrology follow-up          Patient: Saranya Hightower MRN: [id number]  SSN: [id number]    YOB: 1950  Age: 74 y.o.  Sex: female      Subjective:   I have seen patient at the bedside  She looks comfortable  Bps good  On nasal cannula 2 L  resolving MAKENZIE     Past Medical History:   Diagnosis Date    Abnormal weight loss 09/11/2021    Atherosclerosis of native coronary artery without angina pectoris 09/11/2021    CAD (coronary artery disease)     MI    Carotid artery stenosis 09/11/2021    Cervicalgia 09/11/2021    Colon polyps     Constipation 09/11/2021    HTN (hypertension) 09/11/2021    Hyperlipidemia     Hypertension     Hypertensive heart disease without congestive heart failure 09/11/2021    Menopause     Personal history of colonic polyps 09/11/2021    Vitamin D deficiency 09/11/2021     Past Surgical History:   Procedure Laterality Date    CARDIAC CATHETERIZATION      X 3    CARDIAC DEFIBRILLATOR PLACEMENT  09/16/2013    COLONOSCOPY N/A 10/5/2021    COLONOSCOPY ( T I V A) performed by Talib Ortega MD at [facility] ENDOSCOPY    COLONOSCOPY  2015    ORTHOPEDIC SURGERY      R leg    OTHER SURGICAL HISTORY      BILAT LOWER EXTREM VASC BYPASS SURGERY    OTHER SURGICAL HISTORY      R CAROTID ENDORECTOMY 11-4-05,   L CAROTID ENDARECTOMY 11-6-2006    PACEMAKER      6 0r 7 years ago      Family History   Problem Relation Age of Onset    No Known Problems Father     Cancer Mother     Hypertension Mother      Social History     Tobacco Use    Smoking status: Every Day     Current packs/day: 0.50     Types: Cigarettes    Smokeless tobacco: Never   Substance Use Topics    Alcohol use: Never      Current Facility-Administered Medications   Medication Dose Route Frequency Provider Last Rate Last Admin    benzonatate (TESSALON) capsule 100 mg  100 mg Oral TID Corky Ram MD   100 mg at 07/11/24 1416    dilTIAZem (CARDIZEM CD) extended release capsule 240 mg  240 mg Oral Daily Elian Duncan MD   240 mg at 
         Nephrology follow-up          Patient: Saranya Hightower MRN: [id number]  SSN: [id number]    YOB: 1950  Age: 74 y.o.  Sex: female      Subjective:   I have seen patient at the bedside  She looks comfortable  She is noticed to have lowish blood pressures  On nasal cannula 2 L  Labs are pending today    Past Medical History:   Diagnosis Date    Abnormal weight loss 09/11/2021    Atherosclerosis of native coronary artery without angina pectoris 09/11/2021    CAD (coronary artery disease)     MI    Carotid artery stenosis 09/11/2021    Cervicalgia 09/11/2021    Colon polyps     Constipation 09/11/2021    HTN (hypertension) 09/11/2021    Hyperlipidemia     Hypertension     Hypertensive heart disease without congestive heart failure 09/11/2021    Menopause     Personal history of colonic polyps 09/11/2021    Vitamin D deficiency 09/11/2021     Past Surgical History:   Procedure Laterality Date    CARDIAC CATHETERIZATION      X 3    CARDIAC DEFIBRILLATOR PLACEMENT  09/16/2013    COLONOSCOPY N/A 10/5/2021    COLONOSCOPY ( T I V A) performed by Talib Ortega MD at [facility] ENDOSCOPY    COLONOSCOPY  2015    ORTHOPEDIC SURGERY      R leg    OTHER SURGICAL HISTORY      BILAT LOWER EXTREM VASC BYPASS SURGERY    OTHER SURGICAL HISTORY      R CAROTID ENDORECTOMY 11-4-05,   L CAROTID ENDARECTOMY 11-6-2006    PACEMAKER      6 0r 7 years ago      Family History   Problem Relation Age of Onset    No Known Problems Father     Cancer Mother     Hypertension Mother      Social History     Tobacco Use    Smoking status: Every Day     Current packs/day: 0.50     Types: Cigarettes    Smokeless tobacco: Never   Substance Use Topics    Alcohol use: Never      Current Facility-Administered Medications   Medication Dose Route Frequency Provider Last Rate Last Admin    ipratropium 0.5 mg-albuterol 2.5 mg (DUONEB) nebulizer solution 1 Dose  1 Dose Inhalation Q4H PRN Corky Ram MD        aspirin chewable tablet 81 mg  81 mg 
        Counts include 234 beds at the [facility] at [address] [address]  Phone: [phone]      Progress Note      7/8/2024 12:54 PM  NAME: Saranya Hightower   MRN:  [id number]   Admit Diagnosis: Acute pulmonary edema (HCC) [J81.0]  Pulmonary edema cardiac cause (HCC) [I50.1]  Hypertensive emergency [I16.1]  Congestive heart failure, unspecified HF chronicity, unspecified heart failure type (HCC) [I50.9]          Assessment/Plan:     New onset congestive heart failure which appears to be related to hypertensive emergency with increased afterload.  Patient after diuresis and aggressive blood pressure control has cleared from CHF.  Patient echocardiogram and a stress MPI study is pending.  CAD, status post PTCA/ALEJANDRA of RCA, 8-2017 and 2-2019.  Anginal chest pain with as needed use of sublingual nitroglycerin.  Stable.  Continue current antianginal regimen with a statin.  Pharmacologic stress MPI study pending.  PAD with history of bilateral carotid endarterectomy.  Nicotine addiction         []       High complexity decision making was performed in this patient at high risk for decompensation with multiple organ involvement.    Subjective:     Saranya Hightower denies chest pain, dyspnea.  Discussed with RN events overnight.     Review of Systems:     []         Unable to obtain  ROS due to ---   [x]         Total of 12 systems reviewed as follows:     Constitutional: negative fever, negative chills, negative weight loss  Eyes:               negative visual changes  ENT:                negative sore throat, tongue or lip swelling  Respiratory:     negative cough, negative dyspnea  Cards:             As per HPI  GI:                   negative for nausea, vomiting, diarrhea, and abdominal pain  Genitourinary: negative for frequency, dysuria  Integument:     negative for rash   Hematologic:   negative for easy bruising and gum/nose bleeding  Musculoskel:   negative for myalgias,  back pain  Neurological:   negative 
        Progress Note      7/10/2024 1:01 PM  NAME: Saranya Hightower   MRN:  [id number]   Admit Diagnosis: Acute pulmonary edema (HCC) [J81.0]  Pulmonary edema cardiac cause (HCC) [I50.1]  Hypertensive emergency [I16.1]  Congestive heart failure, unspecified HF chronicity, unspecified heart failure type (HCC) [I50.9]      Problem List:   Acute on chronic CHF  Cardiomyopathy with a EF 30-35%  History of ICD  CAD  Tobacco addiction  AAA status post EVAR  HTN  Advanced CKD     Assessment/Plan:   Continue  medical management of CAD  Stress test shows inferior wall scarring  Encourage smoking cessation  Continue diuretics         []       High complexity decision making was performed in this patient at high risk for decompensation with multiple organ involvement.    Subjective:     Saranya Hightower denies chest pain, dyspnea.  Discussed with RN events overnight.     Review of Systems:   Negative except for as noted above.    Objective:      Physical Exam:    Last 24hrs VS reviewed since prior progress note. Most recent are:    /62   Pulse 90   Temp 98.2 °F (36.8 °C) (Oral)   Resp 18   Ht 1.575 m (5' 2\")   Wt 45.1 kg (99 lb 6.8 oz)   SpO2 96%   BMI 18.19 kg/m²     Intake/Output Summary (Last 24 hours) at 7/10/2024 1301  Last data filed at 7/10/2024 0911  Gross per 24 hour   Intake 355 ml   Output --   Net 355 ml        General Appearance: Alert; no acute distress.  Ears/Nose/Mouth/Throat: moist mucous membranes  Neck: Supple.  Chest: Lungs clear to auscultation bilaterally.  Cardiovascular: Regular rate and rhythm, S1S2 normal  Abdomen: Soft, non-tender, bowel sounds are active.  Extremities: No edema bilaterally.  Skin: Warm and dry.      PMH/SH reviewed - no change compared to H&P    Telemetry: Sinus rhythm      []  No new EKG for review    Lab Data Personally Reviewed:    Recent Labs     07/08/24  0209   WBC 7.3   HGB 10.9*   HCT 31.7*        No results for input(s): \"INR\", \"APTT\" in the last 72 
        Progress Note      7/11/2024 10:21 AM  NAME: Saranya Hightower   MRN:  [id number]   Admit Diagnosis: Acute pulmonary edema (HCC) [J81.0]  Pulmonary edema cardiac cause (HCC) [I50.1]  Hypertensive emergency [I16.1]  Congestive heart failure, unspecified HF chronicity, unspecified heart failure type (HCC) [I50.9]      Problem List:   Acute on chronic CHF  Cardiomyopathy with a EF 30-35%  History of ICD  CAD  Tobacco addiction  AAA status post EVAR  HTN  Advanced CKD     Assessment/Plan:   Heart failure markedly improved, patient looks euvolemic  Nuclear stress test low risk shows inferior scarring without ischemia  Encourage smoking cessation  Holding diuresis due to worsening renal function         []       High complexity decision making was performed in this patient at high risk for decompensation with multiple organ involvement.    Subjective:     Saranya Hightower denies chest pain, dyspnea.  Discussed with RN events overnight.     Review of Systems:   Negative except for as noted above.    Objective:      Physical Exam:    Last 24hrs VS reviewed since prior progress note. Most recent are:    BP (!) 192/94   Pulse 86   Temp 98.8 °F (37.1 °C) (Oral)   Resp 18   Ht 1.575 m (5' 2\")   Wt 45.1 kg (99 lb 6.8 oz)   SpO2 97%   BMI 18.19 kg/m²     Intake/Output Summary (Last 24 hours) at 7/11/2024 1021  Last data filed at 7/11/2024 0748  Gross per 24 hour   Intake 237 ml   Output 650 ml   Net -413 ml          General Appearance: Alert; no acute distress.  Ears/Nose/Mouth/Throat: moist mucous membranes  Neck: Supple.  Chest: Lungs clear to auscultation bilaterally.  Cardiovascular: Regular rate and rhythm, S1S2 normal  Abdomen: Soft, non-tender, bowel sounds are active.  Extremities: No edema bilaterally.  Skin: Warm and dry.      PMH/SH reviewed - no change compared to H&P    Telemetry: Sinus rhythm      []  No new EKG for review    Lab Data Personally Reviewed:    Recent Labs     07/11/24  0248   WBC 6.0   HGB 
       Hospitalist Progress Note    NAME:   Saranya Hightower   : 1950   MRN: [id number]     Subjective:   Daily Progress Note:  2024    Chief complaint:  Chief Complaint   Patient presents with    Transfer of Care         Hospital course to date/HPI from H&P:  74-year-old female admitted on 2024 with a history of coronary artery disease status post PCI, primary cardiologist Dr. Levy, essential hypertension, hyperlipidemia, diastolic heart dysfunction who presented with chest discomfort and acute respiratory failure with hypoxia. Patient was placed on BiPAP and started on nitro drip for the chest pain and the high blood pressure greater than 200 systolic. Patient was diuresed and with control of blood pressure patient's respiratory distress has improved. She remains on oxygen. Troponins have been normal x 3. BNP was greater than 10,000. Chest x-ray consistent with pulmonary edema and possible right lower lobe airspace disease. Have added Rocephin and azithromycin prophylactically. Cardiology consultation pending.     24-patient seen for the first time, chart was reviewed.  Patient had a syncopal event with orthostatic positive with PT session.  Blood pressure dropped to low 100s.  Creatinine has bumped to 2.8, IV diuretics held, BP meds held at this time.  Gave to 250 cc NS bolus.  No other acute issues reported to me by patient or staff at this time.    24-patient seen and examined, chart was reviewed.  Case discussed with  at bedside.  Patient BP has improved.  Meds have been adjusted.  Appreciate consultant input.  No other acute issues reported to me by staff.  PT OT recommending skilled nursing placement.  Labs pending from today.    7/10/24-patient seen and examined, chart was reviewed.  Patient completed stress test yesterday.  Creatinine worse today.  IV fluids started by nephrology.  Discussed with  at bedside.  BP remains soft.  No other acute issues reported to me by 
       Hospitalist Progress Note    NAME:   Saranya Hightower   : 1950   MRN: [id number]     Subjective:   Daily Progress Note:  2024    Chief complaint:  Chief Complaint   Patient presents with    Transfer of Care         Hospital course to date/HPI from H&P:  74-year-old female admitted on 2024 with a history of coronary artery disease status post PCI, primary cardiologist Dr. Levy, essential hypertension, hyperlipidemia, diastolic heart dysfunction who presented with chest discomfort and acute respiratory failure with hypoxia. Patient was placed on BiPAP and started on nitro drip for the chest pain and the high blood pressure greater than 200 systolic. Patient was diuresed and with control of blood pressure patient's respiratory distress has improved. She remains on oxygen. Troponins have been normal x 3. BNP was greater than 10,000. Chest x-ray consistent with pulmonary edema and possible right lower lobe airspace disease. Have added Rocephin and azithromycin prophylactically. Cardiology consultation pending.     24-patient seen for the first time, chart was reviewed.  Patient had a syncopal event with orthostatic positive with PT session.  Blood pressure dropped to low 100s.  Creatinine has bumped to 2.8, IV diuretics held, BP meds held at this time.  Gave to 250 cc NS bolus.  No other acute issues reported to me by patient or staff at this time.    24-patient seen and examined, chart was reviewed.  Case discussed with  at bedside.  Patient BP has improved.  Meds have been adjusted.  Appreciate consultant input.  No other acute issues reported to me by staff.  PT OT recommending skilled nursing placement.  Labs pending from today.    7/10/24-patient seen and examined, chart was reviewed.  Patient completed stress test yesterday.  Creatinine worse today.  IV fluids started by nephrology.  Discussed with  at bedside.  BP remains soft.  No other acute issues reported to me by 
       Hospitalist Progress Note    NAME:   Saranya Hightower   : 1950   MRN: [id number]     Subjective:   Daily Progress Note:  2024    Chief complaint:  Chief Complaint   Patient presents with    Transfer of Care         Hospital course to date/HPI from H&P:  74-year-old female admitted on 2024 with a history of coronary artery disease status post PCI, primary cardiologist Dr. Levy, essential hypertension, hyperlipidemia, diastolic heart dysfunction who presented with chest discomfort and acute respiratory failure with hypoxia. Patient was placed on BiPAP and started on nitro drip for the chest pain and the high blood pressure greater than 200 systolic. Patient was diuresed and with control of blood pressure patient's respiratory distress has improved. She remains on oxygen. Troponins have been normal x 3. BNP was greater than 10,000. Chest x-ray consistent with pulmonary edema and possible right lower lobe airspace disease. Have added Rocephin and azithromycin prophylactically. Cardiology consultation pending.     24-patient seen for the first time, chart was reviewed.  Patient had a syncopal event with orthostatic positive with PT session.  Blood pressure dropped to low 100s.  Creatinine has bumped to 2.8, IV diuretics held, BP meds held at this time.  Gave to 250 cc NS bolus.  No other acute issues reported to me by patient or staff at this time.    24-patient seen and examined, chart was reviewed.  Case discussed with  at bedside.  Patient BP has improved.  Meds have been adjusted.  Appreciate consultant input.  No other acute issues reported to me by staff.  PT OT recommending skilled nursing placement.  Labs pending from today.    7/10/24-patient seen and examined, chart was reviewed.  Patient completed stress test yesterday.  Creatinine worse today.  IV fluids started by nephrology.  Discussed with  at bedside.  BP remains soft.  No other acute issues reported to me by 
       Hospitalist Progress Note    NAME:   Saranya Hightower   : 1950   MRN: [id number]     Subjective:   Daily Progress Note:  2024    Chief complaint:  Chief Complaint   Patient presents with    Transfer of Care         Hospital course to date/HPI from H&P:  74-year-old female admitted on 2024 with a history of coronary artery disease status post PCI, primary cardiologist Dr. Levy, essential hypertension, hyperlipidemia, diastolic heart dysfunction who presented with chest discomfort and acute respiratory failure with hypoxia. Patient was placed on BiPAP and started on nitro drip for the chest pain and the high blood pressure greater than 200 systolic. Patient was diuresed and with control of blood pressure patient's respiratory distress has improved. She remains on oxygen. Troponins have been normal x 3. BNP was greater than 10,000. Chest x-ray consistent with pulmonary edema and possible right lower lobe airspace disease. Have added Rocephin and azithromycin prophylactically. Cardiology consultation pending.     24-patient seen for the first time, chart was reviewed.  Patient had a syncopal event with orthostatic positive with PT session.  Blood pressure dropped to low 100s.  Creatinine has bumped to 2.8, IV diuretics held, BP meds held at this time.  Gave to 250 cc NS bolus.  No other acute issues reported to me by patient or staff at this time.    24-patient seen and examined, chart was reviewed.  Case discussed with  at bedside.  Patient BP has improved.  Meds have been adjusted.  Appreciate consultant input.  No other acute issues reported to me by staff.  PT OT recommending skilled nursing placement.  Labs pending from today      Objective:     /68   Pulse 74   Temp 98.4 °F (36.9 °C) (Oral)   Resp 18   Ht 1.575 m (5' 2\")   Wt 45.1 kg (99 lb 6.8 oz)   SpO2 100%   BMI 18.19 kg/m²  O2 Flow Rate (L/min): 2 L/min      Temp (24hrs), Av.4 °F (36.9 °C), Min:98.4 °F (36.9 
       Hospitalist Progress Note    NAME:   Saranya Hightower   : 1950   MRN: [id number]     Subjective:   Daily Progress Note:  2024    Chief complaint:  Chief Complaint   Patient presents with    Transfer of Care         Hospital course to date/HPI from H&P:  74-year-old female admitted on 2024 with a history of coronary artery disease status post PCI, primary cardiologist Dr. Levy, essential hypertension, hyperlipidemia, diastolic heart dysfunction who presented with chest discomfort and acute respiratory failure with hypoxia. Patient was placed on BiPAP and started on nitro drip for the chest pain and the high blood pressure greater than 200 systolic. Patient was diuresed and with control of blood pressure patient's respiratory distress has improved. She remains on oxygen. Troponins have been normal x 3. BNP was greater than 10,000. Chest x-ray consistent with pulmonary edema and possible right lower lobe airspace disease. Have added Rocephin and azithromycin prophylactically. Cardiology consultation pending.     24-patient seen for the first time, chart was reviewed.  Patient had a syncopal event with orthostatic positive with PT session.  Blood pressure dropped to low 100s.  Creatinine has bumped to 2.8, IV diuretics held, BP meds held at this time.  Gave to 250 cc NS bolus.  No other acute issues reported to me by patient or staff at this time.      Objective:     BP (!) 141/69   Pulse 83   Temp 98.1 °F (36.7 °C) (Oral)   Resp 18   Ht 1.575 m (5' 2\")   Wt 46.7 kg (103 lb)   SpO2 98%   BMI 18.84 kg/m²  O2 Flow Rate (L/min): 2 L/min      Temp (24hrs), Av.9 °F (37.2 °C), Min:97.7 °F (36.5 °C), Max:99.9 °F (37.7 °C)        PHYSICAL EXAM:  Gen thin built and nourished  Neck Supple  CVS RRR  Resp Symmetric expansion  Abdomen soft, NT/ND  Ext moves all  Neuro Alert normal speech  Psych Normal Affect  Skin no visible Rash        Data Review:    Recent Results (from the past 18 hour(s)) 
  7/13/2024 10:28 AM  NAME: Saranya Hightower   MRN:  [id number]   Admit Diagnosis: Acute pulmonary edema (HCC) [J81.0]  Pulmonary edema cardiac cause (HCC) [I50.1]  Hypertensive emergency [I16.1]  Congestive heart failure, unspecified HF chronicity, unspecified heart failure type (HCC) [I50.9]          Assessment/Plan:   Shortness of breath/pulmonary edema, echo with ejection fraction of 30 to 35%, improved, ACE on hold with kidney injury.  Continue carvedilol.  Had LV dysfunction in the past, subsequently improved, now with worsening LV function.  Has ICD.  Will attempt to restart ACE.    Coronary artery disease, history of multivessel PCI, Cardiolite on this admission without inducible ischemia.    Tobacco use, has continued to smoke.    Kidney disease, acute on chronic, improving.    Hypertension, was lightheaded and dizzy and receiving IV fluids, now off.  Will restart lisinopril, recheck renal function in a.m.    Abdominal aortic aneurysm, status post EVAR, history of right iliac artery embolism, history of femorofemoral bypass 2019         []       High complexity decision making was performed in this patient at high risk for decompensation with multiple organ involvement.    Subjective:     Saranya Hightower denies chest pain, dyspnea.  Says she did walk some.  Discussed with RN events overnight.     Review of Systems:    Symptom Y/N Comments  Symptom Y/N Comments   Fever/Chills N   Chest Pain N    Poor Appetite N   Edema N    Cough N   Abdominal Pain N    Sputum N   Joint Pain N    SOB/KAUR N   Pruritis/Rash N    Nausea/vomit N   Tolerating PT/OT Y    Diarrhea N   Tolerating Diet Y    Constipation N   Other       Could NOT obtain due to:      Objective:      Physical Exam:    Last 24hrs VS reviewed since prior progress note. Most recent are:    BP (!) 154/80   Pulse 83   Temp 98.4 °F (36.9 °C) (Oral)   Resp 18   Ht 1.575 m (5' 2\")   Wt 45.1 kg (99 lb 6.8 oz)   SpO2 100%   BMI 18.19 kg/m²     Intake/Output 
  7/14/2024 9:52 AM  NAME: Saranya Hightower   MRN:  [id number]   Admit Diagnosis: Acute pulmonary edema (HCC) [J81.0]  Pulmonary edema cardiac cause (HCC) [I50.1]  Hypertensive emergency [I16.1]  Congestive heart failure, unspecified HF chronicity, unspecified heart failure type (HCC) [I50.9]          Assessment/Plan:   Shortness of breath/pulmonary edema, echo with ejection fraction of 30 to 35% in the past, had improved, restarted lisinopril, will increase, recheck renal function?  Uptrending creatinine.    Coronary artery disease, history of multivessel PCI, Cardiolite on this admission without inducible ischemia.    Tobacco use, has continued to smoke.    Kidney disease, acute on chronic, improving.    Hypertension, blood pressure continues to be high.  Will increase lisinopril.    Abdominal aortic aneurysm, status post EVAR, history of right iliac artery embolism, history of femorofemoral bypass 2019         []       High complexity decision making was performed in this patient at high risk for decompensation with multiple organ involvement.    Subjective:     Saranya Hightower denies chest pain, dyspnea.  Says she did walk some.   says she has not been walking enough and concerned that she might get discharged today.  Discussed with RN events overnight.     Review of Systems:    Symptom Y/N Comments  Symptom Y/N Comments   Fever/Chills N   Chest Pain N    Poor Appetite N   Edema N    Cough N   Abdominal Pain N    Sputum N   Joint Pain N    SOB/KAUR N   Pruritis/Rash N    Nausea/vomit N   Tolerating PT/OT Y    Diarrhea N   Tolerating Diet Y    Constipation N   Other       Could NOT obtain due to:      Objective:      Physical Exam:    Last 24hrs VS reviewed since prior progress note. Most recent are:    BP (!) 174/83   Pulse 75   Temp 98.2 °F (36.8 °C) (Oral)   Resp 18   Ht 1.575 m (5' 2\")   Wt 44.6 kg (98 lb 5.2 oz)   SpO2 96%   BMI 17.98 kg/m²     Intake/Output Summary (Last 24 hours) at 7/14/2024 
4 Eyes Skin Assessment     NAME:  Saranya Hightower  YOB: 1950  MEDICAL RECORD NUMBER:  [id number]    The patient is being assessed for  Other weekly skin assessment    I agree that at least one RN has performed a thorough Head to Toe Skin Assessment on the patient. ALL assessment sites listed below have been assessed.      Areas assessed by both nurses:    Head, Face, Ears, Shoulders, Back, Chest, Arms, Elbows, Hands, Sacrum. Buttock, Coccyx, Ischium, Legs. Feet and Heels, and Under Medical Devices         Does the Patient have a Wound? No noted wound(s)       Goyo Prevention initiated by RN: No  Wound Care Orders initiated by RN: No    Pressure Injury (Stage 3,4, Unstageable, DTI, NWPT, and Complex wounds) if present, place Wound referral order by RN under : No    New Ostomies, if present place, Ostomy referral order under : No     Nurse 1 eSignature: Electronically signed by Alayna Purvis RN on 7/10/24 at 11:24 AM EDT    **SHARE this note so that the co-signing nurse can place an eSignature**    Nurse 2 eSignature: Electronically signed by Kamilla Milton RN on 7/10/24 at 1:36 PM EDT   
Comprehensive Nutrition Assessment    Type and Reason for Visit:  Initial (BMI)    Nutrition Recommendations/Plan:   Continue Current Diet   Encourage adequate PO intake  Initiate ONS 3x/day  Monitor PO intake, ONS tolerance, BM in I/o's     Malnutrition Assessment:  Malnutrition Status:  Mild malnutrition (07/09/24 1414)    Context:  Chronic Illness     Findings of the 6 clinical characteristics of malnutrition:  Energy Intake:  75% or less estimated energy requirements for 1 month or longer (Decreased intake past 6 months)  Weight Loss:  Unable to assess     Body Fat Loss:  Unable to assess     Muscle Mass Loss:  Unable to assess    Fluid Accumulation:  Unable to assess     Strength:  Not Performed    Nutrition Assessment:    RD assessed for BMI 18.2. SPoke w  who reported decreased appetite and intake for the past 6 months. Plan to initiate ONS 3x/day. Meds reviewed. Abnormal labs include BUN 32, Creatinine 2.8 GFR 12, Hgb 10.9 Hct 31.7.    Nutrition Related Findings:    NFPE deferred to f/u.  reports no N/V and denies dysphagia. LBM 7/6. Wound Type: None       Current Nutrition Intake & Therapies:    Average Meal Intake: 1-25%  Average Supplements Intake: None Ordered  ADULT DIET; Regular; Low Sodium (2 gm); 1500 ml    Anthropometric Measures:  Height: 157.5 cm (5' 2\")  Ideal Body Weight (IBW): 110 lbs (50 kg)       Current Body Weight: 45.1 kg (99 lb 6.8 oz),   IBW. Weight Source: Standing Scale  Current BMI (kg/m2): 18.2                          BMI Categories: Underweight (BMI less than 22) age over 65    Estimated Daily Nutrient Needs:  Energy Requirements Based On: Kcal/kg  Weight Used for Energy Requirements: Current  Energy (kcal/day): 1400 kcal ( 30 kcal / CBW)  Weight Used for Protein Requirements: Current  Protein (g/day): 55- 68 g ( 1.2-1.5 g /CBW)  Method Used for Fluid Requirements: 1 ml/kcal  Fluid (ml/day): 1400 mL/kcal    Nutrition Diagnosis:   Inadequate protein-energy intake 
Discharge plan of care/case management plan validated with provider discharge order. Pt gcs 15. Respirations even non labored. AVS instructions reviewed with pt, all questions and concerns answered. All belongings gathered and sent with pt and . Pt denies any pain. VSS.  
OCCUPATIONAL THERAPY TREATMENT  Patient: Saranya Hightower (74 y.o. female)  Date: 2024  Primary Diagnosis: Acute pulmonary edema (HCC) [J81.0]  Pulmonary edema cardiac cause (HCC) [I50.1]  Hypertensive emergency [I16.1]  Congestive heart failure, unspecified HF chronicity, unspecified heart failure type (HCC) [I50.9]       Precautions: Fall Risk                Recommendations for nursing mobility: Out of bed to chair for meals, Encourage HEP in prep for ADLs/mobility; see handout for details, AD and gt belt for bed to chair , Amb to bathroom with AD and gait belt, Assist x1, and orthostatic hypotension    In place during session: Peripheral IV, External Catheter, and EKG/telemetry   Chart, occupational therapy assessment, plan of care, and goals were reviewed.  ASSESSMENT  Patient continues with skilled OT services and is progressing towards goals. Pt presented supine in bed upon DONIS arrival, agreeable to session. Pt A&O x 4. Pt is supervision for bed mobility.  Pt set up to don hospital gown.  Pt sat EOB and donned briefs and non-skid slipper socks with set up.  Pt has met lower body dressing goals. Pt SBA to stand and pull briefs to waist. Pt's BP monitored: supine 158/71, sitting EOB: 144/71 and standin/75.  Pt is asymptomatic.  Pt returned semi supine and left with all needs met. (See below for objective details and assist levels).     Overall pt tolerated session fair today with orthostatic hypotension.  Potential barriers for safe discharge: pt is not safe to be alone and concern for pt safely navigating or managing the home environment. Pt orthostatic. Current OT recommendations for discharge  HHOT with 24  hour family A . Will continue to benefit from skilled OT services, and will continue to progress as tolerated.      Start of Session End of Session BP supine BP sitting EOB BP standing   SPO2 (%) 100 100 158/77 144/71 113/75   Heart Rate (BPM) 77 76        GOALS:    Problem: Occupational Therapy - 
Patient discharged Sunday, July 14, 2024. Contacted patient Monday July 15, 2024 to discuss follow-up appointments made, Nephrology and Cardiology. Charge Nurse aware.  
Patient working with PT and had a syncopal episode. Orthostatic blood pressures taken. Layin/77 sittin/63 standing: unable to obtain due to another syncopal episode. MD notified.    1037: Dr. Ram gave telephone orders to give 250cc normal saline bolus  
Spiritual Care Assessment/Progress Note  [facility]    Name: Saranya Hightower MRN: [id number]    Age: 74 y.o.     Sex: female   Language: English     Date: 7/10/2024            Total Time Calculated: 5 min              Spiritual Assessment begun in [facility] [place] [facility]  Service Provided For: Patient  Referral/Consult From: Rounding  Encounter Overview/Reason: Initial Encounter    Spiritual beliefs:      [x] Involved in a theresa tradition/spiritual practice:      [] Supported by a theresa community:      [] Claims no spiritual orientation:      [] Seeking spiritual identity:           [] Adheres to an individual form of spirituality:      [] Not able to assess:                Identified resources for coping and support system:   Support System: Spouse       [x] Prayer                  [] Devotional reading               [] Music                  [] Guided Imagery     [] Pet visits                                        [] Other: (COMMENT)     Specific area/focus of visit   Encounter:    Crisis:    Spiritual/Emotional needs: Type: Spiritual Support  Ritual, Rites and Sacraments:    Grief, Loss, and Adjustments:    Ethics/Mediation:    Behavioral Health:    Palliative Care:    Advance Care Planning:      Plan/Referrals: Other (Comment) ( follow-up available on referral)    Narrative:  visited pt on 4W while rounding. Pt and her  were in the room. Pt was calm and peaceful. She and her  shared about her recent medical incident. Pt was appreciative of the visit and welcomed prayer.     engaged in conversation with the pt and her .  provided spiritual support while offering words of encouragement and prayer.    Chaplain Kike Intern  Contact [facility] at [phone]          
  Output --   Net 118 ml        General Appearance: Well developed, well nourished, alert; no acute distress.  Ears/Nose/Mouth/Throat: Hearing grossly normal; moist mucous membranes  Neck: Supple.  Chest: Lungs clear to auscultation bilaterally.  Cardiovascular: Regular rate and rhythm, S1S2 normal, no murmur.  Abdomen: Soft, non-tender, bowel sounds are active.  Extremities: No edema bilaterally.  Skin: Warm and dry.    []         Post-cath site without hematoma, bruit, tenderness, or thrill.  Distal pulses intact.    PMH/SH reviewed - no change compared to H&P    Data Review    Telemetry:     EKG:   []  No new EKG for review    Lab Data Personally Reviewed:    Recent Labs     07/06/24 1944 07/08/24 0209   WBC 11.0 7.3   HGB 11.3* 10.9*   HCT 33.5* 31.7*    151     No results for input(s): \"INR\", \"APTT\" in the last 72 hours.    Invalid input(s): \"PTP\"   Recent Labs     07/06/24 1944 07/08/24 0209   * 139   K 4.0 3.5    106   CO2 21 23   BUN 25* 32*     No results for input(s): \"CPK\" in the last 72 hours.    Invalid input(s): \"CPKMB\", \"CKNDX\", \"TROIQ\"  No results found for: \"CHOL\", \"CHLST\", \"CHOLV\", \"HDL\", \"HDLC\", \"LDL\"    Recent Labs     07/06/24 1944 07/08/24 0209   GLOB 4.3* 3.4     No results for input(s): \"PH\", \"PCO2\", \"PO2\" in the last 72 hours.    Medications Personally Reviewed:    Current Facility-Administered Medications   Medication Dose Route Frequency    ipratropium 0.5 mg-albuterol 2.5 mg (DUONEB) nebulizer solution 1 Dose  1 Dose Inhalation Q4H PRN    aspirin chewable tablet 81 mg  81 mg Oral Daily    carvedilol (COREG) tablet 25 mg  25 mg Oral BID WC    [Held by provider] cloNIDine (CATAPRES) tablet 0.1 mg  0.1 mg Oral BID    clopidogrel (PLAVIX) tablet 75 mg  75 mg Oral Daily    cyproheptadine (PERIACTIN) 4 MG tablet 4 mg  4 mg Oral TID    [Held by provider] dilTIAZem (CARDIZEM CD) extended release capsule 240 mg  240 mg Oral Daily    ferrous sulfate (IRON 325) tablet 325 mg 
650 mg Oral TID  Elian Duncan MD   650 mg at 07/12/24 1125    cloNIDine (CATAPRES) tablet 0.1 mg  0.1 mg Oral BID Corky Ram MD   0.1 mg at 07/12/24 1243    benzonatate (TESSALON) capsule 100 mg  100 mg Oral TID Corky Ram MD   100 mg at 07/12/24 1243    dilTIAZem (CARDIZEM CD) extended release capsule 240 mg  240 mg Oral Daily Elian Duncan MD   240 mg at 07/12/24 0808    carvedilol (COREG) tablet 6.25 mg  6.25 mg Oral BID  Corky Ram MD   6.25 mg at 07/12/24 0807    ipratropium 0.5 mg-albuterol 2.5 mg (DUONEB) nebulizer solution 1 Dose  1 Dose Inhalation Q4H PRN Corky Ram MD        aspirin chewable tablet 81 mg  81 mg Oral Daily Juan Dominguez MD   81 mg at 07/12/24 0807    clopidogrel (PLAVIX) tablet 75 mg  75 mg Oral Daily Juan Dominguez MD   75 mg at 07/12/24 0807    cyproheptadine (PERIACTIN) 4 MG tablet 4 mg  4 mg Oral TID Juan Dominguez MD   4 mg at 07/12/24 1243    ferrous sulfate (IRON 325) tablet 325 mg  325 mg Oral Every Other Day Juan Dominguez MD   325 mg at 07/11/24 0951    lactulose (CHRONULAC) 10 GM/15ML solution 20 g  20 g Oral QPM Juan Dominguez MD   20 g at 07/11/24 1740    [Held by provider] lisinopril (PRINIVIL;ZESTRIL) tablet 20 mg  20 mg Oral Daily Juan Dominguez MD   20 mg at 07/08/24 0823    megestrol (MEGACE) tablet 40 mg  40 mg Oral BID Juan Dominguez MD   40 mg at 07/12/24 0808    mirtazapine (REMERON) tablet 15 mg  15 mg Oral Nightly Juan Dominguez MD   15 mg at 07/11/24 2041    nitroGLYCERIN (NITROSTAT) SL tablet 0.4 mg  0.4 mg SubLINGual Q5 Min PRN Juan Dominguez MD   0.4 mg at 07/10/24 1542    pantoprazole (PROTONIX) tablet 40 mg  40 mg Oral Daily Juan Dominguez MD   40 mg at 07/12/24 0807    rosuvastatin (CRESTOR) tablet 20 mg  20 mg Oral Nightly Juan Dominguez MD   20 mg at 07/11/24 2041    sodium chloride flush 0.9 % injection 5-40 mL  5-40 mL IntraVENous 2 times per day Juan Dominguez MD   10 mL at 07/11/24 2042    sodium chloride flush 0.9 
x-ray with possible right lower lobe pneumonia  Rocephin  Azithromycin  If chest x-ray clears most likely pulmonary edema and can de-escalate antibiotic  Chest x-ray in a.m.    Medical Decision Making     [x] High (any 2)     A. Problems (any 1)  [x] Acute/Chronic Illness/injury posing threat to life or bodily function:    [] Severe exacerbation of chronic illness:    ---------------------------------------------------------------------  B. Risk of Treatment (any 1)   [x] Drugs/treatments that require intensive monitoring for toxicity include:    [x] IV ABX requiring serial renal monitoring for nephrotoxicity:     [] IV Narcotic analgesia for adverse drug reaction  [x] Aggressive IV diuresis requiring serial monitoring for renal impairment and electrolyte derangements  [x] Critical electrolyte abnormalities requiring IV replacement and close serial monitoring  [] Insulin - monitoring serial FSBS for Hypoglycemic adverse drug reaction  [] Other -   [] Change in code status:    [] Decision to escalate care:    [] Major surgery/procedure with associated risk factors:     ----------------------------------------------------------------------  C. Data (any 2)  [x] Discussed current management and discharge planning options with Case Management.  [x] Discussed management of the case with: Cardiology  [x] Telemetry personally reviewed and interpreted as documented above    [x] Imaging personally reviewed and interpreted, includes: Chest x-ray with acute pulmonary edema  [x] Data Review (any 3)  [x] All available Consultant notes from yesterday/today were reviewed  [x] All current labs were reviewed and interpreted for clinical significance   [x] Appropriate follow-up labs were ordered  [] Collateral history obtained from:           Code Status: Full code  DVT Prophylaxis: Heparin  GI Prophylaxis: Protonix    Subjective:     Chief Complaint / Reason for Physician Visit  Still with shortness of breath although improved.  
(ZOFRAN-ODT) disintegrating tablet 4 mg  4 mg Oral Q8H PRN Juan Dominguez MD        Or    ondansetron (ZOFRAN) injection 4 mg  4 mg IntraVENous Q6H PRN Juan Dominguez MD        polyethylene glycol (GLYCOLAX) packet 17 g  17 g Oral Daily PRN Juan Dominguez MD        acetaminophen (TYLENOL) tablet 650 mg  650 mg Oral Q6H PRN Juan Dominguez MD   650 mg at 07/07/24 1002    Or    acetaminophen (TYLENOL) suppository 650 mg  650 mg Rectal Q6H PRN Juan Dominguez MD        [Held by provider] furosemide (LASIX) injection 40 mg  40 mg IntraVENous BID Juan Dominguez MD   40 mg at 07/08/24 0823    budesonide-formoterol (SYMBICORT) 160-4.5 MCG/ACT inhaler 2 puff  2 puff Inhalation BID RT Too Rosa MD   2 puff at 07/10/24 0902    hydrALAZINE (APRESOLINE) injection 10 mg  10 mg IntraVENous Q6H PRN Lacho Pope PA-C   10 mg at 07/09/24 0424        No Known Allergies    Review of Systems:  A comprehensive review of systems was negative except for that written in the History of Present Illness.    Objective:     Vitals:    07/10/24 0426 07/10/24 0748 07/10/24 0904 07/10/24 1031   BP: 120/71 (!) 150/83  106/62   Pulse: 90 87 87 90   Resp: 20 18 18 18   Temp: 98.4 °F (36.9 °C) 99.3 °F (37.4 °C)  98.2 °F (36.8 °C)   TempSrc: Oral Oral  Oral   SpO2: 98% 93% 96% 96%   Weight:       Height:            Physical Exam:  General: NAD  Eyes: sclera anicteric  Oral Cavity: No thrush or ulcers  Neck: no JVD  Chest: Fair bilateral air entry  Heart: normal sounds  Abdomen: soft and non tender   : no stewart  Lower Extremities: no edema  Skin: no rash  Neuro: intact  Psychiatric: non-depressed          Assessment/Plan:     Acute kidney injury on chronic kidney disease stage III  2/2 to prerenal azotemia from hypotension plus on lisinopril and diuretics  On admission BUN/creatinine 25/1.9  Worse MAKENZIE  Creatinine was 1.7 on 7/6 and bumped to 2.80.  Baseline creatinine seems to be around 1.2 in May 2024.  Clinically no 
6.25 mg  6.25 mg Oral BID WC    ipratropium 0.5 mg-albuterol 2.5 mg (DUONEB) nebulizer solution 1 Dose  1 Dose Inhalation Q4H PRN    aspirin chewable tablet 81 mg  81 mg Oral Daily    [Held by provider] cloNIDine (CATAPRES) tablet 0.1 mg  0.1 mg Oral BID    clopidogrel (PLAVIX) tablet 75 mg  75 mg Oral Daily    cyproheptadine (PERIACTIN) 4 MG tablet 4 mg  4 mg Oral TID    ferrous sulfate (IRON 325) tablet 325 mg  325 mg Oral Every Other Day    lactulose (CHRONULAC) 10 GM/15ML solution 20 g  20 g Oral QPM    [Held by provider] lisinopril (PRINIVIL;ZESTRIL) tablet 20 mg  20 mg Oral Daily    megestrol (MEGACE) tablet 40 mg  40 mg Oral BID    mirtazapine (REMERON) tablet 15 mg  15 mg Oral Nightly    nitroGLYCERIN (NITROSTAT) SL tablet 0.4 mg  0.4 mg SubLINGual Q5 Min PRN    pantoprazole (PROTONIX) tablet 40 mg  40 mg Oral Daily    rosuvastatin (CRESTOR) tablet 20 mg  20 mg Oral Nightly    sodium chloride flush 0.9 % injection 5-40 mL  5-40 mL IntraVENous 2 times per day    sodium chloride flush 0.9 % injection 5-40 mL  5-40 mL IntraVENous PRN    0.9 % sodium chloride infusion   IntraVENous PRN    heparin (porcine) injection 5,000 Units  5,000 Units SubCUTAneous BID    ondansetron (ZOFRAN-ODT) disintegrating tablet 4 mg  4 mg Oral Q8H PRN    Or    ondansetron (ZOFRAN) injection 4 mg  4 mg IntraVENous Q6H PRN    polyethylene glycol (GLYCOLAX) packet 17 g  17 g Oral Daily PRN    acetaminophen (TYLENOL) tablet 650 mg  650 mg Oral Q6H PRN    Or    acetaminophen (TYLENOL) suppository 650 mg  650 mg Rectal Q6H PRN    [Held by provider] furosemide (LASIX) injection 40 mg  40 mg IntraVENous BID    budesonide-formoterol (SYMBICORT) 160-4.5 MCG/ACT inhaler 2 puff  2 puff Inhalation BID RT    hydrALAZINE (APRESOLINE) injection 10 mg  10 mg IntraVENous Q6H PRN         Jesse Levy MD    
myocardial infarction. Findings suggest a low risk of cardiac events.    Perfusion Defect: There is a mild severity left ventricular stress perfusion defect that is small to medium in size present in the inferior segment(s) that is predominantly fixed.      Renal U/S-IMPRESSION:     1. Bilateral renal cysts.  2. Status post aortobifemoral graft placement. No flow is identified in the  right iliac portion. This is likely chronic and the patient has a femoral  femoral bypass that is not evaluated on this study.    Assessment/Plan:     CAD/acute on chronic SHF ef 35% with pulm edema-  Well compensated now  Holding diuretics due to MAKENZIE/hypotension  Echocardiogram as above   Weaned off oxygen  Holding GDMT meds due to MAKENZIE   Nuclear stress test  as above fixed defects no intervention per cards    Hypertensive emergency resolved  Weaned off nitro drip  Due to orthostasis holding BP meds  BP trended up slowly add bp meds as appropraite    MAKENZIE-  Creatinine worse >3.9-->2.2-->1.87  Holding  Lasix  Resume lisinopril per nephrology  IVF per renal  Nephrology  on case  Avoid nephrotoxins  Renal U/S as above no hydroneprosis    COPD-not in exacerbation continue home inhalers    Anorexia continue Megace    Doubt pneumonia   repeat chest x-ray shows improvement in pulmonary edema  stopped all antibiotics.      Discussion/MDM:-  Patient with multiple medical comorbidities, each with high likelihood for morbidity and mortality if left untreated.   I have reviewed patient's presenting subjective and objective findings, as well as all laboratory studies from today/yesterday including cbc/bmp or any other labs, imaging studies from today/yesterday if available, consultant notes from today/yesterday and vital signs to date as well as treatment rendered and patient's response to those treatments. Pt need IV fluids with additives , IV antibiotics if needed and Drug therapy requiring intensive monitoring for toxicity with labs and levels. 
improvement        This is dictation was done by dragon, computer voice recognition software.  Quite often unanticipated grammatical, syntax, homophones and other interpretive errors or inadvertently transcribed by the computer software.  Please excuse errors that have escaped final proofreading. Please contact me for any questions or details.  Thank you.     Signed By: Corky Ram MD     July 10, 2024

## 2024-08-07 DIAGNOSIS — R63.4 ABNORMAL WEIGHT LOSS: ICD-10-CM

## 2024-08-07 DIAGNOSIS — R63.0 ANOREXIA: ICD-10-CM

## 2024-08-09 ENCOUNTER — TELEPHONE (OUTPATIENT)
Age: 74
End: 2024-08-09

## 2024-08-09 RX ORDER — MEGESTROL ACETATE 40 MG/1
40 TABLET ORAL 2 TIMES DAILY
Qty: 180 TABLET | Refills: 1 | OUTPATIENT
Start: 2024-08-09

## 2024-08-30 ENCOUNTER — HOSPITAL ENCOUNTER (OUTPATIENT)
Facility: HOSPITAL | Age: 74
End: 2024-08-30
Payer: MEDICARE

## 2024-08-30 DIAGNOSIS — N18.31 CHRONIC KIDNEY DISEASE (CKD) STAGE G3A/A1, MODERATELY DECREASED GLOMERULAR FILTRATION RATE (GFR) BETWEEN 45-59 ML/MIN/1.73 SQUARE METER AND ALBUMINURIA CREATININE RATIO LESS THAN 30 MG/G (HCC): ICD-10-CM

## 2024-08-30 LAB
ALBUMIN SERPL-MCNC: 3.4 G/DL (ref 3.5–5)
ANION GAP SERPL CALC-SCNC: 11 MMOL/L (ref 5–15)
APPEARANCE UR: CLEAR
BACTERIA URNS QL MICRO: NEGATIVE /HPF
BILIRUB UR QL: NEGATIVE
BUN SERPL-MCNC: 45 MG/DL (ref 6–20)
BUN/CREAT SERPL: 13 (ref 12–20)
CA-I BLD-MCNC: 9.3 MG/DL (ref 8.5–10.1)
CHLORIDE SERPL-SCNC: 104 MMOL/L (ref 97–108)
CO2 SERPL-SCNC: 25 MMOL/L (ref 21–32)
COLOR UR: ABNORMAL
CREAT SERPL-MCNC: 3.55 MG/DL (ref 0.55–1.02)
CREAT UR-MCNC: 69.13 MG/DL
ERYTHROCYTE [DISTWIDTH] IN BLOOD BY AUTOMATED COUNT: 14.2 % (ref 11.5–14.5)
FERRITIN SERPL-MCNC: 40 NG/ML (ref 26–388)
GLUCOSE SERPL-MCNC: 109 MG/DL (ref 65–100)
GLUCOSE UR STRIP.AUTO-MCNC: NEGATIVE MG/DL
HCT VFR BLD AUTO: 28.5 % (ref 35–47)
HGB BLD-MCNC: 9.4 G/DL (ref 11.5–16)
HGB UR QL STRIP: ABNORMAL
KETONES UR QL STRIP.AUTO: NEGATIVE MG/DL
LEUKOCYTE ESTERASE UR QL STRIP.AUTO: NEGATIVE
MCH RBC QN AUTO: 34.1 PG (ref 26–34)
MCHC RBC AUTO-ENTMCNC: 33 G/DL (ref 30–36.5)
MCV RBC AUTO: 103.3 FL (ref 80–99)
NITRITE UR QL STRIP.AUTO: NEGATIVE
NRBC # BLD: 0 K/UL (ref 0–0.01)
NRBC BLD-RTO: 0 PER 100 WBC
PH UR STRIP: 6 (ref 5–8)
PHOSPHATE SERPL-MCNC: 3.9 MG/DL (ref 2.6–4.7)
PLATELET # BLD AUTO: 172 K/UL (ref 150–400)
PMV BLD AUTO: 11 FL (ref 8.9–12.9)
POTASSIUM SERPL-SCNC: 3.9 MMOL/L (ref 3.5–5.1)
PROT UR STRIP-MCNC: 30 MG/DL
PROT UR-MCNC: 105 MG/DL (ref 0–11.9)
PROT/CREAT UR-RTO: 1.5
RBC # BLD AUTO: 2.76 M/UL (ref 3.8–5.2)
RBC #/AREA URNS HPF: NORMAL /HPF (ref 0–3)
SODIUM SERPL-SCNC: 140 MMOL/L (ref 136–145)
SP GR UR REFRACTOMETRY: 1.01 (ref 1–1.03)
UROBILINOGEN UR QL STRIP.AUTO: 0.2 EU/DL (ref 0.2–1)
WBC # BLD AUTO: 8.3 K/UL (ref 3.6–11)
WBC URNS QL MICRO: NORMAL /HPF (ref 0–5)

## 2024-08-30 PROCEDURE — 81001 URINALYSIS AUTO W/SCOPE: CPT

## 2024-08-30 PROCEDURE — 82306 VITAMIN D 25 HYDROXY: CPT

## 2024-08-30 PROCEDURE — 85027 COMPLETE CBC AUTOMATED: CPT

## 2024-08-30 PROCEDURE — 36415 COLL VENOUS BLD VENIPUNCTURE: CPT

## 2024-08-30 PROCEDURE — 84156 ASSAY OF PROTEIN URINE: CPT

## 2024-08-30 PROCEDURE — 82728 ASSAY OF FERRITIN: CPT

## 2024-08-30 PROCEDURE — 80069 RENAL FUNCTION PANEL: CPT

## 2024-08-30 PROCEDURE — 83540 ASSAY OF IRON: CPT

## 2024-08-30 PROCEDURE — 83550 IRON BINDING TEST: CPT

## 2024-08-30 PROCEDURE — 82570 ASSAY OF URINE CREATININE: CPT

## 2024-08-31 LAB — 25(OH)D3 SERPL-MCNC: 41.2 NG/ML (ref 30–100)

## 2024-09-02 LAB
IRON SATN MFR SERPL: 21 % (ref 15–55)
IRON: 67 UG/DL (ref 27–139)
TIBC SERPL-MCNC: 325 UG/DL (ref 250–450)
UIBC SERPL-MCNC: 258 UG/DL (ref 118–369)

## 2024-09-18 ENCOUNTER — OFFICE VISIT (OUTPATIENT)
Age: 74
End: 2024-09-18
Payer: MEDICARE

## 2024-09-18 VITALS
BODY MASS INDEX: 20.41 KG/M2 | SYSTOLIC BLOOD PRESSURE: 158 MMHG | HEIGHT: 63 IN | RESPIRATION RATE: 18 BRPM | WEIGHT: 115.2 LBS | HEART RATE: 85 BPM | TEMPERATURE: 98.1 F | DIASTOLIC BLOOD PRESSURE: 77 MMHG | OXYGEN SATURATION: 97 %

## 2024-09-18 DIAGNOSIS — R63.0 ANOREXIA: ICD-10-CM

## 2024-09-18 DIAGNOSIS — R63.4 ABNORMAL WEIGHT LOSS: ICD-10-CM

## 2024-09-18 DIAGNOSIS — Z86.0100 PERSONAL HISTORY OF COLONIC POLYPS: Primary | ICD-10-CM

## 2024-09-18 PROCEDURE — 1090F PRES/ABSN URINE INCON ASSESS: CPT | Performed by: INTERNAL MEDICINE

## 2024-09-18 PROCEDURE — 3078F DIAST BP <80 MM HG: CPT | Performed by: INTERNAL MEDICINE

## 2024-09-18 PROCEDURE — 3017F COLORECTAL CA SCREEN DOC REV: CPT | Performed by: INTERNAL MEDICINE

## 2024-09-18 PROCEDURE — G8420 CALC BMI NORM PARAMETERS: HCPCS | Performed by: INTERNAL MEDICINE

## 2024-09-18 PROCEDURE — G8400 PT W/DXA NO RESULTS DOC: HCPCS | Performed by: INTERNAL MEDICINE

## 2024-09-18 PROCEDURE — 4004F PT TOBACCO SCREEN RCVD TLK: CPT | Performed by: INTERNAL MEDICINE

## 2024-09-18 PROCEDURE — 1123F ACP DISCUSS/DSCN MKR DOCD: CPT | Performed by: INTERNAL MEDICINE

## 2024-09-18 PROCEDURE — 99214 OFFICE O/P EST MOD 30 MIN: CPT | Performed by: INTERNAL MEDICINE

## 2024-09-18 PROCEDURE — 3077F SYST BP >= 140 MM HG: CPT | Performed by: INTERNAL MEDICINE

## 2024-09-18 PROCEDURE — G8427 DOCREV CUR MEDS BY ELIG CLIN: HCPCS | Performed by: INTERNAL MEDICINE

## 2024-09-18 RX ORDER — MEGESTROL ACETATE 40 MG/1
40 TABLET ORAL 2 TIMES DAILY
Qty: 180 TABLET | Refills: 2 | Status: ON HOLD | OUTPATIENT
Start: 2024-09-18

## 2024-09-18 RX ORDER — ROSUVASTATIN CALCIUM 10 MG/1
40 TABLET, COATED ORAL DAILY
Status: ON HOLD | COMMUNITY
End: 2024-09-26

## 2024-09-18 RX ORDER — SODIUM BICARBONATE 650 MG/1
650 TABLET ORAL
Status: ON HOLD | COMMUNITY
Start: 2024-08-20

## 2024-09-18 RX ORDER — FUROSEMIDE 20 MG
20 TABLET ORAL DAILY
Status: ON HOLD | COMMUNITY
Start: 2024-08-19 | End: 2024-09-26 | Stop reason: HOSPADM

## 2024-09-24 ENCOUNTER — APPOINTMENT (OUTPATIENT)
Facility: HOSPITAL | Age: 74
End: 2024-09-24
Payer: MEDICARE

## 2024-09-24 ENCOUNTER — HOSPITAL ENCOUNTER (EMERGENCY)
Facility: HOSPITAL | Age: 74
Discharge: OTHER INSTITUTION | End: 2024-09-24
Attending: EMERGENCY MEDICINE
Payer: MEDICARE

## 2024-09-24 ENCOUNTER — HOSPITAL ENCOUNTER (INPATIENT)
Facility: HOSPITAL | Age: 74
LOS: 3 days | Discharge: HOME | DRG: 304 | End: 2024-09-27
Attending: INTERNAL MEDICINE | Admitting: STUDENT IN AN ORGANIZED HEALTH CARE EDUCATION/TRAINING PROGRAM
Payer: MEDICARE

## 2024-09-24 VITALS
BODY MASS INDEX: 21.33 KG/M2 | DIASTOLIC BLOOD PRESSURE: 98 MMHG | RESPIRATION RATE: 24 BRPM | WEIGHT: 120.4 LBS | OXYGEN SATURATION: 100 % | SYSTOLIC BLOOD PRESSURE: 179 MMHG | HEART RATE: 98 BPM | TEMPERATURE: 98.7 F

## 2024-09-24 DIAGNOSIS — J81.0 FLASH PULMONARY EDEMA: Primary | ICD-10-CM

## 2024-09-24 DIAGNOSIS — I11.9 HYPERTENSIVE HEART DISEASE WITHOUT CONGESTIVE HEART FAILURE: Primary | ICD-10-CM

## 2024-09-24 DIAGNOSIS — I16.1 HYPERTENSIVE EMERGENCY: ICD-10-CM

## 2024-09-24 DIAGNOSIS — I50.1 PULMONARY EDEMA CARDIAC CAUSE (HCC): ICD-10-CM

## 2024-09-24 DIAGNOSIS — I25.10 ATHEROSCLEROSIS OF NATIVE CORONARY ARTERY OF NATIVE HEART WITHOUT ANGINA PECTORIS: ICD-10-CM

## 2024-09-24 DIAGNOSIS — I1A.0 RESISTANT HYPERTENSION: ICD-10-CM

## 2024-09-24 LAB
ANION GAP SERPL CALC-SCNC: 11 MMOL/L (ref 2–12)
BASOPHILS # BLD: 0.1 K/UL (ref 0–0.1)
BASOPHILS NFR BLD: 1 % (ref 0–1)
BNP SERPL-MCNC: ABNORMAL PG/ML
BUN SERPL-MCNC: 56 MG/DL (ref 6–20)
BUN/CREAT SERPL: 14 (ref 12–20)
CA-I BLD-MCNC: 9.5 MG/DL (ref 8.5–10.1)
CHLORIDE SERPL-SCNC: 99 MMOL/L (ref 97–108)
CO2 SERPL-SCNC: 25 MMOL/L (ref 21–32)
CREAT SERPL-MCNC: 3.9 MG/DL (ref 0.55–1.02)
DIFFERENTIAL METHOD BLD: ABNORMAL
EOSINOPHIL # BLD: 0.1 K/UL (ref 0–0.4)
EOSINOPHIL NFR BLD: 1 % (ref 0–7)
ERYTHROCYTE [DISTWIDTH] IN BLOOD BY AUTOMATED COUNT: 13 % (ref 11.5–14.5)
GLUCOSE SERPL-MCNC: 116 MG/DL (ref 65–100)
HCT VFR BLD AUTO: 31.5 % (ref 35–47)
HGB BLD-MCNC: 10.5 G/DL (ref 11.5–16)
IMM GRANULOCYTES # BLD AUTO: 0 K/UL (ref 0–0.04)
IMM GRANULOCYTES NFR BLD AUTO: 0 % (ref 0–0.5)
LYMPHOCYTES # BLD: 2.3 K/UL (ref 0.8–3.5)
LYMPHOCYTES NFR BLD: 22 % (ref 12–49)
MCH RBC QN AUTO: 33.4 PG (ref 26–34)
MCHC RBC AUTO-ENTMCNC: 33.3 G/DL (ref 30–36.5)
MCV RBC AUTO: 100.3 FL (ref 80–99)
MONOCYTES # BLD: 1.2 K/UL (ref 0–1)
MONOCYTES NFR BLD: 11 % (ref 5–13)
MRSA DNA SPEC QL NAA+PROBE: NOT DETECTED
NEUTS SEG # BLD: 7.1 K/UL (ref 1.8–8)
NEUTS SEG NFR BLD: 65 % (ref 32–75)
NRBC # BLD: 0 K/UL (ref 0–0.01)
NRBC BLD-RTO: 0 PER 100 WBC
PLATELET # BLD AUTO: 216 K/UL (ref 150–400)
PMV BLD AUTO: 11.5 FL (ref 8.9–12.9)
POTASSIUM SERPL-SCNC: 3.8 MMOL/L (ref 3.5–5.1)
RBC # BLD AUTO: 3.14 M/UL (ref 3.8–5.2)
SODIUM SERPL-SCNC: 135 MMOL/L (ref 136–145)
TROPONIN I SERPL HS-MCNC: 46 NG/L (ref 0–51)
WBC # BLD AUTO: 10.8 K/UL (ref 3.6–11)

## 2024-09-24 PROCEDURE — 94640 AIRWAY INHALATION TREATMENT: CPT

## 2024-09-24 PROCEDURE — 84484 ASSAY OF TROPONIN QUANT: CPT

## 2024-09-24 PROCEDURE — 6370000000 HC RX 637 (ALT 250 FOR IP): Performed by: STUDENT IN AN ORGANIZED HEALTH CARE EDUCATION/TRAINING PROGRAM

## 2024-09-24 PROCEDURE — 71045 X-RAY EXAM CHEST 1 VIEW: CPT

## 2024-09-24 PROCEDURE — 2700000000 HC OXYGEN THERAPY PER DAY

## 2024-09-24 PROCEDURE — 85025 COMPLETE CBC W/AUTO DIFF WBC: CPT

## 2024-09-24 PROCEDURE — 2100000000 HC CCU R&B

## 2024-09-24 PROCEDURE — 87641 MR-STAPH DNA AMP PROBE: CPT

## 2024-09-24 PROCEDURE — 94660 CPAP INITIATION&MGMT: CPT

## 2024-09-24 PROCEDURE — 6360000002 HC RX W HCPCS: Performed by: EMERGENCY MEDICINE

## 2024-09-24 PROCEDURE — 5A0935A ASSISTANCE WITH RESPIRATORY VENTILATION, LESS THAN 24 CONSECUTIVE HOURS, HIGH NASAL FLOW/VELOCITY: ICD-10-PCS | Performed by: STUDENT IN AN ORGANIZED HEALTH CARE EDUCATION/TRAINING PROGRAM

## 2024-09-24 PROCEDURE — 2580000003 HC RX 258: Performed by: STUDENT IN AN ORGANIZED HEALTH CARE EDUCATION/TRAINING PROGRAM

## 2024-09-24 PROCEDURE — 94761 N-INVAS EAR/PLS OXIMETRY MLT: CPT

## 2024-09-24 PROCEDURE — 6360000002 HC RX W HCPCS

## 2024-09-24 PROCEDURE — 96366 THER/PROPH/DIAG IV INF ADDON: CPT

## 2024-09-24 PROCEDURE — 99285 EMERGENCY DEPT VISIT HI MDM: CPT

## 2024-09-24 PROCEDURE — 2000000000 HC ICU R&B

## 2024-09-24 PROCEDURE — 36415 COLL VENOUS BLD VENIPUNCTURE: CPT

## 2024-09-24 PROCEDURE — 83880 ASSAY OF NATRIURETIC PEPTIDE: CPT

## 2024-09-24 PROCEDURE — 6360000002 HC RX W HCPCS: Performed by: STUDENT IN AN ORGANIZED HEALTH CARE EDUCATION/TRAINING PROGRAM

## 2024-09-24 PROCEDURE — 96365 THER/PROPH/DIAG IV INF INIT: CPT

## 2024-09-24 PROCEDURE — 93005 ELECTROCARDIOGRAM TRACING: CPT | Performed by: EMERGENCY MEDICINE

## 2024-09-24 PROCEDURE — 80048 BASIC METABOLIC PNL TOTAL CA: CPT

## 2024-09-24 PROCEDURE — 6370000000 HC RX 637 (ALT 250 FOR IP)

## 2024-09-24 RX ORDER — NITROGLYCERIN 0.4 MG/1
0.4 TABLET SUBLINGUAL EVERY 5 MIN PRN
Status: DISCONTINUED | OUTPATIENT
Start: 2024-09-24 | End: 2024-09-27 | Stop reason: HOSPADM

## 2024-09-24 RX ORDER — CLONIDINE HYDROCHLORIDE 0.1 MG/1
0.1 TABLET ORAL 2 TIMES DAILY
Status: DISCONTINUED | OUTPATIENT
Start: 2024-09-24 | End: 2024-09-25

## 2024-09-24 RX ORDER — ASPIRIN 81 MG/1
81 TABLET, CHEWABLE ORAL ONCE
Status: COMPLETED | OUTPATIENT
Start: 2024-09-24 | End: 2024-09-24

## 2024-09-24 RX ORDER — SODIUM CHLORIDE 0.9 % (FLUSH) 0.9 %
5-40 SYRINGE (ML) INJECTION EVERY 12 HOURS SCHEDULED
Status: DISCONTINUED | OUTPATIENT
Start: 2024-09-24 | End: 2024-09-27 | Stop reason: HOSPADM

## 2024-09-24 RX ORDER — ACETAMINOPHEN 650 MG/1
650 SUPPOSITORY RECTAL EVERY 6 HOURS PRN
Status: DISCONTINUED | OUTPATIENT
Start: 2024-09-24 | End: 2024-09-27 | Stop reason: HOSPADM

## 2024-09-24 RX ORDER — ONDANSETRON 4 MG/1
4 TABLET, ORALLY DISINTEGRATING ORAL EVERY 8 HOURS PRN
Status: DISCONTINUED | OUTPATIENT
Start: 2024-09-24 | End: 2024-09-27 | Stop reason: HOSPADM

## 2024-09-24 RX ORDER — FUROSEMIDE 40 MG
20 TABLET ORAL DAILY
Status: DISCONTINUED | OUTPATIENT
Start: 2024-09-24 | End: 2024-09-27 | Stop reason: HOSPADM

## 2024-09-24 RX ORDER — ACETAMINOPHEN 325 MG/1
650 TABLET ORAL EVERY 6 HOURS PRN
Status: DISCONTINUED | OUTPATIENT
Start: 2024-09-24 | End: 2024-09-27 | Stop reason: HOSPADM

## 2024-09-24 RX ORDER — POLYETHYLENE GLYCOL 3350 17 G/17G
17 POWDER, FOR SOLUTION ORAL DAILY PRN
Status: DISCONTINUED | OUTPATIENT
Start: 2024-09-24 | End: 2024-09-27 | Stop reason: HOSPADM

## 2024-09-24 RX ORDER — SODIUM CHLORIDE 9 MG/ML
INJECTION, SOLUTION INTRAVENOUS PRN
Status: DISCONTINUED | OUTPATIENT
Start: 2024-09-24 | End: 2024-09-27 | Stop reason: HOSPADM

## 2024-09-24 RX ORDER — CARVEDILOL 12.5 MG/1
25 TABLET ORAL 2 TIMES DAILY WITH MEALS
Status: DISCONTINUED | OUTPATIENT
Start: 2024-09-24 | End: 2024-09-27 | Stop reason: HOSPADM

## 2024-09-24 RX ORDER — SODIUM CHLORIDE 0.9 % (FLUSH) 0.9 %
5-40 SYRINGE (ML) INJECTION PRN
Status: DISCONTINUED | OUTPATIENT
Start: 2024-09-24 | End: 2024-09-27 | Stop reason: HOSPADM

## 2024-09-24 RX ORDER — ALBUTEROL SULFATE 90 UG/1
2 INHALANT RESPIRATORY (INHALATION) EVERY 4 HOURS PRN
Status: DISCONTINUED | OUTPATIENT
Start: 2024-09-24 | End: 2024-09-27 | Stop reason: HOSPADM

## 2024-09-24 RX ORDER — FUROSEMIDE 10 MG/ML
INJECTION INTRAMUSCULAR; INTRAVENOUS
Status: COMPLETED
Start: 2024-09-24 | End: 2024-09-24

## 2024-09-24 RX ORDER — BUDESONIDE AND FORMOTEROL FUMARATE DIHYDRATE 160; 4.5 UG/1; UG/1
2 AEROSOL RESPIRATORY (INHALATION) 2 TIMES DAILY
Status: DISCONTINUED | OUTPATIENT
Start: 2024-09-24 | End: 2024-09-27 | Stop reason: HOSPADM

## 2024-09-24 RX ORDER — MIRTAZAPINE 15 MG/1
15 TABLET, FILM COATED ORAL NIGHTLY
Status: DISCONTINUED | OUTPATIENT
Start: 2024-09-24 | End: 2024-09-27 | Stop reason: HOSPADM

## 2024-09-24 RX ORDER — ROSUVASTATIN CALCIUM 5 MG/1
10 TABLET, COATED ORAL DAILY
Status: DISCONTINUED | OUTPATIENT
Start: 2024-09-24 | End: 2024-09-27 | Stop reason: HOSPADM

## 2024-09-24 RX ORDER — LISINOPRIL 20 MG/1
20 TABLET ORAL DAILY
Status: DISCONTINUED | OUTPATIENT
Start: 2024-09-25 | End: 2024-09-27 | Stop reason: HOSPADM

## 2024-09-24 RX ORDER — CLOPIDOGREL BISULFATE 75 MG/1
75 TABLET ORAL DAILY
Status: DISCONTINUED | OUTPATIENT
Start: 2024-09-25 | End: 2024-09-27 | Stop reason: HOSPADM

## 2024-09-24 RX ORDER — FERROUS SULFATE 325(65) MG
325 TABLET ORAL EVERY OTHER DAY
Status: DISCONTINUED | OUTPATIENT
Start: 2024-09-25 | End: 2024-09-26

## 2024-09-24 RX ORDER — NITROGLYCERIN 20 MG/100ML
INJECTION INTRAVENOUS
Status: COMPLETED
Start: 2024-09-24 | End: 2024-09-24

## 2024-09-24 RX ORDER — NITROGLYCERIN 20 MG/100ML
5-200 INJECTION INTRAVENOUS CONTINUOUS
Status: DISCONTINUED | OUTPATIENT
Start: 2024-09-24 | End: 2024-09-24 | Stop reason: HOSPADM

## 2024-09-24 RX ORDER — DILTIAZEM HYDROCHLORIDE 120 MG/1
240 CAPSULE, COATED, EXTENDED RELEASE ORAL DAILY
Status: DISCONTINUED | OUTPATIENT
Start: 2024-09-24 | End: 2024-09-25

## 2024-09-24 RX ORDER — ONDANSETRON 2 MG/ML
4 INJECTION INTRAMUSCULAR; INTRAVENOUS EVERY 6 HOURS PRN
Status: DISCONTINUED | OUTPATIENT
Start: 2024-09-24 | End: 2024-09-27 | Stop reason: HOSPADM

## 2024-09-24 RX ADMIN — FUROSEMIDE 20 MG: 40 TABLET ORAL at 19:45

## 2024-09-24 RX ADMIN — FUROSEMIDE 80 MG: 10 INJECTION, SOLUTION INTRAMUSCULAR; INTRAVENOUS at 08:25

## 2024-09-24 RX ADMIN — Medication 2 PUFF: at 20:15

## 2024-09-24 RX ADMIN — ASPIRIN 81 MG 81 MG: 81 TABLET ORAL at 18:57

## 2024-09-24 RX ADMIN — NICARDIPINE HYDROCHLORIDE 5 MG/HR: 2.5 INJECTION, SOLUTION INTRAVENOUS at 19:49

## 2024-09-24 RX ADMIN — MIRTAZAPINE 15 MG: 15 TABLET, FILM COATED ORAL at 20:42

## 2024-09-24 RX ADMIN — NITROGLYCERIN 100 MCG/MIN: 20 INJECTION INTRAVENOUS at 08:35

## 2024-09-24 RX ADMIN — NITROGLYCERIN 200 MCG/MIN: 20 INJECTION INTRAVENOUS at 12:54

## 2024-09-24 RX ADMIN — NITROGLYCERIN 1 INCH: 20 OINTMENT TOPICAL at 08:18

## 2024-09-24 RX ADMIN — CARVEDILOL 25 MG: 12.5 TABLET, FILM COATED ORAL at 18:57

## 2024-09-24 RX ADMIN — SODIUM CHLORIDE, PRESERVATIVE FREE 5 ML: 5 INJECTION INTRAVENOUS at 20:42

## 2024-09-24 RX ADMIN — CLONIDINE HYDROCHLORIDE 0.1 MG: 0.1 TABLET ORAL at 20:42

## 2024-09-24 RX ADMIN — ROSUVASTATIN CALCIUM 10 MG: 5 TABLET, FILM COATED ORAL at 18:57

## 2024-09-24 RX ADMIN — DILTIAZEM HYDROCHLORIDE 240 MG: 120 CAPSULE, COATED, EXTENDED RELEASE ORAL at 18:57

## 2024-09-24 NOTE — H&P
clinical presentation, I have a high level of concern for decompensation if discharged from the emergency department and that patient warrants admission to hospital.     Assessment & Plan:       Hypertensive emergency  Pulmonary edema  On arrival at outside hospital setting patient was found to be in SBP 230s  CXR revealing mild pulmonary edema  Patient was started on a Cardene drip and transferred to [facility]    There is some factor of noncompliance with medications, patient on multiple home medications, reports that she might have ran out of a couple of medications at home.    -Continue Cardene drip, titrate as to keep systolic blood pressure 188 over 90 mmHg  - Resume all home medications : Coreg 25 twice daily, clonidine 0.5 Mg twice daily, lisinopril 20 Mg p.o. daily  - As needed labetalol 5 Mg IV every 6 hours    Acute hypoxic respiratory failure requiring increased oxygen  SOB in the setting of pulmonary edema  CXR revealed mild pulmonary edema  On auscultation mild bibasilar crackles  -She starting at 99% on high flow FiO2 35% and 40 L oxygen  -Continue home Lasix 20 Mg  -Wean oxygen as able      HFrEF, mild exacerbation    proBNP 15,971;  in July 2024 it was 11,037    Patient does not appear to be fluid overloaded on examination: No lungs crackles appreciated on auscultation, no B/L pedal edema.    Prior echo on 7/8/2024 revealed EF 30 to 35%.  G1 DD    -Appears to be on Lasix 20 Mg at home, will continue  - Pharmacy to [facility]    CKD stage III  Creatinine 3.9 on arrival  -- Consulted Nephrology, appreciate recs     History of CAD  - Continue ASA 81, Plavix 75, rosuvastatin 10 Mg    Anemia  HB 10   .3   -- will order Iron studies and Ferritin & Vit b12       CODE STATUS:  Full Code     Social determinants of health: None known      Home medications were reviewed    Signed By: Amna Mccain MD     September 24, 2024

## 2024-09-25 LAB
ANION GAP SERPL CALC-SCNC: 10 MMOL/L (ref 2–12)
BASOPHILS # BLD: 0 K/UL (ref 0–0.1)
BASOPHILS NFR BLD: 0 % (ref 0–1)
BUN SERPL-MCNC: 60 MG/DL (ref 6–20)
BUN/CREAT SERPL: 15 (ref 12–20)
CA-I BLD-MCNC: 10 MG/DL (ref 8.5–10.1)
CHLORIDE SERPL-SCNC: 101 MMOL/L (ref 97–108)
CO2 SERPL-SCNC: 24 MMOL/L (ref 21–32)
CREAT SERPL-MCNC: 3.94 MG/DL (ref 0.55–1.02)
DIFFERENTIAL METHOD BLD: ABNORMAL
EKG ATRIAL RATE: 94 BPM
EKG ATRIAL RATE: 96 BPM
EKG ATRIAL RATE: 96 BPM
EKG DIAGNOSIS: NORMAL
EKG P AXIS: 3 DEGREES
EKG P AXIS: 65 DEGREES
EKG P AXIS: 85 DEGREES
EKG P-R INTERVAL: 163 MS
EKG P-R INTERVAL: 174 MS
EKG P-R INTERVAL: 198 MS
EKG Q-T INTERVAL: 366 MS
EKG Q-T INTERVAL: 374 MS
EKG Q-T INTERVAL: 384 MS
EKG QRS DURATION: 100 MS
EKG QRS DURATION: 109 MS
EKG QRS DURATION: 98 MS
EKG QTC CALCULATION (BAZETT): 462 MS
EKG QTC CALCULATION (BAZETT): 468 MS
EKG QTC CALCULATION (BAZETT): 480 MS
EKG R AXIS: -11 DEGREES
EKG R AXIS: -21 DEGREES
EKG R AXIS: 22 DEGREES
EKG T AXIS: -17 DEGREES
EKG T AXIS: 30 DEGREES
EKG T AXIS: 31 DEGREES
EKG VENTRICULAR RATE: 94 BPM
EKG VENTRICULAR RATE: 94 BPM
EKG VENTRICULAR RATE: 96 BPM
EOSINOPHIL # BLD: 0 K/UL (ref 0–0.4)
EOSINOPHIL NFR BLD: 0 % (ref 0–7)
ERYTHROCYTE [DISTWIDTH] IN BLOOD BY AUTOMATED COUNT: 12.3 % (ref 11.5–14.5)
GLUCOSE SERPL-MCNC: 111 MG/DL (ref 65–100)
HCT VFR BLD AUTO: 29.6 % (ref 35–47)
HGB BLD-MCNC: 10.2 G/DL (ref 11.5–16)
IMM GRANULOCYTES # BLD AUTO: 0 K/UL (ref 0–0.04)
IMM GRANULOCYTES NFR BLD AUTO: 1 % (ref 0–0.5)
LYMPHOCYTES # BLD: 0.7 K/UL (ref 0.8–3.5)
LYMPHOCYTES NFR BLD: 9 % (ref 12–49)
MCH RBC QN AUTO: 33.7 PG (ref 26–34)
MCHC RBC AUTO-ENTMCNC: 34.5 G/DL (ref 30–36.5)
MCV RBC AUTO: 97.7 FL (ref 80–99)
MONOCYTES # BLD: 0.4 K/UL (ref 0–1)
MONOCYTES NFR BLD: 5 % (ref 5–13)
NEUTS SEG # BLD: 6.7 K/UL (ref 1.8–8)
NEUTS SEG NFR BLD: 85 % (ref 32–75)
NRBC # BLD: 0 K/UL (ref 0–0.01)
NRBC BLD-RTO: 0 PER 100 WBC
PLATELET # BLD AUTO: 206 K/UL (ref 150–400)
PMV BLD AUTO: 11.5 FL (ref 8.9–12.9)
POTASSIUM SERPL-SCNC: 3.5 MMOL/L (ref 3.5–5.1)
RBC # BLD AUTO: 3.03 M/UL (ref 3.8–5.2)
SODIUM SERPL-SCNC: 135 MMOL/L (ref 136–145)
WBC # BLD AUTO: 7.9 K/UL (ref 3.6–11)

## 2024-09-25 PROCEDURE — 82746 ASSAY OF FOLIC ACID SERUM: CPT

## 2024-09-25 PROCEDURE — 94640 AIRWAY INHALATION TREATMENT: CPT

## 2024-09-25 PROCEDURE — 2580000003 HC RX 258: Performed by: STUDENT IN AN ORGANIZED HEALTH CARE EDUCATION/TRAINING PROGRAM

## 2024-09-25 PROCEDURE — 36415 COLL VENOUS BLD VENIPUNCTURE: CPT

## 2024-09-25 PROCEDURE — 6370000000 HC RX 637 (ALT 250 FOR IP): Performed by: STUDENT IN AN ORGANIZED HEALTH CARE EDUCATION/TRAINING PROGRAM

## 2024-09-25 PROCEDURE — 82607 VITAMIN B-12: CPT

## 2024-09-25 PROCEDURE — 2060000000 HC ICU INTERMEDIATE R&B

## 2024-09-25 PROCEDURE — 83540 ASSAY OF IRON: CPT

## 2024-09-25 PROCEDURE — 6370000000 HC RX 637 (ALT 250 FOR IP): Performed by: INTERNAL MEDICINE

## 2024-09-25 PROCEDURE — 93005 ELECTROCARDIOGRAM TRACING: CPT | Performed by: STUDENT IN AN ORGANIZED HEALTH CARE EDUCATION/TRAINING PROGRAM

## 2024-09-25 PROCEDURE — 85025 COMPLETE CBC W/AUTO DIFF WBC: CPT

## 2024-09-25 PROCEDURE — 6360000002 HC RX W HCPCS: Performed by: INTERNAL MEDICINE

## 2024-09-25 PROCEDURE — 80048 BASIC METABOLIC PNL TOTAL CA: CPT

## 2024-09-25 PROCEDURE — 82728 ASSAY OF FERRITIN: CPT

## 2024-09-25 RX ORDER — DILTIAZEM HYDROCHLORIDE 300 MG/1
300 CAPSULE, COATED, EXTENDED RELEASE ORAL DAILY
Status: DISCONTINUED | OUTPATIENT
Start: 2024-09-25 | End: 2024-09-26

## 2024-09-25 RX ORDER — METOPROLOL TARTRATE 1 MG/ML
5 INJECTION, SOLUTION INTRAVENOUS EVERY 6 HOURS PRN
Status: DISCONTINUED | OUTPATIENT
Start: 2024-09-25 | End: 2024-09-25

## 2024-09-25 RX ORDER — HYDRALAZINE HYDROCHLORIDE 50 MG/1
100 TABLET, FILM COATED ORAL EVERY 8 HOURS SCHEDULED
Status: DISCONTINUED | OUTPATIENT
Start: 2024-09-25 | End: 2024-09-27 | Stop reason: HOSPADM

## 2024-09-25 RX ORDER — LABETALOL HYDROCHLORIDE 5 MG/ML
10 INJECTION, SOLUTION INTRAVENOUS EVERY 4 HOURS PRN
Status: DISCONTINUED | OUTPATIENT
Start: 2024-09-25 | End: 2024-09-27 | Stop reason: HOSPADM

## 2024-09-25 RX ORDER — LABETALOL HYDROCHLORIDE 5 MG/ML
10 INJECTION, SOLUTION INTRAVENOUS EVERY 6 HOURS PRN
Status: DISCONTINUED | OUTPATIENT
Start: 2024-09-25 | End: 2024-09-25

## 2024-09-25 RX ADMIN — MIRTAZAPINE 15 MG: 15 TABLET, FILM COATED ORAL at 19:44

## 2024-09-25 RX ADMIN — Medication 2 PUFF: at 06:44

## 2024-09-25 RX ADMIN — SODIUM CHLORIDE, PRESERVATIVE FREE 10 ML: 5 INJECTION INTRAVENOUS at 21:09

## 2024-09-25 RX ADMIN — HYDRALAZINE HYDROCHLORIDE 100 MG: 50 TABLET ORAL at 21:09

## 2024-09-25 RX ADMIN — LABETALOL HYDROCHLORIDE 10 MG: 5 INJECTION, SOLUTION INTRAVENOUS at 19:49

## 2024-09-25 RX ADMIN — Medication 2 PUFF: at 21:46

## 2024-09-25 RX ADMIN — CLOPIDOGREL BISULFATE 75 MG: 75 TABLET ORAL at 08:56

## 2024-09-25 RX ADMIN — FERROUS SULFATE TAB 325 MG (65 MG ELEMENTAL FE) 325 MG: 325 (65 FE) TAB at 08:56

## 2024-09-25 RX ADMIN — DILTIAZEM HYDROCHLORIDE 300 MG: 300 CAPSULE, COATED, EXTENDED RELEASE ORAL at 08:56

## 2024-09-25 RX ADMIN — CARVEDILOL 25 MG: 12.5 TABLET, FILM COATED ORAL at 08:56

## 2024-09-25 RX ADMIN — HYDRALAZINE HYDROCHLORIDE 100 MG: 50 TABLET ORAL at 08:56

## 2024-09-25 RX ADMIN — CARVEDILOL 25 MG: 12.5 TABLET, FILM COATED ORAL at 18:59

## 2024-09-25 RX ADMIN — SODIUM CHLORIDE, PRESERVATIVE FREE 10 ML: 5 INJECTION INTRAVENOUS at 08:57

## 2024-09-25 RX ADMIN — HYDRALAZINE HYDROCHLORIDE 100 MG: 50 TABLET ORAL at 15:24

## 2024-09-25 RX ADMIN — ROSUVASTATIN CALCIUM 10 MG: 5 TABLET, FILM COATED ORAL at 08:56

## 2024-09-25 NOTE — CARE COORDINATION
09/25/24 1419   Service Assessment   Patient Orientation Alert and Oriented   Cognition Alert   History Provided By Patient   Primary Caregiver Self   Support Systems Spouse/Significant Other;Children;Family Members;Friends/Neighbors   Patient's Healthcare Decision Maker is: Legal Next of Kin   PCP Verified by CM Yes   Last Visit to PCP Within last 3 months   Prior Functional Level Independent in ADLs/IADLs   Current Functional Level Independent in ADLs/IADLs   Can patient return to prior living arrangement Yes   Ability to make needs known: Good   Family able to assist with home care needs: Yes   Would you like for me to discuss the discharge plan with any other family members/significant others, and if so, who? Yes   Financial Resources None   Community Resources None   Social/Functional History   Lives With Spouse;Son   Home Equipment Walker - Rolling;Cane;Wheelchair - Manual   Services At/After Discharge   Mode of Transport at Discharge Other (see comment)   Confirm Follow Up Transport Family     CM met f/f with Pt, her Granddaughter and Pt . Pt confirmed that the information on the face sheet is correct. Pt stated that she lives with her , and a son.     No HH, Pt has PCA that come in 5 days a week for a few hours, Pt has a wlker, cane and wheelchair, and independent with ADL    Send RX to [facility] in [place].     Family will transport Pt home when D/C.    CM dispo: TBD    Advance Care Planning     General Advance Care Planning (ACP) Conversation    Date of Conversation: 9/25/2024      Healthcare Decision Maker:   Primary Decision Maker: Dalton Hightower - Spouse - [phone]       Content/Action Overview:    Reviewed DNR/DNI and patient elects Full Code (Attempt Resuscitation)

## 2024-09-25 NOTE — CONSULTS
Nephrology Consultation          Patient: Saranya Hightower MRN: [id number]  SSN: [id number]    YOB: 1950  Age: 74 y.o.  Sex: female      Subjective:   Reason for the consultation.  Acute kidney injury on underlying chronic kidney disease stage IV and malignant hypertension.    History of present illness.  The patient is 74-year-old woman with underlying history of hypertension, chronic kidney disease stage IV, follow-up with me as an patient, congestive heart failure, coronary artery disease presented with increasing shortness of breath and malignant hypertension.  A chest x-ray did show mild pulmonary edema.  Her initial chemistry showed elevated BUN of 16 creatinine 3.9 which prompted nephrology consultation.  She was started on Cardene drip.  She is currently on room air.  No noticed lower extremity swelling.  Blood pressures are better and tapering off Cardene drip.    Past Medical History:   Diagnosis Date    Abnormal weight loss 09/11/2021    Atherosclerosis of native coronary artery without angina pectoris 09/11/2021    CAD (coronary artery disease)     MI    Carotid artery stenosis 09/11/2021    Cervicalgia 09/11/2021    Colon polyps     Constipation 09/11/2021    HTN (hypertension) 09/11/2021    Hyperlipidemia     Hypertension     Hypertensive heart disease without congestive heart failure 09/11/2021    Menopause     Personal history of colonic polyps 09/11/2021    Vitamin D deficiency 09/11/2021     Past Surgical History:   Procedure Laterality Date    CARDIAC CATHETERIZATION      X 3    CARDIAC DEFIBRILLATOR PLACEMENT  09/16/2013    COLONOSCOPY N/A 10/5/2021    COLONOSCOPY ( T I V A) performed by Talib Ortega MD at [facility] ENDOSCOPY    COLONOSCOPY  2015    ORTHOPEDIC SURGERY      R leg    OTHER SURGICAL HISTORY      BILAT LOWER EXTREM VASC BYPASS SURGERY    OTHER SURGICAL HISTORY      R CAROTID ENDORECTOMY 11-4-05,   L CAROTID ENDARECTOMY 11-6-2006    PACEMAKER      6 0r 7 years ago

## 2024-09-26 ENCOUNTER — APPOINTMENT (OUTPATIENT)
Facility: HOSPITAL | Age: 74
DRG: 304 | End: 2024-09-26
Attending: INTERNAL MEDICINE
Payer: MEDICARE

## 2024-09-26 LAB
ALBUMIN SERPL-MCNC: 3.6 G/DL (ref 3.5–5)
ANION GAP SERPL CALC-SCNC: 11 MMOL/L (ref 2–12)
BASOPHILS # BLD: 0 K/UL (ref 0–0.1)
BASOPHILS NFR BLD: 0 % (ref 0–1)
BUN SERPL-MCNC: 79 MG/DL (ref 6–20)
BUN/CREAT SERPL: 17 (ref 12–20)
CA-I BLD-MCNC: 9.4 MG/DL (ref 8.5–10.1)
CHLORIDE SERPL-SCNC: 101 MMOL/L (ref 97–108)
CO2 SERPL-SCNC: 22 MMOL/L (ref 21–32)
CREAT SERPL-MCNC: 4.6 MG/DL (ref 0.55–1.02)
DIFFERENTIAL METHOD BLD: ABNORMAL
EOSINOPHIL # BLD: 0 K/UL (ref 0–0.4)
EOSINOPHIL NFR BLD: 0 % (ref 0–7)
ERYTHROCYTE [DISTWIDTH] IN BLOOD BY AUTOMATED COUNT: 12.6 % (ref 11.5–14.5)
FERRITIN SERPL-MCNC: 48 NG/ML (ref 26–388)
FOLATE SERPL-MCNC: 30.9 NG/ML (ref 5–21)
GLUCOSE SERPL-MCNC: 124 MG/DL (ref 65–100)
HCT VFR BLD AUTO: 30.3 % (ref 35–47)
HGB BLD-MCNC: 10.4 G/DL (ref 11.5–16)
IMM GRANULOCYTES # BLD AUTO: 0.1 K/UL (ref 0–0.04)
IMM GRANULOCYTES NFR BLD AUTO: 1 % (ref 0–0.5)
IRON SATN MFR SERPL: 15 % (ref 20–50)
IRON SERPL-MCNC: 66 UG/DL (ref 35–150)
LYMPHOCYTES # BLD: 1.2 K/UL (ref 0.8–3.5)
LYMPHOCYTES NFR BLD: 12 % (ref 12–49)
MAGNESIUM SERPL-MCNC: 2.6 MG/DL (ref 1.6–2.4)
MCH RBC QN AUTO: 33.1 PG (ref 26–34)
MCHC RBC AUTO-ENTMCNC: 34.3 G/DL (ref 30–36.5)
MCV RBC AUTO: 96.5 FL (ref 80–99)
MONOCYTES # BLD: 1.2 K/UL (ref 0–1)
MONOCYTES NFR BLD: 11 % (ref 5–13)
NEUTS SEG # BLD: 8.1 K/UL (ref 1.8–8)
NEUTS SEG NFR BLD: 76 % (ref 32–75)
NRBC # BLD: 0 K/UL (ref 0–0.01)
NRBC BLD-RTO: 0 PER 100 WBC
PHOSPHATE SERPL-MCNC: 5 MG/DL (ref 2.6–4.7)
PLATELET # BLD AUTO: 217 K/UL (ref 150–400)
PMV BLD AUTO: 11.8 FL (ref 8.9–12.9)
POTASSIUM SERPL-SCNC: 3.2 MMOL/L (ref 3.5–5.1)
RBC # BLD AUTO: 3.14 M/UL (ref 3.8–5.2)
SODIUM SERPL-SCNC: 134 MMOL/L (ref 136–145)
TIBC SERPL-MCNC: 441 UG/DL (ref 250–450)
VIT B12 SERPL-MCNC: 1252 PG/ML (ref 193–986)
WBC # BLD AUTO: 10.7 K/UL (ref 3.6–11)

## 2024-09-26 PROCEDURE — 36415 COLL VENOUS BLD VENIPUNCTURE: CPT

## 2024-09-26 PROCEDURE — 83735 ASSAY OF MAGNESIUM: CPT

## 2024-09-26 PROCEDURE — 93975 VASCULAR STUDY: CPT

## 2024-09-26 PROCEDURE — 6370000000 HC RX 637 (ALT 250 FOR IP): Performed by: INTERNAL MEDICINE

## 2024-09-26 PROCEDURE — 94640 AIRWAY INHALATION TREATMENT: CPT

## 2024-09-26 PROCEDURE — 76770 US EXAM ABDO BACK WALL COMP: CPT

## 2024-09-26 PROCEDURE — 2580000003 HC RX 258: Performed by: STUDENT IN AN ORGANIZED HEALTH CARE EDUCATION/TRAINING PROGRAM

## 2024-09-26 PROCEDURE — 2060000000 HC ICU INTERMEDIATE R&B

## 2024-09-26 PROCEDURE — 85025 COMPLETE CBC W/AUTO DIFF WBC: CPT

## 2024-09-26 PROCEDURE — 83835 ASSAY OF METANEPHRINES: CPT

## 2024-09-26 PROCEDURE — 80069 RENAL FUNCTION PANEL: CPT

## 2024-09-26 PROCEDURE — 6370000000 HC RX 637 (ALT 250 FOR IP): Performed by: STUDENT IN AN ORGANIZED HEALTH CARE EDUCATION/TRAINING PROGRAM

## 2024-09-26 PROCEDURE — 84244 ASSAY OF RENIN: CPT

## 2024-09-26 RX ORDER — FERROUS SULFATE 325(65) MG
325 TABLET ORAL DAILY
Status: DISCONTINUED | OUTPATIENT
Start: 2024-09-27 | End: 2024-09-27 | Stop reason: HOSPADM

## 2024-09-26 RX ORDER — ROSUVASTATIN CALCIUM 10 MG/1
40 TABLET, COATED ORAL DAILY
Qty: 30 TABLET | Refills: 3 | Status: SHIPPED | OUTPATIENT
Start: 2024-10-08 | End: 2024-10-06

## 2024-09-26 RX ORDER — DILTIAZEM HYDROCHLORIDE 120 MG/1
360 CAPSULE, COATED, EXTENDED RELEASE ORAL DAILY
Status: DISCONTINUED | OUTPATIENT
Start: 2024-09-27 | End: 2024-09-27 | Stop reason: HOSPADM

## 2024-09-26 RX ORDER — BUMETANIDE 2 MG/1
1 TABLET ORAL DAILY
Qty: 30 TABLET | Refills: 3 | Status: ON HOLD | OUTPATIENT
Start: 2024-09-26

## 2024-09-26 RX ORDER — HYDRALAZINE HYDROCHLORIDE 100 MG/1
100 TABLET, FILM COATED ORAL EVERY 8 HOURS SCHEDULED
Qty: 90 TABLET | Refills: 3 | Status: ON HOLD | OUTPATIENT
Start: 2024-09-26

## 2024-09-26 RX ORDER — DILTIAZEM HYDROCHLORIDE 360 MG/1
360 CAPSULE, EXTENDED RELEASE ORAL DAILY
Qty: 30 CAPSULE | Refills: 3 | Status: ON HOLD | OUTPATIENT
Start: 2024-09-27

## 2024-09-26 RX ADMIN — SODIUM CHLORIDE, PRESERVATIVE FREE 10 ML: 5 INJECTION INTRAVENOUS at 07:51

## 2024-09-26 RX ADMIN — CARVEDILOL 25 MG: 12.5 TABLET, FILM COATED ORAL at 16:53

## 2024-09-26 RX ADMIN — HYDRALAZINE HYDROCHLORIDE 100 MG: 50 TABLET ORAL at 14:34

## 2024-09-26 RX ADMIN — SODIUM CHLORIDE, PRESERVATIVE FREE 10 ML: 5 INJECTION INTRAVENOUS at 20:35

## 2024-09-26 RX ADMIN — CARVEDILOL 25 MG: 12.5 TABLET, FILM COATED ORAL at 07:51

## 2024-09-26 RX ADMIN — HYDRALAZINE HYDROCHLORIDE 100 MG: 50 TABLET ORAL at 05:33

## 2024-09-26 RX ADMIN — CLOPIDOGREL BISULFATE 75 MG: 75 TABLET ORAL at 07:50

## 2024-09-26 RX ADMIN — DILTIAZEM HYDROCHLORIDE 300 MG: 300 CAPSULE, COATED, EXTENDED RELEASE ORAL at 07:50

## 2024-09-26 RX ADMIN — ROSUVASTATIN CALCIUM 10 MG: 5 TABLET, FILM COATED ORAL at 07:51

## 2024-09-26 RX ADMIN — Medication 2 PUFF: at 20:55

## 2024-09-26 RX ADMIN — MIRTAZAPINE 15 MG: 15 TABLET, FILM COATED ORAL at 20:35

## 2024-09-26 RX ADMIN — HYDRALAZINE HYDROCHLORIDE 100 MG: 50 TABLET ORAL at 20:35

## 2024-09-26 RX ADMIN — POTASSIUM BICARBONATE 20 MEQ: 782 TABLET, EFFERVESCENT ORAL at 12:33

## 2024-09-26 NOTE — CARE COORDINATION
Transition of Care Plan:    RUR: 26%  Prior Level of Functioning: independent  Disposition: home with family  If SNF or IPR: Date FOC offered:   Date FOC received:   Accepting facility:   Date authorization started with reference number:   Date authorization received and expires:   Follow up appointments:   DME needed:   Transportation at discharge: patient arranged  IM/IMM Medicare/ letter given: yes  Is patient a [place] and connected with [facility]?    If yes, was [place] transfer form completed and [facility] notified?   Caregiver Contact:   Discharge Caregiver contacted prior to discharge? Patient aware  Care Conference needed?   Barriers to discharge: n/a

## 2024-09-26 NOTE — NURSE NAVIGATOR
Heart Failure Education/Evaluation/Recommendations  Code Status:    Echo:    BNP:    HGA1C:      Daily Weights:  Date:   Weight (kg/lbs):   Device:  09/24/2024  53.5 kg  / 117 lbs 15 oz  Not Documented  09/25/2024  Not Done    --  09/26/2024  51.5 kg  / 113 lbs 8.6 oz  Bed scale      Cardiology Consulted: Not Consulted    Nephrology Consulted: Dr. Duncan    Dietitian Consulted: Yes      RNAVI defers continued Education to Nursing Staff. Education packet brought to the patients room.     RN-NN knocks, enters the room, and performs hand hygiene/ uses proper PPE. RNSebastianNN introduces herself and the reason for visit.    Patient agrees to Education.    Patient does not meet criteria for outpatient follow-up due to early onset Dementia per spouse    Patient/family/care giver educated by RNSebastianNN, Doctor, and Nurse    Low sodium Diet, reading Nutrition labels, and foods to avoid  Fluid Restriction  Smoking Cessation, ETOH abuse cessation, Drug use/abuse cessation  Daily Weights  Symptoms and Reporting symptoms to Cardiology/Nephrology  Activity/Exercise  Support Groups and family support  Local Resources    Social Determinants of Health problems issues:  Financial:   Denies  Transportation:  Denies  Education:   Denies  Food:    Denies  Housing:   Denies  Safety:    Denies  Drug/ETOH/Smoking:  Current smoking    Patient has a scale that works. Per the spouse the patient also has a blood pressure cuff.      Discharge Disposition Determined to be: Home    Time Spent with patient: 40 mins    RECOMMENDATIONS:  Patient Follow-up with Cardiology within 1 week, if appointment available, if not schedule soonest appointment available.  Patient should follow-up with PCP within 1 week if Cardiology is not available and within 1-2 weeks if cardiology is available.  Patient should follow-up with Nephrology and Pulmonology within 1-2 weeks.  Daily Blood Pressure Checks, Daily Weight Checks, and Smoking Cessation  Advanced Care Planning

## 2024-09-26 NOTE — DISCHARGE SUMMARY
cyanosis or edema.   Pulses: 2+ and symmetric all extremities.   Skin: Skin color, texture, turgor normal. No rashes or lesions   Neurologic: CNII-XII intact. No gross sensory or motor deficits             Significant Diagnostic Studies:   9/24/2024: BUN 56 mg/dL (H; Ref range: 6 - 20 mg/dL); Calcium 9.5 mg/dL (Ref range: 8.5 - 10.1 mg/dL); Chloride 99 mmol/L (Ref range: 97 - 108 mmol/L); CO2 25 mmol/L (Ref range: 21 - 32 mmol/L); Creatinine 3.90 mg/dL (H; Ref range: 0.55 - 1.02 mg/dL); Glucose 116 mg/dL (H; Ref range: 65 - 100 mg/dL); Hematocrit 31.5 % (L; Ref range: 35.0 - 47.0 %); Hemoglobin 10.5 g/dL (L; Ref range: 11.5 - 16.0 g/dL); Potassium 3.8 mmol/L (Ref range: 3.5 - 5.1 mmol/L); Sodium 135 mmol/L (L; Ref range: 136 - 145 mmol/L)  9/25/2024: BUN 60 mg/dL (H; Ref range: 6 - 20 mg/dL); Calcium 10.0 mg/dL (Ref range: 8.5 - 10.1 mg/dL); Chloride 101 mmol/L (Ref range: 97 - 108 mmol/L); CO2 24 mmol/L (Ref range: 21 - 32 mmol/L); Creatinine 3.94 mg/dL (H; Ref range: 0.55 - 1.02 mg/dL); Glucose 111 mg/dL (H; Ref range: 65 - 100 mg/dL); Hematocrit 29.6 % (L; Ref range: 35.0 - 47.0 %); Hemoglobin 10.2 g/dL (L; Ref range: 11.5 - 16.0 g/dL); Potassium 3.5 mmol/L (Ref range: 3.5 - 5.1 mmol/L); Sodium 135 mmol/L (L; Ref range: 136 - 145 mmol/L)  Recent Labs     09/26/24  0140 09/27/24  0652   WBC 10.7 9.8   HGB 10.4* 11.0*   HCT 30.3* 32.4*    197     Recent Labs     09/25/24  0352 09/26/24  0140 09/27/24  0652   * 134* 133*   K 3.5 3.2* 3.4*    101 100   CO2 24 22 24   BUN 60* 79* 79*   CREATININE 3.94* 4.60* 4.43*   GLUCOSE 111* 124* 86   CALCIUM 10.0 9.4 9.4   MG  --  2.6* 2.6*   PHOS  --  5.0* 4.2     No results for input(s): \"AST\", \"ALT\", \"ALKPHOS\", \"BILITOT\", \"LABALBU\", \"GLOB\", \"GGT\", \"AMYLASE\", \"LIPASE\" in the last 72 hours.    Invalid input(s): \"GPT\", \"PROT\"  No results for input(s): \"INR\", \"PROTIME\", \"APTT\" in the last 72 hours.   Recent Labs     09/25/24  1613   IRON 66   TIBC 441      No

## 2024-09-26 NOTE — CONSULTS
Nutrition Education    Educated on HF and Renal Diet  Learners: Patient and Significant Other  Readiness: Acceptance  Method: Explanation and Handout  Response: Verbalizes Understanding and Needs Reinforcement  Pt and  in room at time of visit. Pt appearing drowsy. Discussed low sodium diet, foods high in salt, salt-free seasoning options, and sodium content on nutrition facts labels. Pt and  expressed understanding. Handouts for HF and Renal diet left for patient and SO review at later time.  Contact name and number provided.    Vandana Goldman RD  Contact Number: [phone]

## 2024-09-27 VITALS
SYSTOLIC BLOOD PRESSURE: 161 MMHG | RESPIRATION RATE: 17 BRPM | DIASTOLIC BLOOD PRESSURE: 74 MMHG | OXYGEN SATURATION: 96 % | TEMPERATURE: 98.4 F | WEIGHT: 108.03 LBS | HEIGHT: 63 IN | BODY MASS INDEX: 19.14 KG/M2 | HEART RATE: 89 BPM

## 2024-09-27 LAB
ALBUMIN SERPL-MCNC: 3.6 G/DL (ref 3.5–5)
ANION GAP SERPL CALC-SCNC: 9 MMOL/L (ref 2–12)
BASOPHILS # BLD: 0 K/UL (ref 0–0.1)
BASOPHILS NFR BLD: 0 % (ref 0–1)
BUN SERPL-MCNC: 79 MG/DL (ref 6–20)
BUN/CREAT SERPL: 18 (ref 12–20)
CA-I BLD-MCNC: 9.4 MG/DL (ref 8.5–10.1)
CHLORIDE SERPL-SCNC: 100 MMOL/L (ref 97–108)
CO2 SERPL-SCNC: 24 MMOL/L (ref 21–32)
CREAT SERPL-MCNC: 4.43 MG/DL (ref 0.55–1.02)
DIFFERENTIAL METHOD BLD: ABNORMAL
EOSINOPHIL # BLD: 0 K/UL (ref 0–0.4)
EOSINOPHIL NFR BLD: 0 % (ref 0–7)
ERYTHROCYTE [DISTWIDTH] IN BLOOD BY AUTOMATED COUNT: 12.3 % (ref 11.5–14.5)
GLUCOSE SERPL-MCNC: 86 MG/DL (ref 65–100)
HCT VFR BLD AUTO: 32.4 % (ref 35–47)
HGB BLD-MCNC: 11 G/DL (ref 11.5–16)
IMM GRANULOCYTES # BLD AUTO: 0 K/UL (ref 0–0.04)
IMM GRANULOCYTES NFR BLD AUTO: 0 % (ref 0–0.5)
LYMPHOCYTES # BLD: 1.7 K/UL (ref 0.8–3.5)
LYMPHOCYTES NFR BLD: 18 % (ref 12–49)
MAGNESIUM SERPL-MCNC: 2.6 MG/DL (ref 1.6–2.4)
MCH RBC QN AUTO: 32.9 PG (ref 26–34)
MCHC RBC AUTO-ENTMCNC: 34 G/DL (ref 30–36.5)
MCV RBC AUTO: 97 FL (ref 80–99)
MONOCYTES # BLD: 1.4 K/UL (ref 0–1)
MONOCYTES NFR BLD: 14 % (ref 5–13)
NEUTS SEG # BLD: 6.6 K/UL (ref 1.8–8)
NEUTS SEG NFR BLD: 68 % (ref 32–75)
NRBC # BLD: 0 K/UL (ref 0–0.01)
NRBC BLD-RTO: 0 PER 100 WBC
PHOSPHATE SERPL-MCNC: 4.2 MG/DL (ref 2.6–4.7)
PLATELET # BLD AUTO: 197 K/UL (ref 150–400)
PMV BLD AUTO: 11.5 FL (ref 8.9–12.9)
POTASSIUM SERPL-SCNC: 3.4 MMOL/L (ref 3.5–5.1)
RBC # BLD AUTO: 3.34 M/UL (ref 3.8–5.2)
SODIUM SERPL-SCNC: 133 MMOL/L (ref 136–145)
WBC # BLD AUTO: 9.8 K/UL (ref 3.6–11)

## 2024-09-27 PROCEDURE — 97116 GAIT TRAINING THERAPY: CPT

## 2024-09-27 PROCEDURE — 6370000000 HC RX 637 (ALT 250 FOR IP): Performed by: INTERNAL MEDICINE

## 2024-09-27 PROCEDURE — 80069 RENAL FUNCTION PANEL: CPT

## 2024-09-27 PROCEDURE — 36415 COLL VENOUS BLD VENIPUNCTURE: CPT

## 2024-09-27 PROCEDURE — 6370000000 HC RX 637 (ALT 250 FOR IP): Performed by: STUDENT IN AN ORGANIZED HEALTH CARE EDUCATION/TRAINING PROGRAM

## 2024-09-27 PROCEDURE — 94640 AIRWAY INHALATION TREATMENT: CPT

## 2024-09-27 PROCEDURE — 83735 ASSAY OF MAGNESIUM: CPT

## 2024-09-27 PROCEDURE — 2580000003 HC RX 258: Performed by: STUDENT IN AN ORGANIZED HEALTH CARE EDUCATION/TRAINING PROGRAM

## 2024-09-27 PROCEDURE — 97535 SELF CARE MNGMENT TRAINING: CPT

## 2024-09-27 PROCEDURE — 93975 VASCULAR STUDY: CPT | Performed by: SURGERY

## 2024-09-27 PROCEDURE — 97166 OT EVAL MOD COMPLEX 45 MIN: CPT

## 2024-09-27 PROCEDURE — 85025 COMPLETE CBC W/AUTO DIFF WBC: CPT

## 2024-09-27 PROCEDURE — 97161 PT EVAL LOW COMPLEX 20 MIN: CPT

## 2024-09-27 RX ADMIN — SODIUM CHLORIDE, PRESERVATIVE FREE 10 ML: 5 INJECTION INTRAVENOUS at 08:08

## 2024-09-27 RX ADMIN — ROSUVASTATIN CALCIUM 10 MG: 5 TABLET, FILM COATED ORAL at 08:07

## 2024-09-27 RX ADMIN — HYDRALAZINE HYDROCHLORIDE 100 MG: 50 TABLET ORAL at 06:12

## 2024-09-27 RX ADMIN — DILTIAZEM HYDROCHLORIDE 360 MG: 120 CAPSULE, COATED, EXTENDED RELEASE ORAL at 08:08

## 2024-09-27 RX ADMIN — Medication 2 PUFF: at 08:33

## 2024-09-27 RX ADMIN — FERROUS SULFATE TAB 325 MG (65 MG ELEMENTAL FE) 325 MG: 325 (65 FE) TAB at 08:08

## 2024-09-27 RX ADMIN — POLYETHYLENE GLYCOL 3350 17 G: 17 POWDER, FOR SOLUTION ORAL at 08:24

## 2024-09-27 RX ADMIN — CLOPIDOGREL BISULFATE 75 MG: 75 TABLET ORAL at 08:07

## 2024-09-27 RX ADMIN — CARVEDILOL 25 MG: 12.5 TABLET, FILM COATED ORAL at 08:07

## 2024-09-27 NOTE — PROGRESS NOTES
Nephrology follow-up          Patient: Saranya Hightower MRN: [id number]  SSN: [id number]    YOB: 1950  Age: 74 y.o.  Sex: female      Subjective:   The patient is seen at the bedside.  Blood pressures are better  On room air  Worsening MAKENZIE  Hypokalemia  Pending renal ultrasound  Pending abdominal duplex  Past Medical History:   Diagnosis Date    Abnormal weight loss 09/11/2021    Atherosclerosis of native coronary artery without angina pectoris 09/11/2021    CAD (coronary artery disease)     MI    Carotid artery stenosis 09/11/2021    Cervicalgia 09/11/2021    Colon polyps     Constipation 09/11/2021    HTN (hypertension) 09/11/2021    Hyperlipidemia     Hypertension     Hypertensive heart disease without congestive heart failure 09/11/2021    Menopause     Personal history of colonic polyps 09/11/2021    Vitamin D deficiency 09/11/2021     Past Surgical History:   Procedure Laterality Date    CARDIAC CATHETERIZATION      X 3    CARDIAC DEFIBRILLATOR PLACEMENT  09/16/2013    COLONOSCOPY N/A 10/5/2021    COLONOSCOPY ( T I V A) performed by Talib Ortega MD at [facility] ENDOSCOPY    COLONOSCOPY  2015    ORTHOPEDIC SURGERY      R leg    OTHER SURGICAL HISTORY      BILAT LOWER EXTREM VASC BYPASS SURGERY    OTHER SURGICAL HISTORY      R CAROTID ENDORECTOMY 11-4-05,   L CAROTID ENDARECTOMY 11-6-2006    PACEMAKER      6 0r 7 years ago      Family History   Problem Relation Age of Onset    No Known Problems Father     Cancer Mother     Hypertension Mother      Social History     Tobacco Use    Smoking status: Every Day     Current packs/day: 0.50     Types: Cigarettes    Smokeless tobacco: Never   Substance Use Topics    Alcohol use: Never      Current Facility-Administered Medications   Medication Dose Route Frequency Provider Last Rate Last Admin    [START ON 9/27/2024] dilTIAZem (CARDIZEM CD) extended release capsule 360 mg  360 mg Oral Daily Elian Duncan MD        potassium bicarb-citric acid 
    Hospitalist Progress Note               Daily Progress Note: 9/25/2024      Hospital Day: 2     Chief complaint: No chief complaint on file.       Subjective:   Hospital course to date:  Saranya Hightower is a 74 y.o. female with multiple medical problems including poorly controlled hypertension, presents to the ED at outside hospital setting of for back pain and shortness of breath.  Patient was found to be having systolic blood pressure of 230s for which she was started on a Cardene drip.  CXR revealed mild pulmonary edema  Creatinine 3.9  proBNP 15,971, it was 11,000 in January 2024.     She was started on high flow with FiO2 35% on 40 L oxygen, starting 99%     Patient has been completely asymptomatic of throughout.  Denies having any chest pain, palpitations, headaches, nausea, vomiting.     She does reported that she tries to be compliant with all her medications but sometimes runs out and forgets to pick them up from the pharmacy.  She cannot recollect which medication she has not been taking in the past couple of weeks.     Decision was made to transfer the patient to [facility] for further management to the ICU on Cardene drip for hypertensive emergency       --------  Patient is seen today for follow-up. Overnight was able to wean off supplemental O2, stating 94% on Room Air.   She is also off Cardene gtt this am, BP 180s/80s, . HR 100s     She was comfortable sleeping in bed. Denies any headaches, CP, Palpitations, SOB, N/V.     Cr 3.94  from 3.90       Medications reviewed  Current Facility-Administered Medications   Medication Dose Route Frequency    labetalol (NORMODYNE;TRANDATE) injection 10 mg  10 mg IntraVENous Q6H PRN    dilTIAZem (CARDIZEM CD) extended release capsule 300 mg  300 mg Oral Daily    hydrALAZINE (APRESOLINE) tablet 100 mg  100 mg Oral 3 times per day    labetalol (NORMODYNE;TRANDATE) injection 10 mg  10 mg IntraVENous Q4H PRN    sodium chloride flush 0.9 
1930- patient handed over to nurse, reported already called by day shift nurse. Patient stable at this time,vitals taken.  2100- patient transferred to [facility]   
Discharge order made by the Doctor,  aware, home health was arranged.  Patient discharge today alert and orientated.Vitals signs stable.Discharge instructions given and explained to the patient and ,all questions answered.  No complaints of pain. IV acces removed, tele box returned, patient voided to the bathroom before discharge. Accompanied wheeled in the ground floor with  waiting in the car.  
Discharge order placed. Order comments stated patient could discharge once ultrasound and duplex were complete. Provider contacted for clarification about whether patient should receive results. Provider stated patient should only complete the tests, and patient should follow up with Dr. Duncan as outpatient and discuss the results with him. Primary nurse updated.    Addendum- discharge canceled pending ultrasound results.  
OCCUPATIONAL THERAPY EVALUATION AND DISCHARGE  Patient: Saranya Hightower (74 y.o. female)  Date: 9/27/2024  Primary Diagnosis: Hypertensive emergency [I16.1]       Precautions: Fall Risk                Recommendations for nursing mobility: Out of bed to chair for meals and Use of BSC for toileting     In place during session:EKG/telemetry   ASSESSMENT  Ms. Hightower is a 74 y.o. female presenting to [facility] with c/o SOB/Respiratory Distress was admitted 9/24/24 and currently being treated for Hypertensive Emergency. Pt received sitting in a high back chair bedside upon arrival, A & O x 4, and agreeable to OT evaluation.     Based on the objective data described below pt currently present at baseline for ADLs and function mobility/transfers at this time. (See below for objective details and assist levels).     Overall she tolerated session today on room air without SOB or reporting dizziness, her O2 sats remained in the 90's when seen today, therefore she has no skilled acute OT needs at this time either noted by OT or reported by pt, will DC skilled OT following evaluation; pt verbalized understanding and agreement.  Current OT DC recommendation No skilled occupational therapy, return to previous living settingonce medically appropriate.     Start of Session End of Session   SPO2 (%) 92 93   Heart Rate (BPM) 76 78        PLAN :  Recommendations and Planned Interventions: DC from skilled OT services following evaluation.     Rationale for discharge: Patient currently at functional baseline for transfers/mobility, no further skilled therapy required at this time    Frequency/Duration: DC from OT services following evaluation due to Patient currently at functional baseline for transfers/mobility, no further skilled therapy required at this time; no skilled therapy required during admission, please reorder if needed     Recommendation for discharge: (in order for the patient to meet his/her long term goals)  No skilled 
Spiritual Health History and Assessment/Progress Note  [facility]    Initial Encounter,  ,  ,      Name: Saranya Hightower MRN: [id number]    Age: 74 y.o.     Sex: female   Language: English   Pent[employer]al: Ep[employer]   Hypertensive emergency     Date: 9/26/2024            Total Time Calculated: 14 min              Spiritual Assessment began in [facility] TELEMET[facility]        Referral/Consult From: Rounding   Encounter Overview/Reason: Initial Encounter  Service Provided For: Patient and family together    Brittanie, Belief, Meaning:   Patient identifies as spiritual  Family/Friends identify as spiritual      Importance and Influence:  Patient has spiritual/personal beliefs that influence decisions regarding their health  Family/Friends have spiritual/personal beliefs that influence decisions regarding the patient's health    Community:  Patient : Unknown  Family/Friends are connected with a spiritual community: and feel well-supported. Support system includes: Spouse/Partner    Assessment and Plan of Care:     Patient Interventions include: Facilitated expression of thoughts and feelings  Family/Friends Interventions include: Facilitated expression of thoughts and feelings    Patient Plan of Care: Spiritual Care available upon further referral  Family/Friends Plan of Care: Spiritual Care available upon further referral    Electronically signed by ARLET Marquez on 9/26/2024 at 4:30 PM     NARRATIVE: The  held a brief visit with the patient in Room 488 during rounding. The visit was brief in order to provide the patient's nurse the privacy she requested to change the patient. The patient's spouse communicated their willingness to be visited by a  in the future, should the 's schedule permit. The patient and her spouse articulated their desire to see a , should the 's schedule allow it in the near future.     The patient's spouse expressed gratitude for the  
Spoke with [facility] and they confirmed that the pt renal duplex was completed along with renal ultrasound confirmed by Ultrasound.  
(CARDIZEM CD) 360 MG extended release capsule Take 1 capsule by mouth daily 30 capsule 3    bumetanide (BUMEX) 2 MG tablet Take 0.5 tablets by mouth daily 30 tablet 3    [START ON 10/8/2024] rosuvastatin (CRESTOR) 10 MG tablet Take 4 tablets by mouth daily 30 tablet 3    sodium bicarbonate 650 MG tablet Take 1 tablet by mouth 3 times daily (with meals)      megestrol (MEGACE) 40 MG tablet Take 1 tablet by mouth 2 times daily 180 tablet 2    nitroGLYCERIN (NITROSTAT) 0.3 MG SL tablet Place 1 tablet under the tongue every 5 minutes as needed for Chest pain up to max of 3 total doses. If no relief after 1 dose, call 911. 12 tablet 0    pantoprazole (PROTONIX) 40 MG tablet Take 1 tablet by mouth daily 90 tablet 4    ondansetron (ZOFRAN) 4 MG tablet Take 1 tablet by mouth 3 times daily as needed for Nausea or Vomiting 15 tablet 0    cyproheptadine (PERIACTIN) 4 MG tablet Take 1 tablet by mouth 3 times daily (Patient not taking: Reported on 9/18/2024)      acetaminophen (TYLENOL) 325 MG tablet Take 2 tablets by mouth every 4 hours as needed      albuterol sulfate HFA (PROVENTIL;VENTOLIN;PROAIR) 108 (90 Base) MCG/ACT inhaler Inhale 2 puffs into the lungs every 4 hours as needed      aspirin 81 MG chewable tablet aspirin 81 mg tablet   Take by oral route.      budesonide-formoterol (SYMBICORT) 160-4.5 MCG/ACT AERO ceived the following from [facility]: Outside name: Symbicort 160-4.5 mcg/actuation HFAA      carvedilol (COREG) 25 MG tablet Take 1 tablet by mouth 2 times daily (with meals)      clopidogrel (PLAVIX) 75 MG tablet clopidogrel 75 mg tablet   Take 1 tablet every day by oral route. (Patient not taking: Reported on 9/18/2024)      ferrous sulfate (IRON 325) 325 (65 Fe) MG tablet Take 1 tablet by mouth every other day ceived the following from [facility]: Outside name: FeroSuL 325 mg (65 mg iron) tablet      mirtazapine (REMERON) 15 MG tablet Take 1 tablet by mouth nightly          No 
renal dist EDV 19.0 cm/s    Right Renal Mid RI 0.95 no units    Right renal mid .0 cm/s    Right renal mid EDV 6.6 cm/s    Right Renal Prox RI 0.82 no units    Right renal prox .0 cm/s    Right renal prox EDV 30.6 cm/s    Right renal origin .0 cm/s    Right renal origin EDV 41.8 cm/s    Right Segmental Renal Artery PSV 17.5 cm/s    Right Segmental Renal Artery EDV 0.0 cm/s    Right Segmental Renal Artery PSV 21.2 cm/s    Right Segmental Renal Artery EDV 5.8 cm/s    Right Segmental Renal Artery PSV 18.7 cm/s    Right Segmental Renal Artery EDV 7.1 cm/s    Left kidney length 7.50 cm    Right kidney length 10.02 cm    Right Renal Origin RI 0.78     Ao mid AP 1.85 cm    Ao mid TR 1.96 cm    Ao at renal arteries PSV 95.0 cm/s       Vascular duplex renal arterial complete         US RETROPERITONEAL COMPLETE    (Results Pending)          Discussion/MDM:     [] High (any 2)    A. Problems (any 1)  [] Acute/Chronic Illness/injury posing threat to life or bodily function:    [x] Severe exacerbation of chronic illness:    ---------------------------------------------------------------------  B. Risk of Treatment (any 1)   [] Drugs/treatments that require intensive monitoring for toxicity include:    [] IV ABX requiring serial renal monitoring for nephrotoxicity:     [] IV Narcotic analgesia for adverse drug reaction  [] Aggressive IV diuresis requiring serial monitoring for renal impairment and electrolyte derangements  [] Critical electrolyte abnormalities requiring IV replacement and close serial monitoring  [] SQ Insulin SS- monitoring serial FSBS for Hypoglycemic adverse drug reaction  [] Other -   [] Change in code status:    [] Decision to escalate care:    [] Major surgery/procedure with associated risk factors:    ----------------------------------------------------------------------  C. Data (any 2)  [] Discussed current management and discharge planning options with Case Management.  [] Discussed

## 2024-09-27 NOTE — PLAN OF CARE
(coronary artery disease)     MI    Carotid artery stenosis 09/11/2021    Cervicalgia 09/11/2021    Colon polyps     Constipation 09/11/2021    HTN (hypertension) 09/11/2021    Hyperlipidemia     Hypertension     Hypertensive heart disease without congestive heart failure 09/11/2021    Menopause     Personal history of colonic polyps 09/11/2021    Vitamin D deficiency 09/11/2021     Past Surgical History:   Procedure Laterality Date    CARDIAC CATHETERIZATION      X 3    CARDIAC DEFIBRILLATOR PLACEMENT  09/16/2013    COLONOSCOPY N/A 10/5/2021    COLONOSCOPY ( T I V A) performed by Talib Ortega MD at [facility] ENDOSCOPY    COLONOSCOPY  2015    ORTHOPEDIC SURGERY      R leg    OTHER SURGICAL HISTORY      BILAT LOWER EXTREM VASC BYPASS SURGERY    OTHER SURGICAL HISTORY      R CAROTID ENDORECTOMY 11-4-05,   L CAROTID ENDARECTOMY 11-6-2006    PACEMAKER      6 0r 7 years ago       Home Situation:  Social/Functional History  Lives With: Spouse  Type of Home: House  Home Layout: Two level, Able to Live on Main level with bedroom/bathroom  Home Access: Stairs to enter with rails  Entrance Stairs - Number of Steps: 5  Entrance Stairs - Rails: Both  Home Equipment: Walker - Rolling, Cane, Wheelchair - Manual  ADL Assistance: Independent  Ambulation Assistance: Independent (with use of SPC as needed)  Transfer Assistance: Independent    Cognitive/Behavioral Status:  Orientation  Overall Orientation Status: Within Normal Limits  Orientation Level: Oriented X4  Cognition  Overall Cognitive Status: WNL    Skin: intact where exposed    Strength:    Strength: Generally decreased, functional    Range Of Motion:  AROM: Within functional limits      Tone & Sensation:   Tone: Normal  Sensation: Intact    Functional Mobility:  Bed Mobility:     Bed Mobility Training  Bed Mobility Training: Yes  Interventions: Verbal cues  Rolling: Modified independent  Supine to Sit: Modified independent  Sit to Supine: Modified independent  Scooting:

## 2024-09-27 NOTE — CARE COORDINATION
Patient recc for HH, CM met with patient, patient agreeable, no prefernce for HH agency, referral sent, patient accepted with [facility], SOC 10/10-24.    Patient clear to d/c from  to home with HH, nurse notified.     Transition of Care Plan:     RUR: 26%  Prior Level of Functioning: independent  Disposition: home with family  If SNF or IPR: Date FOC offered:   Date FOC received:   Accepting facility:   Date authorization started with reference number:   Date authorization received and expires:   Follow up appointments:   DME needed:   Transportation at discharge: patient arranged  IM/IMM Medicare/ letter given: yes  Is patient a [place] and connected with [facility]?               If yes, was [place] transfer form completed and [facility] notified?   Caregiver Contact:   Discharge Caregiver contacted prior to discharge? Patient aware  Care Conference needed?   Barriers to discharge: n/a

## 2024-09-28 LAB
BACTERIA SPEC CULT: NORMAL
Lab: NORMAL

## 2024-09-29 LAB
ECHO BSA: 1.54 M2
VAS AORTA AT RENAL ARTERIES PSV: 95 CM/S
VAS AORTA MID AP: 1.85 CM
VAS AORTA MID TRANS: 1.96 CM
VAS L RENAL ORIG RI: 0.87 NO UNITS
VAS LEFT KIDNEY LENGTH: 7.5 CM
VAS LEFT RENAL DIST EDV: 13.8 CM/S
VAS LEFT RENAL DIST PSV: 59.4 CM/S
VAS LEFT RENAL DIST RI: 0.77 NO UNITS
VAS LEFT RENAL MID EDV: 14.4 CM/S
VAS LEFT RENAL MID PSV: 137 CM/S
VAS LEFT RENAL MID RI: 0.89 NO UNITS
VAS LEFT RENAL ORIGIN EDV: 16.8 CM/S
VAS LEFT RENAL ORIGIN PSV: 127 CM/S
VAS LEFT RENAL PROX EDV: 37.6 CM/S
VAS LEFT RENAL PROX PSV: 258 CM/S
VAS LEFT RENAL PROX RI: 0.85 NO UNITS
VAS RIGHT KIDNEY LENGTH: 10.02 CM
VAS RIGHT RENAL DIST EDV: 19 CM/S
VAS RIGHT RENAL DIST PSV: 202 CM/S
VAS RIGHT RENAL DIST RI: 0.91 NO UNITS
VAS RIGHT RENAL MID EDV: 6.6 CM/S
VAS RIGHT RENAL MID PSV: 144 CM/S
VAS RIGHT RENAL MID RI: 0.95 NO UNITS
VAS RIGHT RENAL ORIGIN EDV: 41.8 CM/S
VAS RIGHT RENAL ORIGIN PSV: 190 CM/S
VAS RIGHT RENAL ORIGIN RI: 0.78
VAS RIGHT RENAL PROX EDV: 30.6 CM/S
VAS RIGHT RENAL PROX PSV: 169 CM/S
VAS RIGHT RENAL PROX RI: 0.82 NO UNITS
VAS RIGHT SEGMENTAL RENAL ARTERY EDV: 0 CM/S
VAS RIGHT SEGMENTAL RENAL ARTERY EDV: 5.8 CM/S
VAS RIGHT SEGMENTAL RENAL ARTERY EDV: 7.1 CM/S
VAS RIGHT SEGMENTAL RENAL ARTERY PSV: 17.5 CM/S
VAS RIGHT SEGMENTAL RENAL ARTERY PSV: 18.7 CM/S
VAS RIGHT SEGMENTAL RENAL ARTERY PSV: 21.2 CM/S

## 2024-09-30 ASSESSMENT — ENCOUNTER SYMPTOMS
ANAL BLEEDING: 0
ABDOMINAL PAIN: 0
ABDOMINAL DISTENTION: 0
CONSTIPATION: 0
BLOOD IN STOOL: 0
ALLERGIC/IMMUNOLOGIC NEGATIVE: 1
RESPIRATORY NEGATIVE: 1
RECTAL PAIN: 0
BACK PAIN: 1
DIARRHEA: 0
NAUSEA: 0
VOMITING: 0

## 2024-09-30 NOTE — PROGRESS NOTES
Chief Complaint   Patient presents with    Follow-up     4 month     Weight Loss     \"Have you been to the ER, urgent care clinic since your last visit?  Hospitalized since your last visit?\"    NO    “Have you seen or consulted any other health care providers outside our system since your last visit?”    NO        Click Here for Release of Records Request   
Upper Arm, Position: Sitting, Cuff Size: Medium Adult)   Pulse 85   Temp 98.1 °F (36.7 °C) (Oral)   Resp 18   Ht 1.6 m (5' 3\")   Wt 52.3 kg (115 lb 3.2 oz)   SpO2 97%   BMI 20.41 kg/m²     Physical Exam  Vitals and nursing note reviewed.   Constitutional:       Appearance: Normal appearance. She is obese.   HENT:      Head: Normocephalic and atraumatic.      Nose: Nose normal.   Eyes:      General: No scleral icterus.  Cardiovascular:      Rate and Rhythm: Normal rate and regular rhythm.      Pulses: Normal pulses.      Heart sounds: Normal heart sounds.   Pulmonary:      Effort: Pulmonary effort is normal.      Breath sounds: Normal breath sounds.   Abdominal:      General: Abdomen is flat. There is no distension.      Palpations: Abdomen is soft. There is no mass.      Tenderness: There is no abdominal tenderness. There is no right CVA tenderness, left CVA tenderness, guarding or rebound.      Hernia: No hernia is present.   Musculoskeletal:      Right lower leg: No edema.      Left lower leg: No edema.   Skin:     General: Skin is warm and dry.   Neurological:      General: No focal deficit present.      Mental Status: She is alert and oriented to person, place, and time. Mental status is at baseline.   Psychiatric:         Mood and Affect: Mood normal.         Behavior: Behavior normal.         Thought Content: Thought content normal.         Judgment: Judgment normal.        1. Abnormal weight loss  Continue the Megace as appetite stimulant.  - megestrol (MEGACE) 40 MG tablet; Take 1 tablet by mouth 2 times daily  Dispense: 180 tablet; Refill: 2    2. Anorexia  Improving with the use of medication  - megestrol (MEGACE) 40 MG tablet; Take 1 tablet by mouth 2 times daily  Dispense: 180 tablet; Refill: 2    3. Personal history of colonic polyps

## 2024-10-01 ENCOUNTER — TRANSCRIBE ORDERS (OUTPATIENT)
Facility: HOSPITAL | Age: 74
End: 2024-10-01

## 2024-10-01 ENCOUNTER — HOSPITAL ENCOUNTER (OUTPATIENT)
Facility: HOSPITAL | Age: 74
Setting detail: SPECIMEN
Discharge: HOME | End: 2024-10-04
Payer: MEDICARE

## 2024-10-01 DIAGNOSIS — N18.31 CHRONIC KIDNEY DISEASE (CKD) STAGE G3A/A1, MODERATELY DECREASED GLOMERULAR FILTRATION RATE (GFR) BETWEEN 45-59 ML/MIN/1.73 SQUARE METER AND ALBUMINURIA CREATININE RATIO LESS THAN 30 MG/G (HCC): ICD-10-CM

## 2024-10-01 DIAGNOSIS — N18.9 CHRONIC KIDNEY DISEASE, UNSPECIFIED CKD STAGE: ICD-10-CM

## 2024-10-01 DIAGNOSIS — N18.9 CHRONIC KIDNEY DISEASE, UNSPECIFIED CKD STAGE: Primary | ICD-10-CM

## 2024-10-01 LAB
ALBUMIN SERPL-MCNC: 3.7 G/DL (ref 3.5–5)
ALDOST SERPL-MCNC: 6.9 NG/DL (ref 0–30)
ANION GAP SERPL CALC-SCNC: 17 MMOL/L (ref 2–12)
BUN SERPL-MCNC: 65 MG/DL (ref 6–20)
BUN/CREAT SERPL: 16 (ref 12–20)
CA-I BLD-MCNC: 9.2 MG/DL (ref 8.5–10.1)
CHLORIDE SERPL-SCNC: 95 MMOL/L (ref 97–108)
CO2 SERPL-SCNC: 24 MMOL/L (ref 21–32)
CREAT SERPL-MCNC: 3.94 MG/DL (ref 0.55–1.02)
ERYTHROCYTE [DISTWIDTH] IN BLOOD BY AUTOMATED COUNT: 12.6 % (ref 11.5–14.5)
GLUCOSE SERPL-MCNC: 101 MG/DL (ref 65–100)
HCT VFR BLD AUTO: 31.8 % (ref 35–47)
HGB BLD-MCNC: 10.9 G/DL (ref 11.5–16)
MCH RBC QN AUTO: 33.4 PG (ref 26–34)
MCHC RBC AUTO-ENTMCNC: 34.3 G/DL (ref 30–36.5)
MCV RBC AUTO: 97.5 FL (ref 80–99)
METANEPH FREE SERPL-MCNC: 50.3 PG/ML (ref 0–88)
NORMETANEPHRINE SERPL-MCNC: 284.6 PG/ML (ref 0–285.2)
NRBC # BLD: 0 K/UL (ref 0–0.01)
NRBC BLD-RTO: 0 PER 100 WBC
PHOSPHATE SERPL-MCNC: 4.6 MG/DL (ref 2.6–4.7)
PLATELET # BLD AUTO: 164 K/UL (ref 150–400)
PMV BLD AUTO: 11.3 FL (ref 8.9–12.9)
POTASSIUM SERPL-SCNC: 3.1 MMOL/L (ref 3.5–5.1)
RBC # BLD AUTO: 3.26 M/UL (ref 3.8–5.2)
RENIN PLAS-CCNC: 10.87 NG/ML/HR (ref 0.17–5.38)
SODIUM SERPL-SCNC: 136 MMOL/L (ref 136–145)
WBC # BLD AUTO: 9.9 K/UL (ref 3.6–11)

## 2024-10-01 PROCEDURE — 36415 COLL VENOUS BLD VENIPUNCTURE: CPT

## 2024-10-01 PROCEDURE — 80069 RENAL FUNCTION PANEL: CPT

## 2024-10-01 PROCEDURE — 85027 COMPLETE CBC AUTOMATED: CPT

## 2024-10-05 ENCOUNTER — APPOINTMENT (OUTPATIENT)
Facility: HOSPITAL | Age: 74
DRG: 291 | End: 2024-10-05
Payer: MEDICARE

## 2024-10-05 ENCOUNTER — HOSPITAL ENCOUNTER (INPATIENT)
Facility: HOSPITAL | Age: 74
LOS: 4 days | Discharge: HOME HEALTH | DRG: 291 | End: 2024-10-10
Attending: EMERGENCY MEDICINE | Admitting: HOSPITALIST
Payer: MEDICARE

## 2024-10-05 DIAGNOSIS — I50.23 SYSTOLIC CHF, ACUTE ON CHRONIC (HCC): ICD-10-CM

## 2024-10-05 DIAGNOSIS — R06.02 SHORTNESS OF BREATH: ICD-10-CM

## 2024-10-05 DIAGNOSIS — J81.0 ACUTE PULMONARY EDEMA: Primary | ICD-10-CM

## 2024-10-05 DIAGNOSIS — R94.31 ABNORMAL ELECTROCARDIOGRAPHY: ICD-10-CM

## 2024-10-05 LAB
BASOPHILS # BLD: 0.1 K/UL (ref 0–0.1)
BASOPHILS NFR BLD: 0 % (ref 0–1)
DIFFERENTIAL METHOD BLD: ABNORMAL
EOSINOPHIL # BLD: 0.1 K/UL (ref 0–0.4)
EOSINOPHIL NFR BLD: 0 % (ref 0–7)
ERYTHROCYTE [DISTWIDTH] IN BLOOD BY AUTOMATED COUNT: 12.7 % (ref 11.5–14.5)
HCT VFR BLD AUTO: 32.2 % (ref 35–47)
HGB BLD-MCNC: 10.5 G/DL (ref 11.5–16)
IMM GRANULOCYTES # BLD AUTO: 0.1 K/UL (ref 0–0.04)
IMM GRANULOCYTES NFR BLD AUTO: 1 % (ref 0–0.5)
LYMPHOCYTES # BLD: 3.1 K/UL (ref 0.8–3.5)
LYMPHOCYTES NFR BLD: 13 % (ref 12–49)
MCH RBC QN AUTO: 32.9 PG (ref 26–34)
MCHC RBC AUTO-ENTMCNC: 32.6 G/DL (ref 30–36.5)
MCV RBC AUTO: 100.9 FL (ref 80–99)
MONOCYTES # BLD: 1.4 K/UL (ref 0–1)
MONOCYTES NFR BLD: 6 % (ref 5–13)
NEUTS SEG # BLD: 18.2 K/UL (ref 1.8–8)
NEUTS SEG NFR BLD: 80 % (ref 32–75)
NRBC # BLD: 0 K/UL (ref 0–0.01)
NRBC BLD-RTO: 0 PER 100 WBC
PLATELET # BLD AUTO: 228 K/UL (ref 150–400)
PMV BLD AUTO: 10.8 FL (ref 8.9–12.9)
RBC # BLD AUTO: 3.19 M/UL (ref 3.8–5.2)
WBC # BLD AUTO: 23 K/UL (ref 3.6–11)

## 2024-10-05 PROCEDURE — 80053 COMPREHEN METABOLIC PANEL: CPT

## 2024-10-05 PROCEDURE — 71045 X-RAY EXAM CHEST 1 VIEW: CPT

## 2024-10-05 PROCEDURE — 6360000002 HC RX W HCPCS: Performed by: EMERGENCY MEDICINE

## 2024-10-05 PROCEDURE — 93005 ELECTROCARDIOGRAM TRACING: CPT | Performed by: EMERGENCY MEDICINE

## 2024-10-05 PROCEDURE — 96374 THER/PROPH/DIAG INJ IV PUSH: CPT

## 2024-10-05 PROCEDURE — 87636 SARSCOV2 & INF A&B AMP PRB: CPT

## 2024-10-05 PROCEDURE — 87040 BLOOD CULTURE FOR BACTERIA: CPT

## 2024-10-05 PROCEDURE — 81001 URINALYSIS AUTO W/SCOPE: CPT

## 2024-10-05 PROCEDURE — 96375 TX/PRO/DX INJ NEW DRUG ADDON: CPT

## 2024-10-05 PROCEDURE — 85025 COMPLETE CBC W/AUTO DIFF WBC: CPT

## 2024-10-05 PROCEDURE — 84484 ASSAY OF TROPONIN QUANT: CPT

## 2024-10-05 PROCEDURE — 83880 ASSAY OF NATRIURETIC PEPTIDE: CPT

## 2024-10-05 PROCEDURE — 83605 ASSAY OF LACTIC ACID: CPT

## 2024-10-05 PROCEDURE — 2500000003 HC RX 250 WO HCPCS: Performed by: EMERGENCY MEDICINE

## 2024-10-05 PROCEDURE — 99285 EMERGENCY DEPT VISIT HI MDM: CPT

## 2024-10-05 PROCEDURE — 6370000000 HC RX 637 (ALT 250 FOR IP): Performed by: EMERGENCY MEDICINE

## 2024-10-05 PROCEDURE — 94660 CPAP INITIATION&MGMT: CPT

## 2024-10-05 RX ORDER — FUROSEMIDE 10 MG/ML
40 INJECTION INTRAMUSCULAR; INTRAVENOUS
Status: COMPLETED | OUTPATIENT
Start: 2024-10-05 | End: 2024-10-05

## 2024-10-05 RX ORDER — ENALAPRILAT 1.25 MG/ML
1.25 INJECTION INTRAVENOUS
Status: COMPLETED | OUTPATIENT
Start: 2024-10-05 | End: 2024-10-05

## 2024-10-05 RX ADMIN — FUROSEMIDE 40 MG: 10 INJECTION, SOLUTION INTRAMUSCULAR; INTRAVENOUS at 23:44

## 2024-10-05 RX ADMIN — NITROGLYCERIN 1 INCH: 20 OINTMENT TOPICAL at 23:38

## 2024-10-05 RX ADMIN — ENALAPRILAT 1.25 MG: 1.25 INJECTION INTRAVENOUS at 23:37

## 2024-10-05 ASSESSMENT — PAIN - FUNCTIONAL ASSESSMENT
PAIN_FUNCTIONAL_ASSESSMENT: NONE - DENIES PAIN

## 2024-10-05 ASSESSMENT — LIFESTYLE VARIABLES
HOW MANY STANDARD DRINKS CONTAINING ALCOHOL DO YOU HAVE ON A TYPICAL DAY: PATIENT DOES NOT DRINK
HOW OFTEN DO YOU HAVE A DRINK CONTAINING ALCOHOL: NEVER

## 2024-10-05 ASSESSMENT — PAIN SCALES - GENERAL: PAINLEVEL_OUTOF10: 0

## 2024-10-06 ENCOUNTER — APPOINTMENT (OUTPATIENT)
Facility: HOSPITAL | Age: 74
DRG: 291 | End: 2024-10-06
Attending: EMERGENCY MEDICINE
Payer: MEDICARE

## 2024-10-06 PROBLEM — I50.43 CHF (CONGESTIVE HEART FAILURE), NYHA CLASS I, ACUTE ON CHRONIC, COMBINED (HCC): Status: ACTIVE | Noted: 2024-10-06

## 2024-10-06 PROBLEM — N18.4 CKD (CHRONIC KIDNEY DISEASE) STAGE 4, GFR 15-29 ML/MIN (HCC): Status: ACTIVE | Noted: 2024-10-06

## 2024-10-06 PROBLEM — J90 PLEURAL EFFUSION, BILATERAL: Status: ACTIVE | Noted: 2024-10-06

## 2024-10-06 PROBLEM — Z72.0 TOBACCO USER: Status: ACTIVE | Noted: 2024-10-06

## 2024-10-06 PROBLEM — I16.0 HYPERTENSIVE URGENCY: Status: ACTIVE | Noted: 2024-10-06

## 2024-10-06 PROBLEM — I11.9 HYPERTENSIVE HEART DISEASE WITHOUT CONGESTIVE HEART FAILURE: Status: RESOLVED | Noted: 2021-09-11 | Resolved: 2024-10-06

## 2024-10-06 PROBLEM — I50.23 SYSTOLIC CHF, ACUTE ON CHRONIC (HCC): Status: ACTIVE | Noted: 2024-10-06

## 2024-10-06 PROBLEM — J96.01 ACUTE RESPIRATORY FAILURE WITH HYPOXIA: Status: ACTIVE | Noted: 2024-10-06

## 2024-10-06 LAB
ALBUMIN SERPL-MCNC: 3.1 G/DL (ref 3.5–5)
ALBUMIN SERPL-MCNC: 3.8 G/DL (ref 3.5–5)
ALBUMIN/GLOB SERPL: 0.8 (ref 1.1–2.2)
ALP SERPL-CCNC: 86 U/L (ref 45–117)
ALT SERPL-CCNC: 37 U/L (ref 12–78)
ANION GAP SERPL CALC-SCNC: 13 MMOL/L (ref 2–12)
ANION GAP SERPL CALC-SCNC: 14 MMOL/L (ref 2–12)
APPEARANCE UR: CLEAR
ARTERIAL PATENCY WRIST A: YES
AST SERPL W P-5'-P-CCNC: 31 U/L (ref 15–37)
BACTERIA URNS QL MICRO: ABNORMAL /HPF
BASE DEFICIT BLDA-SCNC: 3.2 MMOL/L
BASOPHILS # BLD: 0 K/UL (ref 0–0.1)
BASOPHILS NFR BLD: 0 % (ref 0–1)
BDY SITE: ABNORMAL
BILIRUB SERPL-MCNC: 0.5 MG/DL (ref 0.2–1)
BILIRUB UR QL: NEGATIVE
BNP SERPL-MCNC: ABNORMAL PG/ML
BUN SERPL-MCNC: 52 MG/DL (ref 6–20)
BUN SERPL-MCNC: 53 MG/DL (ref 6–20)
BUN/CREAT SERPL: 15 (ref 12–20)
BUN/CREAT SERPL: 15 (ref 12–20)
CA-I BLD-MCNC: 9.1 MG/DL (ref 8.5–10.1)
CA-I BLD-MCNC: 9.2 MG/DL (ref 8.5–10.1)
CHLORIDE SERPL-SCNC: 102 MMOL/L (ref 97–108)
CHLORIDE SERPL-SCNC: 103 MMOL/L (ref 97–108)
CHOLEST SERPL-MCNC: 140 MG/DL
CO2 SERPL-SCNC: 23 MMOL/L (ref 21–32)
CO2 SERPL-SCNC: 23 MMOL/L (ref 21–32)
COHGB MFR BLD: 0.1 % (ref 1–2)
COLOR UR: ABNORMAL
CREAT SERPL-MCNC: 3.43 MG/DL (ref 0.55–1.02)
CREAT SERPL-MCNC: 3.52 MG/DL (ref 0.55–1.02)
DIFFERENTIAL METHOD BLD: ABNORMAL
EOSINOPHIL # BLD: 0 K/UL (ref 0–0.4)
EOSINOPHIL NFR BLD: 0 % (ref 0–7)
ERYTHROCYTE [DISTWIDTH] IN BLOOD BY AUTOMATED COUNT: 12.8 % (ref 11.5–14.5)
EST. AVERAGE GLUCOSE BLD GHB EST-MCNC: 82 MG/DL
FLUAV RNA SPEC QL NAA+PROBE: NOT DETECTED
FLUBV RNA SPEC QL NAA+PROBE: NOT DETECTED
GLOBULIN SER CALC-MCNC: 4.6 G/DL (ref 2–4)
GLUCOSE SERPL-MCNC: 149 MG/DL (ref 65–100)
GLUCOSE SERPL-MCNC: 76 MG/DL (ref 65–100)
GLUCOSE UR STRIP.AUTO-MCNC: NEGATIVE MG/DL
HBA1C MFR BLD: 4.5 % (ref 4–5.6)
HCO3 BLDA-SCNC: 20 MMOL/L (ref 22–26)
HCT VFR BLD AUTO: 29.3 % (ref 35–47)
HDLC SERPL-MCNC: 45 MG/DL
HDLC SERPL: 3.1 (ref 0–5)
HGB BLD-MCNC: 9.5 G/DL (ref 11.5–16)
HGB UR QL STRIP: ABNORMAL
IMM GRANULOCYTES # BLD AUTO: 0 K/UL (ref 0–0.04)
IMM GRANULOCYTES NFR BLD AUTO: 0 % (ref 0–0.5)
KETONES UR QL STRIP.AUTO: NEGATIVE MG/DL
LACTATE SERPL-SCNC: 1.2 MMOL/L (ref 0.4–2)
LDLC SERPL CALC-MCNC: 85.6 MG/DL (ref 0–100)
LEUKOCYTE ESTERASE UR QL STRIP.AUTO: NEGATIVE
LIPID PANEL: NORMAL
LYMPHOCYTES # BLD: 1 K/UL (ref 0.8–3.5)
LYMPHOCYTES NFR BLD: 10 % (ref 12–49)
MAGNESIUM SERPL-MCNC: 2.1 MG/DL (ref 1.6–2.4)
MCH RBC QN AUTO: 32.6 PG (ref 26–34)
MCHC RBC AUTO-ENTMCNC: 32.4 G/DL (ref 30–36.5)
MCV RBC AUTO: 100.7 FL (ref 80–99)
METHGB MFR BLD: 0.2 % (ref 0–1.4)
MONOCYTES # BLD: 0.7 K/UL (ref 0–1)
MONOCYTES NFR BLD: 7 % (ref 5–13)
NEUTS SEG # BLD: 8.5 K/UL (ref 1.8–8)
NEUTS SEG NFR BLD: 83 % (ref 32–75)
NITRITE UR QL STRIP.AUTO: NEGATIVE
NRBC # BLD: 0 K/UL (ref 0–0.01)
NRBC BLD-RTO: 0 PER 100 WBC
OXYHGB MFR BLD: 97.1 % (ref 95–99)
PCO2 BLDA: 31 MMHG (ref 35–45)
PERFORMED BY:: ABNORMAL
PH BLDA: 7.43 (ref 7.35–7.45)
PH UR STRIP: 6 (ref 5–8)
PHOSPHATE SERPL-MCNC: 4.2 MG/DL (ref 2.6–4.7)
PLATELET # BLD AUTO: 187 K/UL (ref 150–400)
PMV BLD AUTO: 11.8 FL (ref 8.9–12.9)
PO2 BLDA: 99 MMHG (ref 80–100)
POTASSIUM SERPL-SCNC: 3.6 MMOL/L (ref 3.5–5.1)
POTASSIUM SERPL-SCNC: 3.6 MMOL/L (ref 3.5–5.1)
PROT SERPL-MCNC: 8.4 G/DL (ref 6.4–8.2)
PROT UR STRIP-MCNC: 100 MG/DL
RBC # BLD AUTO: 2.91 M/UL (ref 3.8–5.2)
RBC #/AREA URNS HPF: ABNORMAL /HPF (ref 0–3)
RBC MORPH BLD: ABNORMAL
RBC MORPH BLD: ABNORMAL
SAO2% DEVICE SAO2% SENSOR NAME: ABNORMAL
SARS-COV-2 RNA RESP QL NAA+PROBE: NOT DETECTED
SODIUM SERPL-SCNC: 139 MMOL/L (ref 136–145)
SODIUM SERPL-SCNC: 139 MMOL/L (ref 136–145)
SP GR UR REFRACTOMETRY: 1.01 (ref 1–1.03)
SPECIMEN SITE: ABNORMAL
TRIGL SERPL-MCNC: 47 MG/DL
TROPONIN I SERPL HS-MCNC: 101 NG/L (ref 0–51)
TROPONIN I SERPL HS-MCNC: 58 NG/L (ref 0–51)
TROPONIN I SERPL HS-MCNC: 78 NG/L (ref 0–51)
TROPONIN I SERPL HS-MCNC: 87 NG/L (ref 0–51)
TSH SERPL DL<=0.05 MIU/L-ACNC: 1.33 UIU/ML (ref 0.36–3.74)
URINE CULTURE IF INDICATED: ABNORMAL
UROBILINOGEN UR QL STRIP.AUTO: 0.2 EU/DL (ref 0.2–1)
VLDLC SERPL CALC-MCNC: 9.4 MG/DL
WBC # BLD AUTO: 10.2 K/UL (ref 3.6–11)
WBC URNS QL MICRO: ABNORMAL /HPF (ref 0–5)

## 2024-10-06 PROCEDURE — 87040 BLOOD CULTURE FOR BACTERIA: CPT

## 2024-10-06 PROCEDURE — 83036 HEMOGLOBIN GLYCOSYLATED A1C: CPT

## 2024-10-06 PROCEDURE — 83735 ASSAY OF MAGNESIUM: CPT

## 2024-10-06 PROCEDURE — 2500000003 HC RX 250 WO HCPCS: Performed by: EMERGENCY MEDICINE

## 2024-10-06 PROCEDURE — 96376 TX/PRO/DX INJ SAME DRUG ADON: CPT

## 2024-10-06 PROCEDURE — 6360000002 HC RX W HCPCS: Performed by: HOSPITALIST

## 2024-10-06 PROCEDURE — 80061 LIPID PANEL: CPT

## 2024-10-06 PROCEDURE — 36600 WITHDRAWAL OF ARTERIAL BLOOD: CPT

## 2024-10-06 PROCEDURE — 6370000000 HC RX 637 (ALT 250 FOR IP): Performed by: STUDENT IN AN ORGANIZED HEALTH CARE EDUCATION/TRAINING PROGRAM

## 2024-10-06 PROCEDURE — 6370000000 HC RX 637 (ALT 250 FOR IP): Performed by: EMERGENCY MEDICINE

## 2024-10-06 PROCEDURE — 1100000000 HC RM PRIVATE

## 2024-10-06 PROCEDURE — 6370000000 HC RX 637 (ALT 250 FOR IP): Performed by: HOSPITALIST

## 2024-10-06 PROCEDURE — 51798 US URINE CAPACITY MEASURE: CPT

## 2024-10-06 PROCEDURE — 2580000003 HC RX 258: Performed by: HOSPITALIST

## 2024-10-06 PROCEDURE — 6360000002 HC RX W HCPCS: Performed by: EMERGENCY MEDICINE

## 2024-10-06 PROCEDURE — 80069 RENAL FUNCTION PANEL: CPT

## 2024-10-06 PROCEDURE — 84484 ASSAY OF TROPONIN QUANT: CPT

## 2024-10-06 PROCEDURE — 71250 CT THORAX DX C-: CPT

## 2024-10-06 PROCEDURE — 85025 COMPLETE CBC W/AUTO DIFF WBC: CPT

## 2024-10-06 PROCEDURE — 94640 AIRWAY INHALATION TREATMENT: CPT

## 2024-10-06 PROCEDURE — 74176 CT ABD & PELVIS W/O CONTRAST: CPT

## 2024-10-06 PROCEDURE — 82803 BLOOD GASES ANY COMBINATION: CPT

## 2024-10-06 PROCEDURE — 84443 ASSAY THYROID STIM HORMONE: CPT

## 2024-10-06 RX ORDER — CARVEDILOL 12.5 MG/1
25 TABLET ORAL 2 TIMES DAILY WITH MEALS
Status: DISCONTINUED | OUTPATIENT
Start: 2024-10-06 | End: 2024-10-10

## 2024-10-06 RX ORDER — CLOPIDOGREL BISULFATE 75 MG/1
75 TABLET ORAL DAILY
Status: DISCONTINUED | OUTPATIENT
Start: 2024-10-06 | End: 2024-10-06

## 2024-10-06 RX ORDER — SODIUM CHLORIDE 9 MG/ML
INJECTION, SOLUTION INTRAVENOUS PRN
Status: DISCONTINUED | OUTPATIENT
Start: 2024-10-06 | End: 2024-10-10 | Stop reason: HOSPADM

## 2024-10-06 RX ORDER — MIRTAZAPINE 15 MG/1
15 TABLET, FILM COATED ORAL NIGHTLY
Status: DISCONTINUED | OUTPATIENT
Start: 2024-10-06 | End: 2024-10-10 | Stop reason: HOSPADM

## 2024-10-06 RX ORDER — HYDRALAZINE HYDROCHLORIDE 50 MG/1
100 TABLET, FILM COATED ORAL EVERY 8 HOURS SCHEDULED
Status: DISCONTINUED | OUTPATIENT
Start: 2024-10-06 | End: 2024-10-09

## 2024-10-06 RX ORDER — OXYCODONE HYDROCHLORIDE 10 MG/1
10 TABLET ORAL EVERY 4 HOURS PRN
Status: DISCONTINUED | OUTPATIENT
Start: 2024-10-06 | End: 2024-10-06

## 2024-10-06 RX ORDER — ACETAMINOPHEN 650 MG/1
650 SUPPOSITORY RECTAL EVERY 6 HOURS PRN
Status: DISCONTINUED | OUTPATIENT
Start: 2024-10-06 | End: 2024-10-10 | Stop reason: HOSPADM

## 2024-10-06 RX ORDER — DOCUSATE SODIUM 100 MG/1
100 CAPSULE, LIQUID FILLED ORAL 2 TIMES DAILY
Status: DISCONTINUED | OUTPATIENT
Start: 2024-10-06 | End: 2024-10-10 | Stop reason: HOSPADM

## 2024-10-06 RX ORDER — HYDRALAZINE HYDROCHLORIDE 20 MG/ML
10 INJECTION INTRAMUSCULAR; INTRAVENOUS EVERY 6 HOURS PRN
Status: DISCONTINUED | OUTPATIENT
Start: 2024-10-06 | End: 2024-10-10 | Stop reason: HOSPADM

## 2024-10-06 RX ORDER — HEPARIN SODIUM 5000 [USP'U]/ML
5000 INJECTION, SOLUTION INTRAVENOUS; SUBCUTANEOUS EVERY 8 HOURS SCHEDULED
Status: DISCONTINUED | OUTPATIENT
Start: 2024-10-06 | End: 2024-10-10 | Stop reason: HOSPADM

## 2024-10-06 RX ORDER — DILTIAZEM HYDROCHLORIDE 240 MG/1
240 CAPSULE, EXTENDED RELEASE ORAL DAILY
Status: ON HOLD | COMMUNITY
End: 2024-10-10 | Stop reason: HOSPADM

## 2024-10-06 RX ORDER — ENOXAPARIN SODIUM 100 MG/ML
1 INJECTION SUBCUTANEOUS
Status: COMPLETED | OUTPATIENT
Start: 2024-10-06 | End: 2024-10-06

## 2024-10-06 RX ORDER — LACTULOSE 10 G/15ML
20 SOLUTION ORAL NIGHTLY PRN
Status: DISCONTINUED | OUTPATIENT
Start: 2024-10-06 | End: 2024-10-10 | Stop reason: HOSPADM

## 2024-10-06 RX ORDER — SODIUM CHLORIDE 0.9 % (FLUSH) 0.9 %
5-40 SYRINGE (ML) INJECTION EVERY 12 HOURS SCHEDULED
Status: DISCONTINUED | OUTPATIENT
Start: 2024-10-06 | End: 2024-10-10

## 2024-10-06 RX ORDER — SODIUM BICARBONATE 650 MG/1
650 TABLET ORAL 2 TIMES DAILY WITH MEALS
Status: DISCONTINUED | OUTPATIENT
Start: 2024-10-06 | End: 2024-10-10 | Stop reason: HOSPADM

## 2024-10-06 RX ORDER — BUDESONIDE AND FORMOTEROL FUMARATE DIHYDRATE 160; 4.5 UG/1; UG/1
2 AEROSOL RESPIRATORY (INHALATION)
Status: DISCONTINUED | OUTPATIENT
Start: 2024-10-06 | End: 2024-10-10 | Stop reason: HOSPADM

## 2024-10-06 RX ORDER — POLYETHYLENE GLYCOL 3350 17 G/17G
17 POWDER, FOR SOLUTION ORAL DAILY PRN
Status: DISCONTINUED | OUTPATIENT
Start: 2024-10-06 | End: 2024-10-10 | Stop reason: HOSPADM

## 2024-10-06 RX ORDER — FERROUS SULFATE 325(65) MG
325 TABLET ORAL 2 TIMES DAILY WITH MEALS
Status: DISCONTINUED | OUTPATIENT
Start: 2024-10-06 | End: 2024-10-10 | Stop reason: HOSPADM

## 2024-10-06 RX ORDER — ONDANSETRON 4 MG/1
4 TABLET, ORALLY DISINTEGRATING ORAL EVERY 8 HOURS PRN
Status: DISCONTINUED | OUTPATIENT
Start: 2024-10-06 | End: 2024-10-10 | Stop reason: HOSPADM

## 2024-10-06 RX ORDER — TRAMADOL HYDROCHLORIDE 50 MG/1
50 TABLET ORAL EVERY 6 HOURS PRN
Status: DISCONTINUED | OUTPATIENT
Start: 2024-10-06 | End: 2024-10-10

## 2024-10-06 RX ORDER — ROSUVASTATIN CALCIUM 10 MG/1
40 TABLET, COATED ORAL NIGHTLY
Status: DISCONTINUED | OUTPATIENT
Start: 2024-10-06 | End: 2024-10-10 | Stop reason: HOSPADM

## 2024-10-06 RX ORDER — ACETAMINOPHEN 325 MG/1
650 TABLET ORAL EVERY 6 HOURS PRN
Status: DISCONTINUED | OUTPATIENT
Start: 2024-10-06 | End: 2024-10-10 | Stop reason: HOSPADM

## 2024-10-06 RX ORDER — NICOTINE 21 MG/24HR
1 PATCH, TRANSDERMAL 24 HOURS TRANSDERMAL DAILY
Status: DISCONTINUED | OUTPATIENT
Start: 2024-10-06 | End: 2024-10-10 | Stop reason: HOSPADM

## 2024-10-06 RX ORDER — POTASSIUM CHLORIDE 1500 MG/1
20 TABLET, EXTENDED RELEASE ORAL 2 TIMES DAILY WITH MEALS
Status: DISCONTINUED | OUTPATIENT
Start: 2024-10-06 | End: 2024-10-09

## 2024-10-06 RX ORDER — SODIUM CHLORIDE 0.9 % (FLUSH) 0.9 %
5-40 SYRINGE (ML) INJECTION PRN
Status: DISCONTINUED | OUTPATIENT
Start: 2024-10-06 | End: 2024-10-10 | Stop reason: HOSPADM

## 2024-10-06 RX ORDER — ASPIRIN 81 MG/1
81 TABLET ORAL DAILY
Status: DISCONTINUED | OUTPATIENT
Start: 2024-10-06 | End: 2024-10-10 | Stop reason: HOSPADM

## 2024-10-06 RX ORDER — CARVEDILOL 12.5 MG/1
25 TABLET ORAL
Status: COMPLETED | OUTPATIENT
Start: 2024-10-06 | End: 2024-10-06

## 2024-10-06 RX ORDER — HYDRALAZINE HYDROCHLORIDE 20 MG/ML
10 INJECTION INTRAMUSCULAR; INTRAVENOUS
Status: DISCONTINUED | OUTPATIENT
Start: 2024-10-06 | End: 2024-10-06

## 2024-10-06 RX ORDER — ROSUVASTATIN CALCIUM 40 MG/1
40 TABLET, COATED ORAL DAILY
COMMUNITY
Start: 2024-10-02

## 2024-10-06 RX ORDER — CETIRIZINE HYDROCHLORIDE 5 MG/1
5 TABLET ORAL DAILY
Status: DISCONTINUED | OUTPATIENT
Start: 2024-10-06 | End: 2024-10-10 | Stop reason: HOSPADM

## 2024-10-06 RX ORDER — ENALAPRILAT 1.25 MG/ML
1.25 INJECTION INTRAVENOUS
Status: COMPLETED | OUTPATIENT
Start: 2024-10-06 | End: 2024-10-06

## 2024-10-06 RX ORDER — ONDANSETRON 2 MG/ML
4 INJECTION INTRAMUSCULAR; INTRAVENOUS EVERY 6 HOURS PRN
Status: DISCONTINUED | OUTPATIENT
Start: 2024-10-06 | End: 2024-10-10 | Stop reason: HOSPADM

## 2024-10-06 RX ORDER — IPRATROPIUM BROMIDE AND ALBUTEROL SULFATE 2.5; .5 MG/3ML; MG/3ML
1 SOLUTION RESPIRATORY (INHALATION) EVERY 4 HOURS PRN
Status: DISCONTINUED | OUTPATIENT
Start: 2024-10-06 | End: 2024-10-10 | Stop reason: HOSPADM

## 2024-10-06 RX ORDER — PANTOPRAZOLE SODIUM 40 MG/1
40 TABLET, DELAYED RELEASE ORAL
Status: DISCONTINUED | OUTPATIENT
Start: 2024-10-06 | End: 2024-10-10 | Stop reason: HOSPADM

## 2024-10-06 RX ORDER — GUAIFENESIN 600 MG/1
600 TABLET, EXTENDED RELEASE ORAL 2 TIMES DAILY
Status: DISCONTINUED | OUTPATIENT
Start: 2024-10-06 | End: 2024-10-10

## 2024-10-06 RX ORDER — BUMETANIDE 0.25 MG/ML
1 INJECTION INTRAMUSCULAR; INTRAVENOUS 2 TIMES DAILY
Status: DISCONTINUED | OUTPATIENT
Start: 2024-10-06 | End: 2024-10-10

## 2024-10-06 RX ORDER — CLONIDINE HYDROCHLORIDE 0.1 MG/1
0.1 TABLET ORAL 2 TIMES DAILY
Status: ON HOLD | COMMUNITY
Start: 2024-10-02 | End: 2024-10-10 | Stop reason: HOSPADM

## 2024-10-06 RX ORDER — OXYCODONE HYDROCHLORIDE 5 MG/1
5 TABLET ORAL EVERY 4 HOURS PRN
Status: DISCONTINUED | OUTPATIENT
Start: 2024-10-06 | End: 2024-10-06

## 2024-10-06 RX ORDER — HYDRALAZINE HYDROCHLORIDE 25 MG/1
25 TABLET, FILM COATED ORAL 3 TIMES DAILY
Status: ON HOLD | COMMUNITY
Start: 2024-09-17 | End: 2024-10-10 | Stop reason: HOSPADM

## 2024-10-06 RX ADMIN — FERROUS SULFATE TAB 325 MG (65 MG ELEMENTAL FE) 325 MG: 325 (65 FE) TAB at 11:20

## 2024-10-06 RX ADMIN — DOCUSATE SODIUM 100 MG: 100 CAPSULE, LIQUID FILLED ORAL at 21:00

## 2024-10-06 RX ADMIN — Medication 2 PUFF: at 11:38

## 2024-10-06 RX ADMIN — SODIUM BICARBONATE 650 MG: 650 TABLET ORAL at 16:35

## 2024-10-06 RX ADMIN — CARVEDILOL 25 MG: 12.5 TABLET, FILM COATED ORAL at 01:38

## 2024-10-06 RX ADMIN — CARVEDILOL 25 MG: 12.5 TABLET, FILM COATED ORAL at 11:21

## 2024-10-06 RX ADMIN — CARVEDILOL 25 MG: 12.5 TABLET, FILM COATED ORAL at 16:35

## 2024-10-06 RX ADMIN — HEPARIN SODIUM 5000 UNITS: 5000 INJECTION INTRAVENOUS; SUBCUTANEOUS at 14:04

## 2024-10-06 RX ADMIN — FERROUS SULFATE TAB 325 MG (65 MG ELEMENTAL FE) 325 MG: 325 (65 FE) TAB at 16:35

## 2024-10-06 RX ADMIN — BUMETANIDE 1 MG: 0.25 INJECTION INTRAMUSCULAR; INTRAVENOUS at 18:22

## 2024-10-06 RX ADMIN — POTASSIUM CHLORIDE 20 MEQ: 1500 TABLET, EXTENDED RELEASE ORAL at 11:21

## 2024-10-06 RX ADMIN — PANTOPRAZOLE SODIUM 40 MG: 40 TABLET, DELAYED RELEASE ORAL at 11:20

## 2024-10-06 RX ADMIN — GUAIFENESIN 600 MG: 600 TABLET, EXTENDED RELEASE ORAL at 21:00

## 2024-10-06 RX ADMIN — HYDRALAZINE HYDROCHLORIDE 100 MG: 50 TABLET ORAL at 21:00

## 2024-10-06 RX ADMIN — SODIUM CHLORIDE, PRESERVATIVE FREE 10 ML: 5 INJECTION INTRAVENOUS at 18:22

## 2024-10-06 RX ADMIN — POTASSIUM CHLORIDE 20 MEQ: 1500 TABLET, EXTENDED RELEASE ORAL at 16:35

## 2024-10-06 RX ADMIN — HEPARIN SODIUM 5000 UNITS: 5000 INJECTION INTRAVENOUS; SUBCUTANEOUS at 21:00

## 2024-10-06 RX ADMIN — BUMETANIDE 1 MG: 0.25 INJECTION INTRAMUSCULAR; INTRAVENOUS at 11:21

## 2024-10-06 RX ADMIN — DOCUSATE SODIUM 100 MG: 100 CAPSULE, LIQUID FILLED ORAL at 11:20

## 2024-10-06 RX ADMIN — MIRTAZAPINE 15 MG: 15 TABLET, FILM COATED ORAL at 21:00

## 2024-10-06 RX ADMIN — ROSUVASTATIN CALCIUM 40 MG: 10 TABLET, FILM COATED ORAL at 20:59

## 2024-10-06 RX ADMIN — SODIUM CHLORIDE, PRESERVATIVE FREE 10 ML: 5 INJECTION INTRAVENOUS at 21:35

## 2024-10-06 RX ADMIN — ENOXAPARIN SODIUM 50 MG: 100 INJECTION SUBCUTANEOUS at 05:32

## 2024-10-06 RX ADMIN — GUAIFENESIN 600 MG: 600 TABLET, EXTENDED RELEASE ORAL at 14:04

## 2024-10-06 RX ADMIN — ASPIRIN 81 MG: 81 TABLET, COATED ORAL at 12:44

## 2024-10-06 RX ADMIN — HYDRALAZINE HYDROCHLORIDE 100 MG: 50 TABLET ORAL at 14:04

## 2024-10-06 RX ADMIN — SODIUM CHLORIDE, PRESERVATIVE FREE 10 ML: 5 INJECTION INTRAVENOUS at 11:11

## 2024-10-06 RX ADMIN — CETIRIZINE HYDROCHLORIDE 5 MG: 5 TABLET ORAL at 11:21

## 2024-10-06 RX ADMIN — SODIUM BICARBONATE 650 MG: 650 TABLET ORAL at 11:20

## 2024-10-06 RX ADMIN — ENALAPRILAT 1.25 MG: 1.25 INJECTION INTRAVENOUS at 00:43

## 2024-10-06 RX ADMIN — TRAMADOL HYDROCHLORIDE 50 MG: 50 TABLET ORAL at 22:05

## 2024-10-06 RX ADMIN — Medication 2 PUFF: at 20:31

## 2024-10-06 ASSESSMENT — PAIN SCALES - GENERAL
PAINLEVEL_OUTOF10: 8
PAINLEVEL_OUTOF10: 0

## 2024-10-06 ASSESSMENT — PAIN DESCRIPTION - ORIENTATION: ORIENTATION: LEFT

## 2024-10-06 ASSESSMENT — PAIN - FUNCTIONAL ASSESSMENT: PAIN_FUNCTIONAL_ASSESSMENT: ACTIVITIES ARE NOT PREVENTED

## 2024-10-06 ASSESSMENT — PAIN DESCRIPTION - LOCATION: LOCATION: FLANK

## 2024-10-06 ASSESSMENT — PAIN DESCRIPTION - DESCRIPTORS: DESCRIPTORS: ACHING

## 2024-10-06 NOTE — PLAN OF CARE
Problem: Chronic Conditions and Co-morbidities  Goal: Patient's chronic conditions and co-morbidity symptoms are monitored and maintained or improved  Outcome: Progressing  Flowsheets  Taken 10/6/2024 1158  Care Plan - Patient's Chronic Conditions and Co-Morbidity Symptoms are Monitored and Maintained or Improved:   Monitor and assess patient's chronic conditions and comorbid symptoms for stability, deterioration, or improvement   Collaborate with multidisciplinary team to address chronic and comorbid conditions and prevent exacerbation or deterioration   Update acute care plan with appropriate goals if chronic or comorbid symptoms are exacerbated and prevent overall improvement and discharge  Taken 10/6/2024 1053  Care Plan - Patient's Chronic Conditions and Co-Morbidity Symptoms are Monitored and Maintained or Improved:   Monitor and assess patient's chronic conditions and comorbid symptoms for stability, deterioration, or improvement   Collaborate with multidisciplinary team to address chronic and comorbid conditions and prevent exacerbation or deterioration   Update acute care plan with appropriate goals if chronic or comorbid symptoms are exacerbated and prevent overall improvement and discharge

## 2024-10-06 NOTE — ED TRIAGE NOTES
Pt here BIBA for severe SOB onset 1100pm onset RA 75%RA on EMS arrival placed on NRB @15LPM en route Sp02 increased to 100%, EKG done en route, HX Pulmonary edema

## 2024-10-06 NOTE — ED PROVIDER NOTES
Final diagnoses:   Acute pulmonary edema     ED Course as of 10/06/24 1018   Sun Oct 06, 2024   0811 Patient seen in signout from Dr. Jarquin.  Stable off BiPAP for pulmonary edema.  Symptoms improving.  Not felt to be sepsis no clear source.  White count could be from general inflammation. Given concerns for volume overload, minimal fluids  Is this patient to be included in the SEP-1 Core Measure due to severe sepsis or septic shock?   No   Exclusion criteria - the patient is NOT to be included for SEP-1 Core Measure due to:  Alternative explanation for abnormal labs/vitals that do not relate to sepsis, see MDM for further explanation   [MC]   1017 Discussed with hospitalist Dr. Scott.  Has been reviewing chart as per discussion on signout.  Patient very stable in no distress.  CTs of the chest and abdomen were ordered per their request.  Pending reads for these.  Discussed with hospitalist will admit observation to their service for [MC]      ED Course User Index  [MC] Arnie Gomez MD     Will monitor CT results if positive for pneumonia or other potential infectious cause will activate sepsis protocol but at this point still believe not septic 10:19 AM       Arnie Gomez MD  10/06/24 1019

## 2024-10-06 NOTE — ED PROVIDER NOTES
[facility] EMERGENCY DEPT  EMERGENCY DEPARTMENT ENCOUNTER      Pt Name: Saranya Hightower  MRN: [id number]  Birthdate 1950  Date of evaluation: 10/5/2024  Provider: Ronnie Jarquin MD  11:28 PM    CHIEF COMPLAINT       Chief Complaint   Patient presents with    Shortness of Breath         HISTORY OF PRESENT ILLNESS    Saranya Hihgtower is a 74 y.o. female who presents to the emergency department with complaint of severe shortness of breath starting at 11 PM tonight . Initially 75 % Oxygen saturation on room air. She was placed on 15 liters NRB and brought to ER , with respiratory difficulty. She was tachypneic and diaphoretic . She has a history of CHF and pulmonary edema. She denies chest pain . No fever or any other associated signs or symptoms.     HPI    Nursing Notes were reviewed.    REVIEW OF SYSTEMS       Review of Systems   Constitutional:  Positive for diaphoresis. Negative for chills and fever.   HENT: Negative.     Eyes: Negative.    Respiratory:  Positive for shortness of breath.    Cardiovascular:  Positive for leg swelling. Negative for chest pain.   Gastrointestinal: Negative.    Endocrine: Negative.    Genitourinary: Negative.    Musculoskeletal: Negative.    Allergic/Immunologic: Negative.    Neurological: Negative.    Hematological: Negative.    Psychiatric/Behavioral: Negative.         Except as noted above the remainder of the review of systems was reviewed and negative.       PAST MEDICAL HISTORY     Past Medical History:   Diagnosis Date    Abnormal weight loss 09/11/2021    Atherosclerosis of native coronary artery without angina pectoris 09/11/2021    CAD (coronary artery disease)     MI    Carotid artery stenosis 09/11/2021    Cervicalgia 09/11/2021    Colon polyps     Constipation 09/11/2021    HTN (hypertension) 09/11/2021    Hyperlipidemia     Hypertension     Hypertensive heart disease without congestive heart failure 09/11/2021    Menopause     Personal history of colonic polyps 09/11/2021    Vitamin

## 2024-10-06 NOTE — H&P
History and Physical  Dr Carl Scott MD      Chief Complaints:     Chief Complaint   Patient presents with    Shortness of Breath         Subjective:     Saranya Hightower is a 74 y.o. female followed by Nubia Davila APRN - NP andCardiologist Dr. Pires and Nephrologist Dr. Duncan   has a past medical history of AAA (abdominal aortic aneurysm) ([facility]), Abnormal weight loss, Anemia, Atherosclerosis of native coronary artery without angina pectoris, Bronchitis, CAD (coronary artery disease), Carotid artery stenosis, Cervicalgia, Chronic bronchitis ([facility]), CKD (chronic kidney disease) stage 4, GFR 15-29 ml/min ([facility]), Colon polyps, Constipation, COPD (chronic obstructive pulmonary disease) ([facility]), HTN (hypertension), Hyperlipidemia, Menopause, Personal history of colonic polyps, PVD (peripheral vascular disease) ([facility]), and Vitamin D deficiency.  Patient presents with sudden onset of shortness of breath he recently discharged from [facility] for CHF exacerbation.  She says that she has been compliant with her medications and also watching her weight even though she is unable to tell me how both numbers are doing.  She followed up with her cardiologist Dr. Levy on 10/3 and during that visit with her weight and blood pressure was stable and at baseline.  She says that yesterday she was watching TV soon after eating dinner she felt like she could not catch her breath denies any coughing but described that she could not take deep breath.  She denied any chest pain or cough or swelling in the lower extremities and felt like she was doing well.  Does state that she like her belly was more swollen.  she also says that she only drinks about 2 bottles of water per day.  He notes that the shortness of breath is worse when she laid back but what improved when she sat upright.  She does continue to smoke a pack per day.  Recently her appetite has been poor and has improved and is noted to be on Megace twice daily and she does

## 2024-10-06 NOTE — ED NOTES
Emptied 800 cc's yellow urine from [facility]. Pleasant. No complaints verbalized. RR  even and nonlabored

## 2024-10-07 ENCOUNTER — FOLLOWUP TELEPHONE ENCOUNTER (OUTPATIENT)
Facility: HOSPITAL | Age: 74
End: 2024-10-07

## 2024-10-07 ENCOUNTER — APPOINTMENT (OUTPATIENT)
Facility: HOSPITAL | Age: 74
DRG: 291 | End: 2024-10-07
Attending: HOSPITALIST
Payer: MEDICARE

## 2024-10-07 LAB
ANION GAP SERPL CALC-SCNC: 11 MMOL/L (ref 2–12)
BASOPHILS # BLD: 0 K/UL (ref 0–0.1)
BASOPHILS NFR BLD: 0 % (ref 0–1)
BNP SERPL-MCNC: ABNORMAL PG/ML
BUN SERPL-MCNC: 62 MG/DL (ref 6–20)
BUN/CREAT SERPL: 15 (ref 12–20)
CA-I BLD-MCNC: 8.7 MG/DL (ref 8.5–10.1)
CHLORIDE SERPL-SCNC: 106 MMOL/L (ref 97–108)
CO2 SERPL-SCNC: 23 MMOL/L (ref 21–32)
CREAT SERPL-MCNC: 4.02 MG/DL (ref 0.55–1.02)
DIFFERENTIAL METHOD BLD: ABNORMAL
ECHO BSA: 1.5 M2
ECHO EST RA PRESSURE: 3 MMHG
ECHO LA DIAMETER INDEX: 2.29 CM/M2
ECHO LA DIAMETER: 3.5 CM
ECHO LA VOL A-L A2C: 31 ML (ref 22–52)
ECHO LA VOL A-L A4C: 51 ML (ref 22–52)
ECHO LA VOL BP: 38 ML (ref 22–52)
ECHO LA VOL MOD A2C: 30 ML (ref 22–52)
ECHO LA VOL MOD A4C: 47 ML (ref 22–52)
ECHO LA VOL/BSA BIPLANE: 25 ML/M2 (ref 16–34)
ECHO LA VOLUME AREA LENGTH: 41 ML
ECHO LA VOLUME INDEX A-L A2C: 20 ML/M2 (ref 16–34)
ECHO LA VOLUME INDEX A-L A4C: 33 ML/M2 (ref 16–34)
ECHO LA VOLUME INDEX AREA LENGTH: 27 ML/M2 (ref 16–34)
ECHO LA VOLUME INDEX MOD A2C: 20 ML/M2 (ref 16–34)
ECHO LA VOLUME INDEX MOD A4C: 31 ML/M2 (ref 16–34)
ECHO LV EDV A2C: 60 ML
ECHO LV EDV A4C: 68 ML
ECHO LV EDV BP: 64 ML (ref 56–104)
ECHO LV EDV INDEX A4C: 44 ML/M2
ECHO LV EDV INDEX BP: 42 ML/M2
ECHO LV EDV NDEX A2C: 39 ML/M2
ECHO LV EJECTION FRACTION A2C: 39 %
ECHO LV EJECTION FRACTION A4C: 32 %
ECHO LV EJECTION FRACTION BIPLANE: 37 % (ref 55–100)
ECHO LV ESV A2C: 37 ML
ECHO LV ESV A4C: 46 ML
ECHO LV ESV BP: 41 ML (ref 19–49)
ECHO LV ESV INDEX A2C: 24 ML/M2
ECHO LV ESV INDEX A4C: 30 ML/M2
ECHO LV ESV INDEX BP: 27 ML/M2
ECHO LV FRACTIONAL SHORTENING: 12 % (ref 28–44)
ECHO LV GLOBAL LONGITUDINAL STRAIN (GLS): -10.2 %
ECHO LV GLOBAL LONGITUDINAL STRAIN (GLS): -7.5 %
ECHO LV GLOBAL LONGITUDINAL STRAIN (GLS): -7.7 %
ECHO LV GLOBAL LONGITUDINAL STRAIN (GLS): -8.5 %
ECHO LV INTERNAL DIMENSION DIASTOLE INDEX: 3.2 CM/M2
ECHO LV INTERNAL DIMENSION DIASTOLIC: 4.9 CM (ref 3.9–5.3)
ECHO LV INTERNAL DIMENSION SYSTOLIC INDEX: 2.81 CM/M2
ECHO LV INTERNAL DIMENSION SYSTOLIC: 4.3 CM
ECHO LV IVSD: 1.3 CM (ref 0.6–0.9)
ECHO LV MASS 2D: 213.3 G (ref 67–162)
ECHO LV MASS INDEX 2D: 139.4 G/M2 (ref 43–95)
ECHO LV POSTERIOR WALL DIASTOLIC: 1 CM (ref 0.6–0.9)
ECHO LV RELATIVE WALL THICKNESS RATIO: 0.41
ECHO RIGHT VENTRICULAR SYSTOLIC PRESSURE (RVSP): 42 MMHG
ECHO TV REGURGITANT MAX VELOCITY: 3.11 M/S
ECHO TV REGURGITANT PEAK GRADIENT: 39 MMHG
EOSINOPHIL # BLD: 0.1 K/UL (ref 0–0.4)
EOSINOPHIL NFR BLD: 1 % (ref 0–7)
ERYTHROCYTE [DISTWIDTH] IN BLOOD BY AUTOMATED COUNT: 13 % (ref 11.5–14.5)
GLUCOSE SERPL-MCNC: 81 MG/DL (ref 65–100)
HCT VFR BLD AUTO: 28.7 % (ref 35–47)
HGB BLD-MCNC: 9.5 G/DL (ref 11.5–16)
IMM GRANULOCYTES # BLD AUTO: 0 K/UL (ref 0–0.04)
IMM GRANULOCYTES NFR BLD AUTO: 0 % (ref 0–0.5)
LYMPHOCYTES # BLD: 1.8 K/UL (ref 0.8–3.5)
LYMPHOCYTES NFR BLD: 20 % (ref 12–49)
MCH RBC QN AUTO: 32.6 PG (ref 26–34)
MCHC RBC AUTO-ENTMCNC: 33.1 G/DL (ref 30–36.5)
MCV RBC AUTO: 98.6 FL (ref 80–99)
MONOCYTES # BLD: 0.8 K/UL (ref 0–1)
MONOCYTES NFR BLD: 9 % (ref 5–13)
NEUTS SEG # BLD: 6 K/UL (ref 1.8–8)
NEUTS SEG NFR BLD: 70 % (ref 32–75)
NRBC # BLD: 0 K/UL (ref 0–0.01)
NRBC BLD-RTO: 0 PER 100 WBC
PLATELET # BLD AUTO: 200 K/UL (ref 150–400)
PMV BLD AUTO: 10.9 FL (ref 8.9–12.9)
POTASSIUM SERPL-SCNC: 3.8 MMOL/L (ref 3.5–5.1)
RBC # BLD AUTO: 2.91 M/UL (ref 3.8–5.2)
SODIUM SERPL-SCNC: 140 MMOL/L (ref 136–145)
WBC # BLD AUTO: 8.7 K/UL (ref 3.6–11)

## 2024-10-07 PROCEDURE — 2700000000 HC OXYGEN THERAPY PER DAY

## 2024-10-07 PROCEDURE — 83880 ASSAY OF NATRIURETIC PEPTIDE: CPT

## 2024-10-07 PROCEDURE — 97161 PT EVAL LOW COMPLEX 20 MIN: CPT

## 2024-10-07 PROCEDURE — 93308 TTE F-UP OR LMTD: CPT

## 2024-10-07 PROCEDURE — 36415 COLL VENOUS BLD VENIPUNCTURE: CPT

## 2024-10-07 PROCEDURE — 6370000000 HC RX 637 (ALT 250 FOR IP): Performed by: HOSPITALIST

## 2024-10-07 PROCEDURE — 80048 BASIC METABOLIC PNL TOTAL CA: CPT

## 2024-10-07 PROCEDURE — 51798 US URINE CAPACITY MEASURE: CPT

## 2024-10-07 PROCEDURE — 85025 COMPLETE CBC W/AUTO DIFF WBC: CPT

## 2024-10-07 PROCEDURE — 6360000002 HC RX W HCPCS: Performed by: HOSPITALIST

## 2024-10-07 PROCEDURE — 94760 N-INVAS EAR/PLS OXIMETRY 1: CPT

## 2024-10-07 PROCEDURE — 97165 OT EVAL LOW COMPLEX 30 MIN: CPT

## 2024-10-07 PROCEDURE — 1100000000 HC RM PRIVATE

## 2024-10-07 PROCEDURE — 2580000003 HC RX 258: Performed by: HOSPITALIST

## 2024-10-07 PROCEDURE — 94640 AIRWAY INHALATION TREATMENT: CPT

## 2024-10-07 RX ADMIN — SODIUM BICARBONATE 650 MG: 650 TABLET ORAL at 07:50

## 2024-10-07 RX ADMIN — CARVEDILOL 25 MG: 12.5 TABLET, FILM COATED ORAL at 07:50

## 2024-10-07 RX ADMIN — POTASSIUM CHLORIDE 20 MEQ: 1500 TABLET, EXTENDED RELEASE ORAL at 18:29

## 2024-10-07 RX ADMIN — GUAIFENESIN 600 MG: 600 TABLET, EXTENDED RELEASE ORAL at 21:18

## 2024-10-07 RX ADMIN — SODIUM CHLORIDE, PRESERVATIVE FREE 10 ML: 5 INJECTION INTRAVENOUS at 09:21

## 2024-10-07 RX ADMIN — GUAIFENESIN 600 MG: 600 TABLET, EXTENDED RELEASE ORAL at 09:21

## 2024-10-07 RX ADMIN — Medication 2 PUFF: at 19:32

## 2024-10-07 RX ADMIN — Medication 2 PUFF: at 07:45

## 2024-10-07 RX ADMIN — ROSUVASTATIN CALCIUM 40 MG: 10 TABLET, FILM COATED ORAL at 21:18

## 2024-10-07 RX ADMIN — FERROUS SULFATE TAB 325 MG (65 MG ELEMENTAL FE) 325 MG: 325 (65 FE) TAB at 07:50

## 2024-10-07 RX ADMIN — BUMETANIDE 1 MG: 0.25 INJECTION INTRAMUSCULAR; INTRAVENOUS at 09:21

## 2024-10-07 RX ADMIN — CETIRIZINE HYDROCHLORIDE 5 MG: 5 TABLET ORAL at 09:21

## 2024-10-07 RX ADMIN — MIRTAZAPINE 15 MG: 15 TABLET, FILM COATED ORAL at 21:18

## 2024-10-07 RX ADMIN — HYDRALAZINE HYDROCHLORIDE 100 MG: 50 TABLET ORAL at 21:18

## 2024-10-07 RX ADMIN — SODIUM CHLORIDE, PRESERVATIVE FREE 10 ML: 5 INJECTION INTRAVENOUS at 21:19

## 2024-10-07 RX ADMIN — HEPARIN SODIUM 5000 UNITS: 5000 INJECTION INTRAVENOUS; SUBCUTANEOUS at 13:50

## 2024-10-07 RX ADMIN — SALINE NASAL SPRAY 1 SPRAY: 1.5 SOLUTION NASAL at 09:21

## 2024-10-07 RX ADMIN — ASPIRIN 81 MG: 81 TABLET, COATED ORAL at 09:21

## 2024-10-07 RX ADMIN — DOCUSATE SODIUM 100 MG: 100 CAPSULE, LIQUID FILLED ORAL at 21:18

## 2024-10-07 RX ADMIN — DOCUSATE SODIUM 100 MG: 100 CAPSULE, LIQUID FILLED ORAL at 09:21

## 2024-10-07 RX ADMIN — HEPARIN SODIUM 5000 UNITS: 5000 INJECTION INTRAVENOUS; SUBCUTANEOUS at 21:18

## 2024-10-07 RX ADMIN — ACETAMINOPHEN 650 MG: 325 TABLET ORAL at 00:17

## 2024-10-07 RX ADMIN — SALINE NASAL SPRAY 1 SPRAY: 1.5 SOLUTION NASAL at 18:28

## 2024-10-07 RX ADMIN — HYDRALAZINE HYDROCHLORIDE 100 MG: 50 TABLET ORAL at 05:27

## 2024-10-07 RX ADMIN — HYDRALAZINE HYDROCHLORIDE 100 MG: 50 TABLET ORAL at 13:51

## 2024-10-07 RX ADMIN — FERROUS SULFATE TAB 325 MG (65 MG ELEMENTAL FE) 325 MG: 325 (65 FE) TAB at 18:28

## 2024-10-07 RX ADMIN — SODIUM BICARBONATE 650 MG: 650 TABLET ORAL at 18:29

## 2024-10-07 RX ADMIN — PANTOPRAZOLE SODIUM 40 MG: 40 TABLET, DELAYED RELEASE ORAL at 07:50

## 2024-10-07 RX ADMIN — HEPARIN SODIUM 5000 UNITS: 5000 INJECTION INTRAVENOUS; SUBCUTANEOUS at 05:27

## 2024-10-07 RX ADMIN — CARVEDILOL 25 MG: 12.5 TABLET, FILM COATED ORAL at 18:28

## 2024-10-07 RX ADMIN — BUMETANIDE 1 MG: 0.25 INJECTION INTRAMUSCULAR; INTRAVENOUS at 18:28

## 2024-10-07 RX ADMIN — SALINE NASAL SPRAY 1 SPRAY: 1.5 SOLUTION NASAL at 13:51

## 2024-10-07 RX ADMIN — POTASSIUM CHLORIDE 20 MEQ: 1500 TABLET, EXTENDED RELEASE ORAL at 07:50

## 2024-10-07 RX ADMIN — SALINE NASAL SPRAY 1 SPRAY: 1.5 SOLUTION NASAL at 21:29

## 2024-10-07 ASSESSMENT — PAIN SCALES - GENERAL
PAINLEVEL_OUTOF10: 0
PAINLEVEL_OUTOF10: 3
PAINLEVEL_OUTOF10: 0

## 2024-10-07 ASSESSMENT — PAIN DESCRIPTION - ORIENTATION: ORIENTATION: ANTERIOR

## 2024-10-07 ASSESSMENT — PAIN SCALES - WONG BAKER: WONGBAKER_NUMERICALRESPONSE: NO HURT

## 2024-10-07 ASSESSMENT — PAIN DESCRIPTION - DESCRIPTORS: DESCRIPTORS: ACHING

## 2024-10-07 ASSESSMENT — PAIN DESCRIPTION - LOCATION: LOCATION: HEAD

## 2024-10-07 ASSESSMENT — PAIN - FUNCTIONAL ASSESSMENT: PAIN_FUNCTIONAL_ASSESSMENT: ACTIVITIES ARE NOT PREVENTED

## 2024-10-07 ASSESSMENT — PAIN DESCRIPTION - PAIN TYPE: TYPE: CHRONIC PAIN

## 2024-10-07 NOTE — PLAN OF CARE
Problem: Chronic Conditions and Co-morbidities  Goal: Patient's chronic conditions and co-morbidity symptoms are monitored and maintained or improved  10/7/2024 0139 by Shana Solis RN  Outcome: Progressing  Flowsheets (Taken 10/6/2024 1158 by Arabella José RN)  Care Plan - Patient's Chronic Conditions and Co-Morbidity Symptoms are Monitored and Maintained or Improved:   Monitor and assess patient's chronic conditions and comorbid symptoms for stability, deterioration, or improvement   Collaborate with multidisciplinary team to address chronic and comorbid conditions and prevent exacerbation or deterioration   Update acute care plan with appropriate goals if chronic or comorbid symptoms are exacerbated and prevent overall improvement and discharge  10/6/2024 1158 by Arabella José RN  Outcome: Progressing  Flowsheets  Taken 10/6/2024 1158  Care Plan - Patient's Chronic Conditions and Co-Morbidity Symptoms are Monitored and Maintained or Improved:   Monitor and assess patient's chronic conditions and comorbid symptoms for stability, deterioration, or improvement   Collaborate with multidisciplinary team to address chronic and comorbid conditions and prevent exacerbation or deterioration   Update acute care plan with appropriate goals if chronic or comorbid symptoms are exacerbated and prevent overall improvement and discharge  Taken 10/6/2024 1053  Care Plan - Patient's Chronic Conditions and Co-Morbidity Symptoms are Monitored and Maintained or Improved:   Monitor and assess patient's chronic conditions and comorbid symptoms for stability, deterioration, or improvement   Collaborate with multidisciplinary team to address chronic and comorbid conditions and prevent exacerbation or deterioration   Update acute care plan with appropriate goals if chronic or comorbid symptoms are exacerbated and prevent overall improvement and discharge     Problem: Skin/Tissue Integrity  Goal: Absence of new skin

## 2024-10-07 NOTE — CONSULTS
Nutrition Education    Educated on Heart Healthy (Low Na, Low Fat), Fluid Restricted (1.2L)  Learners: Patient  Readiness: Acceptance  Method: Explanation and Handout  Response: Needs Reinforcement  Contact name and number provided.    Per chart review, RD provided HF and Renal Diet edu on 9/26. Pt similar presentation today, appeared drowsy. Pt reported she continues to cook with salt but doesn't add to food once prepared. Discussed fluid restriction and how hospital implements on meal trays while IP. Limited info provided from pt.       Krysten Francois  Contact Number: ext [phone] or via [employer]

## 2024-10-07 NOTE — PLAN OF CARE
Problem: Chronic Conditions and Co-morbidities  Goal: Patient's chronic conditions and co-morbidity symptoms are monitored and maintained or improved  10/7/2024 0851 by Daphney Briones RN  Outcome: Progressing  10/7/2024 0139 by Shana Solis RN  Outcome: Progressing  Flowsheets (Taken 10/6/2024 1158 by Arabella José, RN)  Care Plan - Patient's Chronic Conditions and Co-Morbidity Symptoms are Monitored and Maintained or Improved:   Monitor and assess patient's chronic conditions and comorbid symptoms for stability, deterioration, or improvement   Collaborate with multidisciplinary team to address chronic and comorbid conditions and prevent exacerbation or deterioration   Update acute care plan with appropriate goals if chronic or comorbid symptoms are exacerbated and prevent overall improvement and discharge     Problem: Skin/Tissue Integrity  Goal: Absence of new skin breakdown  Description: 1.  Monitor for areas of redness and/or skin breakdown  2.  Assess vascular access sites hourly  3.  Every 4-6 hours minimum:  Change oxygen saturation probe site  4.  Every 4-6 hours:  If on nasal continuous positive airway pressure, respiratory therapy assess nares and determine need for appliance change or resting period.  10/7/2024 0851 by Daphney Briones RN  Outcome: Adequate for Discharge  10/7/2024 0139 by Shana Solis RN  Outcome: Progressing     Problem: Safety - Adult  Goal: Free from fall injury  10/7/2024 0851 by Daphney Briones RN  Outcome: Adequate for Discharge  10/7/2024 0139 by Shana Solis RN  Outcome: Progressing  Flowsheets (Taken 10/7/2024 0139)  Free From Fall Injury: Instruct family/caregiver on patient safety     Problem: Pain  Goal: Verbalizes/displays adequate comfort level or baseline comfort level  10/7/2024 0851 by Daphney Briones RN  Outcome: Progressing  10/7/2024 0139 by Shana Solis RN  Outcome: Progressing  Flowsheets (Taken 10/7/2024 0139)  Verbalizes/displays adequate comfort

## 2024-10-07 NOTE — TELEPHONE ENCOUNTER
Pt currently in [facility].     RN-NN unable to perform follow-up call. However, RN-NN does note that the patient did not have a cardiology consult while inpatient, although suggested. Patient appears to be readmitted to another facility within 10-days of discharge. RN-NN does note patient did follow-up with [facility] cardiology post-discharge.

## 2024-10-07 NOTE — THERAPY EVALUATION
ml/min ([facility])     Colon polyps     Constipation 09/11/2021    COPD (chronic obstructive pulmonary disease) ([facility])     HTN (hypertension) 09/11/2021    Hyperlipidemia     Menopause     Personal history of colonic polyps 09/11/2021    PVD (peripheral vascular disease) ([facility]) 2019    s/p left to right fem-fem bypass    Vitamin D deficiency 09/11/2021     Past Surgical History:   Procedure Laterality Date    CARDIAC CATHETERIZATION      X 3    CARDIAC DEFIBRILLATOR PLACEMENT  09/16/2013    COLONOSCOPY N/A 10/5/2021    COLONOSCOPY ( T I V A) performed by Talib Ortega MD at [facility] ENDOSCOPY    COLONOSCOPY  2015    ORTHOPEDIC SURGERY      R leg    OTHER SURGICAL HISTORY      BILAT LOWER EXTREM VASC BYPASS SURGERY    OTHER SURGICAL HISTORY      R CAROTID ENDORECTOMY 11-4-05,   L CAROTID ENDARECTOMY 11-6-2006    PACEMAKER      6 0r 7 years ago       Home Situation:  Social/Functional History  Lives With: Family, Spouse, Son (spouse and son)  Type of Home: House  Home Layout: One level  Home Access: Stairs to enter with rails  Entrance Stairs - Number of Steps: 4  Entrance Stairs - Rails: Both  Home Equipment: Walker - Rolling, Cane  ADL Assistance: Needs assistance  Homemaking Responsibilities: No  Ambulation Assistance: Independent  Transfer Assistance: Independent  Critical Behavior:  Orientation  Overall Orientation Status: Within Normal Limits  Orientation Level: Oriented X4       Hearing:        Vision/Perceptual:                  Strength:    Strength: Generally decreased, functional    Tone & Sensation:           Coordination:  Coordination: Generally decreased, functional    Range Of Motion:  AROM: Generally decreased, functional  PROM: Generally decreased, functional    Functional Mobility:  Bed Mobility:     Bed Mobility Training  Bed Mobility Training: Yes  Overall Level of Assistance: Independent  Rolling: Independent  Supine to Sit: Independent  Sit to Supine: Independent  Scooting: Modified 
    Pain Ratin/10     Activity Tolerance:   Fair , requires rest breaks, and observed shortness of breath on exertion    After treatment:   Patient left in no apparent distress in bed and Call bell within reach    COMMUNICATION/EDUCATION:   The patient's plan of care was discussed with: physical therapist and registered nurse    Patient Education  Education Given To: Patient  Education Provided: Role of Therapy;Precautions;Energy Conservation;Fall Prevention Strategies;Transfer Training  Education Method: Demonstration;Verbal;Teach Back  Barriers to Learning: None  Education Outcome: Verbalized understanding;Demonstrated understanding    Thank you for this referral.  Ora Joel OT  Minutes: 10    Occupational Therapy Evaluation Charge Determination   History Examination Decision-Making   LOW Complexity : Brief history review  LOW Complexity: 1-3 Performance deficits relating to physical, cognitive, or psychosocial skills that result in activity limitations and/or participation restrictions LOW Complexity: No comorbidities that affect functional and  no verbal  or physical assist needed to complete eval tasks   Based on the above components, the patient evaluation is determined to be of the following complexity level: Low

## 2024-10-08 LAB
ANION GAP SERPL CALC-SCNC: 10 MMOL/L (ref 2–12)
BUN SERPL-MCNC: 61 MG/DL (ref 6–20)
BUN/CREAT SERPL: 15 (ref 12–20)
CA-I BLD-MCNC: 9.2 MG/DL (ref 8.5–10.1)
CHLORIDE SERPL-SCNC: 104 MMOL/L (ref 97–108)
CO2 SERPL-SCNC: 25 MMOL/L (ref 21–32)
CREAT SERPL-MCNC: 4.02 MG/DL (ref 0.55–1.02)
EKG ATRIAL RATE: 105 BPM
EKG DIAGNOSIS: NORMAL
EKG P AXIS: 75 DEGREES
EKG P-R INTERVAL: 167 MS
EKG Q-T INTERVAL: 350 MS
EKG QRS DURATION: 102 MS
EKG QTC CALCULATION (BAZETT): 463 MS
EKG R AXIS: 37 DEGREES
EKG T AXIS: -13 DEGREES
EKG VENTRICULAR RATE: 105 BPM
GLUCOSE SERPL-MCNC: 84 MG/DL (ref 65–100)
POTASSIUM SERPL-SCNC: 4.3 MMOL/L (ref 3.5–5.1)
SODIUM SERPL-SCNC: 139 MMOL/L (ref 136–145)

## 2024-10-08 PROCEDURE — 6370000000 HC RX 637 (ALT 250 FOR IP): Performed by: HOSPITALIST

## 2024-10-08 PROCEDURE — 6360000002 HC RX W HCPCS: Performed by: HOSPITALIST

## 2024-10-08 PROCEDURE — 1100000000 HC RM PRIVATE

## 2024-10-08 PROCEDURE — 80048 BASIC METABOLIC PNL TOTAL CA: CPT

## 2024-10-08 PROCEDURE — 94640 AIRWAY INHALATION TREATMENT: CPT

## 2024-10-08 PROCEDURE — 2580000003 HC RX 258: Performed by: HOSPITALIST

## 2024-10-08 PROCEDURE — 6370000000 HC RX 637 (ALT 250 FOR IP)

## 2024-10-08 PROCEDURE — 36415 COLL VENOUS BLD VENIPUNCTURE: CPT

## 2024-10-08 RX ORDER — METOLAZONE 2.5 MG/1
2.5 TABLET ORAL DAILY
Status: DISCONTINUED | OUTPATIENT
Start: 2024-10-08 | End: 2024-10-10 | Stop reason: HOSPADM

## 2024-10-08 RX ORDER — METOLAZONE 2.5 MG/1
2.5 TABLET ORAL DAILY
Status: CANCELLED | OUTPATIENT
Start: 2024-10-08

## 2024-10-08 RX ADMIN — BUMETANIDE 1 MG: 0.25 INJECTION INTRAMUSCULAR; INTRAVENOUS at 16:23

## 2024-10-08 RX ADMIN — IPRATROPIUM BROMIDE AND ALBUTEROL SULFATE 1 DOSE: .5; 3 SOLUTION RESPIRATORY (INHALATION) at 01:51

## 2024-10-08 RX ADMIN — SODIUM CHLORIDE, PRESERVATIVE FREE 10 ML: 5 INJECTION INTRAVENOUS at 08:18

## 2024-10-08 RX ADMIN — POTASSIUM CHLORIDE 20 MEQ: 1500 TABLET, EXTENDED RELEASE ORAL at 08:18

## 2024-10-08 RX ADMIN — Medication 2 PUFF: at 19:38

## 2024-10-08 RX ADMIN — HYDRALAZINE HYDROCHLORIDE 100 MG: 50 TABLET ORAL at 14:10

## 2024-10-08 RX ADMIN — CETIRIZINE HYDROCHLORIDE 5 MG: 5 TABLET ORAL at 08:18

## 2024-10-08 RX ADMIN — SODIUM BICARBONATE 650 MG: 650 TABLET ORAL at 16:23

## 2024-10-08 RX ADMIN — SODIUM BICARBONATE 650 MG: 650 TABLET ORAL at 08:18

## 2024-10-08 RX ADMIN — GUAIFENESIN 600 MG: 600 TABLET, EXTENDED RELEASE ORAL at 08:17

## 2024-10-08 RX ADMIN — CARVEDILOL 25 MG: 12.5 TABLET, FILM COATED ORAL at 08:18

## 2024-10-08 RX ADMIN — GUAIFENESIN 600 MG: 600 TABLET, EXTENDED RELEASE ORAL at 21:50

## 2024-10-08 RX ADMIN — FERROUS SULFATE TAB 325 MG (65 MG ELEMENTAL FE) 325 MG: 325 (65 FE) TAB at 08:18

## 2024-10-08 RX ADMIN — MIRTAZAPINE 15 MG: 15 TABLET, FILM COATED ORAL at 21:50

## 2024-10-08 RX ADMIN — ROSUVASTATIN CALCIUM 40 MG: 10 TABLET, FILM COATED ORAL at 21:50

## 2024-10-08 RX ADMIN — PANTOPRAZOLE SODIUM 40 MG: 40 TABLET, DELAYED RELEASE ORAL at 08:17

## 2024-10-08 RX ADMIN — HEPARIN SODIUM 5000 UNITS: 5000 INJECTION INTRAVENOUS; SUBCUTANEOUS at 06:43

## 2024-10-08 RX ADMIN — BUMETANIDE 1 MG: 0.25 INJECTION INTRAMUSCULAR; INTRAVENOUS at 08:17

## 2024-10-08 RX ADMIN — HEPARIN SODIUM 5000 UNITS: 5000 INJECTION INTRAVENOUS; SUBCUTANEOUS at 14:10

## 2024-10-08 RX ADMIN — HEPARIN SODIUM 5000 UNITS: 5000 INJECTION INTRAVENOUS; SUBCUTANEOUS at 21:50

## 2024-10-08 RX ADMIN — POTASSIUM CHLORIDE 20 MEQ: 1500 TABLET, EXTENDED RELEASE ORAL at 16:23

## 2024-10-08 RX ADMIN — SALINE NASAL SPRAY 1 SPRAY: 1.5 SOLUTION NASAL at 14:11

## 2024-10-08 RX ADMIN — SALINE NASAL SPRAY 1 SPRAY: 1.5 SOLUTION NASAL at 21:50

## 2024-10-08 RX ADMIN — DOCUSATE SODIUM 100 MG: 100 CAPSULE, LIQUID FILLED ORAL at 08:17

## 2024-10-08 RX ADMIN — FERROUS SULFATE TAB 325 MG (65 MG ELEMENTAL FE) 325 MG: 325 (65 FE) TAB at 16:23

## 2024-10-08 RX ADMIN — HYDRALAZINE HYDROCHLORIDE 100 MG: 50 TABLET ORAL at 06:36

## 2024-10-08 RX ADMIN — Medication 2 PUFF: at 07:42

## 2024-10-08 RX ADMIN — METOLAZONE 2.5 MG: 2.5 TABLET ORAL at 14:10

## 2024-10-08 RX ADMIN — ASPIRIN 81 MG: 81 TABLET, COATED ORAL at 08:17

## 2024-10-08 RX ADMIN — SALINE NASAL SPRAY 1 SPRAY: 1.5 SOLUTION NASAL at 16:23

## 2024-10-08 RX ADMIN — SALINE NASAL SPRAY 1 SPRAY: 1.5 SOLUTION NASAL at 08:19

## 2024-10-08 RX ADMIN — DOCUSATE SODIUM 100 MG: 100 CAPSULE, LIQUID FILLED ORAL at 21:50

## 2024-10-08 RX ADMIN — HYDRALAZINE HYDROCHLORIDE 100 MG: 50 TABLET ORAL at 21:50

## 2024-10-08 RX ADMIN — SODIUM CHLORIDE, PRESERVATIVE FREE 10 ML: 5 INJECTION INTRAVENOUS at 21:55

## 2024-10-08 RX ADMIN — CARVEDILOL 25 MG: 12.5 TABLET, FILM COATED ORAL at 16:23

## 2024-10-08 ASSESSMENT — PAIN SCALES - GENERAL
PAINLEVEL_OUTOF10: 0

## 2024-10-08 NOTE — PLAN OF CARE
Problem: Chronic Conditions and Co-morbidities  Goal: Patient's chronic conditions and co-morbidity symptoms are monitored and maintained or improved  Outcome: Progressing     Problem: Skin/Tissue Integrity  Goal: Absence of new skin breakdown  Description: 1.  Monitor for areas of redness and/or skin breakdown  2.  Assess vascular access sites hourly  3.  Every 4-6 hours minimum:  Change oxygen saturation probe site  4.  Every 4-6 hours:  If on nasal continuous positive airway pressure, respiratory therapy assess nares and determine need for appliance change or resting period.  Outcome: Progressing     Problem: Safety - Adult  Goal: Free from fall injury  Outcome: Progressing     Problem: Pain  Goal: Verbalizes/displays adequate comfort level or baseline comfort level  Outcome: Progressing

## 2024-10-08 NOTE — H&P
Bedside shift change report given to Amina CLARKEN (oncoming nurse) by Elizabeth ELLIOTT (offgoing nurse). Report included the following information Nurse Handoff Report.

## 2024-10-09 LAB
ANION GAP SERPL CALC-SCNC: 11 MMOL/L (ref 2–12)
BNP SERPL-MCNC: ABNORMAL PG/ML
BUN SERPL-MCNC: 67 MG/DL (ref 6–20)
BUN/CREAT SERPL: 17 (ref 12–20)
CA-I BLD-MCNC: 9.4 MG/DL (ref 8.5–10.1)
CHLORIDE SERPL-SCNC: 101 MMOL/L (ref 97–108)
CO2 SERPL-SCNC: 25 MMOL/L (ref 21–32)
CREAT SERPL-MCNC: 4.02 MG/DL (ref 0.55–1.02)
GLUCOSE SERPL-MCNC: 75 MG/DL (ref 65–100)
POTASSIUM SERPL-SCNC: 4.9 MMOL/L (ref 3.5–5.1)
SODIUM SERPL-SCNC: 137 MMOL/L (ref 136–145)

## 2024-10-09 PROCEDURE — 6360000002 HC RX W HCPCS: Performed by: HOSPITALIST

## 2024-10-09 PROCEDURE — 6370000000 HC RX 637 (ALT 250 FOR IP): Performed by: HOSPITALIST

## 2024-10-09 PROCEDURE — 36415 COLL VENOUS BLD VENIPUNCTURE: CPT

## 2024-10-09 PROCEDURE — 2580000003 HC RX 258: Performed by: HOSPITALIST

## 2024-10-09 PROCEDURE — 94640 AIRWAY INHALATION TREATMENT: CPT

## 2024-10-09 PROCEDURE — 1100000000 HC RM PRIVATE

## 2024-10-09 PROCEDURE — 80048 BASIC METABOLIC PNL TOTAL CA: CPT

## 2024-10-09 PROCEDURE — 83880 ASSAY OF NATRIURETIC PEPTIDE: CPT

## 2024-10-09 PROCEDURE — 6370000000 HC RX 637 (ALT 250 FOR IP)

## 2024-10-09 RX ORDER — HYDRALAZINE HYDROCHLORIDE 25 MG/1
25 TABLET, FILM COATED ORAL 3 TIMES DAILY
Status: DISCONTINUED | OUTPATIENT
Start: 2024-10-09 | End: 2024-10-10 | Stop reason: HOSPADM

## 2024-10-09 RX ORDER — AMIODARONE HYDROCHLORIDE 200 MG/1
200 TABLET ORAL 2 TIMES DAILY
Status: DISCONTINUED | OUTPATIENT
Start: 2024-10-09 | End: 2024-10-10

## 2024-10-09 RX ORDER — ISOSORBIDE DINITRATE 20 MG/1
20 TABLET ORAL 3 TIMES DAILY
Status: DISCONTINUED | OUTPATIENT
Start: 2024-10-09 | End: 2024-10-10 | Stop reason: HOSPADM

## 2024-10-09 RX ADMIN — ISOSORBIDE DINITRATE 20 MG: 20 TABLET ORAL at 15:37

## 2024-10-09 RX ADMIN — HEPARIN SODIUM 5000 UNITS: 5000 INJECTION INTRAVENOUS; SUBCUTANEOUS at 22:09

## 2024-10-09 RX ADMIN — FERROUS SULFATE TAB 325 MG (65 MG ELEMENTAL FE) 325 MG: 325 (65 FE) TAB at 09:33

## 2024-10-09 RX ADMIN — AMIODARONE HYDROCHLORIDE 200 MG: 200 TABLET ORAL at 21:26

## 2024-10-09 RX ADMIN — METOLAZONE 2.5 MG: 2.5 TABLET ORAL at 09:33

## 2024-10-09 RX ADMIN — ISOSORBIDE DINITRATE 20 MG: 20 TABLET ORAL at 21:27

## 2024-10-09 RX ADMIN — SODIUM CHLORIDE, PRESERVATIVE FREE 10 ML: 5 INJECTION INTRAVENOUS at 21:28

## 2024-10-09 RX ADMIN — ROSUVASTATIN CALCIUM 40 MG: 10 TABLET, FILM COATED ORAL at 21:27

## 2024-10-09 RX ADMIN — BUMETANIDE 1 MG: 0.25 INJECTION INTRAMUSCULAR; INTRAVENOUS at 09:33

## 2024-10-09 RX ADMIN — CARVEDILOL 25 MG: 12.5 TABLET, FILM COATED ORAL at 09:32

## 2024-10-09 RX ADMIN — CETIRIZINE HYDROCHLORIDE 5 MG: 5 TABLET ORAL at 09:33

## 2024-10-09 RX ADMIN — Medication 2 PUFF: at 20:05

## 2024-10-09 RX ADMIN — HYDRALAZINE HYDROCHLORIDE 100 MG: 50 TABLET ORAL at 06:02

## 2024-10-09 RX ADMIN — Medication 2 PUFF: at 07:18

## 2024-10-09 RX ADMIN — HYDRALAZINE HYDROCHLORIDE 25 MG: 25 TABLET ORAL at 21:26

## 2024-10-09 RX ADMIN — GUAIFENESIN 600 MG: 600 TABLET, EXTENDED RELEASE ORAL at 09:32

## 2024-10-09 RX ADMIN — POTASSIUM CHLORIDE 20 MEQ: 1500 TABLET, EXTENDED RELEASE ORAL at 09:33

## 2024-10-09 RX ADMIN — HEPARIN SODIUM 5000 UNITS: 5000 INJECTION INTRAVENOUS; SUBCUTANEOUS at 15:37

## 2024-10-09 RX ADMIN — ASPIRIN 81 MG: 81 TABLET, COATED ORAL at 09:32

## 2024-10-09 RX ADMIN — SODIUM BICARBONATE 650 MG: 650 TABLET ORAL at 16:49

## 2024-10-09 RX ADMIN — MIRTAZAPINE 15 MG: 15 TABLET, FILM COATED ORAL at 21:27

## 2024-10-09 RX ADMIN — DOCUSATE SODIUM 100 MG: 100 CAPSULE, LIQUID FILLED ORAL at 09:32

## 2024-10-09 RX ADMIN — BUMETANIDE 1 MG: 0.25 INJECTION INTRAMUSCULAR; INTRAVENOUS at 17:33

## 2024-10-09 RX ADMIN — PANTOPRAZOLE SODIUM 40 MG: 40 TABLET, DELAYED RELEASE ORAL at 09:32

## 2024-10-09 RX ADMIN — HYDRALAZINE HYDROCHLORIDE 10 MG: 20 INJECTION, SOLUTION INTRAMUSCULAR; INTRAVENOUS at 03:55

## 2024-10-09 RX ADMIN — DOCUSATE SODIUM 100 MG: 100 CAPSULE, LIQUID FILLED ORAL at 21:26

## 2024-10-09 RX ADMIN — HEPARIN SODIUM 5000 UNITS: 5000 INJECTION INTRAVENOUS; SUBCUTANEOUS at 06:02

## 2024-10-09 RX ADMIN — FERROUS SULFATE TAB 325 MG (65 MG ELEMENTAL FE) 325 MG: 325 (65 FE) TAB at 16:50

## 2024-10-09 RX ADMIN — SALINE NASAL SPRAY 1 SPRAY: 1.5 SOLUTION NASAL at 09:33

## 2024-10-09 RX ADMIN — CARVEDILOL 25 MG: 12.5 TABLET, FILM COATED ORAL at 16:50

## 2024-10-09 RX ADMIN — SODIUM CHLORIDE, PRESERVATIVE FREE 10 ML: 5 INJECTION INTRAVENOUS at 09:33

## 2024-10-09 RX ADMIN — POTASSIUM CHLORIDE 20 MEQ: 1500 TABLET, EXTENDED RELEASE ORAL at 16:49

## 2024-10-09 RX ADMIN — SODIUM BICARBONATE 650 MG: 650 TABLET ORAL at 09:32

## 2024-10-09 RX ADMIN — GUAIFENESIN 600 MG: 600 TABLET, EXTENDED RELEASE ORAL at 21:26

## 2024-10-09 RX ADMIN — HYDRALAZINE HYDROCHLORIDE 25 MG: 25 TABLET ORAL at 15:37

## 2024-10-09 RX ADMIN — AMIODARONE HYDROCHLORIDE 200 MG: 200 TABLET ORAL at 12:06

## 2024-10-09 ASSESSMENT — PAIN SCALES - GENERAL
PAINLEVEL_OUTOF10: 0

## 2024-10-09 ASSESSMENT — ENCOUNTER SYMPTOMS
GASTROINTESTINAL NEGATIVE: 1
RESPIRATORY NEGATIVE: 1
EYES NEGATIVE: 1
ALLERGIC/IMMUNOLOGIC NEGATIVE: 1

## 2024-10-09 NOTE — CARE COORDINATION
10/07/24 1014   Service Assessment   Patient Orientation Alert and Oriented   Cognition Alert   History Provided By Medical Record   Primary Caregiver Self   Support Systems Spouse/Significant Other;Children   Patient's Healthcare Decision Maker is: Legal Next of Kin   PCP Verified by CM No   Prior Functional Level Independent in ADLs/IADLs   Current Functional Level Independent in ADLs/IADLs   Can patient return to prior living arrangement Yes   Ability to make needs known: Good   Family able to assist with home care needs: Yes  (lives with spouse, son and grandson)   Would you like for me to discuss the discharge plan with any other family members/significant others, and if so, who? No   Financial Resources Medicare   Community Resources None   CM/SW Referral Other (see comment)  (HH)   Social/Functional History   Lives With Spouse;Family   Type of Home House   ADL Assistance Independent   Toileting Independent   Homemaking Assistance Independent   Homemaking Responsibilities Yes   Ambulation Assistance Independent   Transfer Assistance Independent   Active  No   Occupation Retired   Discharge Planning   Type of Residence House   Living Arrangements Spouse/Significant Other;Children;Family Members   Current Services Prior To Admission Durable Medical Equipment   Current DME Prior to Arrival Walker;Cane   Patient expects to be discharged to: House   Services At/After Discharge   Transition of Care Consult (CM Consult) Home Health   Internal Home Health No   Reason Outside Agency Chosen Out of service area;Patient already serviced by other home care/hospice agency   Services At/After Discharge Home Health   Mode of Transport at Discharge Other (see comment)  ()   Confirm Follow Up Transport Family   Condition of Participation: Discharge Planning   The Patient and/or Patient Representative was provided with a Choice of Provider? Patient   The Patient and/Or Patient Representative agree with the

## 2024-10-09 NOTE — PLAN OF CARE
Problem: Chronic Conditions and Co-morbidities  Goal: Patient's chronic conditions and co-morbidity symptoms are monitored and maintained or improved  10/9/2024 0951 by Alia Wilkins LPN  Outcome: Progressing  10/9/2024 0215 by Shana Solis RN  Outcome: Progressing  Flowsheets  Taken 10/9/2024 0215  Care Plan - Patient's Chronic Conditions and Co-Morbidity Symptoms are Monitored and Maintained or Improved:   Monitor and assess patient's chronic conditions and comorbid symptoms for stability, deterioration, or improvement   Collaborate with multidisciplinary team to address chronic and comorbid conditions and prevent exacerbation or deterioration   Update acute care plan with appropriate goals if chronic or comorbid symptoms are exacerbated and prevent overall improvement and discharge  Taken 10/8/2024 2022  Care Plan - Patient's Chronic Conditions and Co-Morbidity Symptoms are Monitored and Maintained or Improved: Monitor and assess patient's chronic conditions and comorbid symptoms for stability, deterioration, or improvement     Problem: Skin/Tissue Integrity  Goal: Absence of new skin breakdown  Description: 1.  Monitor for areas of redness and/or skin breakdown  2.  Assess vascular access sites hourly  3.  Every 4-6 hours minimum:  Change oxygen saturation probe site  4.  Every 4-6 hours:  If on nasal continuous positive airway pressure, respiratory therapy assess nares and determine need for appliance change or resting period.  10/9/2024 0951 by Alia Wilkins LPN  Outcome: Progressing  10/9/2024 0215 by Shana Solis RN  Outcome: Progressing     Problem: Safety - Adult  Goal: Free from fall injury  10/9/2024 0951 by Alia Wilkins LPN  Outcome: Progressing  10/9/2024 0215 by Shana Solis RN  Outcome: Progressing  Flowsheets (Taken 10/7/2024 0139)  Free From Fall Injury: Instruct family/caregiver on patient safety     Problem: Pain  Goal: Verbalizes/displays adequate comfort level or baseline comfort

## 2024-10-09 NOTE — PLAN OF CARE
Problem: Chronic Conditions and Co-morbidities  Goal: Patient's chronic conditions and co-morbidity symptoms are monitored and maintained or improved  10/9/2024 0215 by Shana Solis RN  Outcome: Progressing  Flowsheets  Taken 10/9/2024 0215  Care Plan - Patient's Chronic Conditions and Co-Morbidity Symptoms are Monitored and Maintained or Improved:   Monitor and assess patient's chronic conditions and comorbid symptoms for stability, deterioration, or improvement   Collaborate with multidisciplinary team to address chronic and comorbid conditions and prevent exacerbation or deterioration   Update acute care plan with appropriate goals if chronic or comorbid symptoms are exacerbated and prevent overall improvement and discharge  Taken 10/8/2024 2022  Care Plan - Patient's Chronic Conditions and Co-Morbidity Symptoms are Monitored and Maintained or Improved: Monitor and assess patient's chronic conditions and comorbid symptoms for stability, deterioration, or improvement  10/8/2024 1440 by Daphney Briones RN  Outcome: /[facility] Progressing     Problem: Skin/Tissue Integrity  Goal: Absence of new skin breakdown  Description: 1.  Monitor for areas of redness and/or skin breakdown  2.  Assess vascular access sites hourly  3.  Every 4-6 hours minimum:  Change oxygen saturation probe site  4.  Every 4-6 hours:  If on nasal continuous positive airway pressure, respiratory therapy assess nares and determine need for appliance change or resting period.  10/9/2024 0215 by Shana Solis RN  Outcome: Progressing  10/8/2024 1440 by Daphney Briones RN  Outcome: /[facility] Progressing     Problem: Safety - Adult  Goal: Free from fall injury  10/9/2024 0215 by Shana Solis RN  Outcome: Progressing  Flowsheets (Taken 10/7/2024 0139)  Free From Fall Injury: Instruct family/caregiver on patient safety  10/8/2024 1440 by Daphney Briones RN  Outcome: /[facility] Progressing     Problem: Pain  Goal: Verbalizes/displays adequate comfort

## 2024-10-10 VITALS
HEART RATE: 92 BPM | WEIGHT: 105.38 LBS | HEIGHT: 63 IN | RESPIRATION RATE: 18 BRPM | TEMPERATURE: 98.2 F | OXYGEN SATURATION: 96 % | BODY MASS INDEX: 18.67 KG/M2 | DIASTOLIC BLOOD PRESSURE: 70 MMHG | SYSTOLIC BLOOD PRESSURE: 141 MMHG

## 2024-10-10 LAB
ALBUMIN SERPL-MCNC: 3.2 G/DL (ref 3.5–5)
ALBUMIN/GLOB SERPL: 0.8 (ref 1.1–2.2)
ALP SERPL-CCNC: 58 U/L (ref 45–117)
ALT SERPL-CCNC: 17 U/L (ref 12–78)
ANION GAP SERPL CALC-SCNC: 9 MMOL/L (ref 2–12)
AST SERPL W P-5'-P-CCNC: 20 U/L (ref 15–37)
BILIRUB SERPL-MCNC: 0.8 MG/DL (ref 0.2–1)
BUN SERPL-MCNC: 77 MG/DL (ref 6–20)
BUN/CREAT SERPL: 18 (ref 12–20)
CA-I BLD-MCNC: 9.7 MG/DL (ref 8.5–10.1)
CHLORIDE SERPL-SCNC: 99 MMOL/L (ref 97–108)
CO2 SERPL-SCNC: 28 MMOL/L (ref 21–32)
CREAT SERPL-MCNC: 4.2 MG/DL (ref 0.55–1.02)
GLOBULIN SER CALC-MCNC: 3.9 G/DL (ref 2–4)
GLUCOSE SERPL-MCNC: 76 MG/DL (ref 65–100)
POTASSIUM SERPL-SCNC: 5.6 MMOL/L (ref 3.5–5.1)
PROT SERPL-MCNC: 7.1 G/DL (ref 6.4–8.2)
SODIUM SERPL-SCNC: 136 MMOL/L (ref 136–145)

## 2024-10-10 PROCEDURE — 6370000000 HC RX 637 (ALT 250 FOR IP)

## 2024-10-10 PROCEDURE — 6360000002 HC RX W HCPCS: Performed by: HOSPITALIST

## 2024-10-10 PROCEDURE — 36415 COLL VENOUS BLD VENIPUNCTURE: CPT

## 2024-10-10 PROCEDURE — 94640 AIRWAY INHALATION TREATMENT: CPT

## 2024-10-10 PROCEDURE — 80053 COMPREHEN METABOLIC PANEL: CPT

## 2024-10-10 PROCEDURE — 6370000000 HC RX 637 (ALT 250 FOR IP): Performed by: HOSPITALIST

## 2024-10-10 PROCEDURE — 2580000003 HC RX 258: Performed by: HOSPITALIST

## 2024-10-10 RX ORDER — BUMETANIDE 0.5 MG/1
2 TABLET ORAL DAILY
Status: DISCONTINUED | OUTPATIENT
Start: 2024-10-11 | End: 2024-10-10 | Stop reason: HOSPADM

## 2024-10-10 RX ORDER — HYDRALAZINE HYDROCHLORIDE 25 MG/1
25 TABLET, FILM COATED ORAL 3 TIMES DAILY
Qty: 90 TABLET | Refills: 3 | Status: SHIPPED | OUTPATIENT
Start: 2024-10-10

## 2024-10-10 RX ORDER — METOLAZONE 2.5 MG/1
2.5 TABLET ORAL
Qty: 30 TABLET | Refills: 1 | Status: SHIPPED | OUTPATIENT
Start: 2024-10-12

## 2024-10-10 RX ORDER — ISOSORBIDE DINITRATE 20 MG/1
20 TABLET ORAL 3 TIMES DAILY
Qty: 90 TABLET | Refills: 3 | Status: SHIPPED | OUTPATIENT
Start: 2024-10-10

## 2024-10-10 RX ORDER — SODIUM BICARBONATE 650 MG/1
650 TABLET ORAL 2 TIMES DAILY WITH MEALS
Qty: 60 TABLET | Refills: 1 | Status: SHIPPED | OUTPATIENT
Start: 2024-10-10

## 2024-10-10 RX ORDER — BUMETANIDE 2 MG/1
2 TABLET ORAL DAILY
Qty: 30 TABLET | Refills: 1 | Status: SHIPPED | OUTPATIENT
Start: 2024-10-11

## 2024-10-10 RX ORDER — METOPROLOL SUCCINATE 100 MG/1
100 TABLET, EXTENDED RELEASE ORAL DAILY
Qty: 30 TABLET | Refills: 1 | Status: SHIPPED | OUTPATIENT
Start: 2024-10-10

## 2024-10-10 RX ORDER — BUMETANIDE 0.5 MG/1
1 TABLET ORAL DAILY
Status: DISCONTINUED | OUTPATIENT
Start: 2024-10-10 | End: 2024-10-10

## 2024-10-10 RX ORDER — METOPROLOL SUCCINATE 50 MG/1
100 TABLET, EXTENDED RELEASE ORAL DAILY
Status: DISCONTINUED | OUTPATIENT
Start: 2024-10-10 | End: 2024-10-10 | Stop reason: HOSPADM

## 2024-10-10 RX ADMIN — SODIUM CHLORIDE, PRESERVATIVE FREE 10 ML: 5 INJECTION INTRAVENOUS at 09:14

## 2024-10-10 RX ADMIN — BUMETANIDE 1 MG: 0.5 TABLET ORAL at 09:33

## 2024-10-10 RX ADMIN — FERROUS SULFATE TAB 325 MG (65 MG ELEMENTAL FE) 325 MG: 325 (65 FE) TAB at 08:22

## 2024-10-10 RX ADMIN — ISOSORBIDE DINITRATE 20 MG: 20 TABLET ORAL at 09:14

## 2024-10-10 RX ADMIN — AMIODARONE HYDROCHLORIDE 200 MG: 200 TABLET ORAL at 09:14

## 2024-10-10 RX ADMIN — CETIRIZINE HYDROCHLORIDE 5 MG: 5 TABLET ORAL at 09:14

## 2024-10-10 RX ADMIN — CARVEDILOL 25 MG: 12.5 TABLET, FILM COATED ORAL at 08:22

## 2024-10-10 RX ADMIN — GUAIFENESIN 600 MG: 600 TABLET, EXTENDED RELEASE ORAL at 09:14

## 2024-10-10 RX ADMIN — HEPARIN SODIUM 5000 UNITS: 5000 INJECTION INTRAVENOUS; SUBCUTANEOUS at 05:44

## 2024-10-10 RX ADMIN — SODIUM BICARBONATE 650 MG: 650 TABLET ORAL at 08:22

## 2024-10-10 RX ADMIN — METOPROLOL SUCCINATE 100 MG: 50 TABLET, EXTENDED RELEASE ORAL at 13:08

## 2024-10-10 RX ADMIN — BUMETANIDE 1 MG: 0.25 INJECTION INTRAMUSCULAR; INTRAVENOUS at 09:14

## 2024-10-10 RX ADMIN — DOCUSATE SODIUM 100 MG: 100 CAPSULE, LIQUID FILLED ORAL at 09:14

## 2024-10-10 RX ADMIN — ASPIRIN 81 MG: 81 TABLET, COATED ORAL at 09:14

## 2024-10-10 RX ADMIN — Medication 2 PUFF: at 07:39

## 2024-10-10 RX ADMIN — HYDRALAZINE HYDROCHLORIDE 25 MG: 25 TABLET ORAL at 09:14

## 2024-10-10 RX ADMIN — PANTOPRAZOLE SODIUM 40 MG: 40 TABLET, DELAYED RELEASE ORAL at 08:22

## 2024-10-10 RX ADMIN — METOLAZONE 2.5 MG: 2.5 TABLET ORAL at 09:14

## 2024-10-10 ASSESSMENT — PAIN SCALES - GENERAL
PAINLEVEL_OUTOF10: 0
PAINLEVEL_OUTOF10: 0

## 2024-10-10 NOTE — CONSULTS
CARDIOLOGY CONSULTATION    REASON FOR CONSULT: hypertensive urgency    REQUESTING PROVIDER: Dr. Scott    CHIEF COMPLAINT: shortness of breath     HISTORY OF PRESENT ILLNESS:  Saranya Hightower is a 74 y.o. year-old female with past medical history significant for HFrEF, s/p AICD,  CAD, HTN, HLD who was evaluated today due to CHF exacerbation/hypertensive urgency. Initially evaluated in the ER for progressive shortness of breath and BP readings >200. Trop peaked at 101 and trending down 81, likely secondary to HTN urgency. BNP >54733, echo shows EF 30-35% (consistent with previous) with inferior and inferolateral hypokinesis (change), NST in July shows predominately fixed defect, inferior. Patient follows with Dr. Levy for cardiology. Discussed need for diuresis, BP control, and follow up closely as OP for further workup if indicated/symptoms continue. May consider cardiac catheterization as outpatient.     Records from hospital admission course thus far reviewed.      Telemetry reviewed.  No events overnight.  .    INPATIENT MEDICATIONS:  Home medications reviewed.    Current Facility-Administered Medications:     metoprolol succinate (TOPROL XL) extended release tablet 100 mg, 100 mg, Oral, Daily, Yue Simpson APRN - CNP    [START ON 10/11/2024] bumetanide (BUMEX) tablet 2 mg, 2 mg, Oral, Daily, Yue Simpson APRN - CNP    hydrALAZINE (APRESOLINE) tablet 25 mg, 25 mg, Oral, TID, Carl Scott MD, 25 mg at 10/10/24 0914    isosorbide dinitrate (ISORDIL) tablet 20 mg, 20 mg, Oral, TID, Carl Scott MD, 20 mg at 10/10/24 0914    metOLazone (ZAROXOLYN) tablet 2.5 mg, 2.5 mg, Oral, Daily, Yue Simpson APRN - CNP, 2.5 mg at 10/10/24 0914    sodium chloride flush 0.9 % injection 5-40 mL, 5-40 mL, IntraVENous, PRN, Carl Scott MD, 10 mL at 10/06/24 1822    0.9 % sodium chloride infusion, , IntraVENous, PRN, Carl Scott MD    heparin (porcine) injection 5,000 Units,

## 2024-10-10 NOTE — DISCHARGE INSTRUCTIONS
NOTIFY YOUR PHYSICIAN FOR ANY OF THE FOLLOWING:   Fever over 101 degrees for 24 hours.   Chest pain, shortness of breath, fever, chills, nausea, vomiting, diarrhea, change in mentation, falling, weakness, bleeding. Severe pain or pain not relieved by medications, as well as any other signs or symptoms that you may have questions about    Monitor daily weight and if > 2lb in 24hrs or 5lb in 5 days then to take extra dose of 1/2 tablet of 2mg bumex at dinner  Monitor vitals signs in the mourning and again prior to dinner and keep  log     Will be on bumex 2mg oral daily and that is to keep you from building up with fluid  Metoprolol succinate 100mg once daily and that is to help with your atrial fibrillation and keep HR controled < 90   Hydralazine 25mg oral three times daily and also isosorbide dintrate 20mg three times a day to be taken together   Metalazone 2.5mg oral every other day with first dose start on Saturday 10/12 and take on those days with bumex medication   * Follow-up Care/Patient Instructions:  Activity: activity as tolerated  Diet: cardiac diet and fluid restrict of 1.5L per day   Continue Ensure Enlive 2x/d- encourage to offer w/ medpass   Please try and taper your smoking and quit   Follow closely with Dr. Duncan kidney doctor and also Dr. Levy Cardiology doctor and PCP     It was a pleasure to care for you here at [facility] and I am glad you are feeling better to get home.  When you get home you should receive a survey either via email or in postal mail and appreciate if you are able to fill it out and send it back at your convenience.   Here at [facility] we always strive to give \"excellent\" care and hope you are able to express that in the survey    Thank you

## 2024-10-10 NOTE — DISCHARGE SUMMARY
Additional Contrast? Radiologist Recommendation    Result Date: 10/6/2024  1. Small right pleural effusion, very small left pleural effusion, bibasilar compressive atelectasis. 2. Trace free fluid in the pelvis. Electronically signed by Lucius Hewitt MD    XR CHEST PORTABLE    Result Date: 10/5/2024  Mild pulmonary edema, similar to 9/24/2024. Electronically signed by Douglas Chinchilla               * Discharge Condition: Improved  * Disposition: Home w/Family and home health for skilled nursing to assist with med changes on discharge and monitor blood pressure and HR and also to monitor daily weight and if > 2lb weight gain in 24 hours or 5lb in 5 days to take extra 1/2 tablet of bumex at dinner       Discharge Medications:     Medication List        START taking these medications      isosorbide dinitrate 20 MG tablet  Commonly known as: ISORDIL  Take 1 tablet by mouth in the morning, at noon, and at bedtime   And take with hydralazine medication      metOLazone 2.5 MG tablet  Commonly known as: ZAROXOLYN  Take 1 tablet by mouth every 48 hours  Start taking on: October 12, 2024     metoprolol succinate 100 MG extended release tablet  Commonly known as: TOPROL XL  Take 1 tablet by mouth daily  To help with HR      nicotine 7 MG/24HR  Commonly known as: NICODERM CQ  Place 1 patch onto the skin daily for 14 days            CHANGE how you take these medications      bumetanide 2 MG tablet  Commonly known as: BUMEX  Take 1 tablet by mouth daily  Start taking on: October 11, 2024  What changed: how much to take     hydrALAZINE 25 MG tablet  Commonly known as: APRESOLINE  Take 1 tablet by mouth in the morning, at noon, and at bedtime  And take this with isordil medication   What changed:   medication strength  how much to take  when to take this  Another medication with the same name was removed. Continue taking this medication, and follow the directions you see here.     sodium bicarbonate 650 MG tablet  Take 1 tablet by 
Principal Discharge DX:	Muscle spasm

## 2024-10-10 NOTE — PLAN OF CARE
Problem: Chronic Conditions and Co-morbidities  Goal: Patient's chronic conditions and co-morbidity symptoms are monitored and maintained or improved  Outcome: Progressing     Problem: Skin/Tissue Integrity  Goal: Absence of new skin breakdown  Description: 1.  Monitor for areas of redness and/or skin breakdown  2.  Assess vascular access sites hourly  3.  Every 4-6 hours minimum:  Change oxygen saturation probe site  4.  Every 4-6 hours:  If on nasal continuous positive airway pressure, respiratory therapy assess nares and determine need for appliance change or resting period.  Outcome: Progressing     Problem: Safety - Adult  Goal: Free from fall injury  10/10/2024 0957 by Alia Wilkins LPN  Outcome: Progressing  10/10/2024 0326 by Janey Breen LPN  Outcome: Progressing     Problem: Pain  Goal: Verbalizes/displays adequate comfort level or baseline comfort level  Outcome: Progressing

## 2024-10-10 NOTE — PROGRESS NOTES
Physician Progress Note      PATIENT:               FLORIDA RICHARDSON  [facility] #:                  [id number]  :                       1950  ADMIT DATE:       10/5/2024 11:21 PM  DISCH DATE:  RESPONDING  PROVIDER #:        Carl Scott MD          QUERY TEXT:    Pt admitted with   acute  resp  failure   with  acute  CHF  exacerbation.  Pt   noted to have  malignant  HTN. If possible, please document in progress notes   and discharge summary if you are evaluating and/or treating any of the   following:    FYI-  There  is  no  longer  a  working  code  for malignant    hypertension***************    The medical record reflects the following:  Risk Factors: CAD;  HTN;   HLD;   smoker;    acute  CHF;  CKD 4;  Clinical Indicators: noted  on  H&P_  hypertensive malignancy with blood   pressure of 220/112;    227/125;   224/112;  227/107;  Treatment: Vasotec  IV  x  2;   apresoline  IV  PRN;   apresoline  po   TID;      Hypertensive Urgency: Defined as SBP of >180 OR DBP >120 w/o associated organ   damage. S/s may or may not be present, but can include severe headache, SOB,   epistaxis, severe anxiety. Tx: adjustment of oral BP medication; IV meds not   usually required.    Hypertensive Emergency: SBP of >180 OR DBP >120 w/ associated organ damage   (CVA, MI, acute CHF, MAKENZIE, encephalopathy, pulmonary edema, and unstable   angina). Documentation should include specific organ affected.  Requires   immediate treatment, usually with IV meds.    Hypertensive Crisis, unspecified: SBP of > 180 OR DBP > 120 and includes   damage to blood vessels, including inflammation, leakage of fluid or blood and   can cause stroke, headache, heart failure and eclampsia.  Source:  [facility] Training Guide and Quick Reference Guide    Thank you,   Rosemary Gibson RN   CCDS  Options provided:  -- Hypertensive Crisis  -- Hypertensive Emergency  -- Hypertensive Urgency  -- Other - I will add my own diagnosis  -- Disagree - Not applicable / Not 
Attempted to perform and in and out cath on patient, clitoris large and hard to see opening even with a stewart reese.  Passed on to oncoming shift of inability to perform in and out with bladder scan showing 404 in bladder and dry [place].  
Bedside shift change report given to tashia delgado(oncoming nurse) by indu claudio (offgoing nurse). Report included the following information Nurse Handoff Report.    
Bedside shift change report given to tashia delgado(oncoming nurse) by taylor ortega (offgoing nurse). Report included the following information Nurse Handoff Report.    
C/o shortness of breath.  Sit patient up and check O2.  Sats are 100 on RA.  Notified RT.    RT will administer breath tx.  
Comprehensive Nutrition Assessment    Type and Reason for Visit:  Initial, Positive Nutrition Screen    Nutrition Recommendations/Plan:   Continue current diet  Encourage PO intakes  Continue Ensure Enlive 2x/d- encourage to offer w/ medpass  Continue bowel regimen  Monitor/document daily wts, BM, PO+ONS% in I/O     Malnutrition Assessment:  Malnutrition Status:  Moderate malnutrition (10/07/24 1133)    Context:  Chronic Illness     Findings of the 6 clinical characteristics of malnutrition:  Energy Intake:  75% or less estimated energy requirements for 1 month or longer (Per RD 7/09. Current PO intakes while IP)  Weight Loss:  No significant weight loss     Body Fat Loss:  Mild body fat loss Orbital   Muscle Mass Loss:  Mild muscle mass loss Temples (temporalis)  Fluid Accumulation:  No significant fluid accumulation     Strength:  Not Performed    Nutrition Assessment:    Admitted for acute pulmonary edema. Assessed for MST 2. Brought to ED by EMS c/o SOB. On NRB and BIPAP intermittently. Spoke w/ pt briefly at bedside, no other family present. Pt appeared drowsy; limited interaction during Heart Healthy+Fluid Restriction diet edu. Most info obtained from RN and chart review. Prev assessed by RD x2wks ago pta. Per RN, DARRON bfast PO intake this AM but 0% Ensure consumed. Per I/O, avg PO intakes 26-50% x2 documented meals and avg Ensure intake- 0%. Encouraged RN to offer ONS w/ medpass- agreeable. Amendable to ONS during prev admit x3mo ago. Per EMR, wt fluctuations x6mo ?fluid shifts vs stage of chronic conditions. Labs: BUN 62, Cre 4.02, eGFR 11, NT Pro-BNP 22,659, H/H 9.5/28.7, (10/6) Alb 3.1. Meds: aspirin, heparin,statin, bumex, carvedilol, hydralazine, remeron, protonix, colace, FeSu, KCL, Na bicarb, PRN tramadol.      Nutrition Related Findings:    NFPE deferred d/t pt resting. Observed mild muscle+fat wasting to face. +Upper, lower dentures. No c/s difficulty noted. No N/V/D. +Constipation. Last BM 10/6, 
Discharge  instructions given pt. Verbalized understanding questioned answered.  
LPN assessment reviewed  
Lying in bed watching television.  No c/o pain.  Up ad anastasia to BS.  
[facility] nurse to [employer] provider once patient has arrived to the room, med reconciliation is complete, and patient is ready to be seen
Medications administered, no c/o pain, no acute distress, no needs voiced.  
Nena has has various c/o pain this shift starting with left flank area pain that she reports started at 1800 but she failed to alert off-going nurse.  Communicated with MD and got pain med that she reports being effective.  Again complained of left sided pain and a headache, Tylenol given and patient satisfied again. Voided with BM at beginning of shift and has not voided since, will bladder scan.  
Patient eating breakfast. Offered for patient to get OOB and sit in chair for breakfast and she stated she would rather just sit up in bed.  
Patient round completed  
Patient round completed.  
Patient rounds complete.  
Patient rounds complete.  
Pt. Transferred via w/c to [facility] for transport home by spouse  
Purposeful rounding complete.  No voiced complaints  
Purposeful rounding completed. No further needs voiced.   
Purposeful rounding completed. No further needs voiced.   
Received pt care, pt resting with eyes closed, no acute distress.  
Rested without any complaints this shift, Up to BS and new IV started to give PRN Hydralazine.  BP remains in the range of 166 SBP's. Given scheduled Hydralazine this am.  
SPOUSE AT BEDSIDE.  
Shift assessment reviewed.  
Spiritual Health History and Assessment/Progress Note  [facility]    Initial Encounter,  , Life Adjustments, Adjustment to illness,      Name: Saranya Hightower MRN: [id number]    Age: 74 y.o.     Sex: female   Language: English   Zoroastrian: Mu-ism   Hypertensive urgency     Date: 10/9/2024            Total Time Calculated: 25 min              Spiritual Assessment began in [address]        Referral/Consult From: Rounding   Encounter Overview/Reason: Initial Encounter  Service Provided For: Patient    Brittanie, Belief, Meaning:   Patient is connected with a brittanie tradition or spiritual practice and has beliefs or practices that help with coping during difficult times  Family/Friends No family/friends present      Importance and Influence:  Patient has spiritual/personal beliefs that influence decisions regarding their health  Family/Friends No family/friends present    Community:  Patient is connected with a spiritual community and feels well-supported. Support system includes: Spouse/Partner, Children, Brittanie Community, Friends, and Extended family  Family/Friends No family/friends present    Assessment and Plan of Care:   Patient sitting up in bed alone in her room. Patient shared life/review/legacy including work, family, being a member of local Mu-ism [employer], and reason for being in the hospital. Patient became tearful when sharing concern for her spouse and family. States she has brittanie in God and blessed to have children and Yarsani family be there for them. Listened empathically providing time and space for reflection and sharing of life's sacred events. Provided words of encouragement and comfort. Prayed fervent prayer. Maintained sustaining presence. Patient able to smile intermittently while holding 's hand. Patient thanked  for visit.  available upon request.                               Interventions include: Facilitated expression of thoughts and 
Spouse at bedside.  
Wadsworth catheter D/C. 200 ml in bag. Patient requested pure wick catheter to be put in place.  Patient tolerating o2 at 2l/min well. She denies pain.  
normal on noncontrast images Pancreas: The pancreas is normal on noncontrast images. Adrenals: The adrenals are normal. Gallbladder: Gallbladder is normal. Kidneys: There is no hydronephrosis. Bilateral renal cysts are noted. Bowel: No dilated or thickened loop of large or small bowel is seen. Appendix: The appendix is normal. Abdominal aorta\vasculature: Extensive atherosclerotic calcifications are noted in multiple arteries. Femorofemoral bypass graft is noted. There has been interval placement of an abdominal aortic stent graft. The native abdominal aortic aneurysm sac has decreased in size, however there is suboptimal evaluation without intravenous contrast as a dedicated CTA was not performed. There is a rounded soft tissue density adjacent to the left common femoral artery, unchanged compared to prior CT dated May 2024. Urinary bladder: Wadsworth catheter is present in the urinary bladder. Bones: The osseous structures are diffusely demineralized. There is no acute fracture, subluxation or dislocation. Miscellaneous:  There is trace free fluid in the pelvis.  there is no free intraperitoneal gas. There is no focal fluid collection to suggest abscess.     1. Small right pleural effusion, very small left pleural effusion, bibasilar compressive atelectasis. 2. Trace free fluid in the pelvis. Electronically signed by Lucius Hewitt MD    XR CHEST PORTABLE    Result Date: 10/5/2024  EXAM:  XR CHEST PORTABLE INDICATION: Shortness of breath COMPARISON: 9/24/2024 TECHNIQUE: Portable AP chest view at 2340 hours FINDINGS: The left chest ICD and wires are stable. There is stable cardiac silhouette enlargement. There are mild diffuse interstitial opacities, similar to 9/24/2024. There is no pleural effusion or pneumothorax. The bones and upper abdomen are stable.     Mild pulmonary edema, similar to 9/24/2024. Electronically signed by Douglas Chinchilla         Assessment and Plan:     Acute respiratory failure  -Acute hypoxic 
PRN    cetirizine (ZYRTEC) tablet 5 mg  5 mg Oral Daily    aspirin EC tablet 81 mg  81 mg Oral Daily    hydrALAZINE (APRESOLINE) injection 10 mg  10 mg IntraVENous Q6H PRN    guaiFENesin (MUCINEX) extended release tablet 600 mg  600 mg Oral BID    nicotine (NICODERM CQ) 14 MG/24HR 1 patch  1 patch TransDERmal Daily    sodium chloride (OCEAN, BABY AYR) 0.65 % nasal spray 1 spray  1 spray Each Nostril 4x daily    traMADol (ULTRAM) tablet 50 mg  50 mg Oral Q6H PRN         Allergies         Patient has no known allergies.       Labs/Imaging/Diagnostics      Labs:  Recent Results (from the past 48 hour(s))   Basic Metabolic Panel    Collection Time: 10/08/24  5:36 AM   Result Value Ref Range    Sodium 139 136 - 145 mmol/L    Potassium 4.3 3.5 - 5.1 mmol/L    Chloride 104 97 - 108 mmol/L    CO2 25 21 - 32 mmol/L    Anion Gap 10 2 - 12 mmol/L    Glucose 84 65 - 100 mg/dL    BUN 61 (H) 6 - 20 mg/dL    Creatinine 4.02 (H) 0.55 - 1.02 mg/dL    BUN/Creatinine Ratio 15 12 - 20      Est, Glom Filt Rate 11 (L) >60 ml/min/1.73m2    Calcium 9.2 8.5 - 10.1 mg/dL   Basic Metabolic Panel    Collection Time: 10/09/24  5:35 AM   Result Value Ref Range    Sodium 137 136 - 145 mmol/L    Potassium 4.9 3.5 - 5.1 mmol/L    Chloride 101 97 - 108 mmol/L    CO2 25 21 - 32 mmol/L    Anion Gap 11 2 - 12 mmol/L    Glucose 75 65 - 100 mg/dL    BUN 67 (H) 6 - 20 mg/dL    Creatinine 4.02 (H) 0.55 - 1.02 mg/dL    BUN/Creatinine Ratio 17 12 - 20      Est, Glom Filt Rate 11 (L) >60 ml/min/1.73m2    Calcium 9.4 8.5 - 10.1 mg/dL   Brain Natriuretic Peptide    Collection Time: 10/09/24  5:35 AM   Result Value Ref Range    NT Pro-BNP 11,632 (H) <125 pg/mL        Imaging:  CT CHEST WO CONTRAST    Result Date: 10/6/2024  1. Small right pleural effusion, very small left pleural effusion, bibasilar compressive atelectasis. 2. Trace free fluid in the pelvis. Electronically signed by Lucius Hewitt MD    CT ABDOMEN PELVIS WO CONTRAST Additional Contrast? Radiologist 
noncontrast images. Spleen: The spleen is normal on noncontrast images Pancreas: The pancreas is normal on noncontrast images. Adrenals: The adrenals are normal. Gallbladder: Gallbladder is normal. Kidneys: There is no hydronephrosis. Bilateral renal cysts are noted. Bowel: No dilated or thickened loop of large or small bowel is seen. Appendix: The appendix is normal. Abdominal aorta\vasculature: Extensive atherosclerotic calcifications are noted in multiple arteries. Femorofemoral bypass graft is noted. There has been interval placement of an abdominal aortic stent graft. The native abdominal aortic aneurysm sac has decreased in size, however there is suboptimal evaluation without intravenous contrast as a dedicated CTA was not performed. There is a rounded soft tissue density adjacent to the left common femoral artery, unchanged compared to prior CT dated May 2024. Urinary bladder: Wadsworth catheter is present in the urinary bladder. Bones: The osseous structures are diffusely demineralized. There is no acute fracture, subluxation or dislocation. Miscellaneous:  There is trace free fluid in the pelvis.  there is no free intraperitoneal gas. There is no focal fluid collection to suggest abscess.     1. Small right pleural effusion, very small left pleural effusion, bibasilar compressive atelectasis. 2. Trace free fluid in the pelvis. Electronically signed by Lucius Hewitt MD    XR CHEST PORTABLE    Result Date: 10/5/2024  EXAM:  XR CHEST PORTABLE INDICATION: Shortness of breath COMPARISON: 9/24/2024 TECHNIQUE: Portable AP chest view at 2340 hours FINDINGS: The left chest ICD and wires are stable. There is stable cardiac silhouette enlargement. There are mild diffuse interstitial opacities, similar to 9/24/2024. There is no pleural effusion or pneumothorax. The bones and upper abdomen are stable.     Mild pulmonary edema, similar to 9/24/2024. Electronically signed by Douglas Chinchilla         Assessment and Plan:

## 2024-10-11 LAB
BACTERIA SPEC CULT: NORMAL
Lab: NORMAL

## 2024-10-12 LAB
BACTERIA SPEC CULT: NORMAL
Lab: NORMAL

## 2024-10-31 ENCOUNTER — TELEPHONE (OUTPATIENT)
Facility: HOSPITAL | Age: 74
End: 2024-10-31

## 2024-10-31 NOTE — TELEPHONE ENCOUNTER
Attempted follow-up of patient's recent hospitalization for pulmonary edema/CHF was unable to reach patient on both her home phone number as well as mobile phone number at this time

## 2024-11-09 ENCOUNTER — TELEPHONE (OUTPATIENT)
Facility: HOSPITAL | Age: 74
End: 2024-11-09

## 2024-11-09 NOTE — TELEPHONE ENCOUNTER
Followed up patient's recent hospital stay for hypoxic respiratory failure secondary to CHF.  Was able to talk with patient's  who said that she is \"getting along\" but no recurrence of shortness of breath and he has been helping her watch her weight every day and has been staying stable a denied having any issues obtaining medications and stated that they did follow-up with both cardiology and her primary provider.  At this time he had no questions or concerns and appreciated the call back.

## 2024-11-13 ENCOUNTER — APPOINTMENT (OUTPATIENT)
Facility: HOSPITAL | Age: 74
DRG: 291 | End: 2024-11-13
Attending: HOSPITALIST
Payer: MEDICARE

## 2024-11-13 ENCOUNTER — APPOINTMENT (OUTPATIENT)
Facility: HOSPITAL | Age: 74
DRG: 291 | End: 2024-11-13
Payer: MEDICARE

## 2024-11-13 ENCOUNTER — HOSPITAL ENCOUNTER (INPATIENT)
Facility: HOSPITAL | Age: 74
LOS: 4 days | Discharge: HOME HEALTH | DRG: 291 | End: 2024-11-17
Attending: HOSPITALIST
Payer: MEDICARE

## 2024-11-13 ENCOUNTER — HOSPITAL ENCOUNTER (INPATIENT)
Facility: HOSPITAL | Age: 74
LOS: 1 days | Discharge: ANOTHER HOSPITAL | DRG: 291 | End: 2024-11-13
Attending: EMERGENCY MEDICINE | Admitting: HOSPITALIST
Payer: MEDICARE

## 2024-11-13 VITALS
RESPIRATION RATE: 20 BRPM | DIASTOLIC BLOOD PRESSURE: 72 MMHG | OXYGEN SATURATION: 96 % | HEART RATE: 88 BPM | WEIGHT: 112.5 LBS | BODY MASS INDEX: 20.7 KG/M2 | HEIGHT: 62 IN | TEMPERATURE: 98 F | SYSTOLIC BLOOD PRESSURE: 148 MMHG

## 2024-11-13 DIAGNOSIS — I50.9 ACUTE ON CHRONIC CONGESTIVE HEART FAILURE, UNSPECIFIED HEART FAILURE TYPE (HCC): ICD-10-CM

## 2024-11-13 DIAGNOSIS — I16.1 HYPERTENSIVE EMERGENCY: ICD-10-CM

## 2024-11-13 DIAGNOSIS — J96.01 ACUTE RESPIRATORY FAILURE WITH HYPOXIA: ICD-10-CM

## 2024-11-13 DIAGNOSIS — J81.0 FLASH PULMONARY EDEMA: ICD-10-CM

## 2024-11-13 DIAGNOSIS — J90 PLEURAL EFFUSION, BILATERAL: ICD-10-CM

## 2024-11-13 DIAGNOSIS — R06.00 DYSPNEA, UNSPECIFIED TYPE: Primary | ICD-10-CM

## 2024-11-13 DIAGNOSIS — I50.23 SYSTOLIC CHF, ACUTE ON CHRONIC (HCC): ICD-10-CM

## 2024-11-13 PROBLEM — D72.829 LEUKOCYTOSIS: Status: ACTIVE | Noted: 2024-11-13

## 2024-11-13 LAB
ALBUMIN SERPL-MCNC: 3.5 G/DL (ref 3.5–5)
ALBUMIN/GLOB SERPL: 0.9 (ref 1.1–2.2)
ALP SERPL-CCNC: 85 U/L (ref 45–117)
ALT SERPL-CCNC: 27 U/L (ref 12–78)
ANION GAP SERPL CALC-SCNC: 10 MMOL/L (ref 2–12)
ANION GAP SERPL CALC-SCNC: 13 MMOL/L (ref 2–12)
ARTERIAL PATENCY WRIST A: YES
AST SERPL W P-5'-P-CCNC: 24 U/L (ref 15–37)
BASE DEFICIT BLDA-SCNC: 0.2 MMOL/L
BASOPHILS # BLD: 0.1 K/UL (ref 0–0.1)
BASOPHILS NFR BLD: 0 % (ref 0–1)
BDY SITE: ABNORMAL
BILIRUB DIRECT SERPL-MCNC: 0.2 MG/DL (ref 0–0.2)
BILIRUB SERPL-MCNC: 0.8 MG/DL (ref 0.2–1)
BNP SERPL-MCNC: ABNORMAL PG/ML
BNP SERPL-MCNC: ABNORMAL PG/ML
BUN SERPL-MCNC: 37 MG/DL (ref 6–20)
BUN SERPL-MCNC: 37 MG/DL (ref 6–20)
BUN/CREAT SERPL: 12 (ref 12–20)
BUN/CREAT SERPL: 13 (ref 12–20)
CA-I BLD-MCNC: 9.3 MG/DL (ref 8.5–10.1)
CA-I BLD-MCNC: 9.5 MG/DL (ref 8.5–10.1)
CHLORIDE SERPL-SCNC: 95 MMOL/L (ref 97–108)
CHLORIDE SERPL-SCNC: 96 MMOL/L (ref 97–108)
CO2 SERPL-SCNC: 27 MMOL/L (ref 21–32)
CO2 SERPL-SCNC: 28 MMOL/L (ref 21–32)
COHGB MFR BLD: 0.9 % (ref 1–2)
CREAT SERPL-MCNC: 2.85 MG/DL (ref 0.55–1.02)
CREAT SERPL-MCNC: 3.13 MG/DL (ref 0.55–1.02)
DIFFERENTIAL METHOD BLD: ABNORMAL
ECHO BSA: 1.49 M2
ECHO BSA: 1.49 M2
ECHO EST RA PRESSURE: 8 MMHG
ECHO LV EDV A2C: 79 ML
ECHO LV EDV A4C: 60 ML
ECHO LV EDV BP: 70 ML (ref 56–104)
ECHO LV EDV INDEX A4C: 40 ML/M2
ECHO LV EDV INDEX BP: 47 ML/M2
ECHO LV EDV NDEX A2C: 53 ML/M2
ECHO LV EJECTION FRACTION A2C: 27 %
ECHO LV EJECTION FRACTION A4C: 31 %
ECHO LV EJECTION FRACTION BIPLANE: 31 % (ref 55–100)
ECHO LV ESV A2C: 58 ML
ECHO LV ESV A4C: 41 ML
ECHO LV ESV BP: 49 ML (ref 19–49)
ECHO LV ESV INDEX A2C: 39 ML/M2
ECHO LV ESV INDEX A4C: 27 ML/M2
ECHO LV ESV INDEX BP: 33 ML/M2
ECHO LV FRACTIONAL SHORTENING: 9 % (ref 28–44)
ECHO LV GLOBAL LONGITUDINAL STRAIN (GLS): -9.2 %
ECHO LV INTERNAL DIMENSION DIASTOLE INDEX: 3.13 CM/M2
ECHO LV INTERNAL DIMENSION DIASTOLIC: 4.7 CM (ref 3.9–5.3)
ECHO LV INTERNAL DIMENSION SYSTOLIC INDEX: 2.87 CM/M2
ECHO LV INTERNAL DIMENSION SYSTOLIC: 4.3 CM
ECHO LV IVSD: 1.1 CM (ref 0.6–0.9)
ECHO LV MASS 2D: 175.8 G (ref 67–162)
ECHO LV MASS INDEX 2D: 117.2 G/M2 (ref 43–95)
ECHO LV POSTERIOR WALL DIASTOLIC: 1 CM (ref 0.6–0.9)
ECHO LV RELATIVE WALL THICKNESS RATIO: 0.43
ECHO MV A VELOCITY: 0.88 M/S
ECHO MV E DECELERATION TIME (DT): 158.4 MS
ECHO MV E VELOCITY: 1.03 M/S
ECHO MV E/A RATIO: 1.17
ECHO RIGHT VENTRICULAR SYSTOLIC PRESSURE (RVSP): 41 MMHG
ECHO RV FREE WALL PEAK S': 9.2 CM/S
ECHO RV TAPSE: 1.5 CM (ref 1.7–?)
ECHO TV REGURGITANT MAX VELOCITY: 2.89 M/S
ECHO TV REGURGITANT PEAK GRADIENT: 34 MMHG
EKG ATRIAL RATE: 115 BPM
EKG DIAGNOSIS: NORMAL
EKG P AXIS: 231 DEGREES
EKG P-R INTERVAL: 162 MS
EKG Q-T INTERVAL: 336 MS
EKG QRS DURATION: 103 MS
EKG QTC CALCULATION (BAZETT): 471 MS
EKG R AXIS: -5 DEGREES
EKG T AXIS: 55 DEGREES
EKG VENTRICULAR RATE: 118 BPM
EOSINOPHIL # BLD: 0.1 K/UL (ref 0–0.4)
EOSINOPHIL NFR BLD: 1 % (ref 0–7)
ERYTHROCYTE [DISTWIDTH] IN BLOOD BY AUTOMATED COUNT: 14.1 % (ref 11.5–14.5)
FLUAV RNA SPEC QL NAA+PROBE: NOT DETECTED
FLUBV RNA SPEC QL NAA+PROBE: NOT DETECTED
GLOBULIN SER CALC-MCNC: 3.7 G/DL (ref 2–4)
GLUCOSE SERPL-MCNC: 92 MG/DL (ref 65–100)
GLUCOSE SERPL-MCNC: 93 MG/DL (ref 65–100)
HCO3 BLDA-SCNC: 22 MMOL/L (ref 22–26)
HCT VFR BLD AUTO: 31.7 % (ref 35–47)
HGB BLD-MCNC: 10.7 G/DL (ref 11.5–16)
IMM GRANULOCYTES # BLD AUTO: 0.1 K/UL (ref 0–0.04)
IMM GRANULOCYTES NFR BLD AUTO: 0 % (ref 0–0.5)
LYMPHOCYTES # BLD: 2.1 K/UL (ref 0.8–3.5)
LYMPHOCYTES NFR BLD: 17 % (ref 12–49)
MAGNESIUM SERPL-MCNC: 2.4 MG/DL (ref 1.6–2.4)
MCH RBC QN AUTO: 32.4 PG (ref 26–34)
MCHC RBC AUTO-ENTMCNC: 33.8 G/DL (ref 30–36.5)
MCV RBC AUTO: 96.1 FL (ref 80–99)
METHGB MFR BLD: 0.3 % (ref 0–1.4)
MONOCYTES # BLD: 1.7 K/UL (ref 0–1)
MONOCYTES NFR BLD: 14 % (ref 5–13)
NEUTS SEG # BLD: 8.2 K/UL (ref 1.8–8)
NEUTS SEG NFR BLD: 68 % (ref 32–75)
NRBC # BLD: 0 K/UL (ref 0–0.01)
NRBC BLD-RTO: 0 PER 100 WBC
OXYHGB MFR BLD: 94 % (ref 95–99)
PCO2 BLDA: 29 MMHG (ref 35–45)
PERFORMED BY:: ABNORMAL
PH BLDA: 7.5 (ref 7.35–7.45)
PLATELET # BLD AUTO: 241 K/UL (ref 150–400)
PMV BLD AUTO: 11.3 FL (ref 8.9–12.9)
PO2 BLDA: 76 MMHG (ref 80–100)
POTASSIUM SERPL-SCNC: 3.5 MMOL/L (ref 3.5–5.1)
POTASSIUM SERPL-SCNC: 3.9 MMOL/L (ref 3.5–5.1)
PROCALCITONIN SERPL-MCNC: 0.14 NG/ML
PROT SERPL-MCNC: 7.2 G/DL (ref 6.4–8.2)
RBC # BLD AUTO: 3.3 M/UL (ref 3.8–5.2)
SAO2% DEVICE SAO2% SENSOR NAME: ABNORMAL
SARS-COV-2 RNA RESP QL NAA+PROBE: NOT DETECTED
SODIUM SERPL-SCNC: 133 MMOL/L (ref 136–145)
SODIUM SERPL-SCNC: 136 MMOL/L (ref 136–145)
SPECIMEN SITE: ABNORMAL
TROPONIN I SERPL HS-MCNC: 109 NG/L (ref 0–51)
TROPONIN I SERPL HS-MCNC: 133 NG/L (ref 0–51)
TROPONIN I SERPL HS-MCNC: 164 NG/L (ref 0–51)
TROPONIN I SERPL HS-MCNC: 95 NG/L (ref 0–51)
TSH SERPL DL<=0.05 MIU/L-ACNC: 2.12 UIU/ML (ref 0.36–3.74)
WBC # BLD AUTO: 12.2 K/UL (ref 3.6–11)

## 2024-11-13 PROCEDURE — 83735 ASSAY OF MAGNESIUM: CPT

## 2024-11-13 PROCEDURE — 94640 AIRWAY INHALATION TREATMENT: CPT

## 2024-11-13 PROCEDURE — 1100000000 HC RM PRIVATE

## 2024-11-13 PROCEDURE — 6360000002 HC RX W HCPCS: Performed by: PHYSICIAN ASSISTANT

## 2024-11-13 PROCEDURE — 93970 EXTREMITY STUDY: CPT

## 2024-11-13 PROCEDURE — 84145 PROCALCITONIN (PCT): CPT

## 2024-11-13 PROCEDURE — 84443 ASSAY THYROID STIM HORMONE: CPT

## 2024-11-13 PROCEDURE — 80061 LIPID PANEL: CPT

## 2024-11-13 PROCEDURE — 83880 ASSAY OF NATRIURETIC PEPTIDE: CPT

## 2024-11-13 PROCEDURE — 82728 ASSAY OF FERRITIN: CPT

## 2024-11-13 PROCEDURE — 6370000000 HC RX 637 (ALT 250 FOR IP): Performed by: HOSPITALIST

## 2024-11-13 PROCEDURE — 71045 X-RAY EXAM CHEST 1 VIEW: CPT

## 2024-11-13 PROCEDURE — 6360000002 HC RX W HCPCS: Performed by: HOSPITALIST

## 2024-11-13 PROCEDURE — 87636 SARSCOV2 & INF A&B AMP PRB: CPT

## 2024-11-13 PROCEDURE — 2580000003 HC RX 258: Performed by: HOSPITALIST

## 2024-11-13 PROCEDURE — 6370000000 HC RX 637 (ALT 250 FOR IP): Performed by: EMERGENCY MEDICINE

## 2024-11-13 PROCEDURE — 93321 DOPPLER ECHO F-UP/LMTD STD: CPT

## 2024-11-13 PROCEDURE — 2500000003 HC RX 250 WO HCPCS: Performed by: EMERGENCY MEDICINE

## 2024-11-13 PROCEDURE — 6360000002 HC RX W HCPCS: Performed by: EMERGENCY MEDICINE

## 2024-11-13 PROCEDURE — 6370000000 HC RX 637 (ALT 250 FOR IP)

## 2024-11-13 PROCEDURE — 84484 ASSAY OF TROPONIN QUANT: CPT

## 2024-11-13 PROCEDURE — 96375 TX/PRO/DX INJ NEW DRUG ADDON: CPT

## 2024-11-13 PROCEDURE — 82803 BLOOD GASES ANY COMBINATION: CPT

## 2024-11-13 PROCEDURE — 80076 HEPATIC FUNCTION PANEL: CPT

## 2024-11-13 PROCEDURE — 5A09357 ASSISTANCE WITH RESPIRATORY VENTILATION, LESS THAN 24 CONSECUTIVE HOURS, CONTINUOUS POSITIVE AIRWAY PRESSURE: ICD-10-PCS | Performed by: HOSPITALIST

## 2024-11-13 PROCEDURE — 83540 ASSAY OF IRON: CPT

## 2024-11-13 PROCEDURE — 84439 ASSAY OF FREE THYROXINE: CPT

## 2024-11-13 PROCEDURE — 36600 WITHDRAWAL OF ARTERIAL BLOOD: CPT

## 2024-11-13 PROCEDURE — 93005 ELECTROCARDIOGRAM TRACING: CPT | Performed by: EMERGENCY MEDICINE

## 2024-11-13 PROCEDURE — 2060000000 HC ICU INTERMEDIATE R&B

## 2024-11-13 PROCEDURE — 2580000003 HC RX 258: Performed by: PHYSICIAN ASSISTANT

## 2024-11-13 PROCEDURE — 80048 BASIC METABOLIC PNL TOTAL CA: CPT

## 2024-11-13 PROCEDURE — 96376 TX/PRO/DX INJ SAME DRUG ADON: CPT

## 2024-11-13 PROCEDURE — 96374 THER/PROPH/DIAG INJ IV PUSH: CPT

## 2024-11-13 PROCEDURE — 85025 COMPLETE CBC W/AUTO DIFF WBC: CPT

## 2024-11-13 PROCEDURE — 94660 CPAP INITIATION&MGMT: CPT

## 2024-11-13 PROCEDURE — 99285 EMERGENCY DEPT VISIT HI MDM: CPT

## 2024-11-13 PROCEDURE — 6370000000 HC RX 637 (ALT 250 FOR IP): Performed by: PHYSICIAN ASSISTANT

## 2024-11-13 RX ORDER — SODIUM CHLORIDE 0.9 % (FLUSH) 0.9 %
5-40 SYRINGE (ML) INJECTION EVERY 12 HOURS SCHEDULED
Status: DISCONTINUED | OUTPATIENT
Start: 2024-11-13 | End: 2024-11-13 | Stop reason: HOSPADM

## 2024-11-13 RX ORDER — SODIUM CHLORIDE 0.9 % (FLUSH) 0.9 %
5-40 SYRINGE (ML) INJECTION EVERY 12 HOURS SCHEDULED
Status: DISCONTINUED | OUTPATIENT
Start: 2024-11-13 | End: 2024-11-17 | Stop reason: HOSPADM

## 2024-11-13 RX ORDER — HYDRALAZINE HYDROCHLORIDE 50 MG/1
25 TABLET, FILM COATED ORAL 3 TIMES DAILY
Status: DISCONTINUED | OUTPATIENT
Start: 2024-11-13 | End: 2024-11-15

## 2024-11-13 RX ORDER — ACETAMINOPHEN 325 MG/1
650 TABLET ORAL EVERY 6 HOURS PRN
Status: DISCONTINUED | OUTPATIENT
Start: 2024-11-13 | End: 2024-11-17 | Stop reason: HOSPADM

## 2024-11-13 RX ORDER — NITROGLYCERIN 20 MG/100ML
5-200 INJECTION INTRAVENOUS CONTINUOUS
Status: DISCONTINUED | OUTPATIENT
Start: 2024-11-13 | End: 2024-11-13

## 2024-11-13 RX ORDER — ONDANSETRON 2 MG/ML
4 INJECTION INTRAMUSCULAR; INTRAVENOUS EVERY 6 HOURS PRN
Status: DISCONTINUED | OUTPATIENT
Start: 2024-11-13 | End: 2024-11-17 | Stop reason: HOSPADM

## 2024-11-13 RX ORDER — ONDANSETRON 2 MG/ML
4 INJECTION INTRAMUSCULAR; INTRAVENOUS EVERY 6 HOURS PRN
Status: DISCONTINUED | OUTPATIENT
Start: 2024-11-13 | End: 2024-11-13 | Stop reason: HOSPADM

## 2024-11-13 RX ORDER — SODIUM CHLORIDE 0.9 % (FLUSH) 0.9 %
5-40 SYRINGE (ML) INJECTION PRN
Status: DISCONTINUED | OUTPATIENT
Start: 2024-11-13 | End: 2024-11-17 | Stop reason: HOSPADM

## 2024-11-13 RX ORDER — BUDESONIDE AND FORMOTEROL FUMARATE DIHYDRATE 160; 4.5 UG/1; UG/1
2 AEROSOL RESPIRATORY (INHALATION) DAILY
Status: DISCONTINUED | OUTPATIENT
Start: 2024-11-14 | End: 2024-11-17 | Stop reason: HOSPADM

## 2024-11-13 RX ORDER — POLYETHYLENE GLYCOL 3350 17 G/17G
17 POWDER, FOR SOLUTION ORAL DAILY PRN
Status: DISCONTINUED | OUTPATIENT
Start: 2024-11-13 | End: 2024-11-13 | Stop reason: HOSPADM

## 2024-11-13 RX ORDER — FUROSEMIDE 10 MG/ML
60 INJECTION INTRAMUSCULAR; INTRAVENOUS
Status: DISCONTINUED | OUTPATIENT
Start: 2024-11-13 | End: 2024-11-13

## 2024-11-13 RX ORDER — SODIUM CHLORIDE 0.9 % (FLUSH) 0.9 %
5-40 SYRINGE (ML) INJECTION PRN
Status: DISCONTINUED | OUTPATIENT
Start: 2024-11-13 | End: 2024-11-13 | Stop reason: HOSPADM

## 2024-11-13 RX ORDER — ASPIRIN 81 MG/1
81 TABLET, CHEWABLE ORAL DAILY
Status: DISCONTINUED | OUTPATIENT
Start: 2024-11-14 | End: 2024-11-17 | Stop reason: HOSPADM

## 2024-11-13 RX ORDER — BUMETANIDE 0.25 MG/ML
1 INJECTION, SOLUTION INTRAMUSCULAR; INTRAVENOUS 2 TIMES DAILY
Status: DISCONTINUED | OUTPATIENT
Start: 2024-11-13 | End: 2024-11-13

## 2024-11-13 RX ORDER — FERROUS SULFATE 325(65) MG
325 TABLET ORAL EVERY OTHER DAY
Status: DISCONTINUED | OUTPATIENT
Start: 2024-11-13 | End: 2024-11-13 | Stop reason: HOSPADM

## 2024-11-13 RX ORDER — ACETAMINOPHEN 325 MG/1
650 TABLET ORAL EVERY 4 HOURS PRN
Status: DISCONTINUED | OUTPATIENT
Start: 2024-11-13 | End: 2024-11-17 | Stop reason: HOSPADM

## 2024-11-13 RX ORDER — MIRTAZAPINE 15 MG/1
15 TABLET, FILM COATED ORAL NIGHTLY
Status: DISCONTINUED | OUTPATIENT
Start: 2024-11-13 | End: 2024-11-17 | Stop reason: HOSPADM

## 2024-11-13 RX ORDER — BUMETANIDE 0.25 MG/ML
1 INJECTION, SOLUTION INTRAMUSCULAR; INTRAVENOUS 2 TIMES DAILY
Status: DISCONTINUED | OUTPATIENT
Start: 2024-11-13 | End: 2024-11-13 | Stop reason: HOSPADM

## 2024-11-13 RX ORDER — METOPROLOL SUCCINATE 50 MG/1
100 TABLET, EXTENDED RELEASE ORAL DAILY
Status: DISCONTINUED | OUTPATIENT
Start: 2024-11-14 | End: 2024-11-16

## 2024-11-13 RX ORDER — HEPARIN SODIUM 5000 [USP'U]/ML
5000 INJECTION, SOLUTION INTRAVENOUS; SUBCUTANEOUS EVERY 8 HOURS SCHEDULED
Status: DISCONTINUED | OUTPATIENT
Start: 2024-11-13 | End: 2024-11-17 | Stop reason: HOSPADM

## 2024-11-13 RX ORDER — SODIUM CHLORIDE 9 MG/ML
INJECTION, SOLUTION INTRAVENOUS PRN
Status: DISCONTINUED | OUTPATIENT
Start: 2024-11-13 | End: 2024-11-17 | Stop reason: HOSPADM

## 2024-11-13 RX ORDER — ATORVASTATIN CALCIUM 40 MG/1
80 TABLET, FILM COATED ORAL DAILY
Status: DISCONTINUED | OUTPATIENT
Start: 2024-11-14 | End: 2024-11-17 | Stop reason: HOSPADM

## 2024-11-13 RX ORDER — METOLAZONE 5 MG/1
2.5 TABLET ORAL
Status: DISCONTINUED | OUTPATIENT
Start: 2024-11-13 | End: 2024-11-14

## 2024-11-13 RX ORDER — ACETAMINOPHEN 325 MG/1
650 TABLET ORAL EVERY 6 HOURS PRN
Status: DISCONTINUED | OUTPATIENT
Start: 2024-11-13 | End: 2024-11-13 | Stop reason: HOSPADM

## 2024-11-13 RX ORDER — BUMETANIDE 1 MG/1
2 TABLET ORAL DAILY
Status: DISCONTINUED | OUTPATIENT
Start: 2024-11-14 | End: 2024-11-15

## 2024-11-13 RX ORDER — ONDANSETRON 4 MG/1
4 TABLET, ORALLY DISINTEGRATING ORAL EVERY 8 HOURS PRN
Status: DISCONTINUED | OUTPATIENT
Start: 2024-11-13 | End: 2024-11-13 | Stop reason: HOSPADM

## 2024-11-13 RX ORDER — HYDRALAZINE HYDROCHLORIDE 25 MG/1
25 TABLET, FILM COATED ORAL 3 TIMES DAILY
Status: DISCONTINUED | OUTPATIENT
Start: 2024-11-13 | End: 2024-11-13

## 2024-11-13 RX ORDER — MAGNESIUM HYDROXIDE/ALUMINUM HYDROXICE/SIMETHICONE 120; 1200; 1200 MG/30ML; MG/30ML; MG/30ML
30 SUSPENSION ORAL EVERY 6 HOURS PRN
Status: DISCONTINUED | OUTPATIENT
Start: 2024-11-13 | End: 2024-11-13 | Stop reason: HOSPADM

## 2024-11-13 RX ORDER — ASPIRIN 81 MG/1
81 TABLET, CHEWABLE ORAL DAILY
Status: DISCONTINUED | OUTPATIENT
Start: 2024-11-13 | End: 2024-11-13 | Stop reason: HOSPADM

## 2024-11-13 RX ORDER — ACETAMINOPHEN 650 MG/1
650 SUPPOSITORY RECTAL EVERY 6 HOURS PRN
Status: DISCONTINUED | OUTPATIENT
Start: 2024-11-13 | End: 2024-11-13 | Stop reason: HOSPADM

## 2024-11-13 RX ORDER — PANTOPRAZOLE SODIUM 40 MG/1
40 TABLET, DELAYED RELEASE ORAL DAILY
Status: DISCONTINUED | OUTPATIENT
Start: 2024-11-13 | End: 2024-11-13 | Stop reason: HOSPADM

## 2024-11-13 RX ORDER — HYDRALAZINE HYDROCHLORIDE 20 MG/ML
10 INJECTION INTRAMUSCULAR; INTRAVENOUS ONCE
Status: COMPLETED | OUTPATIENT
Start: 2024-11-13 | End: 2024-11-13

## 2024-11-13 RX ORDER — ONDANSETRON 4 MG/1
4 TABLET, ORALLY DISINTEGRATING ORAL EVERY 8 HOURS PRN
Status: DISCONTINUED | OUTPATIENT
Start: 2024-11-13 | End: 2024-11-17 | Stop reason: HOSPADM

## 2024-11-13 RX ORDER — BUMETANIDE 0.25 MG/ML
2 INJECTION, SOLUTION INTRAMUSCULAR; INTRAVENOUS 2 TIMES DAILY
Status: DISCONTINUED | OUTPATIENT
Start: 2024-11-13 | End: 2024-11-13

## 2024-11-13 RX ORDER — SODIUM CHLORIDE 9 MG/ML
INJECTION, SOLUTION INTRAVENOUS PRN
Status: DISCONTINUED | OUTPATIENT
Start: 2024-11-13 | End: 2024-11-13 | Stop reason: HOSPADM

## 2024-11-13 RX ORDER — HYDRALAZINE HYDROCHLORIDE 50 MG/1
50 TABLET, FILM COATED ORAL 3 TIMES DAILY
Status: DISCONTINUED | OUTPATIENT
Start: 2024-11-13 | End: 2024-11-13 | Stop reason: HOSPADM

## 2024-11-13 RX ORDER — POTASSIUM CHLORIDE 1500 MG/1
20 TABLET, EXTENDED RELEASE ORAL 2 TIMES DAILY WITH MEALS
Status: DISCONTINUED | OUTPATIENT
Start: 2024-11-13 | End: 2024-11-13 | Stop reason: HOSPADM

## 2024-11-13 RX ORDER — HYDRALAZINE HYDROCHLORIDE 20 MG/ML
10 INJECTION INTRAMUSCULAR; INTRAVENOUS EVERY 4 HOURS PRN
Status: DISCONTINUED | OUTPATIENT
Start: 2024-11-13 | End: 2024-11-13 | Stop reason: HOSPADM

## 2024-11-13 RX ORDER — ENALAPRILAT 1.25 MG/ML
1.25 INJECTION INTRAVENOUS ONCE
Status: COMPLETED | OUTPATIENT
Start: 2024-11-13 | End: 2024-11-13

## 2024-11-13 RX ORDER — BUMETANIDE 0.25 MG/ML
2 INJECTION, SOLUTION INTRAMUSCULAR; INTRAVENOUS
Status: COMPLETED | OUTPATIENT
Start: 2024-11-13 | End: 2024-11-13

## 2024-11-13 RX ORDER — ACETAMINOPHEN 650 MG/1
650 SUPPOSITORY RECTAL EVERY 6 HOURS PRN
Status: DISCONTINUED | OUTPATIENT
Start: 2024-11-13 | End: 2024-11-17 | Stop reason: HOSPADM

## 2024-11-13 RX ORDER — SODIUM BICARBONATE 650 MG/1
650 TABLET ORAL 2 TIMES DAILY WITH MEALS
Status: DISCONTINUED | OUTPATIENT
Start: 2024-11-14 | End: 2024-11-14

## 2024-11-13 RX ORDER — ROSUVASTATIN CALCIUM 40 MG/1
40 TABLET, COATED ORAL DAILY
Status: DISCONTINUED | OUTPATIENT
Start: 2024-11-13 | End: 2024-11-13 | Stop reason: HOSPADM

## 2024-11-13 RX ORDER — ISOSORBIDE DINITRATE 20 MG/1
20 TABLET ORAL 3 TIMES DAILY
Status: DISCONTINUED | OUTPATIENT
Start: 2024-11-13 | End: 2024-11-13 | Stop reason: HOSPADM

## 2024-11-13 RX ORDER — METOLAZONE 2.5 MG/1
2.5 TABLET ORAL DAILY
Status: DISCONTINUED | OUTPATIENT
Start: 2024-11-13 | End: 2024-11-13

## 2024-11-13 RX ORDER — POLYETHYLENE GLYCOL 3350 17 G/17G
17 POWDER, FOR SOLUTION ORAL DAILY PRN
Status: DISCONTINUED | OUTPATIENT
Start: 2024-11-13 | End: 2024-11-15

## 2024-11-13 RX ORDER — HEPARIN SODIUM 5000 [USP'U]/ML
5000 INJECTION, SOLUTION INTRAVENOUS; SUBCUTANEOUS EVERY 8 HOURS SCHEDULED
Status: DISCONTINUED | OUTPATIENT
Start: 2024-11-13 | End: 2024-11-13 | Stop reason: HOSPADM

## 2024-11-13 RX ORDER — ALBUTEROL SULFATE 90 UG/1
2 INHALANT RESPIRATORY (INHALATION) EVERY 4 HOURS PRN
Status: DISCONTINUED | OUTPATIENT
Start: 2024-11-13 | End: 2024-11-17 | Stop reason: HOSPADM

## 2024-11-13 RX ORDER — BUDESONIDE AND FORMOTEROL FUMARATE DIHYDRATE 160; 4.5 UG/1; UG/1
2 AEROSOL RESPIRATORY (INHALATION)
Status: DISCONTINUED | OUTPATIENT
Start: 2024-11-13 | End: 2024-11-13 | Stop reason: HOSPADM

## 2024-11-13 RX ORDER — ISOSORBIDE DINITRATE 20 MG/1
20 TABLET ORAL 3 TIMES DAILY
Status: DISCONTINUED | OUTPATIENT
Start: 2024-11-13 | End: 2024-11-17 | Stop reason: HOSPADM

## 2024-11-13 RX ORDER — METOPROLOL SUCCINATE 50 MG/1
100 TABLET, EXTENDED RELEASE ORAL DAILY
Status: DISCONTINUED | OUTPATIENT
Start: 2024-11-13 | End: 2024-11-13 | Stop reason: HOSPADM

## 2024-11-13 RX ORDER — HYDRALAZINE HYDROCHLORIDE 20 MG/ML
10 INJECTION INTRAMUSCULAR; INTRAVENOUS EVERY 6 HOURS PRN
Status: DISCONTINUED | OUTPATIENT
Start: 2024-11-13 | End: 2024-11-17 | Stop reason: HOSPADM

## 2024-11-13 RX ORDER — PANTOPRAZOLE SODIUM 40 MG/1
40 TABLET, DELAYED RELEASE ORAL
Status: DISCONTINUED | OUTPATIENT
Start: 2024-11-14 | End: 2024-11-17 | Stop reason: HOSPADM

## 2024-11-13 RX ORDER — MIRTAZAPINE 15 MG/1
15 TABLET, FILM COATED ORAL NIGHTLY
Status: DISCONTINUED | OUTPATIENT
Start: 2024-11-13 | End: 2024-11-13 | Stop reason: HOSPADM

## 2024-11-13 RX ORDER — BUMETANIDE 0.25 MG/ML
1 INJECTION, SOLUTION INTRAMUSCULAR; INTRAVENOUS 2 TIMES DAILY
Status: DISCONTINUED | OUTPATIENT
Start: 2024-11-13 | End: 2024-11-14

## 2024-11-13 RX ORDER — FERROUS SULFATE 325(65) MG
325 TABLET ORAL EVERY OTHER DAY
Status: DISCONTINUED | OUTPATIENT
Start: 2024-11-14 | End: 2024-11-17 | Stop reason: HOSPADM

## 2024-11-13 RX ADMIN — ROSUVASTATIN 40 MG: 40 TABLET, FILM COATED ORAL at 07:59

## 2024-11-13 RX ADMIN — ENALAPRILAT 1.25 MG: 1.25 INJECTION INTRAVENOUS at 03:37

## 2024-11-13 RX ADMIN — POTASSIUM CHLORIDE 20 MEQ: 1500 TABLET, EXTENDED RELEASE ORAL at 08:00

## 2024-11-13 RX ADMIN — SODIUM CHLORIDE, PRESERVATIVE FREE 10 ML: 5 INJECTION INTRAVENOUS at 09:14

## 2024-11-13 RX ADMIN — BUMETANIDE 1 MG: 0.25 INJECTION INTRAMUSCULAR; INTRAVENOUS at 22:19

## 2024-11-13 RX ADMIN — HYDRALAZINE HYDROCHLORIDE 50 MG: 50 TABLET ORAL at 14:58

## 2024-11-13 RX ADMIN — METOLAZONE 2.5 MG: 5 TABLET ORAL at 22:18

## 2024-11-13 RX ADMIN — NITROGLYCERIN 1 INCH: 20 OINTMENT TOPICAL at 03:38

## 2024-11-13 RX ADMIN — PANTOPRAZOLE SODIUM 40 MG: 40 TABLET, DELAYED RELEASE ORAL at 08:00

## 2024-11-13 RX ADMIN — ISOSORBIDE DINITRATE 20 MG: 20 TABLET ORAL at 07:59

## 2024-11-13 RX ADMIN — HYDRALAZINE HYDROCHLORIDE 10 MG: 20 INJECTION INTRAMUSCULAR; INTRAVENOUS at 02:47

## 2024-11-13 RX ADMIN — NITROGLYCERIN 1 INCH: 20 OINTMENT TOPICAL at 01:51

## 2024-11-13 RX ADMIN — BUMETANIDE 2 MG: 0.25 INJECTION INTRAMUSCULAR; INTRAVENOUS at 07:58

## 2024-11-13 RX ADMIN — FERROUS SULFATE TAB 325 MG (65 MG ELEMENTAL FE) 325 MG: 325 (65 FE) TAB at 07:59

## 2024-11-13 RX ADMIN — HYDRALAZINE HYDROCHLORIDE 25 MG: 25 TABLET ORAL at 07:59

## 2024-11-13 RX ADMIN — SODIUM CHLORIDE, PRESERVATIVE FREE 10 ML: 5 INJECTION INTRAVENOUS at 22:20

## 2024-11-13 RX ADMIN — Medication 3 MG: at 22:18

## 2024-11-13 RX ADMIN — ISOSORBIDE DINITRATE 20 MG: 20 TABLET ORAL at 22:19

## 2024-11-13 RX ADMIN — ISOSORBIDE DINITRATE 20 MG: 20 TABLET ORAL at 14:58

## 2024-11-13 RX ADMIN — MIRTAZAPINE 15 MG: 15 TABLET, FILM COATED ORAL at 22:19

## 2024-11-13 RX ADMIN — HYDRALAZINE HYDROCHLORIDE 10 MG: 20 INJECTION INTRAMUSCULAR; INTRAVENOUS at 03:37

## 2024-11-13 RX ADMIN — BUMETANIDE 2 MG: 0.25 INJECTION INTRAMUSCULAR; INTRAVENOUS at 01:28

## 2024-11-13 RX ADMIN — HEPARIN SODIUM 5000 UNITS: 5000 INJECTION INTRAVENOUS; SUBCUTANEOUS at 22:20

## 2024-11-13 RX ADMIN — METOPROLOL SUCCINATE 100 MG: 50 TABLET, EXTENDED RELEASE ORAL at 07:59

## 2024-11-13 RX ADMIN — HEPARIN SODIUM 5000 UNITS: 5000 INJECTION INTRAVENOUS; SUBCUTANEOUS at 09:14

## 2024-11-13 RX ADMIN — ASPIRIN 81 MG 81 MG: 81 TABLET ORAL at 07:58

## 2024-11-13 RX ADMIN — BUDESONIDE AND FORMOTEROL FUMARATE DIHYDRATE 2 PUFF: 160; 4.5 AEROSOL RESPIRATORY (INHALATION) at 07:36

## 2024-11-13 ASSESSMENT — ENCOUNTER SYMPTOMS
ALLERGIC/IMMUNOLOGIC NEGATIVE: 1
GASTROINTESTINAL NEGATIVE: 1
SHORTNESS OF BREATH: 1
COUGH: 1
EYES NEGATIVE: 1

## 2024-11-13 ASSESSMENT — PAIN - FUNCTIONAL ASSESSMENT: PAIN_FUNCTIONAL_ASSESSMENT: 0-10

## 2024-11-13 ASSESSMENT — PAIN SCALES - GENERAL
PAINLEVEL_OUTOF10: 0
PAINLEVEL_OUTOF10: 0

## 2024-11-13 NOTE — PROGRESS NOTES
4 Eyes Skin Assessment     NAME:  Saranya Hightower  YOB: 1950  MEDICAL RECORD NUMBER:  [id number]    The patient is being assessed for  Admission    I agree that at least one RN has performed a thorough Head to Toe Skin Assessment on the patient. ALL assessment sites listed below have been assessed.      Areas assessed by both nurses:    Head, Face, Ears, Shoulders, Back, Chest, Arms, Elbows, Hands, Sacrum. Buttock, Coccyx, Ischium, Legs. Feet and Heels, and Under Medical Devices         Does the Patient have a Wound? No noted wound(s)       Goyo Prevention initiated by RN: Yes  Wound Care Orders initiated by RN: No    Pressure Injury (Stage 3,4, Unstageable, DTI, NWPT, and Complex wounds) if present, place Wound referral order by RN under : No    New Ostomies, if present place, Ostomy referral order under : No     Nurse 1 eSignature: Electronically signed by Peg Shea RN on 11/13/24 at 5:56 PM EST    **SHARE this note so that the co-signing nurse can place an eSignature**    Nurse 2 eSignature: Electronically signed by Alayna Purvis RN on 11/13/24 at 6:13 PM EST

## 2024-11-13 NOTE — PLAN OF CARE
Basic admission orders placed for Ms. Hightower for CHF exacerbation/flash pulmonary edema.  Please contact me when patient is on the inpatient floor, situated and home medication reconciliation has been completed.

## 2024-11-13 NOTE — DISCHARGE SUMMARY
Physician Discharge Summary       Patient: Saranya Hightower MRN: [id number]     YOB: 1950  Age: 74 y.o.  Sex: female    PCP: Nubia Davila APRN - NP    Allergies: Patient has no known allergies.    Admit date: 11/13/2024  Admitting Provider: Carl Scott MD    Discharge date: 11/13/2024  Discharging Provider: Carl Scott MD    * Admission Diagnoses:   Flash pulmonary edema [J81.0]  Hypertensive emergency [I16.1]  Dyspnea, unspecified type [R06.00]  Acute on chronic congestive heart failure, unspecified heart failure type (HCC) [I50.9]      * Discharge Diagnoses:    Active Hospital Problems    Leukocytosis      Acute respiratory failure with hypoxia      CHF (congestive heart failure), NYHA class I, acute on chronic, combined (HCC)      CKD (chronic kidney disease) stage 4, GFR 15-29 ml/min ([facility])      Hypertensive emergency           Chief Complaint   Patient presents with    Shortness of Breath        Presentation on 11/13/2024  HPI as per admitting provider on 11/13/2024    aSranya Hightower is a 74 y.o. female followed by Nubia Davila APRN - NP and  has a past medical history of AAA (abdominal aortic aneurysm) ([facility]), Abnormal weight loss, Anemia, Atherosclerosis of native coronary artery without angina pectoris, Bronchitis, CAD (coronary artery disease), Carotid artery stenosis, Cervicalgia, Chronic bronchitis (HCC), CKD (chronic kidney disease) stage 4, GFR 15-29 ml/min ([facility]), Colon polyps, Constipation, COPD (chronic obstructive pulmonary disease) ([facility]), HTN (hypertension), Hyperlipidemia, Menopause, Personal history of colonic polyps, PVD (peripheral vascular disease) ([facility]), and Vitamin D deficiency.    Patient was able to give some history but unable to confirm symptoms that she was expressing and attempted to contact patient's  but was unsuccessful but patient said that yesterday she was doing okay and even followed up with phone

## 2024-11-13 NOTE — PROGRESS NOTES
Report called to kaycee ELLIOTT see EDWIGE.  TRANSFER - OUT REPORT:    Verbal report given to kaycee ELLIOTT on Saranya Hightower  being transferred to [place] for change in patient condition ( )       Report consisted of patient's Situation, Background, Assessment and   Recommendations(SBAR).     Information from the following report(s) Event Log was reviewed with the receiving nurse.           Lines:   Peripheral IV 11/13/24 Left;Dorsal Forearm (Active)        Opportunity for questions and clarification was provided.      Patient transported with:  Monitor  O2

## 2024-11-13 NOTE — ED PROVIDER NOTES
results of their tests and reason(s) for their admission have been discussed with pt and/or available family. They convey agreement and understanding for the need to be admitted and for the admission diagnosis.           I am the Primary Clinician of Record: Candace Ortiz MD (electronically signed)    (Please note that parts of this dictation were completed with voice recognition software. Quite often unanticipated grammatical, syntax, homophones, and other interpretive errors are inadvertently transcribed by the computer software. Please disregards these errors. Please excuse any errors that have escaped final proofreading.)     Candace Ortiz MD  11/13/24 9847

## 2024-11-13 NOTE — PROGRESS NOTES
EDWIGE initiated. Ate fair for lunch today. Denies n/v. Dr. Scott in to speak with  and pt for update. Plan for transfer to when bed is available

## 2024-11-13 NOTE — ED NOTES
Patient remains on Bi-Pap resting comfortably in bed. Continuing to monitor patient blood pressure to evaluate if she can be admitted or if transfer is needed.

## 2024-11-13 NOTE — ED NOTES
Patient admits that she is still smoking cigarettes.  Smells heavily of cigarette smoke in triage.

## 2024-11-13 NOTE — CONSULTS
CARDIOLOGY CONSULTATION    REASON FOR CONSULT: HTN urgency/elevated troponin/CHF exacerbation    REQUESTING PROVIDER: Dr. Scott     CHIEF COMPLAINT:  Shortness of breath    HISTORY OF PRESENT ILLNESS: Saranya Hightower is a 74 y.o. year-old female with past medical history significant for HFrEF, s/p AICD,  CAD, HTN, HLD, CKD IV who was evaluated today due to CHF exacerbation/hypertensive urgency. Initially evaluated in the ER for progressive shortness of breath and BP readings >200. Trop -314-133, flat, trending down. Likely demand in the setting of  HTN urgency. BNP >67125, echo shows EF 30-35%, . Regional wall motion abnormalities noted with areas of akinesis at inferior and infero lateral. Base of inferior wall appears anuerysmal. (Change from previous), NST in July shows predominately fixed defect, inferior. Patient follows with Dr. Levy for cardiology.  Renal function significant for creat 3.13, BUN 37. Discussed with primary team and feel that needs can not be met at this facility. She would benefit from transfer and consult with nephrology in an effort to manage acute CHF exacerbation and CKD. Echo findings and need for further workup/cardiac cath/support from nephrology to be determined at [facility]. On exam, she is ill appearing but in no acute distress, no chest pain, shortness of breath, palpitations, dizziness, syncope reported. Ms. Hightower follows with Dr. Levy and transition of care to their service should occur upon her arrival to [facility].     Records from hospital admission course thus far reviewed.      Telemetry reviewed.  No events overnight.  .    INPATIENT MEDICATIONS:  Home medications reviewed.    Current Facility-Administered Medications:     sodium chloride flush 0.9 % injection 5-40 mL, 5-40 mL, IntraVENous, 2 times per day, Yehuda Andre MD, 10 mL at 11/13/24 0914    sodium chloride flush 0.9 % injection 5-40 mL, 5-40 mL, IntraVENous, PRN, Yehuda Andre MD    0.9 % sodium

## 2024-11-13 NOTE — PROGRESS NOTES
Admitted to room 206. MA intact. No pain. O2 per 4L/m purewick in place. No respiratory distress. Lying in bed HOB elevated. Eating breakfast. No n/v

## 2024-11-13 NOTE — PROGRESS NOTES
Dr. Scott in to examine. O2 off. Rechecked O2 sat. 95% RT notified that O2 removed at this time. No shortness of breath

## 2024-11-13 NOTE — H&P
History and Physical  Dr Carl Scott MD      Chief Complaints:     Chief Complaint   Patient presents with    Shortness of Breath         Subjective:     Saranya Hightower is a 74 y.o. female followed by Nubia Davila APRN - NP and  has a past medical history of AAA (abdominal aortic aneurysm) ([facility]), Abnormal weight loss, Anemia, Atherosclerosis of native coronary artery without angina pectoris, Bronchitis, CAD (coronary artery disease), Carotid artery stenosis, Cervicalgia, Chronic bronchitis ([facility]), CKD (chronic kidney disease) stage 4, GFR 15-29 ml/min ([facility]), Colon polyps, Constipation, COPD (chronic obstructive pulmonary disease) ([facility]), HTN (hypertension), Hyperlipidemia, Menopause, Personal history of colonic polyps, PVD (peripheral vascular disease) ([facility]), and Vitamin D deficiency.    Patient was able to give some history but unable to confirm symptoms that she was expressing and attempted to contact patient's  but was unsuccessful but patient said that yesterday she was doing okay and even followed up with phone call on 11/9 after recent discharge for similar presentation and at that time communicated with patient's  who said that overall she was \" getting along\" and he noted that he was assisting with monitoring her weights and that they have been stable.  Patient continues to smoke about half a pack per day.  Patient said that yesterday she was in her normal state of health and then started to have increasing shortness of breath and she did note swelling in the lower extremities but said that there was more swelling on the right greater than left.  Initially she denied any cough but then on further questioning she said she did have a cough with clear sputum production but denies any fever chills.  When asked about fluid intake she says she drinks only about a bottle per day.  [place] [facility] she was in hypertensive emergency at 201/123 with a heart rate of 113 initial O2 sat was

## 2024-11-13 NOTE — ED TRIAGE NOTES
Pt reports SOB since yesterday that progressively worsened.  Pt has hx of CHF.  Pt sats 89-90% on RA.  Pt denies any CP at this time.

## 2024-11-13 NOTE — PROGRESS NOTES
Paged Dr. Peña twice sice patient came for admission orders, was able to receive feedback from  to that they sent message to Dr. Vargas.     [id number] Dr. Vargas not available currently and according to her,  night hospitalist should be informed.

## 2024-11-14 ENCOUNTER — APPOINTMENT (OUTPATIENT)
Facility: HOSPITAL | Age: 74
DRG: 291 | End: 2024-11-14
Attending: HOSPITALIST
Payer: MEDICARE

## 2024-11-14 LAB
ALBUMIN SERPL-MCNC: 2.8 G/DL (ref 3.5–5)
ALBUMIN/GLOB SERPL: 0.8 (ref 1.1–2.2)
ALP SERPL-CCNC: 72 U/L (ref 45–117)
ALT SERPL-CCNC: 19 U/L (ref 12–78)
ANION GAP SERPL CALC-SCNC: 9 MMOL/L (ref 2–12)
AST SERPL W P-5'-P-CCNC: 17 U/L (ref 15–37)
BASOPHILS # BLD: 0 K/UL (ref 0–0.1)
BASOPHILS NFR BLD: 0 % (ref 0–1)
BILIRUB SERPL-MCNC: 0.6 MG/DL (ref 0.2–1)
BUN SERPL-MCNC: 39 MG/DL (ref 6–20)
BUN/CREAT SERPL: 12 (ref 12–20)
CA-I BLD-MCNC: 8.7 MG/DL (ref 8.5–10.1)
CHLORIDE SERPL-SCNC: 103 MMOL/L (ref 97–108)
CHOLEST SERPL-MCNC: 219 MG/DL
CO2 SERPL-SCNC: 25 MMOL/L (ref 21–32)
CREAT SERPL-MCNC: 3.19 MG/DL (ref 0.55–1.02)
DIFFERENTIAL METHOD BLD: ABNORMAL
EOSINOPHIL # BLD: 0.1 K/UL (ref 0–0.4)
EOSINOPHIL NFR BLD: 1 % (ref 0–7)
ERYTHROCYTE [DISTWIDTH] IN BLOOD BY AUTOMATED COUNT: 14.1 % (ref 11.5–14.5)
FERRITIN SERPL-MCNC: 41 NG/ML (ref 26–388)
GLOBULIN SER CALC-MCNC: 3.6 G/DL (ref 2–4)
GLUCOSE SERPL-MCNC: 58 MG/DL (ref 65–100)
HCT VFR BLD AUTO: 31.3 % (ref 35–47)
HDLC SERPL-MCNC: 66 MG/DL
HDLC SERPL: 3.3 (ref 0–5)
HGB BLD-MCNC: 10.3 G/DL (ref 11.5–16)
IMM GRANULOCYTES # BLD AUTO: 0 K/UL (ref 0–0.04)
IMM GRANULOCYTES NFR BLD AUTO: 0 % (ref 0–0.5)
IRON SATN MFR SERPL: 19 % (ref 20–50)
IRON SERPL-MCNC: 63 UG/DL (ref 35–150)
LDLC SERPL CALC-MCNC: 142.8 MG/DL (ref 0–100)
LIPID PANEL: ABNORMAL
LYMPHOCYTES # BLD: 1.3 K/UL (ref 0.8–3.5)
LYMPHOCYTES NFR BLD: 17 % (ref 12–49)
MCH RBC QN AUTO: 31.9 PG (ref 26–34)
MCHC RBC AUTO-ENTMCNC: 32.9 G/DL (ref 30–36.5)
MCV RBC AUTO: 96.9 FL (ref 80–99)
MONOCYTES # BLD: 0.9 K/UL (ref 0–1)
MONOCYTES NFR BLD: 11 % (ref 5–13)
NEUTS SEG # BLD: 5.2 K/UL (ref 1.8–8)
NEUTS SEG NFR BLD: 71 % (ref 32–75)
NRBC # BLD: 0 K/UL (ref 0–0.01)
NRBC BLD-RTO: 0 PER 100 WBC
PLATELET # BLD AUTO: 204 K/UL (ref 150–400)
PMV BLD AUTO: 11.6 FL (ref 8.9–12.9)
POTASSIUM SERPL-SCNC: 3.1 MMOL/L (ref 3.5–5.1)
PROT SERPL-MCNC: 6.4 G/DL (ref 6.4–8.2)
RBC # BLD AUTO: 3.23 M/UL (ref 3.8–5.2)
SODIUM SERPL-SCNC: 137 MMOL/L (ref 136–145)
T4 FREE SERPL-MCNC: 1.3 NG/DL (ref 0.8–1.5)
TIBC SERPL-MCNC: 326 UG/DL (ref 250–450)
TRIGL SERPL-MCNC: 51 MG/DL
TROPONIN I SERPL HS-MCNC: 71 NG/L (ref 0–51)
VLDLC SERPL CALC-MCNC: 10.2 MG/DL
WBC # BLD AUTO: 7.5 K/UL (ref 3.6–11)

## 2024-11-14 PROCEDURE — 6370000000 HC RX 637 (ALT 250 FOR IP): Performed by: INTERNAL MEDICINE

## 2024-11-14 PROCEDURE — 94761 N-INVAS EAR/PLS OXIMETRY MLT: CPT

## 2024-11-14 PROCEDURE — 71045 X-RAY EXAM CHEST 1 VIEW: CPT

## 2024-11-14 PROCEDURE — 6370000000 HC RX 637 (ALT 250 FOR IP): Performed by: PHYSICIAN ASSISTANT

## 2024-11-14 PROCEDURE — 2580000003 HC RX 258: Performed by: PHYSICIAN ASSISTANT

## 2024-11-14 PROCEDURE — 97530 THERAPEUTIC ACTIVITIES: CPT

## 2024-11-14 PROCEDURE — 2060000000 HC ICU INTERMEDIATE R&B

## 2024-11-14 PROCEDURE — 94640 AIRWAY INHALATION TREATMENT: CPT

## 2024-11-14 PROCEDURE — 6360000002 HC RX W HCPCS: Performed by: INTERNAL MEDICINE

## 2024-11-14 PROCEDURE — 97161 PT EVAL LOW COMPLEX 20 MIN: CPT

## 2024-11-14 PROCEDURE — 2700000000 HC OXYGEN THERAPY PER DAY

## 2024-11-14 PROCEDURE — 85025 COMPLETE CBC W/AUTO DIFF WBC: CPT

## 2024-11-14 PROCEDURE — 36415 COLL VENOUS BLD VENIPUNCTURE: CPT

## 2024-11-14 PROCEDURE — 84484 ASSAY OF TROPONIN QUANT: CPT

## 2024-11-14 PROCEDURE — 80053 COMPREHEN METABOLIC PANEL: CPT

## 2024-11-14 PROCEDURE — 6360000002 HC RX W HCPCS: Performed by: PHYSICIAN ASSISTANT

## 2024-11-14 RX ORDER — BUMETANIDE 0.25 MG/ML
1 INJECTION, SOLUTION INTRAMUSCULAR; INTRAVENOUS 3 TIMES DAILY
Status: DISCONTINUED | OUTPATIENT
Start: 2024-11-14 | End: 2024-11-15

## 2024-11-14 RX ORDER — POTASSIUM CHLORIDE 1500 MG/1
40 TABLET, EXTENDED RELEASE ORAL DAILY
Status: DISCONTINUED | OUTPATIENT
Start: 2024-11-14 | End: 2024-11-14

## 2024-11-14 RX ADMIN — ISOSORBIDE DINITRATE 20 MG: 20 TABLET ORAL at 20:21

## 2024-11-14 RX ADMIN — HYDRALAZINE HYDROCHLORIDE 25 MG: 50 TABLET ORAL at 09:51

## 2024-11-14 RX ADMIN — PANTOPRAZOLE SODIUM 40 MG: 40 TABLET, DELAYED RELEASE ORAL at 06:46

## 2024-11-14 RX ADMIN — MIRTAZAPINE 15 MG: 15 TABLET, FILM COATED ORAL at 20:21

## 2024-11-14 RX ADMIN — FERROUS SULFATE TAB 325 MG (65 MG ELEMENTAL FE) 325 MG: 325 (65 FE) TAB at 09:34

## 2024-11-14 RX ADMIN — SODIUM CHLORIDE, PRESERVATIVE FREE 10 ML: 5 INJECTION INTRAVENOUS at 20:21

## 2024-11-14 RX ADMIN — BUMETANIDE 1 MG: 0.25 INJECTION INTRAMUSCULAR; INTRAVENOUS at 14:07

## 2024-11-14 RX ADMIN — HYDRALAZINE HYDROCHLORIDE 25 MG: 50 TABLET ORAL at 20:21

## 2024-11-14 RX ADMIN — HEPARIN SODIUM 5000 UNITS: 5000 INJECTION INTRAVENOUS; SUBCUTANEOUS at 06:46

## 2024-11-14 RX ADMIN — Medication 2 PUFF: at 09:14

## 2024-11-14 RX ADMIN — ATORVASTATIN CALCIUM 80 MG: 40 TABLET, FILM COATED ORAL at 09:35

## 2024-11-14 RX ADMIN — ASPIRIN 81 MG: 81 TABLET, CHEWABLE ORAL at 09:35

## 2024-11-14 RX ADMIN — POLYETHYLENE GLYCOL 3350 17 G: 17 POWDER, FOR SOLUTION ORAL at 09:51

## 2024-11-14 RX ADMIN — ISOSORBIDE DINITRATE 20 MG: 20 TABLET ORAL at 14:07

## 2024-11-14 RX ADMIN — SODIUM BICARBONATE 650 MG: 650 TABLET ORAL at 09:36

## 2024-11-14 RX ADMIN — BUMETANIDE 1 MG: 0.25 INJECTION INTRAMUSCULAR; INTRAVENOUS at 09:34

## 2024-11-14 RX ADMIN — SODIUM CHLORIDE, PRESERVATIVE FREE 10 ML: 5 INJECTION INTRAVENOUS at 09:36

## 2024-11-14 RX ADMIN — HEPARIN SODIUM 5000 UNITS: 5000 INJECTION INTRAVENOUS; SUBCUTANEOUS at 14:07

## 2024-11-14 RX ADMIN — METOPROLOL SUCCINATE 100 MG: 50 TABLET, EXTENDED RELEASE ORAL at 09:35

## 2024-11-14 RX ADMIN — Medication 3 MG: at 20:21

## 2024-11-14 RX ADMIN — ISOSORBIDE DINITRATE 20 MG: 20 TABLET ORAL at 09:35

## 2024-11-14 RX ADMIN — HEPARIN SODIUM 5000 UNITS: 5000 INJECTION INTRAVENOUS; SUBCUTANEOUS at 23:33

## 2024-11-14 RX ADMIN — POTASSIUM BICARBONATE 40 MEQ: 782 TABLET, EFFERVESCENT ORAL at 18:42

## 2024-11-14 RX ADMIN — HYDRALAZINE HYDROCHLORIDE 25 MG: 50 TABLET ORAL at 14:07

## 2024-11-14 RX ADMIN — BUMETANIDE 1 MG: 0.25 INJECTION INTRAMUSCULAR; INTRAVENOUS at 20:21

## 2024-11-14 ASSESSMENT — PAIN SCALES - GENERAL
PAINLEVEL_OUTOF10: 0
PAINLEVEL_OUTOF10: 0

## 2024-11-14 NOTE — CONSULTS
Nephrology Consultation          Patient: Saranya Hightower MRN: [id number]  SSN: [id number]    YOB: 1950  Age: 74 y.o.  Sex: female      Subjective:   Reason for the consultation.  Acute kidney injury on chronic kidney stage IV and fluid overload.  History of present illness.  The patient is 74-year-old woman with underlying history of chronic kidney stage IV follow-up with me as an outpatient, hypertension, history of congestive heart failure with LVEF 30 to 35%, history of coronary artery disease status post PTCA/ALEJANDRA of RCA, status post AICD placement, AAA repair presented to the ER with increasing shortness of breath and hypoxia requiring BiPAP support.  Chest x-ray showed pulmonary edema.  She required IV diuresis.  She is currently on nasal cannula 2 L.  On admission her creatinine was elevated 2.8 and bumped to 3.1 today.  No noticed lower extremity swelling.  No voiding issues so far.    Past Medical History:   Diagnosis Date    AAA (abdominal aortic aneurysm) ([facility])     S/P STENTING    Abnormal weight loss 09/11/2021    Anemia     Atherosclerosis of native coronary artery without angina pectoris 09/11/2021    Bronchitis     CAD (coronary artery disease)     MI and hx of stents Lcx and RCA 8/2017    Carotid artery stenosis 09/11/2021    Cervicalgia 09/11/2021    Chronic bronchitis ([facility])     CKD (chronic kidney disease) stage 4, GFR 15-29 ml/min ([facility])     Colon polyps     Constipation 09/11/2021    COPD (chronic obstructive pulmonary disease) ([facility])     HTN (hypertension) 09/11/2021    Hyperlipidemia     Menopause     Personal history of colonic polyps 09/11/2021    PVD (peripheral vascular disease) ([facility]) 2019    s/p left to right fem-fem bypass    Vitamin D deficiency 09/11/2021     Past Surgical History:   Procedure Laterality Date    CARDIAC CATHETERIZATION      X 3    CARDIAC DEFIBRILLATOR PLACEMENT  09/16/2013    COLONOSCOPY N/A 10/5/2021    COLONOSCOPY ( T I V A) performed by [facility]    Temp:  98.1 °F (36.7 °C)  98.1 °F (36.7 °C)   TempSrc:  Oral  Oral   SpO2:  96% 97% 95%   Weight:       Height:            Physical Exam:  General: NAD  Eyes: sclera anicteric  Oral Cavity: No thrush or ulcers  Neck: no JVD  Chest: Fair bilateral air entry  Heart: normal sounds  Abdomen: soft and non tender   : no stewart  Lower Extremities: no edema  Skin: no rash  Neuro: intact  Psychiatric: non-depressed          Assessment/Plan:   Acute kidney injury on chronic kidney stage IV  2/2 prerenal azotemia from cardiorenal  On admission creatinine was 2.85 and bumped to 3.13 today.  Clinically volume up.  Chest x-ray pulmonary edema.  LVEF 30 to 35%.  Continue IV diuresis.  No emergent indication for the dialysis.    Hypokalemia  In the setting of on loop diuretics.  Potassium 3.1  Oral p.o. KCl replacement.    Hypertension  Blood pressures are normal to elevated.  On hydralazine 25 mg 3 times daily/Isordil 20 mg 3 times daily/metoprolol  mg daily.  Plan to increase hydralazine dose if persistently elevated blood pressures.    Acute CHF  Due to LV systolic dysfunction  LVEF 30 to 35%  Status post AICD placement  History of ischemic cardiomyopathy.  Presented with SOB/chest x-ray pulmonary edema.  IV diuresis.  On nasal cannula 2 L now.    Acute hypoxic respiratory.  Due to fluid overload/pulmonary edema.  Initially required BiPAP support and currently on nasal cannula 2 L.  Continue IV diuresis.      DVT prophylaxis  Agreed with unfractioned subcu heparin.    Thank you for the consultation.          Plan:       Signed By: Elian Duncan MD     November 14, 2024

## 2024-11-14 NOTE — PROGRESS NOTES
OT eval order received and acknowledged. Pt screened and demonstrating baseline independence for self care tasks and functional mobility/transfers. She has no need for skilled OT services at this time. OT evaluation order will therefore be discontinued this pt has no acute OT needs. Please reorder OT if pt's functional status changes. Thank you.         Functional Measure:  [facility] AM-PACTM \"6 Clicks\"                                                       Daily Activity Inpatient Short Form  How much help from another person does the patient currently need... Total; A Lot A Little None   1.  Putting on and taking off regular lower body clothing? []  1 []  2 []  3 [x]  4   2.  Bathing (including washing, rinsing, drying)? []  1 []  2 []  3 [x]  4   3.  Toileting, which includes using toilet, bedpan or urinal? [] 1 []  2 []  3 [x]  4   4.  Putting on and taking off regular upper body clothing? []  1 []  2 []  3 [x]  4   5.  Taking care of personal grooming such as brushing teeth? []  1 []  2 []  3 [x]  4   6.  Eating meals? []  1 []  2 []  3 [x]  4   © 2007, Trustees of [facility], under license to [employer]. All rights reserved     Score: 24/24     Interpretation of Tool:  Represents clinically-significant functional categories (i.e. Activities of daily living).  Percentage of Impairment CH    0%   CI    1-19% CJ    20-39% CK    40-59% CL    60-79% CM    80-99% CN     100%   [facility]  Score 6-24 24 23 20-22 15-19 10-14 7-9 6

## 2024-11-14 NOTE — PROGRESS NOTES
Comprehensive Nutrition Assessment    Type and Reason for Visit:  Initial, Consult (unintentional wt loss, poor PO)    Nutrition Recommendations/Plan:   Encourage intakes >75% - update dietary preferences to encourage adequate intakes  Monitor weight, intakes, labs, and bowel fxn - hx of constipation, rec low threshold for bowel regimen     Malnutrition Assessment:  Malnutrition Status:  At risk for malnutrition (recent hx of wt loss/malnutrition now resolved) (11/14/24 1028)    Context:  Chronic Illness     Findings of the 6 clinical characteristics of malnutrition:  Energy Intake:  Mild decrease in energy intake (per pt, now eating close to her normal amount)  Weight Loss:  No weight loss     Body Fat Loss:  Unable to assess     Muscle Mass Loss:  Unable to assess    Fluid Accumulation:  Mild (not r/t nutr status)     Strength:  Not Performed    Nutrition Assessment:    Pt presentwed w/ respiratory failure/hypoxia requiring HF O2. RD consulted for poor PO/wt loss. Per EMR, pt w/ hx of wt loss; however, does not currently qualify as clinically significant any longer. RD assessment in July noting poor PO intake/wt loss. However, pt now endorsing good intakes and wt has trended up since last assessment. No documented intakes as of yet, but pt eating from BF tray during visit. Pt is on Cardiac/Low K/Low Phos diet. Labs and meds reviewed. K 3.1, BUN 39, Cr 3.19, h/h 10.3/31.3, cholesterol 219, .8.    Nutrition Related Findings:    NFPE deferred. +Upper, lower dentures per last RD assessment. No c/s difficulty noted. No reported N/V/D/C, hx of constipation. LBM 11/11. Trace BLE edema. Wound Type: None       Current Nutrition Intake & Therapies:    Average Meal Intake: 51-75%  Average Supplements Intake: None Ordered  ADULT DIET; Regular; Low Fat/Low Chol/High Fiber/2 gm Na; Low Potassium (Less than 3000 mg/day); Low Phosphorus (Less than 1000 mg)    Anthropometric Measures:  Height: 160 cm (5' 2.99\")  Ideal

## 2024-11-14 NOTE — CARE COORDINATION
11/14/24 1321   Service Assessment   Patient Orientation Alert and Oriented   Cognition Alert   History Provided By Patient   Primary Caregiver Self   Accompanied By/Relationship  at bedside   Support Systems Spouse/Significant Other   Patient's Healthcare Decision Maker is: Legal Next of Kin   PCP Verified by CM Yes   Last Visit to PCP Within last 6 months   Prior Functional Level Mobility;Cooking;Housework;Shopping   Current Functional Level Mobility;Bathing;Dressing;Toileting;Cooking;Housework;Shopping   Can patient return to prior living arrangement Yes   Ability to make needs known: Good   Family able to assist with home care needs: Yes   Would you like for me to discuss the discharge plan with any other family members/significant others, and if so, who? No   Financial Resources Medicare   Community Resources None   Social/Functional History   Lives With Spouse   Type of Home House   Home Layout One level   Home Access Stairs to enter with rails   Entrance Stairs - Number of Steps 4   Home Equipment Cane   Discharge Planning   Type of Residence House   Living Arrangements Spouse/Significant Other   Current Services Prior To Admission None   Potential Assistance Needed N/A   DME Ordered? No   Type of Home Care Services OT;PT;Nursing Services   Patient expects to be discharged to: House   One/Two Story Residence One story   Services At/After Discharge   Transition of Care Consult (CM Consult) Home Health   Services At/After Discharge Home Health   Mode of Transport at Discharge Other (see comment)  ( to transport)   Confirm Follow Up Transport Family     CM assessment complete and demographics confirmed. Patient states that she lives in a single story house with her  and that there are 4 steps to entrance. Patient states that she needs assistance with mobility and some ADLs. Patient states that she is interested in HH. Choice letter signed and placed in chart. Referral sent via [facility].

## 2024-11-14 NOTE — PROGRESS NOTES
Received Order for Telemetry     Saranya Hightower   1950   [id number]   congestive hearth failure exacerbation   Georges Peña MD     Tele Box # [id number] placed on patient at  1726 pm  ER Room # DIRECT ADMIT/EMPORIA  Admitting to Room 489  Verified with Primary Nurse SHARLENE at  1726 pm

## 2024-11-14 NOTE — PLAN OF CARE
PHYSICAL THERAPY EVALUATION  Patient: Saranya Hightower (74 y.o. female)  Date: 11/14/2024  Primary Diagnosis: Acute respiratory failure with hypoxemia [J96.01]       Precautions: Fall Risk                      Recommendations for nursing mobility: Encourage HEP in prep for ADLs/mobility; see handout for details, Frequent repositioning to prevent skin breakdown, LE elevation for management of edema, Amb to bathroom with AD and gait belt, and Assist x1    In place during session: Peripheral IV, External Catheter, and EKG/telemetry     ASSESSMENT  Pt is a 74 y.o. female admitted on 11/13/2024 for SOB; pt currently being treated for acute hypoxia . Pt semi supine  upon PT arrival, agreeable to evaluation. Pt A&O x 4.  Patient lives with  and mostly indep at home.    Based on the objective data described below, the patient currently presents with impaired ability to perform high-level IADLs, impaired strength, poor safety awareness, impaired balance, and impaired posture. (See below for objective details and assist levels).     Overall pt tolerated session fair today with PT. Pt required cg to min assist for bed mobility and transfers. Able to amb ~5ft with RW and gait belt demonstrates gen weakness. Pt will benefit from continued skilled PT to address above deficits and return to PLOF. Current PT DC recommendation Intermittent physical therapy up to 2-3x/week in previous living setting once medically appropriate.     Start of Session End of Session   SPO2 (%)     Heart Rate (BPM)       GOALS:    Problem: Physical Therapy - Adult  Goal: By Discharge: Performs mobility at highest level of function for planned discharge setting.  See evaluation for individualized goals.  Description: FUNCTIONAL STATUS PRIOR TO ADMISSION: Patient was modified independent using a rolling walker for functional mobility.    HOME SUPPORT PRIOR TO ADMISSION: The patient lived with  and required minimal assistance for ADLs.    Physical  (HCC)     HTN (hypertension) 09/11/2021    Hyperlipidemia     Menopause     Personal history of colonic polyps 09/11/2021    PVD (peripheral vascular disease) (HCC) 2019    s/p left to right fem-fem bypass    Vitamin D deficiency 09/11/2021     Past Surgical History:   Procedure Laterality Date    CARDIAC CATHETERIZATION      X 3    CARDIAC DEFIBRILLATOR PLACEMENT  09/16/2013    COLONOSCOPY N/A 10/5/2021    COLONOSCOPY ( T I V A) performed by Talib Ortega MD at [facility] ENDOSCOPY    COLONOSCOPY  2015    ORTHOPEDIC SURGERY      R leg    OTHER SURGICAL HISTORY      BILAT LOWER EXTREM VASC BYPASS SURGERY    OTHER SURGICAL HISTORY      R CAROTID ENDORECTOMY 11-4-05,   L CAROTID ENDARECTOMY 11-6-2006    PACEMAKER      6 0r 7 years ago       Home Situation:  Social/Functional History  Lives With: Spouse  Type of Home: House  Home Layout: One level  Home Access: Stairs to enter with rails  Entrance Stairs - Number of Steps: 4  Home Equipment: Cane    Cognitive/Behavioral Status:  Orientation  Orientation Level: Oriented X4  Cognition  Overall Cognitive Status: WNL    Skin:     Edema:     Strength:    Strength: Generally decreased, functional    Range Of Motion:  AROM: Generally decreased, functional       Coordination:        Tone & Sensation:   Tone: Normal         Functional Mobility:  Bed Mobility:     Bed Mobility Training  Bed Mobility Training: Yes  Overall Level of Assistance: Contact-guard assistance  Interventions: Safety awareness training;Weight shifting training/pressure relief  Rolling: Contact-guard assistance  Supine to Sit: Contact-guard assistance  Sit to Supine: Contact-guard assistance  Scooting: Contact-guard assistance  Transfers:     Transfer Training  Transfer Training: Yes  Overall Level of Assistance: Minimum assistance;Assist X1;Additional time  Interventions: Safety awareness training;Weight shifting training/pressure relief  Sit to Stand: Minimum assistance;Additional time;Assist X1  Stand to

## 2024-11-14 NOTE — CONSULTS
Cardiology Consult    NAME: Saranya Hightower   :  1950   MRN:  [id number]     Date/Time:  2024 10:11 AM    Patient PCP: Nubia Davila APRN - NP  ________________________________________________________________________     Assessment:   Acute on chronic CHF  Type II non-Q wave MI, peak troponin 164  Cardiomyopathy with baseline EF 30-35%  CAD status post PTCA/ALEJANDRA of RCA,  and    PAD  AICD  AAA status post EVAR  Labile hypertension  Chronic respiratory failure  Type II non-Q wave MI  Stage IV CKD  Tobacco abuse  COPD      Plan:   Medical management of CAD  Continue to encourage smoking cessation  IV diuresis, follow renal function      []        High complexity decision making was performed        Subjective:   CHIEF COMPLAINT: SOB      REASON FOR CONSULT: SOB      HISTORY OF PRESENT ILLNESS:     Saranya Hightower is a 74 y.o. Black /  female who has CAD, CHF, PAD, previous bilateral carotid endarterectomy and severe peripheral vascular disease with ischemic cardiomyopathy.    Unfortunately, she is an active smoker.  She has underlying COPD, baseline EF 35%, with ICD.    She presented yesterday complaining of shortness of breath and with markedly elevated BP.  She reports compliance with her meds although she does not pay much attention to her salt intake.  Denies any chest pain.  Did have some swelling in her legs.    Labs show peak troponin 133, BNP 25, 861          Past Medical History:   Diagnosis Date    AAA (abdominal aortic aneurysm) ([facility])     S/P STENTING    Abnormal weight loss 2021    Anemia     Atherosclerosis of native coronary artery without angina pectoris 2021    Bronchitis     CAD (coronary artery disease)     MI and hx of stents Lcx and RCA 2017    Carotid artery stenosis 2021    Cervicalgia 2021    Chronic bronchitis ([facility])     CKD (chronic kidney disease) stage 4, GFR 15-29 ml/min ([facility])     Colon polyps     Constipation  09/11/2021    COPD (chronic obstructive pulmonary disease) ([facility])     HTN (hypertension) 09/11/2021    Hyperlipidemia     Menopause     Personal history of colonic polyps 09/11/2021    PVD (peripheral vascular disease) ([facility]) 2019    s/p left to right fem-fem bypass    Vitamin D deficiency 09/11/2021      Past Surgical History:   Procedure Laterality Date    CARDIAC CATHETERIZATION      X 3    CARDIAC DEFIBRILLATOR PLACEMENT  09/16/2013    COLONOSCOPY N/A 10/5/2021    COLONOSCOPY ( T I V A) performed by Talib Ortega MD at [facility] ENDOSCOPY    COLONOSCOPY  2015    ORTHOPEDIC SURGERY      R leg    OTHER SURGICAL HISTORY      BILAT LOWER EXTREM VASC BYPASS SURGERY    OTHER SURGICAL HISTORY      R CAROTID ENDORECTOMY 11-4-05,   L CAROTID ENDARECTOMY 11-6-2006    PACEMAKER      6 0r 7 years ago     No Known Allergies   Meds:  See below  Social History     Tobacco Use    Smoking status: Every Day     Current packs/day: 0.50     Types: Cigarettes    Smokeless tobacco: Never   Substance Use Topics    Alcohol use: Never      Family History   Problem Relation Age of Onset    No Known Problems Father     Cancer Mother     Hypertension Mother        REVIEW OF SYSTEMS:     []         Unable to obtain  ROS due to ---   [x]         Total of 12 systems reviewed as follows:    Constitutional: negative fever, negative chills, negative weight loss  Eyes:   negative visual changes  ENT:   negative sore throat, tongue or lip swelling  Respiratory:  + cough, + dyspnea  Cards:  negative for chest pain, palpitations, lower extremity edema  GI:   negative for nausea, vomiting, diarrhea, and abdominal pain  Genitourinary: negative for frequency, dysuria  Integument:  negative for rash   Hematologic:  negative for easy bruising and gum/nose bleeding  Musculoskel: negative for myalgias,  back pain  Neurological:  negative for headaches, dizziness, vertigo, weakness  Behavl/Psych: negative for feelings of anxiety, depression     Pertinent

## 2024-11-14 NOTE — PROGRESS NOTES
Hospitalist Progress Note    NAME:   Saranya Hightower   : 1950   MRN: [id number]     Date/Time: 2024 9:26 AM  Patient PCP: Nubia Davila APRN - NP    Estimated discharge date:  Barriers:       Assessment / Plan:    Acute on chronic systolic heart failure secondary to malignant hypertension  Improved with Nitropaste  Slowly improvement with diuresis and continues to depend on 2 L NC O2 currently.  Current blood pressure 140/71 although presenting blood pressure in [place] was 201/123  Continue IV Bumex per cardiology.  Cardiology consultation, Dr. Levy  Nephrology consultation Dr. Duncan  Continue hydralazine  As needed hydralazine IV  Continue metoprolol  BNP 25,000  Defibrillator in place     Acute respiratory failure with hypoxia  Weaned from 40% of oxygen on CPAP to 2 L of oxygen via nasal cannula  Baseline is no oxygen at home.  Secondary to acute CHF.     Elevated troponin consistent with demand ischemia with history of coronary artery disease  Troponin trending down peaked at 164 and trending down to 133 no reported chest pain  Continue aspirin  Continue isosorbide  Continue metoprolol  Continue Crestor  Troponin in a.m.  Transfer for concern for new hypokinesis in the inferior and inferior lateral leads although this is present on previous echocardiogram  Cardiology consultation  MAO of 4     Chronic kidney disease stage IV  Primary nephrologist Dr. Duncan according to the patient  May need to limit diuretics although currently with pulmonary edema will be aggressive   Baseline creatinine near 4  Admitted with a creatinine of 2.85->3.13 consistent with volume overload  Continue sodium bicarb     Leukocytosis  Most likely reactive, procalcitonin normal  Monitor     Chronic tobacco abuser  May require nicotine patch    DVT prophylaxis  Heparin SC    Medical Decision Making:   I personally reviewed labs:  I personally reviewed imaging:  I personally reviewed EKG:  Toxic drug monitoring:    Discussed case with: , patient, nursing    Total time 40 minutes.    Subjective:     Chief Complaint / Reason for Physician Visit  \" Shortness of breath\".  Discussed with RN events overnight.     11/13  --  Admission H&P  74 y.o.  female with PMHx significant for abdominal aortic aneurysm status post stent placement, anemia, coronary artery disease status post PTCA and stent placement, chronic kidney disease stage IV, COPD, essential hypertension, chronic systolic heart failure with an EF of 40% that has changed to 35% who presented with acute respiratory failure with hypoxia requiring high flow oxygen.  She was diuresed and found to have malignant hypertension which was subsequently treated with antihypertensives and diuretics.  Currently blood pressure has improved and shortness of breath has resolved.  Patient remains on 4 L of oxygen via nasal cannula.Chest x-ray with pulmonary edema and small right pleural effusion.  Patient's new echocardiogram revealed akinesis in the inferior and inferior lateral aspect of the heart.  This was previously present on echocardiogram in October.  Patient's troponin is now trending down most likely related to the acute systolic heart failure from the malignant hypertension and demand ischemia.     11/14  Patient is alert noted x 4 with  at bedside.  Currently requiring 2 L NC O2 and improved from initial requiring 3 L.  Chronically not on oxygen at home.  No overnight fever/chills, worsening shortness of breath, chest pain, nausea/vomiting, abdominal pain.  States that her shortness of breath has improved.    Counseled on continuing IV diuretics with cardiology following and continuing treatment for CHF.  At home patient able to ambulate without cane or walker and will obtain PT consultation    Objective:     VITALS:   Last 24hrs VS reviewed since prior progress note. Most recent are:  Patient Vitals for the past 24 hrs:   BP Temp Temp src Pulse Resp SpO2 Height

## 2024-11-14 NOTE — H&P
Hospitalist Admission Note    NAME: Saranya Hightower   :  1950   MRN:  [id number]     Date/Time:  2024 9:04 PM    Patient PCP: Nubia Davila APRN - NP    ______________________________________________________________________  Given the patient's current clinical presentation, I have a high level of concern for decompensation if discharged from the emergency department.  Complex decision making was performed, which includes reviewing the patient's available past medical records, laboratory results, and x-ray films.       My assessment of this patient's clinical condition and my plan of care is as follows.    Assessment / Plan:    Acute on chronic systolic heart failure secondary to malignant hypertension  Improved with Nitropaste  Improved with diuresis  Current blood pressure 140/71 although presenting blood pressure in [place] was 201/123  Continue IV Bumex  Cardiology consultation, Dr. Levy  Nephrology consultation Dr. Duncan  Continue hydralazine  As needed hydralazine IV  Continue metoprolol  BNP 25,000  Defibrillator in place    Acute respiratory failure with hypoxia  Weaned from 40% of oxygen on CPAP to 4 L of oxygen via nasal cannula  Baseline is no oxygen at home    Elevated troponin consistent with demand ischemia with history of coronary artery disease  Troponin trending down peaked at 164 and trending down to 133 no reported chest pain  Continue aspirin  Continue isosorbide  Continue metoprolol  Continue Crestor  Troponin in a.m.  Transfer for concern for new hypokinesis in the inferior and inferior lateral leads although this is present on previous echocardiogram  Cardiology consultation  MAO of 4    Chronic kidney disease stage IV  Primary nephrologist Dr. Duncan according to the patient  May need to limit diuretics although currently with pulmonary edema will be aggressive   Baseline creatinine near 4  Admitted with a creatinine of 2.85->3.13 consistent with volume

## 2024-11-15 LAB
ALBUMIN SERPL-MCNC: 3.4 G/DL (ref 3.5–5)
ALBUMIN/GLOB SERPL: 0.8 (ref 1.1–2.2)
ALP SERPL-CCNC: 80 U/L (ref 45–117)
ALT SERPL-CCNC: 20 U/L (ref 12–78)
ANION GAP SERPL CALC-SCNC: 8 MMOL/L (ref 2–12)
AST SERPL W P-5'-P-CCNC: 17 U/L (ref 15–37)
BASOPHILS # BLD: 0.1 K/UL (ref 0–0.1)
BASOPHILS NFR BLD: 1 % (ref 0–1)
BILIRUB SERPL-MCNC: 0.7 MG/DL (ref 0.2–1)
BUN SERPL-MCNC: 43 MG/DL (ref 6–20)
BUN/CREAT SERPL: 13 (ref 12–20)
CA-I BLD-MCNC: 9.3 MG/DL (ref 8.5–10.1)
CHLORIDE SERPL-SCNC: 96 MMOL/L (ref 97–108)
CO2 SERPL-SCNC: 31 MMOL/L (ref 21–32)
CREAT SERPL-MCNC: 3.4 MG/DL (ref 0.55–1.02)
DIFFERENTIAL METHOD BLD: ABNORMAL
EOSINOPHIL # BLD: 0.1 K/UL (ref 0–0.4)
EOSINOPHIL NFR BLD: 2 % (ref 0–7)
ERYTHROCYTE [DISTWIDTH] IN BLOOD BY AUTOMATED COUNT: 13.7 % (ref 11.5–14.5)
GLOBULIN SER CALC-MCNC: 4.1 G/DL (ref 2–4)
GLUCOSE SERPL-MCNC: 70 MG/DL (ref 65–100)
HCT VFR BLD AUTO: 34.6 % (ref 35–47)
HGB BLD-MCNC: 11.6 G/DL (ref 11.5–16)
IMM GRANULOCYTES # BLD AUTO: 0 K/UL (ref 0–0.04)
IMM GRANULOCYTES NFR BLD AUTO: 0 % (ref 0–0.5)
LYMPHOCYTES # BLD: 1.9 K/UL (ref 0.8–3.5)
LYMPHOCYTES NFR BLD: 25 % (ref 12–49)
MCH RBC QN AUTO: 31.6 PG (ref 26–34)
MCHC RBC AUTO-ENTMCNC: 33.5 G/DL (ref 30–36.5)
MCV RBC AUTO: 94.3 FL (ref 80–99)
MONOCYTES # BLD: 1.1 K/UL (ref 0–1)
MONOCYTES NFR BLD: 14 % (ref 5–13)
NEUTS SEG # BLD: 4.6 K/UL (ref 1.8–8)
NEUTS SEG NFR BLD: 58 % (ref 32–75)
NRBC # BLD: 0 K/UL (ref 0–0.01)
NRBC BLD-RTO: 0 PER 100 WBC
PHOSPHATE SERPL-MCNC: 2.3 MG/DL (ref 2.6–4.7)
PLATELET # BLD AUTO: 230 K/UL (ref 150–400)
PMV BLD AUTO: 11.7 FL (ref 8.9–12.9)
POTASSIUM SERPL-SCNC: 3.8 MMOL/L (ref 3.5–5.1)
PROT SERPL-MCNC: 7.5 G/DL (ref 6.4–8.2)
RBC # BLD AUTO: 3.67 M/UL (ref 3.8–5.2)
SODIUM SERPL-SCNC: 135 MMOL/L (ref 136–145)
WBC # BLD AUTO: 7.8 K/UL (ref 3.6–11)

## 2024-11-15 PROCEDURE — 85025 COMPLETE CBC W/AUTO DIFF WBC: CPT

## 2024-11-15 PROCEDURE — 2580000003 HC RX 258: Performed by: PHYSICIAN ASSISTANT

## 2024-11-15 PROCEDURE — 6370000000 HC RX 637 (ALT 250 FOR IP): Performed by: PHYSICIAN ASSISTANT

## 2024-11-15 PROCEDURE — 84100 ASSAY OF PHOSPHORUS: CPT

## 2024-11-15 PROCEDURE — 6370000000 HC RX 637 (ALT 250 FOR IP): Performed by: INTERNAL MEDICINE

## 2024-11-15 PROCEDURE — 36415 COLL VENOUS BLD VENIPUNCTURE: CPT

## 2024-11-15 PROCEDURE — 6360000002 HC RX W HCPCS: Performed by: PHYSICIAN ASSISTANT

## 2024-11-15 PROCEDURE — 6360000002 HC RX W HCPCS: Performed by: INTERNAL MEDICINE

## 2024-11-15 PROCEDURE — 97530 THERAPEUTIC ACTIVITIES: CPT

## 2024-11-15 PROCEDURE — 2060000000 HC ICU INTERMEDIATE R&B

## 2024-11-15 PROCEDURE — 80053 COMPREHEN METABOLIC PANEL: CPT

## 2024-11-15 RX ORDER — HYDRALAZINE HYDROCHLORIDE 50 MG/1
50 TABLET, FILM COATED ORAL 3 TIMES DAILY
Status: DISCONTINUED | OUTPATIENT
Start: 2024-11-15 | End: 2024-11-17

## 2024-11-15 RX ORDER — AMLODIPINE BESYLATE 5 MG/1
10 TABLET ORAL DAILY
Status: DISCONTINUED | OUTPATIENT
Start: 2024-11-16 | End: 2024-11-17 | Stop reason: HOSPADM

## 2024-11-15 RX ORDER — BUMETANIDE 0.25 MG/ML
1 INJECTION, SOLUTION INTRAMUSCULAR; INTRAVENOUS 2 TIMES DAILY
Status: DISCONTINUED | OUTPATIENT
Start: 2024-11-15 | End: 2024-11-15

## 2024-11-15 RX ORDER — POLYETHYLENE GLYCOL 3350 17 G/17G
17 POWDER, FOR SOLUTION ORAL DAILY
Status: DISCONTINUED | OUTPATIENT
Start: 2024-11-15 | End: 2024-11-17 | Stop reason: HOSPADM

## 2024-11-15 RX ORDER — AMLODIPINE BESYLATE 5 MG/1
5 TABLET ORAL DAILY
Status: DISCONTINUED | OUTPATIENT
Start: 2024-11-15 | End: 2024-11-15

## 2024-11-15 RX ORDER — BUMETANIDE 1 MG/1
1 TABLET ORAL 2 TIMES DAILY
Status: DISCONTINUED | OUTPATIENT
Start: 2024-11-15 | End: 2024-11-17 | Stop reason: HOSPADM

## 2024-11-15 RX ORDER — DOCUSATE SODIUM 100 MG/1
100 CAPSULE, LIQUID FILLED ORAL 2 TIMES DAILY
Status: DISCONTINUED | OUTPATIENT
Start: 2024-11-15 | End: 2024-11-17 | Stop reason: HOSPADM

## 2024-11-15 RX ADMIN — ATORVASTATIN CALCIUM 80 MG: 40 TABLET, FILM COATED ORAL at 08:37

## 2024-11-15 RX ADMIN — ISOSORBIDE DINITRATE 20 MG: 20 TABLET ORAL at 13:15

## 2024-11-15 RX ADMIN — HEPARIN SODIUM 5000 UNITS: 5000 INJECTION INTRAVENOUS; SUBCUTANEOUS at 15:35

## 2024-11-15 RX ADMIN — ASPIRIN 81 MG: 81 TABLET, CHEWABLE ORAL at 08:37

## 2024-11-15 RX ADMIN — ISOSORBIDE DINITRATE 20 MG: 20 TABLET ORAL at 08:37

## 2024-11-15 RX ADMIN — BUMETANIDE 1 MG: 0.25 INJECTION INTRAMUSCULAR; INTRAVENOUS at 08:37

## 2024-11-15 RX ADMIN — Medication 3 MG: at 21:17

## 2024-11-15 RX ADMIN — DOCUSATE SODIUM 100 MG: 100 CAPSULE, LIQUID FILLED ORAL at 21:18

## 2024-11-15 RX ADMIN — POTASSIUM BICARBONATE 40 MEQ: 782 TABLET, EFFERVESCENT ORAL at 08:37

## 2024-11-15 RX ADMIN — HYDRALAZINE HYDROCHLORIDE 10 MG: 20 INJECTION INTRAMUSCULAR; INTRAVENOUS at 08:47

## 2024-11-15 RX ADMIN — ISOSORBIDE DINITRATE 20 MG: 20 TABLET ORAL at 21:18

## 2024-11-15 RX ADMIN — HYDRALAZINE HYDROCHLORIDE 25 MG: 50 TABLET ORAL at 13:15

## 2024-11-15 RX ADMIN — AMLODIPINE BESYLATE 5 MG: 5 TABLET ORAL at 13:22

## 2024-11-15 RX ADMIN — POLYETHYLENE GLYCOL 3350 17 G: 17 POWDER, FOR SOLUTION ORAL at 15:31

## 2024-11-15 RX ADMIN — HEPARIN SODIUM 5000 UNITS: 5000 INJECTION INTRAVENOUS; SUBCUTANEOUS at 23:14

## 2024-11-15 RX ADMIN — BUMETANIDE 1 MG: 1 TABLET ORAL at 18:36

## 2024-11-15 RX ADMIN — METOPROLOL SUCCINATE 100 MG: 50 TABLET, EXTENDED RELEASE ORAL at 08:37

## 2024-11-15 RX ADMIN — DOCUSATE SODIUM 100 MG: 100 CAPSULE, LIQUID FILLED ORAL at 15:31

## 2024-11-15 RX ADMIN — SODIUM CHLORIDE, PRESERVATIVE FREE 10 ML: 5 INJECTION INTRAVENOUS at 21:18

## 2024-11-15 RX ADMIN — MIRTAZAPINE 15 MG: 15 TABLET, FILM COATED ORAL at 21:18

## 2024-11-15 RX ADMIN — HEPARIN SODIUM 5000 UNITS: 5000 INJECTION INTRAVENOUS; SUBCUTANEOUS at 06:26

## 2024-11-15 RX ADMIN — HYDRALAZINE HYDROCHLORIDE 50 MG: 50 TABLET ORAL at 21:18

## 2024-11-15 RX ADMIN — PANTOPRAZOLE SODIUM 40 MG: 40 TABLET, DELAYED RELEASE ORAL at 06:26

## 2024-11-15 RX ADMIN — HYDRALAZINE HYDROCHLORIDE 10 MG: 20 INJECTION INTRAMUSCULAR; INTRAVENOUS at 13:15

## 2024-11-15 RX ADMIN — SODIUM CHLORIDE, PRESERVATIVE FREE 10 ML: 5 INJECTION INTRAVENOUS at 08:38

## 2024-11-15 RX ADMIN — HYDRALAZINE HYDROCHLORIDE 25 MG: 50 TABLET ORAL at 08:37

## 2024-11-15 NOTE — PROGRESS NOTES
Progress Note      11/15/2024 1:17 PM  NAME: Saranya Hightower   MRN:  [id number]   Admit Diagnosis: Acute respiratory failure with hypoxemia [J96.01]          Assessment/Plan:   Acute on chronic CHF/heart failure with reduced ejection fraction, currently without shortness of breath.  With chronic kidney disease cannot use ARB or ACE.  Continues metoprolol.  Has AICD.    Type II MI, troponin has down trended.  Known coronary artery disease with history of multivessel PCI.    Hypertension, poorly controlled on hydralazine, isosorbide, metoprolol, will add amlodipine.    Chronic kidney disease, rising creatinine.    Peripheral vascular disease, status post left to right femoral femoral and right femoropopliteal bypass and revision.  Status post bilateral carotid endarterectomy.    Tobacco use             []       High complexity decision making was performed in this patient at high risk for decompensation with multiple organ involvement.    Subjective:     Saranya Hightower denies chest pain, dyspnea.  Discussed with RN events overnight.     Review of Systems:    Symptom Y/N Comments  Symptom Y/N Comments   Fever/Chills N   Chest Pain N    Poor Appetite N   Edema N    Cough N   Abdominal Pain N    Sputum N   Joint Pain N    SOB/KAUR N   Pruritis/Rash N    Nausea/vomit N   Tolerating PT/OT Y    Diarrhea N   Tolerating Diet Y    Constipation N   Other       Could NOT obtain due to:      Objective:      Physical Exam:    Last 24hrs VS reviewed since prior progress note. Most recent are:    BP (!) 185/92   Pulse 87   Temp 97.5 °F (36.4 °C) (Oral)   Resp 17   Ht 1.6 m (5' 2.99\")   Wt 49.8 kg (109 lb 12.6 oz)   SpO2 96%   BMI 19.45 kg/m²     Intake/Output Summary (Last 24 hours) at 11/15/2024 1317  Last data filed at 11/15/2024 0631  Gross per 24 hour   Intake --   Output 1925 ml   Net -1925 ml        General Appearance: Fair build, alert; no acute distress.  Ears/Nose/Mouth/Throat: Hearing grossly normal; moist mucous  membranes  Neck: Supple.  Chest: Lungs clear to auscultation bilaterally.  Cardiovascular: Regular rate and rhythm, S1S2 normal, 2/6 systolic murmur.  Abdomen: Soft, non-tender, bowel sounds are active.  Extremities: No edema bilaterally.  Skin: Warm and dry.    []         Post-cath site without hematoma, bruit, tenderness, or thrill.  Distal pulses intact.    PMH/ reviewed - no change compared to H&P    Data Review    Telemetry:     EKG:   []  No new EKG for review    Lab Data Personally Reviewed:    Recent Labs     11/14/24  0230 11/15/24  0348   WBC 7.5 7.8   HGB 10.3* 11.6   HCT 31.3* 34.6*    230     No results for input(s): \"INR\", \"APTT\" in the last 72 hours.    Invalid input(s): \"PTP\"   Recent Labs     11/13/24  0118 11/13/24  0816 11/14/24  0230 11/15/24  0348   * 136 137 135*   K 3.9 3.5 3.1* 3.8   CL 95* 96* 103 96*   CO2 28 27 25 31   BUN 37* 37* 39* 43*   MG 2.4  --   --   --      No results for input(s): \"CPK\" in the last 72 hours.    Invalid input(s): \"CPKMB\", \"CKNDX\", \"TROIQ\"  Lab Results   Component Value Date/Time    CHOL 219 11/13/2024 08:16 AM    HDL 66 11/13/2024 08:16 AM    .8 11/13/2024 08:16 AM       Recent Labs     11/13/24  0816 11/14/24  0230 11/15/24  0348   GLOB 3.7 3.6 4.1*     No results for input(s): \"PH\", \"PCO2\", \"PO2\" in the last 72 hours.    Medications Personally Reviewed:    Current Facility-Administered Medications   Medication Dose Route Frequency    bumetanide (BUMEX) injection 1 mg  1 mg IntraVENous BID    amLODIPine (NORVASC) tablet 5 mg  5 mg Oral Daily    potassium bicarb-citric acid (EFFER-K) effervescent tablet 40 mEq  40 mEq Oral Daily    acetaminophen (TYLENOL) tablet 650 mg  650 mg Oral Q4H PRN    albuterol sulfate HFA (PROVENTIL;VENTOLIN;PROAIR) 108 (90 Base) MCG/ACT inhaler 2 puff  2 puff Inhalation Q4H PRN    aspirin chewable tablet 81 mg  81 mg Oral Daily    budesonide-formoterol (SYMBICORT) 160-4.5 MCG/ACT inhaler 2 puff  2 puff Inhalation

## 2024-11-15 NOTE — CARE COORDINATION
0840: Chart reviewed.    Per notes; patient on IV Bumex and followed via cardiology, dietitian and nephrology.    [facility] accepted patient when medically cleared for discharge.    Updates attached in [facility].    CM will continue to follow patient and recs of medical team.    1400: Update attached in [facility].

## 2024-11-15 NOTE — PLAN OF CARE
PHYSICAL THERAPY TREATMENT     Patient: Saranya Hightower (74 y.o. female)  Date: 11/15/2024  Diagnosis: Acute respiratory failure with hypoxemia [J96.01] Acute respiratory failure with hypoxemia      Precautions: Fall Risk                      Recommendations for nursing mobility: Frequent repositioning to prevent skin breakdown and AD and gt belt for bed to chair     In place during session: Peripheral IV, External Catheter, and EKG/telemetry   Chart, physical therapy assessment, plan of care and goals were reviewed.  ASSESSMENT  Patient continues with skilled PT services and is progressing towards goals. Pt semi supine upon PT arrival, agreeable to session. Pt A&O x 4. (See below for objective details and assist levels).     Overall pt tolerated session fair today with PT. Patient able to perform bed mob, transfers and gait with RW,performed exs in recliner. Will continue to benefit from skilled PT services, and will continue to progress as tolerated. Current PT DC recommendation Intermittent physical therapy up to 2-3x/week in previous living setting once medically appropriate.      GOALS:    Problem: Physical Therapy - Adult  Goal: By Discharge: Performs mobility at highest level of function for planned discharge setting.  See evaluation for individualized goals.  Description: FUNCTIONAL STATUS PRIOR TO ADMISSION: Patient was modified independent using a rolling walker for functional mobility.    HOME SUPPORT PRIOR TO ADMISSION: The patient lived with  and required minimal assistance for ADLs.    Physical Therapy Goals  Initiated 11/14/2024  Pt stated goal: Get better  Pt will be I with LE HEP in 7 days.  Pt will perform bed mobility with Contact Guard Assist in 7 days.  Pt will perform transfers with Contact Guard Assist in 7 days.   Pt will amb 80 feet with LRAD safely with Contact Guard Assist in 7 days.  Pt will verbalize and demonstrate compliance with fall precautions  in 7 days.   Pt will

## 2024-11-15 NOTE — PROGRESS NOTES
Spiritual Health History and Assessment/Progress Note  [facility]    Initial Encounter,  ,  ,      Name: Saranya Hightower MRN: [id number]    Age: 74 y.o.     Sex: female   Language: English   Islam: Mandaen   Acute respiratory failure with hypoxemia     Date: 11/15/2024                           Spiritual Assessment began in [facility] [place] CARDIAC TELEMETRY        Referral/Consult From: Rounding   Encounter Overview/Reason: Initial Encounter  Service Provided For: Patient    Brittanie, Belief, Meaning:   Patient identifies as spiritual, is connected with a brittanie tradition or spiritual practice, and has beliefs or practices that help with coping during difficult times  Family/Friends identify as spiritual      Importance and Influence:  Patient has spiritual/personal beliefs that influence decisions regarding their health  Family/Friends have spiritual/personal beliefs that influence decisions regarding the patient's health    Community:  Patient is connected with a spiritual community and feels well-supported. Support system includes: Spouse/Partner, Children, Brittanie Community, Friends, and Extended family  Family/Friends feel well-supported. Support system includes: Spouse/Partner, Children, Brittanie Community, Friends, and Extended family    Assessment and Plan of Care:     Patient Interventions include:  Engaged in conversation, facilitated storytelling, affirmed/supported thoughts and feelings, gentle reminder of the importance of masking at her agent to combat viruses  Family/Friends Interventions include: No family/friends present    Patient Plan of Care: Spiritual Care available upon further referral  Family/Friends Plan of Care: Spiritual Care available upon further referral    Electronically signed by ARLET Marquez on 11/15/2024 at 11:13 AM     The  visited with the patient in Room 489 during rounding. The patient presented as pleasant and open to my visit.  She articulated being concerned

## 2024-11-15 NOTE — PROGRESS NOTES
Nephrology follow-up          Patient: Saranya Hightower MRN: [id number]  SSN: [id number]    YOB: 1950  Age: 74 y.o.  Sex: female      Subjective:   The patient is seen at the bedside.  She feels better.  On RA now  Worsening MAKENZIE  On IV Bumex 1 mg 3 times daily  I will switch to p.o. Bumex 1 mg twice a day  Past Medical History:   Diagnosis Date    AAA (abdominal aortic aneurysm) ([facility])     S/P STENTING    Abnormal weight loss 09/11/2021    Anemia     Atherosclerosis of native coronary artery without angina pectoris 09/11/2021    Bronchitis     CAD (coronary artery disease)     MI and hx of stents Lcx and RCA 8/2017    Carotid artery stenosis 09/11/2021    Cervicalgia 09/11/2021    Chronic bronchitis ([facility])     CKD (chronic kidney disease) stage 4, GFR 15-29 ml/min ([facility])     Colon polyps     Constipation 09/11/2021    COPD (chronic obstructive pulmonary disease) ([facility])     COPD (chronic obstructive pulmonary disease) ([facility])     HTN (hypertension) 09/11/2021    Hyperlipidemia     Menopause     Personal history of colonic polyps 09/11/2021    PVD (peripheral vascular disease) ([facility]) 2019    s/p left to right fem-fem bypass    Vitamin D deficiency 09/11/2021     Past Surgical History:   Procedure Laterality Date    CARDIAC CATHETERIZATION      X 3    CARDIAC DEFIBRILLATOR PLACEMENT  09/16/2013    COLONOSCOPY N/A 10/5/2021    COLONOSCOPY ( T I V A) performed by Talbi Ortega MD at [facility] ENDOSCOPY    COLONOSCOPY  2015    ORTHOPEDIC SURGERY      R leg    OTHER SURGICAL HISTORY      BILAT LOWER EXTREM VASC BYPASS SURGERY    OTHER SURGICAL HISTORY      R CAROTID ENDORECTOMY 11-4-05,   L CAROTID ENDARECTOMY 11-6-2006    PACEMAKER      6 0r 7 years ago      Family History   Problem Relation Age of Onset    No Known Problems Father     Cancer Mother     Hypertension Mother      Social History     Tobacco Use    Smoking status: Every Day     Current packs/day: 0.50     Types: Cigarettes    Smokeless tobacco:  Never   Substance Use Topics    Alcohol use: Never      Current Facility-Administered Medications   Medication Dose Route Frequency Provider Last Rate Last Admin    bumetanide (BUMEX) injection 1 mg  1 mg IntraVENous BID Elian Duncan MD   1 mg at 11/15/24 0837    docusate sodium (COLACE) capsule 100 mg  100 mg Oral BID Rafy Gonsales MD   100 mg at 11/15/24 1531    polyethylene glycol (GLYCOLAX) packet 17 g  17 g Oral Daily Rafy Gonsales MD   17 g at 11/15/24 1531    hydrALAZINE (APRESOLINE) tablet 50 mg  50 mg Oral TID Rafy Gonsales MD        [START ON 11/16/2024] amLODIPine (NORVASC) tablet 10 mg  10 mg Oral Daily Rafy Gonsales MD        potassium bicarb-citric acid (EFFER-K) effervescent tablet 40 mEq  40 mEq Oral Daily Rfay Gonsales MD   40 mEq at 11/15/24 0837    acetaminophen (TYLENOL) tablet 650 mg  650 mg Oral Q4H PRN Lacho Pope PA-C        albuterol sulfate HFA (PROVENTIL;VENTOLIN;PROAIR) 108 (90 Base) MCG/ACT inhaler 2 puff  2 puff Inhalation Q4H PRN Lacho Pope PA-C        aspirin chewable tablet 81 mg  81 mg Oral Daily Lacho Pope PA-C   81 mg at 11/15/24 0837    budesonide-formoterol (SYMBICORT) 160-4.5 MCG/ACT inhaler 2 puff  2 puff Inhalation Daily Lacho Pope PA-C   2 puff at 11/14/24 0914    [Held by provider] bumetanide (BUMEX) tablet 2 mg  2 mg Oral Daily Lacho Pope PA-C        ferrous sulfate (IRON 325) tablet 325 mg  325 mg Oral Every Other Day Lacoh Pope PA-C   325 mg at 11/14/24 0934    isosorbide dinitrate (ISORDIL) tablet 20 mg  20 mg Oral TID Lacho Pope PA-C   20 mg at 11/15/24 1315    metoprolol succinate (TOPROL XL) extended release tablet 100 mg  100 mg Oral Daily Lacho Pope PA-C   100 mg at 11/15/24 0837    mirtazapine (REMERON) tablet 15 mg  15 mg Oral Nightly Lacho Pope PA-C   15 mg at 11/14/24 2021    pantoprazole (PROTONIX) tablet 40 mg  40 mg Oral QAM AC Lacho Pope PA-C   40 mg at

## 2024-11-15 NOTE — PLAN OF CARE
Problem: Chronic Conditions and Co-morbidities  Goal: Patient's chronic conditions and co-morbidity symptoms are monitored and maintained or improved  11/15/2024 0730 by Peg Shea RN  Outcome: Progressing  11/15/2024 0022 by Patricia Salazar RN  Outcome: Progressing     Problem: Discharge Planning  Goal: Discharge to home or other facility with appropriate resources  11/15/2024 0730 by Peg Shea RN  Outcome: Progressing  11/15/2024 0022 by Patricia Salazar RN  Outcome: Progressing     Problem: Skin/Tissue Integrity  Goal: Absence of new skin breakdown  Description: 1.  Monitor for areas of redness and/or skin breakdown  2.  Assess vascular access sites hourly  3.  Every 4-6 hours minimum:  Change oxygen saturation probe site  4.  Every 4-6 hours:  If on nasal continuous positive airway pressure, respiratory therapy assess nares and determine need for appliance change or resting period.  11/15/2024 0730 by Peg Shea RN  Outcome: Progressing  11/15/2024 0022 by Patricia Salazar RN  Outcome: Progressing     Problem: Safety - Adult  Goal: Free from fall injury  11/15/2024 0730 by Peg Shea RN  Outcome: Progressing  11/15/2024 0022 by Patricia Salazar RN  Outcome: Progressing     Problem: ABCDS Injury Assessment  Goal: Absence of physical injury  11/15/2024 0730 by Peg Shea RN  Outcome: Progressing  11/15/2024 0022 by Patricia Salazar RN  Outcome: Progressing

## 2024-11-15 NOTE — PROGRESS NOTES
labs: CMP, CBC  I personally reviewed imaging:  I personally reviewed EKG:  Toxic drug monitoring:   Discussed case with:  Patient, nursing    Total time 45-minutes    Subjective:     Chief Complaint / Reason for Physician Visit  \" Shortness of breath\".  Discussed with RN events overnight.     11/13  --  Admission H&P  74 y.o.  female with PMHx significant for abdominal aortic aneurysm status post stent placement, anemia, coronary artery disease status post PTCA and stent placement, chronic kidney disease stage IV, COPD, essential hypertension, chronic systolic heart failure with an EF of 40% that has changed to 35% who presented with acute respiratory failure with hypoxia requiring high flow oxygen.  She was diuresed and found to have malignant hypertension which was subsequently treated with antihypertensives and diuretics.  Currently blood pressure has improved and shortness of breath has resolved.  Patient remains on 4 L of oxygen via nasal cannula.Chest x-ray with pulmonary edema and small right pleural effusion.  Patient's new echocardiogram revealed akinesis in the inferior and inferior lateral aspect of the heart.  This was previously present on echocardiogram in October.  Patient's troponin is now trending down most likely related to the acute systolic heart failure from the malignant hypertension and demand ischemia.     11/14  Patient is alert noted x 4 with  at bedside.  Currently requiring 2 L NC O2 and improved from initial requiring 3 L.  Chronically not on oxygen at home.  States that her shortness of breath has improved.  Counseled on continuing IV diuretics with cardiology following and continuing treatment for CHF.  At home patient able to ambulate without cane or walker and will obtain PT consultation.        11/14  Patient is alert noted x 4, laying in bed comfortably.  Has been weaned down from 3 L initially and tolerating room air currently.  No overnight fever/chills, worsening  shortness of breath, chest pain, nausea/vomiting, abdominal pain.  States that her shortness of breath has improved.    Uncontrolled blood pressures noted over the last 24 hours, 180s/90s.    Counseled on continuing IV diuretics with cardiology and nephrology following and continuing treatment for CHF.  Thus far diuresed 1.2 L documented  At home patient able to ambulate without cane or walker and will obtain PT consultation.        Objective:     VITALS:   Last 24hrs VS reviewed since prior progress note. Most recent are:  Patient Vitals for the past 24 hrs:   BP Temp Temp src Pulse Resp SpO2 Weight   11/15/24 1202 (!) 185/92 97.5 °F (36.4 °C) Oral 87 17 96 % --   11/15/24 0845 -- -- -- 88 -- -- --   11/15/24 0832 (!) 195/97 97.5 °F (36.4 °C) Oral 87 16 93 % --   11/15/24 0700 -- -- -- 81 -- -- --   11/15/24 0614 (!) 182/90 98.1 °F (36.7 °C) Oral 84 17 99 % 49.8 kg (109 lb 12.6 oz)   11/15/24 0009 -- -- -- 88 -- -- --   11/14/24 2330 (!) 162/91 98.5 °F (36.9 °C) Oral 88 22 96 % --   11/14/24 2006 (!) 148/73 98.1 °F (36.7 °C) Oral 87 20 96 % --   11/14/24 1618 (!) 155/77 98.8 °F (37.1 °C) -- 79 18 93 % --   11/14/24 1500 -- -- -- 91 -- -- --         Intake/Output Summary (Last 24 hours) at 11/15/2024 1422  Last data filed at 11/15/2024 1328  Gross per 24 hour   Intake 450 ml   Output 1375 ml   Net -925 ml        I had a face to face encounter and independently examined this patient on 11/15/2024, as outlined below:  PHYSICAL EXAM:  General: Alert, cooperative  EENT:  EOMI. Anicteric sclerae.  Resp:  CTA bilaterally, no wheezing or rales.  No accessory muscle use  CV:  Regular  rhythm,  No edema  GI:  Soft, Non distended, Non tender.  +Bowel sounds  Neurologic:  Alert and oriented X 3, normal speech,   Psych:   Good insight. Not anxious nor agitated  Skin:  No rashes.  No jaundice    Reviewed most current lab test results and cultures  YES  Reviewed most current radiology test results   YES  Review and summation of

## 2024-11-16 LAB
ALBUMIN SERPL-MCNC: 3.3 G/DL (ref 3.5–5)
ALBUMIN/GLOB SERPL: 0.8 (ref 1.1–2.2)
ALP SERPL-CCNC: 82 U/L (ref 45–117)
ALT SERPL-CCNC: 18 U/L (ref 12–78)
ANION GAP SERPL CALC-SCNC: 7 MMOL/L (ref 2–12)
AST SERPL W P-5'-P-CCNC: 24 U/L (ref 15–37)
BASOPHILS # BLD: 0.1 K/UL (ref 0–0.1)
BASOPHILS NFR BLD: 1 % (ref 0–1)
BILIRUB SERPL-MCNC: 0.6 MG/DL (ref 0.2–1)
BUN SERPL-MCNC: 50 MG/DL (ref 6–20)
BUN/CREAT SERPL: 14 (ref 12–20)
CA-I BLD-MCNC: 9.8 MG/DL (ref 8.5–10.1)
CHLORIDE SERPL-SCNC: 95 MMOL/L (ref 97–108)
CO2 SERPL-SCNC: 27 MMOL/L (ref 21–32)
CREAT SERPL-MCNC: 3.57 MG/DL (ref 0.55–1.02)
DIFFERENTIAL METHOD BLD: NORMAL
EOSINOPHIL # BLD: 0 K/UL (ref 0–0.4)
EOSINOPHIL NFR BLD: 1 % (ref 0–7)
ERYTHROCYTE [DISTWIDTH] IN BLOOD BY AUTOMATED COUNT: 13.3 % (ref 11.5–14.5)
GLOBULIN SER CALC-MCNC: 4.4 G/DL (ref 2–4)
GLUCOSE SERPL-MCNC: 75 MG/DL (ref 65–100)
HCT VFR BLD AUTO: 37.3 % (ref 35–47)
HGB BLD-MCNC: 12.8 G/DL (ref 11.5–16)
IMM GRANULOCYTES # BLD AUTO: 0 K/UL (ref 0–0.04)
IMM GRANULOCYTES NFR BLD AUTO: 0 % (ref 0–0.5)
LYMPHOCYTES # BLD: 1.2 K/UL (ref 0.8–3.5)
LYMPHOCYTES NFR BLD: 16 % (ref 12–49)
MCH RBC QN AUTO: 32.2 PG (ref 26–34)
MCHC RBC AUTO-ENTMCNC: 34.3 G/DL (ref 30–36.5)
MCV RBC AUTO: 94 FL (ref 80–99)
MONOCYTES # BLD: 0.9 K/UL (ref 0–1)
MONOCYTES NFR BLD: 13 % (ref 5–13)
NEUTS SEG # BLD: 5 K/UL (ref 1.8–8)
NEUTS SEG NFR BLD: 69 % (ref 32–75)
NRBC # BLD: 0 K/UL (ref 0–0.01)
NRBC BLD-RTO: 0 PER 100 WBC
PLATELET # BLD AUTO: 277 K/UL (ref 150–400)
PMV BLD AUTO: 11.5 FL (ref 8.9–12.9)
POTASSIUM SERPL-SCNC: 3.8 MMOL/L (ref 3.5–5.1)
PROT SERPL-MCNC: 7.7 G/DL (ref 6.4–8.2)
RBC # BLD AUTO: 3.97 M/UL (ref 3.8–5.2)
SODIUM SERPL-SCNC: 129 MMOL/L (ref 136–145)
WBC # BLD AUTO: 7.1 K/UL (ref 3.6–11)

## 2024-11-16 PROCEDURE — 6370000000 HC RX 637 (ALT 250 FOR IP): Performed by: PHYSICIAN ASSISTANT

## 2024-11-16 PROCEDURE — 6360000002 HC RX W HCPCS: Performed by: PHYSICIAN ASSISTANT

## 2024-11-16 PROCEDURE — 2060000000 HC ICU INTERMEDIATE R&B

## 2024-11-16 PROCEDURE — 6370000000 HC RX 637 (ALT 250 FOR IP)

## 2024-11-16 PROCEDURE — 2580000003 HC RX 258: Performed by: PHYSICIAN ASSISTANT

## 2024-11-16 PROCEDURE — 80053 COMPREHEN METABOLIC PANEL: CPT

## 2024-11-16 PROCEDURE — 85025 COMPLETE CBC W/AUTO DIFF WBC: CPT

## 2024-11-16 PROCEDURE — 6370000000 HC RX 637 (ALT 250 FOR IP): Performed by: INTERNAL MEDICINE

## 2024-11-16 PROCEDURE — 36415 COLL VENOUS BLD VENIPUNCTURE: CPT

## 2024-11-16 RX ORDER — CARVEDILOL 12.5 MG/1
25 TABLET ORAL 2 TIMES DAILY WITH MEALS
Status: DISCONTINUED | OUTPATIENT
Start: 2024-11-16 | End: 2024-11-17 | Stop reason: HOSPADM

## 2024-11-16 RX ADMIN — ATORVASTATIN CALCIUM 80 MG: 40 TABLET, FILM COATED ORAL at 09:25

## 2024-11-16 RX ADMIN — Medication 2 PUFF: at 09:52

## 2024-11-16 RX ADMIN — MIRTAZAPINE 15 MG: 15 TABLET, FILM COATED ORAL at 21:38

## 2024-11-16 RX ADMIN — SODIUM CHLORIDE, PRESERVATIVE FREE 10 ML: 5 INJECTION INTRAVENOUS at 09:31

## 2024-11-16 RX ADMIN — HEPARIN SODIUM 5000 UNITS: 5000 INJECTION INTRAVENOUS; SUBCUTANEOUS at 08:00

## 2024-11-16 RX ADMIN — DOCUSATE SODIUM 100 MG: 100 CAPSULE, LIQUID FILLED ORAL at 21:37

## 2024-11-16 RX ADMIN — HEPARIN SODIUM 5000 UNITS: 5000 INJECTION INTRAVENOUS; SUBCUTANEOUS at 21:37

## 2024-11-16 RX ADMIN — CARVEDILOL 25 MG: 12.5 TABLET, FILM COATED ORAL at 15:36

## 2024-11-16 RX ADMIN — POTASSIUM BICARBONATE 40 MEQ: 782 TABLET, EFFERVESCENT ORAL at 09:27

## 2024-11-16 RX ADMIN — ISOSORBIDE DINITRATE 20 MG: 20 TABLET ORAL at 09:25

## 2024-11-16 RX ADMIN — HYDRALAZINE HYDROCHLORIDE 50 MG: 50 TABLET ORAL at 09:25

## 2024-11-16 RX ADMIN — HYDRALAZINE HYDROCHLORIDE 50 MG: 50 TABLET ORAL at 15:36

## 2024-11-16 RX ADMIN — AMLODIPINE BESYLATE 10 MG: 5 TABLET ORAL at 09:25

## 2024-11-16 RX ADMIN — ASPIRIN 81 MG: 81 TABLET, CHEWABLE ORAL at 09:25

## 2024-11-16 RX ADMIN — ISOSORBIDE DINITRATE 20 MG: 20 TABLET ORAL at 21:37

## 2024-11-16 RX ADMIN — FERROUS SULFATE TAB 325 MG (65 MG ELEMENTAL FE) 325 MG: 325 (65 FE) TAB at 09:24

## 2024-11-16 RX ADMIN — SODIUM CHLORIDE, PRESERVATIVE FREE 10 ML: 5 INJECTION INTRAVENOUS at 21:52

## 2024-11-16 RX ADMIN — HYDRALAZINE HYDROCHLORIDE 50 MG: 50 TABLET ORAL at 21:38

## 2024-11-16 RX ADMIN — PANTOPRAZOLE SODIUM 40 MG: 40 TABLET, DELAYED RELEASE ORAL at 08:00

## 2024-11-16 RX ADMIN — Medication 3 MG: at 21:38

## 2024-11-16 RX ADMIN — METOPROLOL SUCCINATE 100 MG: 50 TABLET, EXTENDED RELEASE ORAL at 09:26

## 2024-11-16 RX ADMIN — ISOSORBIDE DINITRATE 20 MG: 20 TABLET ORAL at 15:36

## 2024-11-16 NOTE — PROGRESS NOTES
Hearing grossly normal; moist mucous membranes  Neck: Supple.  Chest: Lungs clear to auscultation bilaterally.  Cardiovascular: Regular rate and rhythm, S1S2 normal, 2/6 systolic murmur.  Abdomen: Soft, non-tender, bowel sounds are active.  Extremities: No edema bilaterally.  Skin: Warm and dry.    []         Post-cath site without hematoma, bruit, tenderness, or thrill.  Distal pulses intact.    PMH/ reviewed - no change compared to H&P    Data Review    Telemetry:     EKG:   []  No new EKG for review    Lab Data Personally Reviewed:    Recent Labs     11/14/24  0230 11/15/24  0348   WBC 7.5 7.8   HGB 10.3* 11.6   HCT 31.3* 34.6*    230     No results for input(s): \"INR\", \"APTT\" in the last 72 hours.    Invalid input(s): \"PTP\"   Recent Labs     11/14/24  0230 11/15/24  0348 11/16/24  0519    135* 129*   K 3.1* 3.8 3.8    96* 95*   CO2 25 31 27   BUN 39* 43* 50*     No results for input(s): \"CPK\" in the last 72 hours.    Invalid input(s): \"CPKMB\", \"CKNDX\", \"TROIQ\"  Lab Results   Component Value Date/Time    CHOL 219 11/13/2024 08:16 AM    HDL 66 11/13/2024 08:16 AM    .8 11/13/2024 08:16 AM       Recent Labs     11/14/24  0230 11/15/24  0348 11/16/24  0519   GLOB 3.6 4.1* 4.4*     No results for input(s): \"PH\", \"PCO2\", \"PO2\" in the last 72 hours.    Medications Personally Reviewed:    Current Facility-Administered Medications   Medication Dose Route Frequency    docusate sodium (COLACE) capsule 100 mg  100 mg Oral BID    polyethylene glycol (GLYCOLAX) packet 17 g  17 g Oral Daily    hydrALAZINE (APRESOLINE) tablet 50 mg  50 mg Oral TID    amLODIPine (NORVASC) tablet 10 mg  10 mg Oral Daily    [Held by provider] bumetanide (BUMEX) tablet 1 mg  1 mg Oral BID    potassium bicarb-citric acid (EFFER-K) effervescent tablet 40 mEq  40 mEq Oral Daily    acetaminophen (TYLENOL) tablet 650 mg  650 mg Oral Q4H PRN    albuterol sulfate HFA (PROVENTIL;VENTOLIN;PROAIR) 108 (90 Base) MCG/ACT inhaler 2  puff  2 puff Inhalation Q4H PRN    aspirin chewable tablet 81 mg  81 mg Oral Daily    budesonide-formoterol (SYMBICORT) 160-4.5 MCG/ACT inhaler 2 puff  2 puff Inhalation Daily    ferrous sulfate (IRON 325) tablet 325 mg  325 mg Oral Every Other Day    isosorbide dinitrate (ISORDIL) tablet 20 mg  20 mg Oral TID    metoprolol succinate (TOPROL XL) extended release tablet 100 mg  100 mg Oral Daily    mirtazapine (REMERON) tablet 15 mg  15 mg Oral Nightly    pantoprazole (PROTONIX) tablet 40 mg  40 mg Oral QAM AC    atorvastatin (LIPITOR) tablet 80 mg  80 mg Oral Daily    sodium chloride flush 0.9 % injection 5-40 mL  5-40 mL IntraVENous 2 times per day    sodium chloride flush 0.9 % injection 5-40 mL  5-40 mL IntraVENous PRN    0.9 % sodium chloride infusion   IntraVENous PRN    heparin (porcine) injection 5,000 Units  5,000 Units SubCUTAneous 3 times per day    ondansetron (ZOFRAN-ODT) disintegrating tablet 4 mg  4 mg Oral Q8H PRN    Or    ondansetron (ZOFRAN) injection 4 mg  4 mg IntraVENous Q6H PRN    acetaminophen (TYLENOL) tablet 650 mg  650 mg Oral Q6H PRN    Or    acetaminophen (TYLENOL) suppository 650 mg  650 mg Rectal Q6H PRN    melatonin tablet 3 mg  3 mg Oral Nightly    hydrALAZINE (APRESOLINE) injection 10 mg  10 mg IntraVENous Q6H PRN         Jesse Levy MD

## 2024-11-16 NOTE — PROGRESS NOTES
Hospitalist Progress Note    NAME:   Saranya Hightower   : 1950   MRN: [id number]     Date/Time: 2024 1:25 PM  Patient PCP: uNbia Davila APRN - NP    Estimated discharge date:  Barriers:       Assessment / Plan:    Acute on chronic systolic heart failure secondary to malignant hypertension  Defibrillator in place  Patient is now on room air  Blood pressure fluctuates  Cardiology consultation, Dr. Levy  Nephrology consultation Dr. Duncan  Continue hydralazine  As needed hydralazine IV  Change metoprolol to Coreg for better blood pressure control  Bumex is on hold because of worsening MAKENZIE       Acute respiratory failure with hypoxia  Weaned from 40% of oxygen on CPAP to 2 L of oxygen via nasal cannula  Baseline is no oxygen at home.  11/15  -  now tolerating room air.  Secondary to acute CHF.     Elevated troponin consistent with demand ischemia with history of coronary artery disease  Troponin trending down peaked at 164 and trending down to 133 no reported chest pain  Continue aspirin, Isordil, metoprolol, Crestor.  Appreciate cardiology consultation with no further intervention noted.  MAO of 4     Uncontrolled hypertension  11/15  - increased Hydralazine to 50 Mg 3 times daily and increased Norvasc to 10 Mg daily.  Continue Isordil, metoprolol.    Chronic kidney disease stage IV  Primary nephrologist Dr. Duncan according to the patient  May need to limit diuretics although currently with pulmonary edema will be aggressive   Baseline creatinine near 4  Admitted with a creatinine of 2.85->3.13 consistent with volume overload  S/p sodium bicarb per nephrology  Holding diuretics today  Recheck kidney function tomorrow     Leukocytosis  Resolved     Chronic tobacco abuser  May require nicotine patch    DVT prophylaxis  Heparin SC    Medical Decision Making:   I personally reviewed labs: CMP, CBC  I personally reviewed imaging:  I personally reviewed EKG:  Toxic drug monitoring:   Discussed case

## 2024-11-17 VITALS
WEIGHT: 102.51 LBS | DIASTOLIC BLOOD PRESSURE: 72 MMHG | RESPIRATION RATE: 18 BRPM | SYSTOLIC BLOOD PRESSURE: 148 MMHG | HEART RATE: 80 BPM | OXYGEN SATURATION: 94 % | TEMPERATURE: 98.1 F | HEIGHT: 63 IN | BODY MASS INDEX: 18.16 KG/M2

## 2024-11-17 LAB
ALBUMIN SERPL-MCNC: 3.6 G/DL (ref 3.5–5)
ALBUMIN/GLOB SERPL: 0.8 (ref 1.1–2.2)
ALP SERPL-CCNC: 80 U/L (ref 45–117)
ALT SERPL-CCNC: 18 U/L (ref 12–78)
ANION GAP SERPL CALC-SCNC: 10 MMOL/L (ref 2–12)
AST SERPL W P-5'-P-CCNC: 19 U/L (ref 15–37)
BASOPHILS # BLD: 0.1 K/UL (ref 0–0.1)
BASOPHILS NFR BLD: 1 % (ref 0–1)
BILIRUB SERPL-MCNC: 0.7 MG/DL (ref 0.2–1)
BUN SERPL-MCNC: 53 MG/DL (ref 6–20)
BUN/CREAT SERPL: 14 (ref 12–20)
CA-I BLD-MCNC: 9.7 MG/DL (ref 8.5–10.1)
CHLORIDE SERPL-SCNC: 91 MMOL/L (ref 97–108)
CO2 SERPL-SCNC: 30 MMOL/L (ref 21–32)
CREAT SERPL-MCNC: 3.73 MG/DL (ref 0.55–1.02)
DIFFERENTIAL METHOD BLD: ABNORMAL
EOSINOPHIL # BLD: 0.1 K/UL (ref 0–0.4)
EOSINOPHIL NFR BLD: 1 % (ref 0–7)
ERYTHROCYTE [DISTWIDTH] IN BLOOD BY AUTOMATED COUNT: 13.2 % (ref 11.5–14.5)
GLOBULIN SER CALC-MCNC: 4.4 G/DL (ref 2–4)
GLUCOSE SERPL-MCNC: 75 MG/DL (ref 65–100)
HCT VFR BLD AUTO: 37.2 % (ref 35–47)
HGB BLD-MCNC: 12.5 G/DL (ref 11.5–16)
IMM GRANULOCYTES # BLD AUTO: 0 K/UL (ref 0–0.04)
IMM GRANULOCYTES NFR BLD AUTO: 0 % (ref 0–0.5)
LYMPHOCYTES # BLD: 1.2 K/UL (ref 0.8–3.5)
LYMPHOCYTES NFR BLD: 20 % (ref 12–49)
MCH RBC QN AUTO: 31.6 PG (ref 26–34)
MCHC RBC AUTO-ENTMCNC: 33.6 G/DL (ref 30–36.5)
MCV RBC AUTO: 93.9 FL (ref 80–99)
MONOCYTES # BLD: 0.9 K/UL (ref 0–1)
MONOCYTES NFR BLD: 15 % (ref 5–13)
NEUTS SEG # BLD: 3.8 K/UL (ref 1.8–8)
NEUTS SEG NFR BLD: 63 % (ref 32–75)
NRBC # BLD: 0 K/UL (ref 0–0.01)
NRBC BLD-RTO: 0 PER 100 WBC
PLATELET # BLD AUTO: 253 K/UL (ref 150–400)
PMV BLD AUTO: 11.4 FL (ref 8.9–12.9)
POTASSIUM SERPL-SCNC: 4.4 MMOL/L (ref 3.5–5.1)
PROT SERPL-MCNC: 8 G/DL (ref 6.4–8.2)
RBC # BLD AUTO: 3.96 M/UL (ref 3.8–5.2)
SODIUM SERPL-SCNC: 131 MMOL/L (ref 136–145)
WBC # BLD AUTO: 6 K/UL (ref 3.6–11)

## 2024-11-17 PROCEDURE — 6370000000 HC RX 637 (ALT 250 FOR IP): Performed by: INTERNAL MEDICINE

## 2024-11-17 PROCEDURE — 93005 ELECTROCARDIOGRAM TRACING: CPT

## 2024-11-17 PROCEDURE — 94640 AIRWAY INHALATION TREATMENT: CPT

## 2024-11-17 PROCEDURE — 85025 COMPLETE CBC W/AUTO DIFF WBC: CPT

## 2024-11-17 PROCEDURE — 6370000000 HC RX 637 (ALT 250 FOR IP)

## 2024-11-17 PROCEDURE — 6370000000 HC RX 637 (ALT 250 FOR IP): Performed by: PHYSICIAN ASSISTANT

## 2024-11-17 PROCEDURE — 94761 N-INVAS EAR/PLS OXIMETRY MLT: CPT

## 2024-11-17 PROCEDURE — 36415 COLL VENOUS BLD VENIPUNCTURE: CPT

## 2024-11-17 PROCEDURE — 80053 COMPREHEN METABOLIC PANEL: CPT

## 2024-11-17 PROCEDURE — 6360000002 HC RX W HCPCS: Performed by: PHYSICIAN ASSISTANT

## 2024-11-17 RX ORDER — HYDRALAZINE HYDROCHLORIDE 50 MG/1
100 TABLET, FILM COATED ORAL 3 TIMES DAILY
Status: DISCONTINUED | OUTPATIENT
Start: 2024-11-17 | End: 2024-11-17

## 2024-11-17 RX ORDER — HYDRALAZINE HYDROCHLORIDE 50 MG/1
50 TABLET, FILM COATED ORAL EVERY 6 HOURS SCHEDULED
Status: DISCONTINUED | OUTPATIENT
Start: 2024-11-17 | End: 2024-11-17 | Stop reason: HOSPADM

## 2024-11-17 RX ORDER — AMLODIPINE BESYLATE 10 MG/1
10 TABLET ORAL DAILY
Qty: 90 TABLET | Refills: 3 | Status: SHIPPED | OUTPATIENT
Start: 2024-11-18

## 2024-11-17 RX ORDER — HYDRALAZINE HYDROCHLORIDE 50 MG/1
50 TABLET, FILM COATED ORAL 3 TIMES DAILY
Qty: 120 TABLET | Refills: 3 | Status: SHIPPED | OUTPATIENT
Start: 2024-11-17

## 2024-11-17 RX ORDER — HYDRALAZINE HYDROCHLORIDE 50 MG/1
50 TABLET, FILM COATED ORAL 3 TIMES DAILY
Status: DISCONTINUED | OUTPATIENT
Start: 2024-11-17 | End: 2024-11-17

## 2024-11-17 RX ORDER — HYDRALAZINE HYDROCHLORIDE 50 MG/1
75 TABLET, FILM COATED ORAL 3 TIMES DAILY
Status: DISCONTINUED | OUTPATIENT
Start: 2024-11-17 | End: 2024-11-17

## 2024-11-17 RX ORDER — CARVEDILOL 25 MG/1
25 TABLET ORAL 2 TIMES DAILY WITH MEALS
Qty: 120 TABLET | Refills: 3 | Status: SHIPPED | OUTPATIENT
Start: 2024-11-17

## 2024-11-17 RX ADMIN — DOCUSATE SODIUM 100 MG: 100 CAPSULE, LIQUID FILLED ORAL at 07:49

## 2024-11-17 RX ADMIN — Medication 2 PUFF: at 09:29

## 2024-11-17 RX ADMIN — ASPIRIN 81 MG: 81 TABLET, CHEWABLE ORAL at 07:49

## 2024-11-17 RX ADMIN — AMLODIPINE BESYLATE 10 MG: 5 TABLET ORAL at 07:49

## 2024-11-17 RX ADMIN — HYDRALAZINE HYDROCHLORIDE 50 MG: 50 TABLET ORAL at 07:49

## 2024-11-17 RX ADMIN — ATORVASTATIN CALCIUM 80 MG: 40 TABLET, FILM COATED ORAL at 07:49

## 2024-11-17 RX ADMIN — HEPARIN SODIUM 5000 UNITS: 5000 INJECTION INTRAVENOUS; SUBCUTANEOUS at 06:42

## 2024-11-17 RX ADMIN — POTASSIUM BICARBONATE 40 MEQ: 782 TABLET, EFFERVESCENT ORAL at 07:53

## 2024-11-17 RX ADMIN — PANTOPRAZOLE SODIUM 40 MG: 40 TABLET, DELAYED RELEASE ORAL at 06:42

## 2024-11-17 RX ADMIN — CARVEDILOL 25 MG: 12.5 TABLET, FILM COATED ORAL at 07:49

## 2024-11-17 RX ADMIN — ISOSORBIDE DINITRATE 20 MG: 20 TABLET ORAL at 07:49

## 2024-11-17 ASSESSMENT — PAIN SCALES - GENERAL: PAINLEVEL_OUTOF10: 0

## 2024-11-17 NOTE — PLAN OF CARE
Problem: Chronic Conditions and Co-morbidities  Goal: Patient's chronic conditions and co-morbidity symptoms are monitored and maintained or improved  11/16/2024 2126 by Brad Funes RN  Outcome: Progressing  11/16/2024 1017 by Peg Shea RN  Outcome: Progressing     Problem: Discharge Planning  Goal: Discharge to home or other facility with appropriate resources  11/16/2024 2126 by Brad Funes RN  Outcome: Progressing  11/16/2024 1017 by Peg Shea RN  Outcome: Progressing     Problem: Safety - Adult  Goal: Free from fall injury  11/16/2024 2126 by Brad Funes RN  Outcome: Progressing  11/16/2024 1017 by Peg Shea RN  Outcome: Progressing     Problem: ABCDS Injury Assessment  Goal: Absence of physical injury  11/16/2024 2126 by Brad Funes RN  Outcome: Progressing  11/16/2024 1017 by Peg Shea RN  Outcome: Progressing

## 2024-11-17 NOTE — PROGRESS NOTES
Discharge instructions reviewed with patient,  at bedside. Instructions included follow up appts, medications, side effects. Verbalized understanding. Escorted to car via wheelchair. Discharged home without distress

## 2024-11-17 NOTE — DISCHARGE SUMMARY
Physician Discharge Summary     Patient ID:    Saranya Hightower  [id number]  74 y.o.  1950    Admit date: 11/13/2024    Discharge date : 11/17/2024      Final Diagnoses:   Acute respiratory failure with hypoxemia [J96.01]      Reason for Hospitalization: Acute CHF exacerbation      Hospital Course:   Saranya Hightower is a 74 y.o.  female with PMHx significant for abdominal aortic aneurysm status post stent placement, anemia, coronary artery disease status post PTCA and stent placement, chronic kidney disease stage IV, COPD, essential hypertension, chronic systolic heart failure with an EF of 40% that has changed to 35% who presented with acute respiratory failure with hypoxia requiring high flow oxygen.  She was diuresed and found to have malignant hypertension which was subsequently treated with antihypertensives and diuretics.  Currently blood pressure has improved and shortness of breath has resolved.  Patient remains on 4 L of oxygen via nasal cannula.Chest x-ray with pulmonary edema and small right pleural effusion.  Patient's new echocardiogram revealed akinesis in the inferior and inferior lateral aspect of the heart.  This was previously present on echocardiogram in October.  Patient's troponin is now trending down most likely related to the acute systolic heart failure from the malignant hypertension and demand ischemia.     Acute on chronic systolic heart failure with reduced EF secondary to malignant hypertension  Defibrillator in place  Patient is now on room air  Blood pressure fluctuates as high as systolic 170s over 110s  Cardiology consultation, Dr. Levy  Nephrology consultation Dr. Duncan  Metoprolol changed to Coreg for better blood pressure control  Bumex was on hold because of worsening MAKENZIE, after that creatinine got better, will hold today and resume from tomorrow when she will be at home.  Jardiance unable to be started because of the kidney function, as well as spironolactone  mmol/L)  11/14/2024: BUN 39 mg/dL (H; Ref range: 6 - 20 mg/dL); Calcium 8.7 mg/dL (Ref range: 8.5 - 10.1 mg/dL); Chloride 103 mmol/L (Ref range: 97 - 108 mmol/L); CO2 25 mmol/L (Ref range: 21 - 32 mmol/L); Creatinine 3.19 mg/dL (H; Ref range: 0.55 - 1.02 mg/dL); Glucose 58 mg/dL (L; Ref range: 65 - 100 mg/dL); Hematocrit 31.3 % (L; Ref range: 35.0 - 47.0 %); Hemoglobin 10.3 g/dL (L; Ref range: 11.5 - 16.0 g/dL); Potassium 3.1 mmol/L (L; Ref range: 3.5 - 5.1 mmol/L); Sodium 137 mmol/L (Ref range: 136 - 145 mmol/L)  Recent Labs     11/15/24  0348 11/16/24  1225   WBC 7.8 7.1   HGB 11.6 12.8   HCT 34.6* 37.3    277     Recent Labs     11/15/24  0348 11/16/24  0519 11/17/24  0322   * 129* 131*   K 3.8 3.8 4.4   CL 96* 95* 91*   CO2 31 27 30   BUN 43* 50* 53*   CREATININE 3.40* 3.57* 3.73*   GLUCOSE 70 75 75   CALCIUM 9.3 9.8 9.7   PHOS 2.3*  --   --      Recent Labs     11/15/24  0348 11/16/24  0519 11/17/24  0322   AST 17 24 19   ALT 20 18 18   ALKPHOS 80 82 80   BILITOT 0.7 0.6 0.7   GLOB 4.1* 4.4* 4.4*     No results for input(s): \"INR\", \"PROTIME\", \"APTT\" in the last 72 hours.   No results for input(s): \"IRON\", \"TIBC\" in the last 72 hours.    Invalid input(s): \"PSAT\", \"FERR\"   No results for input(s): \"PH\", \"PCO2\", \"PO2\" in the last 72 hours.  No results for input(s): \"CKTOTAL\", \"CKMB\", \"TROPONINI\" in the last 72 hours.  Lab Results   Component Value Date/Time    POCGLU 133 07/06/2024 07:29 PM         Discharge time spent 35 minutes    Signed:  Emily Vargas MD  11/17/2024  9:01 AM

## 2024-11-17 NOTE — PLAN OF CARE
Problem: Chronic Conditions and Co-morbidities  Goal: Patient's chronic conditions and co-morbidity symptoms are monitored and maintained or improved  11/17/2024 0802 by Cee Stovall RN  Outcome: Progressing  Flowsheets (Taken 11/17/2024 0755)  Care Plan - Patient's Chronic Conditions and Co-Morbidity Symptoms are Monitored and Maintained or Improved:   Monitor and assess patient's chronic conditions and comorbid symptoms for stability, deterioration, or improvement   Collaborate with multidisciplinary team to address chronic and comorbid conditions and prevent exacerbation or deterioration   Update acute care plan with appropriate goals if chronic or comorbid symptoms are exacerbated and prevent overall improvement and discharge  11/16/2024 2126 by Brad Funes, LEXI  Outcome: Progressing     Problem: Discharge Planning  Goal: Discharge to home or other facility with appropriate resources  11/17/2024 0802 by Cee Stovall RN  Outcome: Progressing  Flowsheets (Taken 11/17/2024 0755)  Discharge to home or other facility with appropriate resources:   Identify barriers to discharge with patient and caregiver   Arrange for needed discharge resources and transportation as appropriate   Identify discharge learning needs (meds, wound care, etc)   Refer to discharge planning if patient needs post-hospital services based on physician order or complex needs related to functional status, cognitive ability or social support system  11/16/2024 2126 by Brad Funes, LEXI  Outcome: Progressing     Problem: Skin/Tissue Integrity  Goal: Absence of new skin breakdown  Description: 1.  Monitor for areas of redness and/or skin breakdown  2.  Assess vascular access sites hourly  3.  Every 4-6 hours minimum:  Change oxygen saturation probe site  4.  Every 4-6 hours:  If on nasal continuous positive airway pressure, respiratory therapy assess nares and determine need for appliance

## 2024-11-17 NOTE — CARE COORDINATION
1030: Discharge summary/order attached and modified to include home health.    Attached in [facility] to [facility].    1100: CM met with patient and spouse to confirm understanding of discharge today with [facility].    Understanding verbalized.    Transition of Care Plan:    RUR: 26%  Prior Level of Functioning: Assistance  Disposition: Home with Replaced by [facility]  RAKEL: 11/17/2024  If SNF or IPR: Date FOC offered: N/A  Date FOC received: 11/14/2024  Accepting facility: N/A  Date authorization started with reference number: N/A  Date authorization received and expires: N/A  Follow up appointments: Discharge Summary  DME needed: None  Transportation at discharge: Spouse  IM/IMM Medicare/ letter given: 11/17/2024  Is patient a [place] and connected with [facility]? No  If yes, was  transfer form completed and [facility] notified? N/A  Caregiver Contact: Patient and Spouse  Discharge Caregiver contacted prior to discharge? Patient and Spouse  Care Conference needed? No  Barriers to discharge: None

## 2024-11-17 NOTE — PROGRESS NOTES
Progress Note      11/17/2024 10:38 AM  NAME: Saranya Hightower   MRN:  [id number]   Admit Diagnosis: Acute respiratory failure with hypoxemia [J96.01]          Assessment/Plan:   Acute on chronic CHF/heart failure with reduced ejection fraction, currently without shortness of breath.  With chronic kidney disease cannot use ARB or ACE.  Continues metoprolol.  Has AICD.    Type II MI, troponin has down trended.  Known coronary artery disease with history of multivessel PCI.    Hypertension, blood pressure continues to be high.  Continue hydralazine, carvedilol, isosorbide and amlodipine..    Chronic kidney disease, rising creatinine.    Peripheral vascular disease, status post left to right femoral femoral and right femoropopliteal bypass and revision.  Status post bilateral carotid endarterectomy.    Tobacco use             []       High complexity decision making was performed in this patient at high risk for decompensation with multiple organ involvement.    Subjective:     Saranya Hightower denies chest pain, dyspnea.  Complains of feeling weak, hands cramping up.  Discussed with RN events overnight.     Review of Systems:    Symptom Y/N Comments  Symptom Y/N Comments   Fever/Chills N   Chest Pain N    Poor Appetite N   Edema N    Cough N   Abdominal Pain N    Sputum N   Joint Pain N    SOB/KAUR N   Pruritis/Rash N    Nausea/vomit N   Tolerating PT/OT Y    Diarrhea N   Tolerating Diet Y    Constipation N   Other       Could NOT obtain due to:      Objective:      Physical Exam:    Last 24hrs VS reviewed since prior progress note. Most recent are:    BP (!) 148/84   Pulse 86   Temp 97.9 °F (36.6 °C) (Oral)   Resp 18   Ht 1.6 m (5' 2.99\")   Wt 46.5 kg (102 lb 8.2 oz)   SpO2 98%   BMI 18.16 kg/m²     Intake/Output Summary (Last 24 hours) at 11/17/2024 1038  Last data filed at 11/17/2024 0800  Gross per 24 hour   Intake 240 ml   Output 700 ml   Net -460 ml        General Appearance: Fair build, alert; no acute

## 2024-11-18 LAB
EKG ATRIAL RATE: 78 BPM
EKG DIAGNOSIS: NORMAL
EKG P AXIS: 48 DEGREES
EKG P-R INTERVAL: 178 MS
EKG Q-T INTERVAL: 398 MS
EKG QRS DURATION: 92 MS
EKG QTC CALCULATION (BAZETT): 453 MS
EKG R AXIS: -11 DEGREES
EKG T AXIS: 74 DEGREES
EKG VENTRICULAR RATE: 78 BPM

## 2024-11-27 NOTE — PROGRESS NOTES
Physician Progress Note      PATIENT:               FLORIDA RICHARDSON  [facility] #:                  [id number]  :                       1950  ADMIT DATE:       2024 5:03 PM  DISCH DATE:        2024 11:57 AM  RESPONDING  PROVIDER #:        Emily Vargas MD          QUERY TEXT:    Good Morning    This patient admitted for acute on chronic CHF.    The medical record notes MAKENZIE, and the discharge summary notes CKD stage IV.    In order to support the diagnosis of MAKENZIE, please include additional clinical   indicators in your documentation.?  Or please document if the diagnosis of MAKENZIE has been ruled out after further   study.    The medical record reflects the following:  Risk Factors: CHF< HTN, CKD IV,  Clinical Indicators: Discharge summary notes CKD Stage IV, Creatinine on   admission 2.85-31.3-3/19-3.40-3.57 and discharge creatinine @ 3.73  Treatment: Nephrology consulted, Holding bumex, Strict i*), Daily labs,    Thank you  SAMIRA Umaña,RN, CPHQ, CCDS, SMART    __________________    Defined by Kidney Disease Improving Global Outcomes (KDIGO) clinical practice   guideline for acute kidney injury:  -Increase in SCr by greater than or equal to 0.3 mg/dl within 48 hours; or  -Increase or decrease in SCr to greater than or equal to 1.5 times baseline,   which is known or presumed to have occurred within the prior 7 days; or  -Urine volume < 0.5ml/kg/h for 6 hours.  Options provided:  -- Acute kidney injury evidenced by, Please document evidence as well as a   numerical baseline creatinine, if known.  -- Acute kidney injury ruled out after study. The patient has CKD IV only.  -- Other - I will add my own diagnosis  -- Disagree - Not applicable / Not valid  -- Disagree - Clinically unable to determine / Unknown  -- Refer to Clinical Documentation Reviewer    PROVIDER RESPONSE TEXT:    Acute kidney injury was ruled out after study. The patient has CKD Stage IV   only.    Query created by: Rosemary Bryant on

## 2024-12-13 ENCOUNTER — HOSPITAL ENCOUNTER (INPATIENT)
Facility: HOSPITAL | Age: 74
LOS: 2 days | Discharge: HOME | DRG: 291 | End: 2024-12-15
Attending: EMERGENCY MEDICINE | Admitting: FAMILY MEDICINE
Payer: MEDICARE

## 2024-12-13 ENCOUNTER — APPOINTMENT (OUTPATIENT)
Facility: HOSPITAL | Age: 74
DRG: 291 | End: 2024-12-13
Attending: EMERGENCY MEDICINE
Payer: MEDICARE

## 2024-12-13 DIAGNOSIS — I16.0 HYPERTENSIVE URGENCY: ICD-10-CM

## 2024-12-13 DIAGNOSIS — J44.1 COPD EXACERBATION (HCC): Primary | ICD-10-CM

## 2024-12-13 PROBLEM — I50.9 HEART FAILURE (HCC): Status: ACTIVE | Noted: 2024-12-13

## 2024-12-13 LAB
ALBUMIN SERPL-MCNC: 3.9 G/DL (ref 3.5–5)
ALBUMIN/GLOB SERPL: 1 (ref 1.1–2.2)
ALP SERPL-CCNC: 88 U/L (ref 45–117)
ALT SERPL-CCNC: 22 U/L (ref 12–78)
ANION GAP SERPL CALC-SCNC: 14 MMOL/L (ref 2–12)
AST SERPL W P-5'-P-CCNC: 30 U/L (ref 15–37)
BASOPHILS # BLD: 0 K/UL (ref 0–0.1)
BASOPHILS NFR BLD: 0 % (ref 0–1)
BILIRUB SERPL-MCNC: 0.8 MG/DL (ref 0.2–1)
BNP SERPL-MCNC: ABNORMAL PG/ML
BUN SERPL-MCNC: 37 MG/DL (ref 6–20)
BUN/CREAT SERPL: 15 (ref 12–20)
CA-I BLD-MCNC: 9.8 MG/DL (ref 8.5–10.1)
CHLORIDE SERPL-SCNC: 93 MMOL/L (ref 97–108)
CO2 SERPL-SCNC: 26 MMOL/L (ref 21–32)
CREAT SERPL-MCNC: 2.39 MG/DL (ref 0.55–1.02)
DIFFERENTIAL METHOD BLD: ABNORMAL
EKG ATRIAL RATE: 110 BPM
EKG DIAGNOSIS: NORMAL
EKG P AXIS: -83 DEGREES
EKG P-R INTERVAL: 122 MS
EKG Q-T INTERVAL: 362 MS
EKG QRS DURATION: 107 MS
EKG QTC CALCULATION (BAZETT): 494 MS
EKG R AXIS: 18 DEGREES
EKG T AXIS: -75 DEGREES
EKG VENTRICULAR RATE: 112 BPM
EOSINOPHIL # BLD: 0 K/UL (ref 0–0.4)
EOSINOPHIL NFR BLD: 0 % (ref 0–7)
ERYTHROCYTE [DISTWIDTH] IN BLOOD BY AUTOMATED COUNT: 15.1 % (ref 11.5–14.5)
FLUAV RNA SPEC QL NAA+PROBE: NOT DETECTED
FLUBV RNA SPEC QL NAA+PROBE: NOT DETECTED
GLOBULIN SER CALC-MCNC: 3.8 G/DL (ref 2–4)
GLUCOSE SERPL-MCNC: 107 MG/DL (ref 65–100)
HCT VFR BLD AUTO: 35.1 % (ref 35–47)
HGB BLD-MCNC: 12.4 G/DL (ref 11.5–16)
IMM GRANULOCYTES # BLD AUTO: 0 K/UL (ref 0–0.04)
IMM GRANULOCYTES NFR BLD AUTO: 0 % (ref 0–0.5)
LYMPHOCYTES # BLD: 0.8 K/UL (ref 0.8–3.5)
LYMPHOCYTES NFR BLD: 10 % (ref 12–49)
MAGNESIUM SERPL-MCNC: 2.5 MG/DL (ref 1.6–2.4)
MCH RBC QN AUTO: 32.3 PG (ref 26–34)
MCHC RBC AUTO-ENTMCNC: 35.3 G/DL (ref 30–36.5)
MCV RBC AUTO: 91.4 FL (ref 80–99)
MONOCYTES # BLD: 0.7 K/UL (ref 0–1)
MONOCYTES NFR BLD: 9 % (ref 5–13)
NEUTS SEG # BLD: 6.1 K/UL (ref 1.8–8)
NEUTS SEG NFR BLD: 81 % (ref 32–75)
NRBC # BLD: 0 K/UL (ref 0–0.01)
NRBC BLD-RTO: 0 PER 100 WBC
PLATELET # BLD AUTO: 172 K/UL (ref 150–400)
PMV BLD AUTO: 9.6 FL (ref 8.9–12.9)
POTASSIUM SERPL-SCNC: 2.7 MMOL/L (ref 3.5–5.1)
PROT SERPL-MCNC: 7.7 G/DL (ref 6.4–8.2)
RBC # BLD AUTO: 3.84 M/UL (ref 3.8–5.2)
RBC MORPH BLD: ABNORMAL
SARS-COV-2 RNA RESP QL NAA+PROBE: NOT DETECTED
SODIUM SERPL-SCNC: 133 MMOL/L (ref 136–145)
TROPONIN I SERPL HS-MCNC: 73 NG/L (ref 0–51)
TROPONIN I SERPL HS-MCNC: 81 NG/L (ref 0–51)
WBC # BLD AUTO: 7.6 K/UL (ref 3.6–11)

## 2024-12-13 PROCEDURE — 36415 COLL VENOUS BLD VENIPUNCTURE: CPT

## 2024-12-13 PROCEDURE — 6370000000 HC RX 637 (ALT 250 FOR IP): Performed by: FAMILY MEDICINE

## 2024-12-13 PROCEDURE — 84484 ASSAY OF TROPONIN QUANT: CPT

## 2024-12-13 PROCEDURE — 93005 ELECTROCARDIOGRAM TRACING: CPT | Performed by: EMERGENCY MEDICINE

## 2024-12-13 PROCEDURE — 96375 TX/PRO/DX INJ NEW DRUG ADDON: CPT

## 2024-12-13 PROCEDURE — 87636 SARSCOV2 & INF A&B AMP PRB: CPT

## 2024-12-13 PROCEDURE — 71045 X-RAY EXAM CHEST 1 VIEW: CPT

## 2024-12-13 PROCEDURE — 6360000002 HC RX W HCPCS: Performed by: FAMILY MEDICINE

## 2024-12-13 PROCEDURE — 6360000002 HC RX W HCPCS: Performed by: EMERGENCY MEDICINE

## 2024-12-13 PROCEDURE — 94640 AIRWAY INHALATION TREATMENT: CPT

## 2024-12-13 PROCEDURE — 6370000000 HC RX 637 (ALT 250 FOR IP): Performed by: EMERGENCY MEDICINE

## 2024-12-13 PROCEDURE — 80053 COMPREHEN METABOLIC PANEL: CPT

## 2024-12-13 PROCEDURE — 83880 ASSAY OF NATRIURETIC PEPTIDE: CPT

## 2024-12-13 PROCEDURE — 99285 EMERGENCY DEPT VISIT HI MDM: CPT

## 2024-12-13 PROCEDURE — 1100000000 HC RM PRIVATE

## 2024-12-13 PROCEDURE — 2580000003 HC RX 258: Performed by: FAMILY MEDICINE

## 2024-12-13 PROCEDURE — 85025 COMPLETE CBC W/AUTO DIFF WBC: CPT

## 2024-12-13 PROCEDURE — 83735 ASSAY OF MAGNESIUM: CPT

## 2024-12-13 PROCEDURE — 96376 TX/PRO/DX INJ SAME DRUG ADON: CPT

## 2024-12-13 PROCEDURE — 96374 THER/PROPH/DIAG INJ IV PUSH: CPT

## 2024-12-13 RX ORDER — FERROUS SULFATE 325(65) MG
325 TABLET ORAL EVERY OTHER DAY
Status: DISCONTINUED | OUTPATIENT
Start: 2024-12-13 | End: 2024-12-15 | Stop reason: HOSPADM

## 2024-12-13 RX ORDER — BUDESONIDE AND FORMOTEROL FUMARATE DIHYDRATE 160; 4.5 UG/1; UG/1
2 AEROSOL RESPIRATORY (INHALATION)
Status: DISCONTINUED | OUTPATIENT
Start: 2024-12-13 | End: 2024-12-13

## 2024-12-13 RX ORDER — POTASSIUM CHLORIDE 1500 MG/1
40 TABLET, EXTENDED RELEASE ORAL DAILY
Status: DISCONTINUED | OUTPATIENT
Start: 2024-12-14 | End: 2024-12-14

## 2024-12-13 RX ORDER — BUDESONIDE AND FORMOTEROL FUMARATE DIHYDRATE 160; 4.5 UG/1; UG/1
2 AEROSOL RESPIRATORY (INHALATION)
Status: DISCONTINUED | OUTPATIENT
Start: 2024-12-13 | End: 2024-12-15 | Stop reason: HOSPADM

## 2024-12-13 RX ORDER — SODIUM CHLORIDE 9 MG/ML
INJECTION, SOLUTION INTRAVENOUS PRN
Status: DISCONTINUED | OUTPATIENT
Start: 2024-12-13 | End: 2024-12-15 | Stop reason: HOSPADM

## 2024-12-13 RX ORDER — ONDANSETRON 2 MG/ML
4 INJECTION INTRAMUSCULAR; INTRAVENOUS EVERY 6 HOURS PRN
Status: DISCONTINUED | OUTPATIENT
Start: 2024-12-13 | End: 2024-12-15 | Stop reason: HOSPADM

## 2024-12-13 RX ORDER — SODIUM CHLORIDE 0.9 % (FLUSH) 0.9 %
5-40 SYRINGE (ML) INJECTION EVERY 12 HOURS SCHEDULED
Status: DISCONTINUED | OUTPATIENT
Start: 2024-12-13 | End: 2024-12-15 | Stop reason: HOSPADM

## 2024-12-13 RX ORDER — PANTOPRAZOLE SODIUM 40 MG/1
40 TABLET, DELAYED RELEASE ORAL
Status: DISCONTINUED | OUTPATIENT
Start: 2024-12-13 | End: 2024-12-15 | Stop reason: HOSPADM

## 2024-12-13 RX ORDER — POTASSIUM CHLORIDE 1500 MG/1
40 TABLET, EXTENDED RELEASE ORAL 2 TIMES DAILY WITH MEALS
Status: DISCONTINUED | OUTPATIENT
Start: 2024-12-13 | End: 2024-12-13

## 2024-12-13 RX ORDER — ISOSORBIDE DINITRATE 20 MG/1
20 TABLET ORAL 3 TIMES DAILY
Status: DISCONTINUED | OUTPATIENT
Start: 2024-12-13 | End: 2024-12-15 | Stop reason: HOSPADM

## 2024-12-13 RX ORDER — CARVEDILOL 12.5 MG/1
25 TABLET ORAL EVERY 12 HOURS SCHEDULED
Status: DISCONTINUED | OUTPATIENT
Start: 2024-12-13 | End: 2024-12-15 | Stop reason: HOSPADM

## 2024-12-13 RX ORDER — ASPIRIN 81 MG/1
324 TABLET, CHEWABLE ORAL
Status: COMPLETED | OUTPATIENT
Start: 2024-12-13 | End: 2024-12-13

## 2024-12-13 RX ORDER — HYDRALAZINE HYDROCHLORIDE 20 MG/ML
20 INJECTION INTRAMUSCULAR; INTRAVENOUS
Status: COMPLETED | OUTPATIENT
Start: 2024-12-13 | End: 2024-12-13

## 2024-12-13 RX ORDER — ASPIRIN 81 MG/1
81 TABLET, CHEWABLE ORAL DAILY
Status: DISCONTINUED | OUTPATIENT
Start: 2024-12-13 | End: 2024-12-15 | Stop reason: HOSPADM

## 2024-12-13 RX ORDER — PREDNISONE 20 MG/1
20 TABLET ORAL
Status: DISCONTINUED | OUTPATIENT
Start: 2024-12-13 | End: 2024-12-15 | Stop reason: HOSPADM

## 2024-12-13 RX ORDER — BUMETANIDE 0.25 MG/ML
2 INJECTION, SOLUTION INTRAMUSCULAR; INTRAVENOUS 2 TIMES DAILY
Status: DISCONTINUED | OUTPATIENT
Start: 2024-12-13 | End: 2024-12-15

## 2024-12-13 RX ORDER — FUROSEMIDE 10 MG/ML
20 INJECTION INTRAMUSCULAR; INTRAVENOUS
Status: COMPLETED | OUTPATIENT
Start: 2024-12-13 | End: 2024-12-13

## 2024-12-13 RX ORDER — SODIUM CHLORIDE 0.9 % (FLUSH) 0.9 %
5-40 SYRINGE (ML) INJECTION PRN
Status: DISCONTINUED | OUTPATIENT
Start: 2024-12-13 | End: 2024-12-15 | Stop reason: HOSPADM

## 2024-12-13 RX ORDER — MIRTAZAPINE 15 MG/1
15 TABLET, FILM COATED ORAL NIGHTLY
Status: DISCONTINUED | OUTPATIENT
Start: 2024-12-13 | End: 2024-12-15 | Stop reason: HOSPADM

## 2024-12-13 RX ORDER — ACETAMINOPHEN 650 MG/1
650 SUPPOSITORY RECTAL EVERY 6 HOURS PRN
Status: DISCONTINUED | OUTPATIENT
Start: 2024-12-13 | End: 2024-12-15 | Stop reason: HOSPADM

## 2024-12-13 RX ORDER — POTASSIUM CHLORIDE 7.45 MG/ML
10 INJECTION INTRAVENOUS
Status: COMPLETED | OUTPATIENT
Start: 2024-12-13 | End: 2024-12-13

## 2024-12-13 RX ORDER — ACETAMINOPHEN 325 MG/1
650 TABLET ORAL EVERY 6 HOURS PRN
Status: DISCONTINUED | OUTPATIENT
Start: 2024-12-13 | End: 2024-12-15 | Stop reason: HOSPADM

## 2024-12-13 RX ORDER — ROSUVASTATIN CALCIUM 10 MG/1
10 TABLET, COATED ORAL DAILY
Status: DISCONTINUED | OUTPATIENT
Start: 2024-12-13 | End: 2024-12-15 | Stop reason: HOSPADM

## 2024-12-13 RX ORDER — HYDRALAZINE HYDROCHLORIDE 50 MG/1
50 TABLET, FILM COATED ORAL 3 TIMES DAILY
Status: DISCONTINUED | OUTPATIENT
Start: 2024-12-13 | End: 2024-12-15 | Stop reason: HOSPADM

## 2024-12-13 RX ORDER — AMLODIPINE BESYLATE 10 MG/1
10 TABLET ORAL DAILY
Status: DISCONTINUED | OUTPATIENT
Start: 2024-12-13 | End: 2024-12-15 | Stop reason: HOSPADM

## 2024-12-13 RX ORDER — HYDRALAZINE HYDROCHLORIDE 20 MG/ML
10 INJECTION INTRAMUSCULAR; INTRAVENOUS
Status: COMPLETED | OUTPATIENT
Start: 2024-12-13 | End: 2024-12-13

## 2024-12-13 RX ORDER — POTASSIUM CHLORIDE 7.45 MG/ML
10 INJECTION INTRAVENOUS
Status: ACTIVE | OUTPATIENT
Start: 2024-12-13 | End: 2024-12-13

## 2024-12-13 RX ORDER — CLONIDINE HYDROCHLORIDE 0.1 MG/1
0.2 TABLET ORAL
Status: COMPLETED | OUTPATIENT
Start: 2024-12-13 | End: 2024-12-13

## 2024-12-13 RX ORDER — ONDANSETRON 4 MG/1
4 TABLET, ORALLY DISINTEGRATING ORAL EVERY 8 HOURS PRN
Status: DISCONTINUED | OUTPATIENT
Start: 2024-12-13 | End: 2024-12-15 | Stop reason: HOSPADM

## 2024-12-13 RX ORDER — POTASSIUM CHLORIDE 1500 MG/1
40 TABLET, EXTENDED RELEASE ORAL
Status: COMPLETED | OUTPATIENT
Start: 2024-12-13 | End: 2024-12-13

## 2024-12-13 RX ORDER — IPRATROPIUM BROMIDE AND ALBUTEROL SULFATE 2.5; .5 MG/3ML; MG/3ML
1 SOLUTION RESPIRATORY (INHALATION)
Status: COMPLETED | OUTPATIENT
Start: 2024-12-13 | End: 2024-12-13

## 2024-12-13 RX ORDER — HEPARIN SODIUM 5000 [USP'U]/ML
5000 INJECTION, SOLUTION INTRAVENOUS; SUBCUTANEOUS 2 TIMES DAILY
Status: DISCONTINUED | OUTPATIENT
Start: 2024-12-13 | End: 2024-12-15 | Stop reason: HOSPADM

## 2024-12-13 RX ORDER — POLYETHYLENE GLYCOL 3350 17 G/17G
17 POWDER, FOR SOLUTION ORAL DAILY PRN
Status: DISCONTINUED | OUTPATIENT
Start: 2024-12-13 | End: 2024-12-15 | Stop reason: HOSPADM

## 2024-12-13 RX ADMIN — HYDRALAZINE HYDROCHLORIDE 10 MG: 20 INJECTION INTRAMUSCULAR; INTRAVENOUS at 07:37

## 2024-12-13 RX ADMIN — POTASSIUM CHLORIDE 10 MEQ: 7.46 INJECTION, SOLUTION INTRAVENOUS at 08:27

## 2024-12-13 RX ADMIN — BUMETANIDE 2 MG: 0.25 INJECTION INTRAMUSCULAR; INTRAVENOUS at 12:39

## 2024-12-13 RX ADMIN — IPRATROPIUM BROMIDE AND ALBUTEROL SULFATE 1 DOSE: .5; 3 SOLUTION RESPIRATORY (INHALATION) at 07:49

## 2024-12-13 RX ADMIN — FUROSEMIDE 20 MG: 10 INJECTION, SOLUTION INTRAMUSCULAR; INTRAVENOUS at 09:57

## 2024-12-13 RX ADMIN — AMLODIPINE BESYLATE 10 MG: 10 TABLET ORAL at 12:41

## 2024-12-13 RX ADMIN — SODIUM CHLORIDE, PRESERVATIVE FREE 10 ML: 5 INJECTION INTRAVENOUS at 18:09

## 2024-12-13 RX ADMIN — HYDRALAZINE HYDROCHLORIDE 50 MG: 50 TABLET ORAL at 21:55

## 2024-12-13 RX ADMIN — PANTOPRAZOLE SODIUM 40 MG: 40 TABLET, DELAYED RELEASE ORAL at 12:50

## 2024-12-13 RX ADMIN — HYDRALAZINE HYDROCHLORIDE 20 MG: 20 INJECTION INTRAMUSCULAR; INTRAVENOUS at 08:12

## 2024-12-13 RX ADMIN — HYDRALAZINE HYDROCHLORIDE 50 MG: 50 TABLET ORAL at 15:49

## 2024-12-13 RX ADMIN — CARVEDILOL 25 MG: 12.5 TABLET, FILM COATED ORAL at 12:41

## 2024-12-13 RX ADMIN — ISOSORBIDE DINITRATE 20 MG: 20 TABLET ORAL at 21:54

## 2024-12-13 RX ADMIN — POTASSIUM CHLORIDE 40 MEQ: 1500 TABLET, EXTENDED RELEASE ORAL at 08:25

## 2024-12-13 RX ADMIN — ASPIRIN 81 MG 81 MG: 81 TABLET ORAL at 12:40

## 2024-12-13 RX ADMIN — ISOSORBIDE DINITRATE 20 MG: 20 TABLET ORAL at 15:50

## 2024-12-13 RX ADMIN — CLONIDINE HYDROCHLORIDE 0.2 MG: 0.1 TABLET ORAL at 08:24

## 2024-12-13 RX ADMIN — ROSUVASTATIN CALCIUM 10 MG: 10 TABLET, FILM COATED ORAL at 12:40

## 2024-12-13 RX ADMIN — BUMETANIDE 2 MG: 0.25 INJECTION INTRAMUSCULAR; INTRAVENOUS at 18:09

## 2024-12-13 RX ADMIN — HEPARIN SODIUM 5000 UNITS: 5000 INJECTION INTRAVENOUS; SUBCUTANEOUS at 21:56

## 2024-12-13 RX ADMIN — FERROUS SULFATE TAB 325 MG (65 MG ELEMENTAL FE) 325 MG: 325 (65 FE) TAB at 12:50

## 2024-12-13 RX ADMIN — ASPIRIN 324 MG: 81 TABLET, CHEWABLE ORAL at 07:38

## 2024-12-13 RX ADMIN — SODIUM CHLORIDE, PRESERVATIVE FREE 10 ML: 5 INJECTION INTRAVENOUS at 21:57

## 2024-12-13 RX ADMIN — HEPARIN SODIUM 5000 UNITS: 5000 INJECTION INTRAVENOUS; SUBCUTANEOUS at 12:40

## 2024-12-13 RX ADMIN — MIRTAZAPINE 15 MG: 15 TABLET, FILM COATED ORAL at 21:54

## 2024-12-13 RX ADMIN — Medication 2 PUFF: at 20:10

## 2024-12-13 RX ADMIN — SODIUM CHLORIDE, PRESERVATIVE FREE 10 ML: 5 INJECTION INTRAVENOUS at 12:41

## 2024-12-13 ASSESSMENT — PAIN - FUNCTIONAL ASSESSMENT
PAIN_FUNCTIONAL_ASSESSMENT: 0-10
PAIN_FUNCTIONAL_ASSESSMENT: ACTIVITIES ARE NOT PREVENTED

## 2024-12-13 ASSESSMENT — PAIN SCALES - GENERAL
PAINLEVEL_OUTOF10: 0

## 2024-12-13 NOTE — H&P
evaluate    Acute exacerbation of COPD  -Chest x-ray as noted above with mild pulmonary edema  -Resume home bronchodilator, breathing treatment as needed, start prednisone  -Monitor respiratory status    Hypertension  -Continue hydralazine, Coreg, amlodipine, isosorbide  -Monitor blood pressure    Dyslipidemia  -Continue statin    CKD stage IV  -Renal function at baseline    GERD  -Continue PPI    Mood disorder  -Continue Remeron    Nicotine dependence  -Continue nicotine patch    Estimated length of stay is 2 midnights.  Case discussed with patient's  present at bedside.    DIET: No diet orders on file   ISOLATION PRECAUTIONS: No active isolations  CODE STATUS: [unfilled]   DVT PROPHYLAXIS: Heparin  ANTICIPATED DISCHARGE: More than 48 hours  ANTICIPATED DISPOSITION: Home  EMERGENCY CONTACT/SURROGATE DECISION MAKER:     CRITICAL CARE WAS PERFORMED FOR THIS ENCOUNTER: No      Signed By: Jony Dixon MD     December 13, 2024

## 2024-12-13 NOTE — ED TRIAGE NOTES
Patient presents for complaint of shortness of breath x3 hours.  Per EMS, they arrived to find patient wearing spouses oxygen.  Room air sp02%.  Presents on 02 @ 2L nasal cannula.

## 2024-12-13 NOTE — ED PROVIDER NOTES
7.6 3.6 - 11.0 K/uL    RBC 3.84 3.80 - 5.20 M/uL    Hemoglobin 12.4 11.5 - 16.0 g/dL    Hematocrit 35.1 35.0 - 47.0 %    MCV 91.4 80.0 - 99.0 FL    MCH 32.3 26.0 - 34.0 PG    MCHC 35.3 30.0 - 36.5 g/dL    RDW 15.1 (H) 11.5 - 14.5 %    Platelets 172 150 - 400 K/uL    MPV 9.6 8.9 - 12.9 FL    Nucleated RBCs 0.0 0.0  WBC    nRBC 0.00 0.00 - 0.01 K/uL    Neutrophils % 81 (H) 32 - 75 %    Lymphocytes % 10 (L) 12 - 49 %    Monocytes % 9 5 - 13 %    Eosinophils % 0 0 - 7 %    Basophils % 0 0 - 1 %    Immature Granulocytes % 0 0 - 0.5 %    Neutrophils Absolute 6.1 1.8 - 8.0 K/UL    Lymphocytes Absolute 0.8 0.8 - 3.5 K/UL    Monocytes Absolute 0.7 0.0 - 1.0 K/UL    Eosinophils Absolute 0.0 0.0 - 0.4 K/UL    Basophils Absolute 0.0 0.0 - 0.1 K/UL    Immature Granulocytes Absolute 0.0 0.00 - 0.04 K/UL    Differential Type Smear Scanned      RBC Comment Normocytic, Normochromic         EKG: EKG interpreted by me. Shows Normal Sinus Rhythm with a HR of 112.  No STEMI.    Radiologic Studies:  Non-plain film images such as CT, Ultrasound and MRI are read by the radiologist. Plain radiographic images are visualized and preliminarily interpreted by the ED Physician with the following findings: Not Applicable.    Interpretation per the Radiologist below, if available at the time of this note:  XR CHEST PORTABLE    (Results Pending)    Composition of the HEART score for chest pain, angina, NSTEMI in the ED.    HEART score criteria             Score  History: Highly suspicious    2    Moderately suspicious   1    Slightly or non-suspicious   0    ECG:  Significant ST depression   2    Nonspecific repolarisation disturbance 1    Normal      0    Age:  > or = 65 years    2    > 45 to < 65 years    1    < Or = to 45 years    0    Risk factors: > or = to 3 risk factors or history of CAD 2    1 or 2 risk factors    1    No risk factors known    0    Troponin: > or = to 3x normal limit   2    > 1 to < 3x normal limit   1    < or = to

## 2024-12-13 NOTE — PLAN OF CARE
Problem: Chronic Conditions and Co-morbidities  Goal: Patient's chronic conditions and co-morbidity symptoms are monitored and maintained or improved  Flowsheets  Taken 12/13/2024 1503  Care Plan - Patient's Chronic Conditions and Co-Morbidity Symptoms are Monitored and Maintained or Improved:   Monitor and assess patient's chronic conditions and comorbid symptoms for stability, deterioration, or improvement   Collaborate with multidisciplinary team to address chronic and comorbid conditions and prevent exacerbation or deterioration   Update acute care plan with appropriate goals if chronic or comorbid symptoms are exacerbated and prevent overall improvement and discharge  Taken 12/13/2024 1202  Care Plan - Patient's Chronic Conditions and Co-Morbidity Symptoms are Monitored and Maintained or Improved: Monitor and assess patient's chronic conditions and comorbid symptoms for stability, deterioration, or improvement

## 2024-12-14 LAB
ANION GAP SERPL CALC-SCNC: 11 MMOL/L (ref 2–12)
BNP SERPL-MCNC: ABNORMAL PG/ML
BUN SERPL-MCNC: 35 MG/DL (ref 6–20)
BUN/CREAT SERPL: 14 (ref 12–20)
CA-I BLD-MCNC: 10.2 MG/DL (ref 8.5–10.1)
CA-I BLD-MCNC: 10.2 MG/DL (ref 8.5–10.1)
CHLORIDE SERPL-SCNC: 97 MMOL/L (ref 97–108)
CO2 SERPL-SCNC: 29 MMOL/L (ref 21–32)
CREAT SERPL-MCNC: 2.47 MG/DL (ref 0.55–1.02)
GLUCOSE BLD STRIP.AUTO-MCNC: 82 MG/DL (ref 65–100)
GLUCOSE SERPL-MCNC: 57 MG/DL (ref 65–100)
PERFORMED BY:: NORMAL
POTASSIUM SERPL-SCNC: 2.9 MMOL/L (ref 3.5–5.1)
PTH-INTACT SERPL-MCNC: 64.3 PG/ML (ref 18.4–88)
SODIUM SERPL-SCNC: 137 MMOL/L (ref 136–145)

## 2024-12-14 PROCEDURE — 83970 ASSAY OF PARATHORMONE: CPT

## 2024-12-14 PROCEDURE — 6370000000 HC RX 637 (ALT 250 FOR IP): Performed by: HOSPITALIST

## 2024-12-14 PROCEDURE — 1100000000 HC RM PRIVATE

## 2024-12-14 PROCEDURE — 83880 ASSAY OF NATRIURETIC PEPTIDE: CPT

## 2024-12-14 PROCEDURE — 80048 BASIC METABOLIC PNL TOTAL CA: CPT

## 2024-12-14 PROCEDURE — 6360000002 HC RX W HCPCS: Performed by: FAMILY MEDICINE

## 2024-12-14 PROCEDURE — 82962 GLUCOSE BLOOD TEST: CPT

## 2024-12-14 PROCEDURE — 2580000003 HC RX 258: Performed by: FAMILY MEDICINE

## 2024-12-14 PROCEDURE — 36415 COLL VENOUS BLD VENIPUNCTURE: CPT

## 2024-12-14 PROCEDURE — 6370000000 HC RX 637 (ALT 250 FOR IP): Performed by: FAMILY MEDICINE

## 2024-12-14 PROCEDURE — 94640 AIRWAY INHALATION TREATMENT: CPT

## 2024-12-14 RX ORDER — POTASSIUM CHLORIDE 1500 MG/1
40 TABLET, EXTENDED RELEASE ORAL 2 TIMES DAILY WITH MEALS
Status: DISCONTINUED | OUTPATIENT
Start: 2024-12-14 | End: 2024-12-15

## 2024-12-14 RX ADMIN — CARVEDILOL 25 MG: 12.5 TABLET, FILM COATED ORAL at 21:16

## 2024-12-14 RX ADMIN — Medication 2 PUFF: at 20:46

## 2024-12-14 RX ADMIN — POTASSIUM CHLORIDE 40 MEQ: 1500 TABLET, EXTENDED RELEASE ORAL at 18:27

## 2024-12-14 RX ADMIN — PANTOPRAZOLE SODIUM 40 MG: 40 TABLET, DELAYED RELEASE ORAL at 08:50

## 2024-12-14 RX ADMIN — SODIUM CHLORIDE, PRESERVATIVE FREE 10 ML: 5 INJECTION INTRAVENOUS at 08:50

## 2024-12-14 RX ADMIN — HYDRALAZINE HYDROCHLORIDE 50 MG: 50 TABLET ORAL at 08:50

## 2024-12-14 RX ADMIN — HEPARIN SODIUM 5000 UNITS: 5000 INJECTION INTRAVENOUS; SUBCUTANEOUS at 08:49

## 2024-12-14 RX ADMIN — ISOSORBIDE DINITRATE 20 MG: 20 TABLET ORAL at 18:27

## 2024-12-14 RX ADMIN — ASPIRIN 81 MG 81 MG: 81 TABLET ORAL at 08:50

## 2024-12-14 RX ADMIN — HYDRALAZINE HYDROCHLORIDE 50 MG: 50 TABLET ORAL at 22:07

## 2024-12-14 RX ADMIN — HEPARIN SODIUM 5000 UNITS: 5000 INJECTION INTRAVENOUS; SUBCUTANEOUS at 22:07

## 2024-12-14 RX ADMIN — POTASSIUM CHLORIDE 40 MEQ: 1500 TABLET, EXTENDED RELEASE ORAL at 08:50

## 2024-12-14 RX ADMIN — ISOSORBIDE DINITRATE 20 MG: 20 TABLET ORAL at 22:07

## 2024-12-14 RX ADMIN — BUMETANIDE 2 MG: 0.25 INJECTION INTRAMUSCULAR; INTRAVENOUS at 08:48

## 2024-12-14 RX ADMIN — ISOSORBIDE DINITRATE 20 MG: 20 TABLET ORAL at 08:49

## 2024-12-14 RX ADMIN — CARVEDILOL 25 MG: 12.5 TABLET, FILM COATED ORAL at 08:49

## 2024-12-14 RX ADMIN — HYDRALAZINE HYDROCHLORIDE 50 MG: 50 TABLET ORAL at 18:27

## 2024-12-14 RX ADMIN — BUMETANIDE 2 MG: 0.25 INJECTION INTRAMUSCULAR; INTRAVENOUS at 18:26

## 2024-12-14 RX ADMIN — AMLODIPINE BESYLATE 10 MG: 10 TABLET ORAL at 08:50

## 2024-12-14 RX ADMIN — MIRTAZAPINE 15 MG: 15 TABLET, FILM COATED ORAL at 22:07

## 2024-12-14 RX ADMIN — ROSUVASTATIN CALCIUM 10 MG: 10 TABLET, FILM COATED ORAL at 08:49

## 2024-12-14 RX ADMIN — SODIUM CHLORIDE, PRESERVATIVE FREE 10 ML: 5 INJECTION INTRAVENOUS at 18:26

## 2024-12-14 RX ADMIN — SODIUM CHLORIDE, PRESERVATIVE FREE 10 ML: 5 INJECTION INTRAVENOUS at 22:07

## 2024-12-14 RX ADMIN — PREDNISONE 20 MG: 20 TABLET ORAL at 08:49

## 2024-12-14 RX ADMIN — POTASSIUM CHLORIDE 40 MEQ: 1500 TABLET, EXTENDED RELEASE ORAL at 12:37

## 2024-12-14 RX ADMIN — Medication 2 PUFF: at 07:36

## 2024-12-14 ASSESSMENT — PAIN SCALES - GENERAL
PAINLEVEL_OUTOF10: 0

## 2024-12-14 NOTE — PLAN OF CARE
Problem: Chronic Conditions and Co-morbidities  Goal: Patient's chronic conditions and co-morbidity symptoms are monitored and maintained or improved  12/14/2024 1529 by Arabella José, RN  Outcome: Progressing  Flowsheets (Taken 12/14/2024 1529)  Care Plan - Patient's Chronic Conditions and Co-Morbidity Symptoms are Monitored and Maintained or Improved:   Monitor and assess patient's chronic conditions and comorbid symptoms for stability, deterioration, or improvement   Collaborate with multidisciplinary team to address chronic and comorbid conditions and prevent exacerbation or deterioration   Update acute care plan with appropriate goals if chronic or comorbid symptoms are exacerbated and prevent overall improvement and discharge  12/14/2024 0600 by Amina Mandel LPN  Outcome: Progressing  Flowsheets (Taken 12/13/2024 1945)  Care Plan - Patient's Chronic Conditions and Co-Morbidity Symptoms are Monitored and Maintained or Improved:   Monitor and assess patient's chronic conditions and comorbid symptoms for stability, deterioration, or improvement   Collaborate with multidisciplinary team to address chronic and comorbid conditions and prevent exacerbation or deterioration   Update acute care plan with appropriate goals if chronic or comorbid symptoms are exacerbated and prevent overall improvement and discharge     Problem: Pain  Goal: Verbalizes/displays adequate comfort level or baseline comfort level  12/14/2024 0600 by Amina Mandel LPN  Outcome: Progressing     Problem: Safety - Adult  Goal: Free from fall injury  12/14/2024 0600 by Amina Mandel LPN  Outcome: Progressing

## 2024-12-14 NOTE — PLAN OF CARE
Problem: Chronic Conditions and Co-morbidities  Goal: Patient's chronic conditions and co-morbidity symptoms are monitored and maintained or improved  Outcome: Progressing  Flowsheets (Taken 12/13/2024 1945)  Care Plan - Patient's Chronic Conditions and Co-Morbidity Symptoms are Monitored and Maintained or Improved:   Monitor and assess patient's chronic conditions and comorbid symptoms for stability, deterioration, or improvement   Collaborate with multidisciplinary team to address chronic and comorbid conditions and prevent exacerbation or deterioration   Update acute care plan with appropriate goals if chronic or comorbid symptoms are exacerbated and prevent overall improvement and discharge     Problem: Pain  Goal: Verbalizes/displays adequate comfort level or baseline comfort level  Outcome: Progressing     Problem: Safety - Adult  Goal: Free from fall injury  Outcome: Progressing

## 2024-12-15 VITALS
SYSTOLIC BLOOD PRESSURE: 140 MMHG | RESPIRATION RATE: 16 BRPM | OXYGEN SATURATION: 94 % | HEIGHT: 63 IN | DIASTOLIC BLOOD PRESSURE: 88 MMHG | HEART RATE: 88 BPM | WEIGHT: 91.38 LBS | TEMPERATURE: 97.7 F | BODY MASS INDEX: 16.19 KG/M2

## 2024-12-15 LAB
ALBUMIN SERPL-MCNC: 3.6 G/DL (ref 3.5–5)
ANION GAP SERPL CALC-SCNC: 10 MMOL/L (ref 2–12)
BUN SERPL-MCNC: 39 MG/DL (ref 6–20)
BUN/CREAT SERPL: 14 (ref 12–20)
CA-I BLD-MCNC: 10.1 MG/DL (ref 8.5–10.1)
CHLORIDE SERPL-SCNC: 97 MMOL/L (ref 97–108)
CO2 SERPL-SCNC: 27 MMOL/L (ref 21–32)
CREAT SERPL-MCNC: 2.87 MG/DL (ref 0.55–1.02)
GLUCOSE SERPL-MCNC: 84 MG/DL (ref 65–100)
PHOSPHATE SERPL-MCNC: 3.4 MG/DL (ref 2.6–4.7)
POTASSIUM SERPL-SCNC: 5.2 MMOL/L (ref 3.5–5.1)
SODIUM SERPL-SCNC: 134 MMOL/L (ref 136–145)

## 2024-12-15 PROCEDURE — 94640 AIRWAY INHALATION TREATMENT: CPT

## 2024-12-15 PROCEDURE — 80069 RENAL FUNCTION PANEL: CPT

## 2024-12-15 PROCEDURE — 36415 COLL VENOUS BLD VENIPUNCTURE: CPT

## 2024-12-15 PROCEDURE — 6370000000 HC RX 637 (ALT 250 FOR IP): Performed by: FAMILY MEDICINE

## 2024-12-15 PROCEDURE — 6360000002 HC RX W HCPCS: Performed by: FAMILY MEDICINE

## 2024-12-15 PROCEDURE — 6360000002 HC RX W HCPCS: Performed by: HOSPITALIST

## 2024-12-15 PROCEDURE — 2580000003 HC RX 258: Performed by: FAMILY MEDICINE

## 2024-12-15 PROCEDURE — 92610 EVALUATE SWALLOWING FUNCTION: CPT

## 2024-12-15 RX ORDER — BUMETANIDE 0.25 MG/ML
1 INJECTION, SOLUTION INTRAMUSCULAR; INTRAVENOUS 2 TIMES DAILY
Status: DISCONTINUED | OUTPATIENT
Start: 2024-12-15 | End: 2024-12-15 | Stop reason: HOSPADM

## 2024-12-15 RX ORDER — BUMETANIDE 0.25 MG/ML
2 INJECTION, SOLUTION INTRAMUSCULAR; INTRAVENOUS ONCE
Status: COMPLETED | OUTPATIENT
Start: 2024-12-15 | End: 2024-12-15

## 2024-12-15 RX ADMIN — FERROUS SULFATE TAB 325 MG (65 MG ELEMENTAL FE) 325 MG: 325 (65 FE) TAB at 09:48

## 2024-12-15 RX ADMIN — HEPARIN SODIUM 5000 UNITS: 5000 INJECTION INTRAVENOUS; SUBCUTANEOUS at 09:49

## 2024-12-15 RX ADMIN — Medication 2 PUFF: at 08:25

## 2024-12-15 RX ADMIN — HYDRALAZINE HYDROCHLORIDE 50 MG: 50 TABLET ORAL at 09:49

## 2024-12-15 RX ADMIN — AMLODIPINE BESYLATE 10 MG: 10 TABLET ORAL at 09:48

## 2024-12-15 RX ADMIN — PREDNISONE 20 MG: 20 TABLET ORAL at 09:48

## 2024-12-15 RX ADMIN — SODIUM CHLORIDE, PRESERVATIVE FREE 10 ML: 5 INJECTION INTRAVENOUS at 09:49

## 2024-12-15 RX ADMIN — PANTOPRAZOLE SODIUM 40 MG: 40 TABLET, DELAYED RELEASE ORAL at 09:48

## 2024-12-15 RX ADMIN — ISOSORBIDE DINITRATE 20 MG: 20 TABLET ORAL at 09:48

## 2024-12-15 RX ADMIN — BUMETANIDE 2 MG: 0.25 INJECTION INTRAMUSCULAR; INTRAVENOUS at 10:08

## 2024-12-15 RX ADMIN — ROSUVASTATIN CALCIUM 10 MG: 10 TABLET, FILM COATED ORAL at 09:48

## 2024-12-15 RX ADMIN — CARVEDILOL 25 MG: 12.5 TABLET, FILM COATED ORAL at 09:48

## 2024-12-15 RX ADMIN — ACETAMINOPHEN 650 MG: 325 TABLET ORAL at 13:38

## 2024-12-15 RX ADMIN — ASPIRIN 81 MG 81 MG: 81 TABLET ORAL at 09:48

## 2024-12-15 ASSESSMENT — PAIN SCALES - GENERAL
PAINLEVEL_OUTOF10: 9
PAINLEVEL_OUTOF10: 0
PAINLEVEL_OUTOF10: 0

## 2024-12-15 ASSESSMENT — PAIN DESCRIPTION - ORIENTATION: ORIENTATION: LEFT

## 2024-12-15 ASSESSMENT — PAIN DESCRIPTION - DESCRIPTORS: DESCRIPTORS: ACHING

## 2024-12-15 ASSESSMENT — PAIN DESCRIPTION - LOCATION: LOCATION: ABDOMEN

## 2024-12-15 NOTE — PROGRESS NOTES
Attempted to collect urine specimen earlier when patient was up to commode and patient only voided 6 ml .  New pure wick systemm replaced to collect specimen.. next shift will be notified or need for specimen.  
Bedside shift change report given to Amina Oviedon (oncoming nurse) by Arabella RN (offgoing nurse). Report included the following information Nurse Handoff Report.    
Dr. Dixon notified patient arrived to floor and current telemetry reading.  Patient alert withno sob observed and skin warm and dry to touch.  
Had been up once to go to bathroom without assistance,  states I thought I had to have a bowel movement but I didn't,  instructed to call for assistance before getting out of bed alone.  Call light within reach  
Lying in bed with eyes closed.  Easily aroused.  No c/o pain.  External catheter in use.  
Mr. Hightower wishes to take wife out of hospital and take her to [facility]. AMA signed IV sites D/C  And telemetry removed.  Dr. Scott aware and spoke with .  
Patient discharged per family escortedto vehicle via w/chair.  
Patient is A/O.  No s/s of distress or acute pain.  Call bell within reach.  Family at bedside.  
Rosuvastatin 40 mg po qd dose reduced to 10 mg po qd r/t CrCl of 15.  
Shift assessment reviewed.  
Shift assessment reviewed.  
Physical Findings, Weight    Discharge Planning:    Continue Oral Nutrition Supplement, Continue current diet     Krysten Francois  Contact: ext [phone] or via [employer]     
or edema.  Pulses: 2+ and symmetric all extremities.  Skin: Skin color, texture, turgor normal. No rashes or lesions  Neurologic: Awake, Alert, oriented. No obvious gross sensory or motor deficits      Active Medications           Current Facility-Administered Medications   Medication Dose Route Frequency    potassium chloride (KLOR-CON M) extended release tablet 40 mEq  40 mEq Oral BID with meals    amLODIPine (NORVASC) tablet 10 mg  10 mg Oral Daily    aspirin chewable tablet 81 mg  81 mg Oral Daily    carvedilol (COREG) tablet 25 mg  25 mg Oral 2 times per day    ferrous sulfate (IRON 325) tablet 325 mg  325 mg Oral Every Other Day    hydrALAZINE (APRESOLINE) tablet 50 mg  50 mg Oral TID    isosorbide dinitrate (ISORDIL) tablet 20 mg  20 mg Oral TID    mirtazapine (REMERON) tablet 15 mg  15 mg Oral Nightly    nicotine (NICODERM CQ) 7 MG/24HR 1 patch  1 patch TransDERmal Daily    pantoprazole (PROTONIX) tablet 40 mg  40 mg Oral QAM AC    rosuvastatin (CRESTOR) tablet 10 mg  10 mg Oral Daily    bumetanide (BUMEX) injection 2 mg  2 mg IntraVENous BID    sodium chloride flush 0.9 % injection 5-40 mL  5-40 mL IntraVENous 2 times per day    sodium chloride flush 0.9 % injection 5-40 mL  5-40 mL IntraVENous PRN    0.9 % sodium chloride infusion   IntraVENous PRN    heparin (porcine) injection 5,000 Units  5,000 Units SubCUTAneous BID    ondansetron (ZOFRAN-ODT) disintegrating tablet 4 mg  4 mg Oral Q8H PRN    Or    ondansetron (ZOFRAN) injection 4 mg  4 mg IntraVENous Q6H PRN    polyethylene glycol (GLYCOLAX) packet 17 g  17 g Oral Daily PRN    acetaminophen (TYLENOL) tablet 650 mg  650 mg Oral Q6H PRN    Or    acetaminophen (TYLENOL) suppository 650 mg  650 mg Rectal Q6H PRN    predniSONE (DELTASONE) tablet 20 mg  20 mg Oral Daily with breakfast    budesonide-formoterol (SYMBICORT) 160-4.5 MCG/ACT inhaler 2 puff  2 puff Inhalation BID RT         Allergies         Patient has no known allergies.

## 2024-12-15 NOTE — PLAN OF CARE
Problem: Chronic Conditions and Co-morbidities  Goal: Patient's chronic conditions and co-morbidity symptoms are monitored and maintained or improved  Outcome: Progressing  Flowsheets (Taken 12/15/2024 1233)  Care Plan - Patient's Chronic Conditions and Co-Morbidity Symptoms are Monitored and Maintained or Improved:   Monitor and assess patient's chronic conditions and comorbid symptoms for stability, deterioration, or improvement   Collaborate with multidisciplinary team to address chronic and comorbid conditions and prevent exacerbation or deterioration   Update acute care plan with appropriate goals if chronic or comorbid symptoms are exacerbated and prevent overall improvement and discharge     Problem: Pain  Goal: Verbalizes/displays adequate comfort level or baseline comfort level  12/15/2024 0117 by Janey Breen LPN  Outcome: Progressing     Problem: Safety - Adult  Goal: Free from fall injury  12/15/2024 0117 by Janey Breen LPN  Outcome: Progressing     Problem: Skin/Tissue Integrity  Goal: Absence of new skin breakdown  Description: 1.  Monitor for areas of redness and/or skin breakdown  2.  Assess vascular access sites hourly  3.  Every 4-6 hours minimum:  Change oxygen saturation probe site  4.  Every 4-6 hours:  If on nasal continuous positive airway pressure, respiratory therapy assess nares and determine need for appliance change or resting period.  12/15/2024 0117 by Janey Breen LPN  Outcome: Progressing

## 2024-12-15 NOTE — THERAPY EVALUATION
bites  Cues for Modifications: None        After evaluation:   []            Patient left in no apparent distress sitting up in chair  [x]            Patient left in no apparent distress in bed  [x]            Call bell left within reach  [x]            Nursing notified  [x]            Family present  []            Caregiver present  []            Bed alarm activated      COMMUNICATION/EDUCATION:   SLP shared Swallow safety precautions, Aspiration precautions, GERD precautions, Diet recommendations, Compensatory strategies, and Prognosis.    [x]           Patient/family have participated as able in goal setting and plan of care.  []           Patient unable to participate in goal setting/plan of care and caregivers unavailable at time of evaluation; educ ongoing with interdisciplinary staff  []           Patient/family understands intent and goals of therapy; agrees to work toward stated goals and plan of care.  []           Patient/family understands intent and goals of therapy; neutral about participation.    Thank you for this referral.  Natasha Markham, SLP

## 2024-12-15 NOTE — DISCHARGE SUMMARY
tolerated  Diet:  Easy to chew cardiac no added salt fluid restrict of 1.5 L/day      Nubia Davila, APRN - NP  [address]  [facility] [address] [phone]    Follow up        This is dictation was done by dragon, computer voice recognition software. Quite often unanticipated grammatical, syntax, homophones and other interpretive errors or inadvertently transcribed by the computer software. Please excuse errors that have escaped final proofreading. Thank you.       Spent 35 minutes evaluating coordinating patient's discharge summary and for patient who signed out AMA  Signed:  Carl Scott MD  12/15/2024  2:43 PM

## 2025-01-06 ENCOUNTER — HOSPITAL ENCOUNTER (EMERGENCY)
Facility: HOSPITAL | Age: 75
Discharge: ANOTHER HOSPITAL | End: 2025-01-06
Attending: STUDENT IN AN ORGANIZED HEALTH CARE EDUCATION/TRAINING PROGRAM
Payer: MEDICARE

## 2025-01-06 ENCOUNTER — HOSPITAL ENCOUNTER (INPATIENT)
Facility: HOSPITAL | Age: 75
LOS: 4 days | Discharge: HOME HEALTH | End: 2025-01-10
Attending: INTERNAL MEDICINE | Admitting: INTERNAL MEDICINE
Payer: MEDICARE

## 2025-01-06 ENCOUNTER — APPOINTMENT (OUTPATIENT)
Facility: HOSPITAL | Age: 75
End: 2025-01-06
Payer: MEDICARE

## 2025-01-06 VITALS
DIASTOLIC BLOOD PRESSURE: 67 MMHG | SYSTOLIC BLOOD PRESSURE: 150 MMHG | RESPIRATION RATE: 19 BRPM | TEMPERATURE: 97.7 F | WEIGHT: 106.9 LBS | OXYGEN SATURATION: 98 % | HEIGHT: 63 IN | HEART RATE: 88 BPM | BODY MASS INDEX: 18.94 KG/M2

## 2025-01-06 DIAGNOSIS — J96.21 ACUTE ON CHRONIC RESPIRATORY FAILURE WITH HYPOXIA: Primary | ICD-10-CM

## 2025-01-06 DIAGNOSIS — J81.0 ACUTE PULMONARY EDEMA: ICD-10-CM

## 2025-01-06 DIAGNOSIS — I16.1 HYPERTENSIVE EMERGENCY: ICD-10-CM

## 2025-01-06 DIAGNOSIS — I50.9 ACUTE CONGESTIVE HEART FAILURE, UNSPECIFIED HEART FAILURE TYPE (HCC): Primary | ICD-10-CM

## 2025-01-06 PROBLEM — I16.0 HYPERTENSIVE URGENCY, MALIGNANT: Status: ACTIVE | Noted: 2025-01-06

## 2025-01-06 LAB
ALBUMIN SERPL-MCNC: 4 G/DL (ref 3.5–5)
ALBUMIN/GLOB SERPL: 0.9 (ref 1.1–2.2)
ALP SERPL-CCNC: 83 U/L (ref 45–117)
ALT SERPL-CCNC: 36 U/L (ref 12–78)
ANION GAP SERPL CALC-SCNC: 12 MMOL/L (ref 2–12)
ARTERIAL PATENCY WRIST A: YES
AST SERPL W P-5'-P-CCNC: 45 U/L (ref 15–37)
BASE DEFICIT BLDA-SCNC: 0.1 MMOL/L
BASOPHILS # BLD: 0 K/UL (ref 0–0.1)
BASOPHILS NFR BLD: 0 % (ref 0–1)
BDY SITE: ABNORMAL
BILIRUB SERPL-MCNC: 0.8 MG/DL (ref 0.2–1)
BNP SERPL-MCNC: ABNORMAL PG/ML
BUN SERPL-MCNC: 27 MG/DL (ref 6–20)
BUN/CREAT SERPL: 10 (ref 12–20)
CA-I BLD-MCNC: 9.7 MG/DL (ref 8.5–10.1)
CHLORIDE SERPL-SCNC: 101 MMOL/L (ref 97–108)
CO2 SERPL-SCNC: 24 MMOL/L (ref 21–32)
COHGB MFR BLD: 1.7 % (ref 1–2)
CREAT SERPL-MCNC: 2.62 MG/DL (ref 0.55–1.02)
DIFFERENTIAL METHOD BLD: ABNORMAL
EKG ATRIAL RATE: 100 BPM
EKG DIAGNOSIS: NORMAL
EKG P AXIS: 73 DEGREES
EKG P-R INTERVAL: 179 MS
EKG Q-T INTERVAL: 351 MS
EKG QRS DURATION: 102 MS
EKG QTC CALCULATION (BAZETT): 453 MS
EKG R AXIS: 24 DEGREES
EKG T AXIS: 11 DEGREES
EKG VENTRICULAR RATE: 100 BPM
EOSINOPHIL # BLD: 0.1 K/UL (ref 0–0.4)
EOSINOPHIL NFR BLD: 1 % (ref 0–7)
ERYTHROCYTE [DISTWIDTH] IN BLOOD BY AUTOMATED COUNT: 15.2 % (ref 11.5–14.5)
FIO2 ON VENT: 28 %
GLOBULIN SER CALC-MCNC: 4.3 G/DL (ref 2–4)
GLUCOSE SERPL-MCNC: 140 MG/DL (ref 65–100)
HCO3 BLDA-SCNC: 24 MMOL/L (ref 22–26)
HCT VFR BLD AUTO: 35.1 % (ref 35–47)
HGB BLD-MCNC: 11.9 G/DL (ref 11.5–16)
IMM GRANULOCYTES # BLD AUTO: 0.1 K/UL (ref 0–0.04)
IMM GRANULOCYTES NFR BLD AUTO: 1 % (ref 0–0.5)
IPAP/PIP: 15
LYMPHOCYTES # BLD: 2.4 K/UL (ref 0.8–3.5)
LYMPHOCYTES NFR BLD: 24 % (ref 12–49)
MAGNESIUM SERPL-MCNC: 2.5 MG/DL (ref 1.6–2.4)
MCH RBC QN AUTO: 33.1 PG (ref 26–34)
MCHC RBC AUTO-ENTMCNC: 33.9 G/DL (ref 30–36.5)
MCV RBC AUTO: 97.8 FL (ref 80–99)
METHGB MFR BLD: 0.1 % (ref 0–1.4)
MONOCYTES # BLD: 0.7 K/UL (ref 0–1)
MONOCYTES NFR BLD: 7 % (ref 5–13)
MRSA DNA SPEC QL NAA+PROBE: NOT DETECTED
NEUTS SEG # BLD: 6.6 K/UL (ref 1.8–8)
NEUTS SEG NFR BLD: 67 % (ref 32–75)
NRBC # BLD: 0 K/UL (ref 0–0.01)
NRBC BLD-RTO: 0 PER 100 WBC
OXYHGB MFR BLD: 87.8 % (ref 95–99)
PCO2 BLDA: 38 MMHG (ref 35–45)
PEEP RESPIRATORY: 10
PERFORMED BY:: ABNORMAL
PH BLDA: 7.42 (ref 7.35–7.45)
PLATELET # BLD AUTO: 163 K/UL (ref 150–400)
PMV BLD AUTO: 12.2 FL (ref 8.9–12.9)
PO2 BLDA: 59 MMHG (ref 80–100)
POTASSIUM SERPL-SCNC: 4.6 MMOL/L (ref 3.5–5.1)
PROT SERPL-MCNC: 8.3 G/DL (ref 6.4–8.2)
RBC # BLD AUTO: 3.59 M/UL (ref 3.8–5.2)
SAO2% DEVICE SAO2% SENSOR NAME: ABNORMAL
SODIUM SERPL-SCNC: 137 MMOL/L (ref 136–145)
SPECIMEN SITE: ABNORMAL
TROPONIN I SERPL HS-MCNC: 44 NG/L (ref 0–51)
WBC # BLD AUTO: 9.8 K/UL (ref 3.6–11)

## 2025-01-06 PROCEDURE — 87641 MR-STAPH DNA AMP PROBE: CPT

## 2025-01-06 PROCEDURE — 2500000003 HC RX 250 WO HCPCS: Performed by: INTERNAL MEDICINE

## 2025-01-06 PROCEDURE — 6370000000 HC RX 637 (ALT 250 FOR IP): Performed by: INTERNAL MEDICINE

## 2025-01-06 PROCEDURE — 6360000002 HC RX W HCPCS: Performed by: STUDENT IN AN ORGANIZED HEALTH CARE EDUCATION/TRAINING PROGRAM

## 2025-01-06 PROCEDURE — 6360000002 HC RX W HCPCS: Performed by: INTERNAL MEDICINE

## 2025-01-06 PROCEDURE — 83880 ASSAY OF NATRIURETIC PEPTIDE: CPT

## 2025-01-06 PROCEDURE — 82803 BLOOD GASES ANY COMBINATION: CPT

## 2025-01-06 PROCEDURE — 94762 N-INVAS EAR/PLS OXIMTRY CONT: CPT

## 2025-01-06 PROCEDURE — 85025 COMPLETE CBC W/AUTO DIFF WBC: CPT

## 2025-01-06 PROCEDURE — 71045 X-RAY EXAM CHEST 1 VIEW: CPT

## 2025-01-06 PROCEDURE — 96375 TX/PRO/DX INJ NEW DRUG ADDON: CPT

## 2025-01-06 PROCEDURE — 94660 CPAP INITIATION&MGMT: CPT

## 2025-01-06 PROCEDURE — 94640 AIRWAY INHALATION TREATMENT: CPT

## 2025-01-06 PROCEDURE — 2580000003 HC RX 258: Performed by: INTERNAL MEDICINE

## 2025-01-06 PROCEDURE — 36600 WITHDRAWAL OF ARTERIAL BLOOD: CPT

## 2025-01-06 PROCEDURE — 6370000000 HC RX 637 (ALT 250 FOR IP): Performed by: STUDENT IN AN ORGANIZED HEALTH CARE EDUCATION/TRAINING PROGRAM

## 2025-01-06 PROCEDURE — 36415 COLL VENOUS BLD VENIPUNCTURE: CPT

## 2025-01-06 PROCEDURE — 2000000000 HC ICU R&B

## 2025-01-06 PROCEDURE — 84484 ASSAY OF TROPONIN QUANT: CPT

## 2025-01-06 PROCEDURE — 96366 THER/PROPH/DIAG IV INF ADDON: CPT

## 2025-01-06 PROCEDURE — 83735 ASSAY OF MAGNESIUM: CPT

## 2025-01-06 PROCEDURE — 80053 COMPREHEN METABOLIC PANEL: CPT

## 2025-01-06 PROCEDURE — 2580000003 HC RX 258: Performed by: STUDENT IN AN ORGANIZED HEALTH CARE EDUCATION/TRAINING PROGRAM

## 2025-01-06 PROCEDURE — 99285 EMERGENCY DEPT VISIT HI MDM: CPT

## 2025-01-06 PROCEDURE — 96365 THER/PROPH/DIAG IV INF INIT: CPT

## 2025-01-06 PROCEDURE — 93005 ELECTROCARDIOGRAM TRACING: CPT | Performed by: STUDENT IN AN ORGANIZED HEALTH CARE EDUCATION/TRAINING PROGRAM

## 2025-01-06 RX ORDER — SODIUM CHLORIDE 9 MG/ML
INJECTION, SOLUTION INTRAVENOUS PRN
Status: DISCONTINUED | OUTPATIENT
Start: 2025-01-06 | End: 2025-01-10 | Stop reason: HOSPADM

## 2025-01-06 RX ORDER — HEPARIN SODIUM 5000 [USP'U]/ML
5000 INJECTION, SOLUTION INTRAVENOUS; SUBCUTANEOUS EVERY 8 HOURS SCHEDULED
Status: DISCONTINUED | OUTPATIENT
Start: 2025-01-06 | End: 2025-01-10 | Stop reason: HOSPADM

## 2025-01-06 RX ORDER — ACETAMINOPHEN 650 MG/1
650 SUPPOSITORY RECTAL EVERY 6 HOURS PRN
Status: DISCONTINUED | OUTPATIENT
Start: 2025-01-06 | End: 2025-01-10 | Stop reason: HOSPADM

## 2025-01-06 RX ORDER — PANTOPRAZOLE SODIUM 40 MG/1
40 TABLET, DELAYED RELEASE ORAL DAILY
Status: DISCONTINUED | OUTPATIENT
Start: 2025-01-06 | End: 2025-01-10 | Stop reason: HOSPADM

## 2025-01-06 RX ORDER — ACETAMINOPHEN 650 MG/1
650 SUPPOSITORY RECTAL EVERY 6 HOURS PRN
Status: DISCONTINUED | OUTPATIENT
Start: 2025-01-06 | End: 2025-01-06

## 2025-01-06 RX ORDER — SODIUM CHLORIDE 0.9 % (FLUSH) 0.9 %
5-40 SYRINGE (ML) INJECTION PRN
Status: DISCONTINUED | OUTPATIENT
Start: 2025-01-06 | End: 2025-01-06 | Stop reason: HOSPADM

## 2025-01-06 RX ORDER — FLUTICASONE FUROATE, UMECLIDINIUM BROMIDE AND VILANTEROL TRIFENATATE 100; 62.5; 25 UG/1; UG/1; UG/1
1 POWDER RESPIRATORY (INHALATION) DAILY
COMMUNITY
Start: 2024-11-29

## 2025-01-06 RX ORDER — ONDANSETRON 4 MG/1
4 TABLET, ORALLY DISINTEGRATING ORAL EVERY 8 HOURS PRN
Status: DISCONTINUED | OUTPATIENT
Start: 2025-01-06 | End: 2025-01-06 | Stop reason: HOSPADM

## 2025-01-06 RX ORDER — ACETAMINOPHEN 325 MG/1
650 TABLET ORAL EVERY 6 HOURS PRN
Status: DISCONTINUED | OUTPATIENT
Start: 2025-01-06 | End: 2025-01-10 | Stop reason: HOSPADM

## 2025-01-06 RX ORDER — SODIUM CHLORIDE 0.9 % (FLUSH) 0.9 %
5-40 SYRINGE (ML) INJECTION PRN
Status: DISCONTINUED | OUTPATIENT
Start: 2025-01-06 | End: 2025-01-10 | Stop reason: HOSPADM

## 2025-01-06 RX ORDER — IPRATROPIUM BROMIDE AND ALBUTEROL SULFATE 2.5; .5 MG/3ML; MG/3ML
1 SOLUTION RESPIRATORY (INHALATION)
Status: COMPLETED | OUTPATIENT
Start: 2025-01-06 | End: 2025-01-06

## 2025-01-06 RX ORDER — FUROSEMIDE 10 MG/ML
80 INJECTION INTRAMUSCULAR; INTRAVENOUS
Status: COMPLETED | OUTPATIENT
Start: 2025-01-06 | End: 2025-01-06

## 2025-01-06 RX ORDER — OLANZAPINE 2.5 MG/1
2.5 TABLET, FILM COATED ORAL NIGHTLY
COMMUNITY
Start: 2024-12-22 | End: 2025-02-20

## 2025-01-06 RX ORDER — POTASSIUM CHLORIDE 1500 MG/1
20 TABLET, EXTENDED RELEASE ORAL DAILY
Status: ON HOLD | COMMUNITY
Start: 2024-12-03 | End: 2025-01-10 | Stop reason: HOSPADM

## 2025-01-06 RX ORDER — ENOXAPARIN SODIUM 100 MG/ML
30 INJECTION SUBCUTANEOUS DAILY
Status: DISCONTINUED | OUTPATIENT
Start: 2025-01-06 | End: 2025-01-06

## 2025-01-06 RX ORDER — MULTIVITAMIN WITH IRON
1 TABLET ORAL DAILY
Status: DISCONTINUED | OUTPATIENT
Start: 2025-01-07 | End: 2025-01-10 | Stop reason: HOSPADM

## 2025-01-06 RX ORDER — HEPARIN SODIUM 5000 [USP'U]/ML
5000 INJECTION, SOLUTION INTRAVENOUS; SUBCUTANEOUS 2 TIMES DAILY
Status: DISCONTINUED | OUTPATIENT
Start: 2025-01-06 | End: 2025-01-06 | Stop reason: HOSPADM

## 2025-01-06 RX ORDER — SODIUM CHLORIDE 0.9 % (FLUSH) 0.9 %
5-40 SYRINGE (ML) INJECTION EVERY 12 HOURS SCHEDULED
Status: DISCONTINUED | OUTPATIENT
Start: 2025-01-06 | End: 2025-01-06 | Stop reason: HOSPADM

## 2025-01-06 RX ORDER — ONDANSETRON 2 MG/ML
4 INJECTION INTRAMUSCULAR; INTRAVENOUS EVERY 6 HOURS PRN
Status: DISCONTINUED | OUTPATIENT
Start: 2025-01-06 | End: 2025-01-10 | Stop reason: HOSPADM

## 2025-01-06 RX ORDER — HYDRALAZINE HYDROCHLORIDE 50 MG/1
50 TABLET, FILM COATED ORAL 3 TIMES DAILY
Status: DISCONTINUED | OUTPATIENT
Start: 2025-01-06 | End: 2025-01-07

## 2025-01-06 RX ORDER — ROSUVASTATIN CALCIUM 5 MG/1
10 TABLET, COATED ORAL DAILY
Status: DISCONTINUED | OUTPATIENT
Start: 2025-01-06 | End: 2025-01-10 | Stop reason: HOSPADM

## 2025-01-06 RX ORDER — OLANZAPINE 2.5 MG/1
2.5 TABLET, FILM COATED ORAL NIGHTLY
Status: DISCONTINUED | OUTPATIENT
Start: 2025-01-06 | End: 2025-01-10 | Stop reason: HOSPADM

## 2025-01-06 RX ORDER — POLYETHYLENE GLYCOL 3350 17 G/17G
17 POWDER, FOR SOLUTION ORAL DAILY PRN
Status: DISCONTINUED | OUTPATIENT
Start: 2025-01-06 | End: 2025-01-06 | Stop reason: HOSPADM

## 2025-01-06 RX ORDER — ONDANSETRON 4 MG/1
4 TABLET, ORALLY DISINTEGRATING ORAL EVERY 8 HOURS PRN
Status: DISCONTINUED | OUTPATIENT
Start: 2025-01-06 | End: 2025-01-10 | Stop reason: HOSPADM

## 2025-01-06 RX ORDER — MULTIVITAMIN,THERAPEUTIC
1 TABLET ORAL DAILY
COMMUNITY
Start: 2024-12-23 | End: 2025-02-21

## 2025-01-06 RX ORDER — METOPROLOL TARTRATE 1 MG/ML
2.5 INJECTION, SOLUTION INTRAVENOUS EVERY 5 MIN PRN
Status: COMPLETED | OUTPATIENT
Start: 2025-01-06 | End: 2025-01-06

## 2025-01-06 RX ORDER — FUROSEMIDE 20 MG/1
20 TABLET ORAL DAILY PRN
Status: ON HOLD | COMMUNITY
Start: 2024-11-18 | End: 2025-01-10 | Stop reason: HOSPADM

## 2025-01-06 RX ORDER — CARVEDILOL 12.5 MG/1
25 TABLET ORAL 2 TIMES DAILY
Status: DISCONTINUED | OUTPATIENT
Start: 2025-01-06 | End: 2025-01-10 | Stop reason: HOSPADM

## 2025-01-06 RX ORDER — SODIUM CHLORIDE 0.9 % (FLUSH) 0.9 %
5-40 SYRINGE (ML) INJECTION EVERY 12 HOURS SCHEDULED
Status: DISCONTINUED | OUTPATIENT
Start: 2025-01-06 | End: 2025-01-10 | Stop reason: HOSPADM

## 2025-01-06 RX ORDER — ACETAMINOPHEN 325 MG/1
650 TABLET ORAL EVERY 6 HOURS PRN
Status: DISCONTINUED | OUTPATIENT
Start: 2025-01-06 | End: 2025-01-06

## 2025-01-06 RX ORDER — ONDANSETRON 2 MG/ML
4 INJECTION INTRAMUSCULAR; INTRAVENOUS EVERY 6 HOURS PRN
Status: DISCONTINUED | OUTPATIENT
Start: 2025-01-06 | End: 2025-01-06 | Stop reason: HOSPADM

## 2025-01-06 RX ORDER — SODIUM CHLORIDE 9 MG/ML
INJECTION, SOLUTION INTRAVENOUS PRN
Status: DISCONTINUED | OUTPATIENT
Start: 2025-01-06 | End: 2025-01-06 | Stop reason: SDUPTHER

## 2025-01-06 RX ORDER — POLYETHYLENE GLYCOL 3350 17 G/17G
17 POWDER, FOR SOLUTION ORAL DAILY PRN
Status: DISCONTINUED | OUTPATIENT
Start: 2025-01-06 | End: 2025-01-10 | Stop reason: HOSPADM

## 2025-01-06 RX ORDER — ISOSORBIDE DINITRATE 20 MG/1
20 TABLET ORAL 3 TIMES DAILY
Status: DISCONTINUED | OUTPATIENT
Start: 2025-01-06 | End: 2025-01-10 | Stop reason: HOSPADM

## 2025-01-06 RX ADMIN — ROSUVASTATIN CALCIUM 10 MG: 5 TABLET, FILM COATED ORAL at 21:37

## 2025-01-06 RX ADMIN — SODIUM CHLORIDE 7.5 MG/HR: 9 INJECTION, SOLUTION INTRAVENOUS at 06:21

## 2025-01-06 RX ADMIN — SODIUM CHLORIDE 5 MG/HR: 9 INJECTION, SOLUTION INTRAVENOUS at 06:20

## 2025-01-06 RX ADMIN — METOPROLOL TARTRATE 2.5 MG: 5 INJECTION INTRAVENOUS at 11:32

## 2025-01-06 RX ADMIN — FUROSEMIDE 80 MG: 10 INJECTION, SOLUTION INTRAMUSCULAR; INTRAVENOUS at 05:26

## 2025-01-06 RX ADMIN — HYDRALAZINE HYDROCHLORIDE 50 MG: 50 TABLET ORAL at 20:57

## 2025-01-06 RX ADMIN — PANTOPRAZOLE SODIUM 40 MG: 40 TABLET, DELAYED RELEASE ORAL at 21:36

## 2025-01-06 RX ADMIN — ISOSORBIDE DINITRATE 20 MG: 20 TABLET ORAL at 21:40

## 2025-01-06 RX ADMIN — SODIUM CHLORIDE, PRESERVATIVE FREE 10 ML: 5 INJECTION INTRAVENOUS at 10:55

## 2025-01-06 RX ADMIN — METOPROLOL TARTRATE 2.5 MG: 5 INJECTION INTRAVENOUS at 11:21

## 2025-01-06 RX ADMIN — SODIUM CHLORIDE, PRESERVATIVE FREE 10 ML: 5 INJECTION INTRAVENOUS at 21:37

## 2025-01-06 RX ADMIN — HEPARIN SODIUM 5000 UNITS: 5000 INJECTION INTRAVENOUS; SUBCUTANEOUS at 20:57

## 2025-01-06 RX ADMIN — HEPARIN SODIUM 5000 UNITS: 5000 INJECTION INTRAVENOUS; SUBCUTANEOUS at 15:39

## 2025-01-06 RX ADMIN — OLANZAPINE 2.5 MG: 5 TABLET, FILM COATED ORAL at 20:56

## 2025-01-06 RX ADMIN — METOPROLOL TARTRATE 2.5 MG: 5 INJECTION INTRAVENOUS at 11:45

## 2025-01-06 RX ADMIN — CARVEDILOL 25 MG: 12.5 TABLET, FILM COATED ORAL at 20:57

## 2025-01-06 RX ADMIN — SODIUM CHLORIDE 5 MG/HR: 9 INJECTION, SOLUTION INTRAVENOUS at 12:01

## 2025-01-06 RX ADMIN — IPRATROPIUM BROMIDE AND ALBUTEROL SULFATE 1 DOSE: .5; 3 SOLUTION RESPIRATORY (INHALATION) at 05:56

## 2025-01-06 ASSESSMENT — PAIN - FUNCTIONAL ASSESSMENT: PAIN_FUNCTIONAL_ASSESSMENT: 0-10

## 2025-01-06 ASSESSMENT — PAIN SCALES - GENERAL: PAINLEVEL_OUTOF10: 0

## 2025-01-06 NOTE — ED NOTES
Discussed BP with MD- states to decrease Cardene gtt to 2.5 mg- left with [facility] after med adjusted

## 2025-01-06 NOTE — H&P
Admission H&P    Name: Saranya Hightower   : 1950   MRN: [id number]   Date: 2025       Diagnoses/problem list:   Resolved acute pulmonary edema   Hypertensive emergency  24-hour events:   74-year-old female with history of hypertension, hyperlipidemia, COPD, CAD, heart failure with reduced ejection fraction 30-35%, AAA status post EVAR 2024, left vertebral artery occlusion, chronic malnutrition, carotid artery stenosis status post CEA CEA, PVD, and renal artery stenosis CKD stage IV presented to the emergency department with acute onset of shortness of breath.  DuoNebs and Lasix were administered.  Noninvasive positive pressure ventilation was begun.  Without improvement in blood pressure but relief of acute respiratory distress, BiPAP was discontinued, nicardipine infusion begun and the patient transferred to the intensive care unit.     ROS negative except as otherwise documented below:  Patient reports she feels much better  Denies chest pain, shortness of breath; only complaint: \"I have to pee\"  Assessment and plan:      NEUROLOGICAL:    Alert, at baseline     PULMONOLOGY:   Resolved pulmonary edema with ontrol of blood pressure  COPD  Acute on chronic hypoxic respiratory failure-improved  Encourage smoking cessation    CARDIOVASCULAR:   Hypertensive emergency resolved  Medication noncompliance strongly suspected  Restart of home medications with weaning to off of nicardipine     GASTROINTESTINAL:   Nutrition:   Bowel regimen  As needed  GI Px  Not indicated     RENAL/ELECTROLYTE:   Renal artery stenosis  CKD stage IV  MAKENZIE-ATN and prerenal contributions  Patient has been unable to identify the name of their nephrologist-will consult Dr. Duncan      ENDOCRINE:   Glycemic control as needed-q. ACH S    HEMATOLOGY/ONCOLOGY:   No acute concerns  VTE Px heparin       ID/MICRO:   No acute issues     ICU DAILY CHECKLIST      Code Status:full  DVT Prophylaxis:lovenox  T/L/D: PIV  Diet: NPO  Activity

## 2025-01-06 NOTE — RT PROTOCOL NOTE
Bipap removed at this time and placed on NC at 4 LPM.  Patient is currently resting comfortably and is in NAD.  ABG performed. Dr. Paniagua notified of changes.

## 2025-01-06 NOTE — ED TRIAGE NOTES
Brought in via [facility] for shortness of breath. Hx COPD and CHF. Duoneb initiated by EMS and in progress upon ED arrival. Patient orthopneic with labored respirations and nonproductive cough. Patient reports SOB and cough started yesterday and has progressively worsened. Reports uses inhaler at home with minimal improvement.

## 2025-01-06 NOTE — ED PROVIDER NOTES
tablet 3    amLODIPine (NORVASC) 10 MG tablet Take 1 tablet by mouth daily 90 tablet 3    isosorbide dinitrate (ISORDIL) 20 MG tablet Take 1 tablet by mouth in the morning, at noon, and at bedtime 90 tablet 3    bumetanide (BUMEX) 2 MG tablet Take 1 tablet by mouth daily 30 tablet 1    nicotine (NICODERM CQ) 7 MG/24HR Place 1 patch onto the skin daily for 14 days 14 patch 0    rosuvastatin (CRESTOR) 40 MG tablet Take 1 tablet by mouth daily      nitroGLYCERIN (NITROSTAT) 0.3 MG SL tablet Place 1 tablet under the tongue every 5 minutes as needed for Chest pain up to max of 3 total doses. If no relief after 1 dose, call 911. 12 tablet 0    pantoprazole (PROTONIX) 40 MG tablet Take 1 tablet by mouth daily 90 tablet 4    acetaminophen (TYLENOL) 325 MG tablet Take 2 tablets by mouth every 4 hours as needed      albuterol sulfate HFA (PROVENTIL;VENTOLIN;PROAIR) 108 (90 Base) MCG/ACT inhaler Inhale 2 puffs into the lungs every 4 hours as needed      aspirin 81 MG chewable tablet aspirin 81 mg tablet   Take by oral route.      ferrous sulfate (IRON 325) 325 (65 Fe) MG tablet Take 1 tablet by mouth every other day ceived the following from [facility]: Outside name: FeroSuL 325 mg (65 mg iron) tablet      mirtazapine (REMERON) 15 MG tablet Take 1 tablet by mouth nightly         Social Determinants of Health:   Social Determinants of Health     Tobacco Use: High Risk (1/6/2025)    Patient History     Smoking Tobacco Use: Every Day     Smokeless Tobacco Use: Never     Passive Exposure: Never   Alcohol Use: Not At Risk (1/6/2025)    AUDIT-C     Frequency of Alcohol Consumption: Never     Average Number of Drinks: Patient does not drink     Frequency of Binge Drinking: Never   Financial Resource Strain: Not on file   Food Insecurity: No Food Insecurity (12/16/2024)    Received from [facility]    Hunger Vital Sign     Worried About Running Out of Food in the Last Year: Never true     Ran Out of Food in the Last

## 2025-01-06 NOTE — CARE COORDINATION
01/06/25 1354   Readmission Assessment   Number of Days since last admission? 8-30 days   Previous Disposition Home with Home Health   Who is being Interviewed Caregiver   What was the patient's/caregiver's perception as to why they think they needed to return back to the hospital? Other (Comment)  (No suggestions)   Did you visit your Primary Care Physician after you left the hospital, before you returned this time? No   Why weren't you able to visit your PCP? Did not have an appointment   Did you see a specialist, such as Cardiac, Pulmonary, Orthopedic Physician, etc. after you left the hospital? No   Who advised the patient to return to the hospital? Caregiver   Does the patient report anything that got in the way of taking their medications? No   In our efforts to provide the best possible care to you and others like you, can you think of anything that we could have done to help you after you left the hospital the first time, so that you might not have needed to return so soon? Additional Community resources available for illness support;Education on how to continue taking medications upon discharge;Discharge instructions that are concise, clear, and non contradictory;Teach back instructions regarding management of illness;Arrange for more help when leaving the hospital;Identify patient's health literacy needs;Improved written discharge instructions;Teaching during hospitalization regarding your illness

## 2025-01-06 NOTE — ED NOTES
Pt resting on stretcher- RR even and nonlabored. O2 at 4l nc in use. Awaiting transport. Report has been called to [facility] ICU by previous RN. Assumed care at this time

## 2025-01-06 NOTE — CARE COORDINATION
01/06/25 8310   Service Assessment   Patient Orientation Alert and Oriented   Cognition Alert   History Provided By Spouse   Primary Caregiver Spouse   Support Systems Spouse/Significant Other;Children;Family Members   Patient's Healthcare Decision Maker is: Legal Next of Kin   PCP Verified by CM Yes   Last Visit to PCP Within last 3 months   Prior Functional Level Assistance with the following:;Bathing;Dressing;Toileting   Current Functional Level Assistance with the following:;Bathing;Dressing;Toileting   Can patient return to prior living arrangement Unknown at present   Ability to make needs known: Good   Family able to assist with home care needs: Yes   Would you like for me to discuss the discharge plan with any other family members/significant others, and if so, who? Yes   Financial Resources None   Community Resources None   Social/Functional History   Lives With Spouse;Son   Home Equipment Walker - Rolling;Wheelchair - Manual;Cane   Services At/After Discharge   Mode of Transport at Discharge Other (see comment)   Confirm Follow Up Transport Family   Condition of Participation: Discharge Planning   The Plan for Transition of Care is related to the following treatment goals: Dalton Hightower   The Patient and/or Patient Representative was provided with a Choice of Provider? Patient Representative   The Patient and/Or Patient Representative agree with the Discharge Plan? Yes     CM met f/f with Pt and her . Pt  confirmed that the information on the face sheet is correct. Pt  stated that Pt lives with him and their son.     Pt uses [facility], she has  a walker, cane and wheelchair. Pt  stated that Pt uses his oxygen. Pt  stated that he assist Pt with ADL.    Send RX to [facility] in [place]    Family will transport Pt home when D/C.    Will need PT/OT eval/recommendation.     ASHLEY dispo: TBD    Advance Care Planning     General Advance Care Planning (ACP)

## 2025-01-07 ENCOUNTER — APPOINTMENT (OUTPATIENT)
Facility: HOSPITAL | Age: 75
End: 2025-01-07
Attending: INTERNAL MEDICINE
Payer: MEDICARE

## 2025-01-07 PROCEDURE — 71045 X-RAY EXAM CHEST 1 VIEW: CPT

## 2025-01-07 PROCEDURE — 2000000000 HC ICU R&B

## 2025-01-07 PROCEDURE — 6370000000 HC RX 637 (ALT 250 FOR IP): Performed by: INTERNAL MEDICINE

## 2025-01-07 PROCEDURE — 2500000003 HC RX 250 WO HCPCS: Performed by: INTERNAL MEDICINE

## 2025-01-07 PROCEDURE — 6360000002 HC RX W HCPCS: Performed by: INTERNAL MEDICINE

## 2025-01-07 PROCEDURE — 1100000000 HC RM PRIVATE

## 2025-01-07 RX ORDER — HYDRALAZINE HYDROCHLORIDE 20 MG/ML
20 INJECTION INTRAMUSCULAR; INTRAVENOUS EVERY 4 HOURS PRN
Status: DISCONTINUED | OUTPATIENT
Start: 2025-01-07 | End: 2025-01-10 | Stop reason: HOSPADM

## 2025-01-07 RX ORDER — BUMETANIDE 0.25 MG/ML
1 INJECTION, SOLUTION INTRAMUSCULAR; INTRAVENOUS 2 TIMES DAILY
Status: DISCONTINUED | OUTPATIENT
Start: 2025-01-07 | End: 2025-01-07

## 2025-01-07 RX ORDER — BUMETANIDE 0.25 MG/ML
1 INJECTION, SOLUTION INTRAMUSCULAR; INTRAVENOUS 3 TIMES DAILY
Status: DISCONTINUED | OUTPATIENT
Start: 2025-01-07 | End: 2025-01-09

## 2025-01-07 RX ORDER — AMLODIPINE BESYLATE 5 MG/1
10 TABLET ORAL DAILY
Status: DISCONTINUED | OUTPATIENT
Start: 2025-01-07 | End: 2025-01-10 | Stop reason: HOSPADM

## 2025-01-07 RX ORDER — HYDRALAZINE HYDROCHLORIDE 50 MG/1
100 TABLET, FILM COATED ORAL 3 TIMES DAILY
Status: DISCONTINUED | OUTPATIENT
Start: 2025-01-07 | End: 2025-01-10 | Stop reason: HOSPADM

## 2025-01-07 RX ORDER — BUMETANIDE 1 MG/1
1 TABLET ORAL DAILY
Status: DISCONTINUED | OUTPATIENT
Start: 2025-01-07 | End: 2025-01-07

## 2025-01-07 RX ADMIN — CARVEDILOL 25 MG: 12.5 TABLET, FILM COATED ORAL at 21:13

## 2025-01-07 RX ADMIN — HYDRALAZINE HYDROCHLORIDE 50 MG: 50 TABLET ORAL at 07:47

## 2025-01-07 RX ADMIN — ISOSORBIDE DINITRATE 20 MG: 20 TABLET ORAL at 21:12

## 2025-01-07 RX ADMIN — ISOSORBIDE DINITRATE 20 MG: 20 TABLET ORAL at 07:47

## 2025-01-07 RX ADMIN — SODIUM CHLORIDE, PRESERVATIVE FREE 10 ML: 5 INJECTION INTRAVENOUS at 07:50

## 2025-01-07 RX ADMIN — CARVEDILOL 25 MG: 12.5 TABLET, FILM COATED ORAL at 07:45

## 2025-01-07 RX ADMIN — SODIUM CHLORIDE, PRESERVATIVE FREE 10 ML: 5 INJECTION INTRAVENOUS at 21:15

## 2025-01-07 RX ADMIN — THERA TABS 1 TABLET: TAB at 07:47

## 2025-01-07 RX ADMIN — PANTOPRAZOLE SODIUM 40 MG: 40 TABLET, DELAYED RELEASE ORAL at 07:47

## 2025-01-07 RX ADMIN — BUMETANIDE 1 MG: 1 TABLET ORAL at 15:43

## 2025-01-07 RX ADMIN — HEPARIN SODIUM 5000 UNITS: 5000 INJECTION INTRAVENOUS; SUBCUTANEOUS at 14:20

## 2025-01-07 RX ADMIN — ISOSORBIDE DINITRATE 20 MG: 20 TABLET ORAL at 14:20

## 2025-01-07 RX ADMIN — BUMETANIDE 1 MG: 0.25 INJECTION INTRAMUSCULAR; INTRAVENOUS at 23:40

## 2025-01-07 RX ADMIN — ROSUVASTATIN CALCIUM 10 MG: 5 TABLET, FILM COATED ORAL at 07:44

## 2025-01-07 RX ADMIN — OLANZAPINE 2.5 MG: 5 TABLET, FILM COATED ORAL at 21:12

## 2025-01-07 RX ADMIN — HYDRALAZINE HYDROCHLORIDE 20 MG: 20 INJECTION INTRAMUSCULAR; INTRAVENOUS at 23:40

## 2025-01-07 RX ADMIN — HEPARIN SODIUM 5000 UNITS: 5000 INJECTION INTRAVENOUS; SUBCUTANEOUS at 07:44

## 2025-01-07 RX ADMIN — HYDRALAZINE HYDROCHLORIDE 50 MG: 50 TABLET ORAL at 14:20

## 2025-01-07 RX ADMIN — HEPARIN SODIUM 5000 UNITS: 5000 INJECTION INTRAVENOUS; SUBCUTANEOUS at 21:13

## 2025-01-07 RX ADMIN — HYDRALAZINE HYDROCHLORIDE 100 MG: 50 TABLET ORAL at 21:12

## 2025-01-07 ASSESSMENT — PAIN SCALES - GENERAL
PAINLEVEL_OUTOF10: 0

## 2025-01-07 ASSESSMENT — ENCOUNTER SYMPTOMS
BACK PAIN: 0
SHORTNESS OF BREATH: 1
WHEEZING: 0
NAUSEA: 0
VOMITING: 0
ABDOMINAL PAIN: 0
COUGH: 1

## 2025-01-07 NOTE — PROGRESS NOTES
Admission H&P    Name: Saranya Hightower   : 1950   MRN: [id number]   Date: 2025       Diagnoses/problem list:   Resolved acute pulmonary edema   Hypertensive emergency  24-hour events:   74-year-old female with history of hypertension, hyperlipidemia, COPD, CAD, heart failure with reduced ejection fraction 30-35%, AAA status post EVAR 2024, left vertebral artery occlusion, chronic malnutrition, carotid artery stenosis status post CEA CEA, PVD, and renal artery stenosis CKD stage IV presented to the emergency department with acute onset of shortness of breath.  DuoNebs and Lasix were administered.  Noninvasive positive pressure ventilation was begun.  Without improvement in blood pressure but relief of acute respiratory distress, BiPAP was discontinued, nicardipine infusion begun and the patient transferred to the intensive care unit.    Overnight events:   was able to come off Cardene fairly quickly last evening after reinitiation of home regimen.  Has not required as needed dosing of hydralazine.  Patient currently denies complaint, good urine output, tolerating p.o.     ROS negative except as otherwise documented below:  Patient reports she feels much better  Denies chest pain, shortness of breath; only complaint: \"I have to pee\"  Assessment and plan:      NEUROLOGICAL:    Alert, at baseline     PULMONOLOGY:   Resolved pulmonary edema with ontrol of blood pressure  COPD  Acute on chronic hypoxic respiratory failure-improved  Encourage smoking cessation    CARDIOVASCULAR:   Hypertensive emergency resolved  Medication noncompliance strongly suspected  Home regimen restarted, likely to require some titration     GASTROINTESTINAL:   Nutrition:   Bowel regimen  As needed  GI Px  Not indicated     RENAL/ELECTROLYTE:   Renal artery stenosis  CKD stage IV  MAKENZIE-ATN and prerenal contributions  Patient has been unable to identify the name of their nephrologist-will consult Dr. Duncan      ENDOCRINE:

## 2025-01-07 NOTE — CONSULTS
Cardiology Consult    NAME: Saranya Hightower   :  1950   MRN:  [id number]     Date/Time:  2025 5:07 PM    Patient PCP: Nubia Davila APRN - NP  ________________________________________________________________________     Assessment/Plan:   Shortness of breath, fluid overload, known heart failure with reduced ejection fraction and ejection fraction of 30 to 35% .  History of AICD.  Feeling better.    Acute on chronic decompensation of heart failure with reduced ejection fraction, compensated.  Elevated NT proBNP.  No edema.  Chest x-ray on admission with mild to moderate pulmonary interstitial edema and small right pleural effusion.  Repeat chest x-ray with improved aeration of the lung bases.    Uncontrolled hypertension, was on Cardene drip, weaned off.    Tobacco use, COPD, says she has not smoked for a week.  Without cough fever or chills.    Chronic kidney disease, stable creatinine.    Uncontrolled hypertension, did require Cardene drip, now weaned off.  Blood pressure mildly elevated.  Continue home medications.  Did instruct patient to call her  and to bring all medications she is currently taking at home.  Per my last office note patient was on Bumex, carvedilol, hydralazine, losartan.  Has been off amlodipine and Cardizem.    Peripheral vascular disease, status post right femoral to femoral and right femoropopliteal bypass and revision, status post bilateral carotid endarterectomy.    Coronary artery disease, totally occluded distal circumflex, stenting of RCA  and .           []        High complexity decision making was performed        Subjective:   CHIEF COMPLAINT:   Shortness of breath    REASON FOR CONSULT:    CHF  HISTORY OF PRESENT ILLNESS:     Saranya Hightower is a 74 y.o. Black /  female who presents with increasing shortness of breath.  Started bothering her 2 days ago.  Was watching TV.  Denies dietary or medical noncompliance.  Says she stopped  fever, negative chills, negative weight loss  Eyes:   negative visual changes  ENT:   negative sore throat, tongue or lip swelling  Respiratory:  negative cough, negative dyspnea                       Cardiovascular: See HPI  GI:   negative for nausea, vomiting, diarrhea, and abdominal pain  Genitourinary: negative for frequency, dysuria  Integument:  negative for rash   Hematologic:  negative for easy bruising and gum/nose bleeding  Musculoskel: negative for myalgias,  back pain  Neurological:  negative for headaches, dizziness, vertigo, weakness  Behavl/Psych: negative for feelings of anxiety, depression     Pertinent Positives include :    Objective:      Physical Exam:    Last 24hrs VS reviewed since prior progress note. Most recent are:    BP (!) 147/82   Pulse 84   Temp 98.7 °F (37.1 °C) (Oral)   Resp 18   Ht 1.6 m (5' 2.99\")   Wt 45.3 kg (99 lb 13.9 oz)   SpO2 100%   BMI 17.70 kg/m²     Intake/Output Summary (Last 24 hours) at 1/7/2025 1707  Last data filed at 1/7/2025 1600  Gross per 24 hour   Intake 1057.68 ml   Output --   Net 1057.68 ml        General Appearance: Thinly built female, alert, no acute distress.  Ears/Nose/Mouth/Throat:  Hearing grossly normal.  Neck: Supple.   Chest: Lungs clear to auscultation bilaterally.  Cardiovascular: Regular rate and rhythm, S1S2 normal, no rubs, gallops.  2 /6 systolic murmur abdomen:   Abdomen: Soft, non-tender, bowel sounds are active. No organomegaly.  Extremities: No edema bilaterally. .  Skin: Warm and dry.  Neuro: Alert  follows simple commands  Psychiatric: Cooperative,     []         Post-cath site without hematoma, bruit, tenderness, or thrill.  Distal pulses intact.    Data:      Telemetry:    EKG:  []  No new EKG for review.    EKG on admission was with sinus tachycardia, probable left atrial enlargement, probable anterior infarct    Prior to Admission medications    Medication Sig Start Date End Date Taking? Authorizing Provider

## 2025-01-07 NOTE — CONSULTS
Consult    Patient: Saranya Hightower MRN: [id number]  SSN: [id number]    YOB: 1950  Age: 74 y.o.  Sex: female      Subjective:      No chief complaint on file.       Saranya Hightower is a 74 y.o. female with PMH of HTN, HLD, COPD, CAD, CHF with EF 30 to 35%, AAA status post EVAR 5/2024, chronic malnutrition, status post CEA for carotid artery stenosis, PVD, CKD stage IV who is being seen for acute shortness of breath.  She reports it came on rather suddenly on January 6, consistent with previous episodes of CHF exacerbations.  In the emergency room she was given DuoNebs and Lasix, placed on BiPAP, subsequently admitted to the ICU.  After improvement in respiratory status BiPAP was discontinued however nicardipine infusion initiated for hypertensive urgency.  Following improvement in blood pressure after initiating home medicines, nicardipine discontinued.  Hospital medicine was subsequently consulted for transfer to medical floor.    Subjective:  Patient seen in ICU for the first time.  Chart reviewed and overnight events discussed with RN.  Patient reports feeling much better, shortness of breath has improved.  She reports no change in her usual medications at home, she denies any excessive salt intake or fluid intake.  She is fairly certain this is the same thing that happened to her prior.    Past Medical History:   Diagnosis Date    AAA (abdominal aortic aneurysm) ([facility])     S/P STENTING    Abnormal weight loss 09/11/2021    Anemia     Atherosclerosis of native coronary artery without angina pectoris 09/11/2021    Bronchitis     CAD (coronary artery disease)     MI and hx of stents Lcx and RCA 8/2017    Carotid artery stenosis 09/11/2021    Cervicalgia 09/11/2021    Chronic bronchitis ([facility])     CKD (chronic kidney disease) stage 4, GFR 15-29 ml/min ([facility])     Colon polyps     Constipation 09/11/2021    COPD (chronic obstructive pulmonary disease) ([facility])     COPD (chronic obstructive pulmonary disease)

## 2025-01-07 NOTE — PLAN OF CARE
Problem: Chronic Conditions and Co-morbidities  Goal: Patient's chronic conditions and co-morbidity symptoms are monitored and maintained or improved  Recent Flowsheet Documentation  Taken 1/7/2025 0800 by Kylee Wright RN  Care Plan - Patient's Chronic Conditions and Co-Morbidity Symptoms are Monitored and Maintained or Improved:   Monitor and assess patient's chronic conditions and comorbid symptoms for stability, deterioration, or improvement   Collaborate with multidisciplinary team to address chronic and comorbid conditions and prevent exacerbation or deterioration   Update acute care plan with appropriate goals if chronic or comorbid symptoms are exacerbated and prevent overall improvement and discharge     Problem: Discharge Planning  Goal: Discharge to home or other facility with appropriate resources  Recent Flowsheet Documentation  Taken 1/7/2025 0800 by Kylee Wright RN  Discharge to home or other facility with appropriate resources:   Identify barriers to discharge with patient and caregiver   Arrange for needed discharge resources and transportation as appropriate   Identify discharge learning needs (meds, wound care, etc)   Arrange for interpreters to assist at discharge as needed     Problem: ABCDS Injury Assessment  Goal: Absence of physical injury  Recent Flowsheet Documentation  Taken 1/7/2025 0800 by Kylee rWight RN  Absence of Physical Injury: Implement safety measures based on patient assessment     Problem: Respiratory - Adult  Goal: Achieves optimal ventilation and oxygenation  Recent Flowsheet Documentation  Taken 1/7/2025 0800 by Kylee Wright RN  Achieves optimal ventilation and oxygenation:   Assess for changes in respiratory status   Assess for changes in mentation and behavior   Position to facilitate oxygenation and minimize respiratory effort   Oxygen supplementation based on oxygen saturation or arterial blood gases   Initiate smoking cessation protocol as indicated

## 2025-01-07 NOTE — CARE COORDINATION
CM reviewed Pt medicals, Pt lives with her  and son. Pt  will assist Pt with her ADL.    [facility] has accept Pt.  Will need an order to resume HH.     Per IDR  Pt admitted for Hypotension urgency.    Diet ordered    Pt doing good.    Pt will transfer to the floor.    present

## 2025-01-08 ENCOUNTER — APPOINTMENT (OUTPATIENT)
Facility: HOSPITAL | Age: 75
End: 2025-01-08
Attending: INTERNAL MEDICINE
Payer: MEDICARE

## 2025-01-08 LAB
ALBUMIN SERPL-MCNC: 3.3 G/DL (ref 3.5–5)
ANION GAP SERPL CALC-SCNC: 6 MMOL/L (ref 2–12)
BASOPHILS # BLD: 0.02 K/UL (ref 0–0.1)
BASOPHILS NFR BLD: 0.3 % (ref 0–1)
BNP SERPL-MCNC: ABNORMAL PG/ML
BUN SERPL-MCNC: 23 MG/DL (ref 6–20)
BUN/CREAT SERPL: 9 (ref 12–20)
CA-I BLD-MCNC: 9.5 MG/DL (ref 8.5–10.1)
CHLORIDE SERPL-SCNC: 102 MMOL/L (ref 97–108)
CO2 SERPL-SCNC: 29 MMOL/L (ref 21–32)
CREAT SERPL-MCNC: 2.48 MG/DL (ref 0.55–1.02)
DIFFERENTIAL METHOD BLD: ABNORMAL
ECHO AO ROOT DIAM: 2.7 CM
ECHO AO ROOT INDEX: 1.88 CM/M2
ECHO AV AREA PEAK VELOCITY: 1 CM2
ECHO AV AREA VTI: 1 CM2
ECHO AV AREA/BSA PEAK VELOCITY: 0.7 CM2/M2
ECHO AV AREA/BSA VTI: 0.7 CM2/M2
ECHO AV MEAN GRADIENT: 4 MMHG
ECHO AV MEAN VELOCITY: 0.9 M/S
ECHO AV PEAK GRADIENT: 7 MMHG
ECHO AV PEAK VELOCITY: 1.3 M/S
ECHO AV VELOCITY RATIO: 0.38
ECHO AV VTI: 23.7 CM
ECHO BSA: 1.42 M2
ECHO EST RA PRESSURE: 10 MMHG
ECHO LA AREA 4C: 21.1 CM2
ECHO LA DIAMETER INDEX: 2.15 CM/M2
ECHO LA DIAMETER: 3.1 CM
ECHO LA MAJOR AXIS: 6.1 CM
ECHO LA TO AORTIC ROOT RATIO: 1.15
ECHO LA VOL MOD A4C: 58 ML (ref 22–52)
ECHO LA VOLUME INDEX MOD A4C: 40 ML/M2 (ref 16–34)
ECHO LV E' LATERAL VELOCITY: 4.79 CM/S
ECHO LV EDV A2C: 97 ML
ECHO LV EDV A4C: 96 ML
ECHO LV EDV INDEX A4C: 67 ML/M2
ECHO LV EDV NDEX A2C: 67 ML/M2
ECHO LV EF PHYSICIAN: 35 %
ECHO LV EJECTION FRACTION A2C: 36 %
ECHO LV EJECTION FRACTION A4C: 36 %
ECHO LV EJECTION FRACTION BIPLANE: 36 % (ref 55–100)
ECHO LV ESV A2C: 62 ML
ECHO LV ESV A4C: 61 ML
ECHO LV ESV INDEX A2C: 43 ML/M2
ECHO LV ESV INDEX A4C: 42 ML/M2
ECHO LV FRACTIONAL SHORTENING: 16 % (ref 28–44)
ECHO LV INTERNAL DIMENSION DIASTOLE INDEX: 3.4 CM/M2
ECHO LV INTERNAL DIMENSION DIASTOLIC: 4.9 CM (ref 3.9–5.3)
ECHO LV INTERNAL DIMENSION SYSTOLIC INDEX: 2.85 CM/M2
ECHO LV INTERNAL DIMENSION SYSTOLIC: 4.1 CM
ECHO LV IVSD: 1.3 CM (ref 0.6–0.9)
ECHO LV MASS 2D: 239.9 G (ref 67–162)
ECHO LV MASS INDEX 2D: 166.6 G/M2 (ref 43–95)
ECHO LV POSTERIOR WALL DIASTOLIC: 1.2 CM (ref 0.6–0.9)
ECHO LV RELATIVE WALL THICKNESS RATIO: 0.49
ECHO LVOT AREA: 2.5 CM2
ECHO LVOT AV VTI INDEX: 0.41
ECHO LVOT DIAM: 1.8 CM
ECHO LVOT MEAN GRADIENT: 1 MMHG
ECHO LVOT PEAK GRADIENT: 1 MMHG
ECHO LVOT PEAK VELOCITY: 0.5 M/S
ECHO LVOT STROKE VOLUME INDEX: 17 ML/M2
ECHO LVOT SV: 24.4 ML
ECHO LVOT VTI: 9.6 CM
ECHO MV A VELOCITY: 0.76 M/S
ECHO MV AREA VTI: 1.2 CM2
ECHO MV E DECELERATION TIME (DT): 218 MS
ECHO MV E VELOCITY: 0.63 M/S
ECHO MV E/A RATIO: 0.83
ECHO MV E/E' LATERAL: 13.15
ECHO MV LVOT VTI INDEX: 2.19
ECHO MV MAX VELOCITY: 1.1 M/S
ECHO MV MEAN GRADIENT: 2 MMHG
ECHO MV MEAN VELOCITY: 0.7 M/S
ECHO MV PEAK GRADIENT: 4 MMHG
ECHO MV REGURGITANT PEAK GRADIENT: 18 MMHG
ECHO MV REGURGITANT PEAK VELOCITY: 2.1 M/S
ECHO MV VTI: 21 CM
ECHO PV MAX VELOCITY: 0.9 M/S
ECHO PV MEAN GRADIENT: 2 MMHG
ECHO PV MEAN VELOCITY: 0.6 M/S
ECHO PV PEAK GRADIENT: 3 MMHG
ECHO PV VTI: 20.4 CM
ECHO RA AREA 4C: 9.1 CM2
ECHO RA END SYSTOLIC VOLUME APICAL 4 CHAMBER INDEX BSA: 13 ML/M2
ECHO RA VOLUME: 18 ML
ECHO RIGHT VENTRICULAR SYSTOLIC PRESSURE (RVSP): 33 MMHG
ECHO RV FREE WALL PEAK S': 8.7 CM/S
ECHO RV TAPSE: 1.2 CM (ref 1.7–?)
ECHO TV REGURGITANT MAX VELOCITY: 2.4 M/S
ECHO TV REGURGITANT PEAK GRADIENT: 15 MMHG
ECHO TV REGURGITANT VTI: 59.6 CM
EOSINOPHIL # BLD: 0.1 K/UL (ref 0–0.4)
EOSINOPHIL NFR BLD: 1.3 % (ref 0–7)
ERYTHROCYTE [DISTWIDTH] IN BLOOD BY AUTOMATED COUNT: 14.5 % (ref 11.5–14.5)
GLUCOSE SERPL-MCNC: 100 MG/DL (ref 65–100)
HCT VFR BLD AUTO: 33.2 % (ref 35–47)
HGB BLD-MCNC: 11.2 G/DL (ref 11.5–16)
IMM GRANULOCYTES # BLD AUTO: 0.01 K/UL (ref 0–0.04)
IMM GRANULOCYTES NFR BLD AUTO: 0.1 % (ref 0–0.5)
LYMPHOCYTES # BLD: 1.41 K/UL (ref 0.8–3.5)
LYMPHOCYTES NFR BLD: 18.6 % (ref 12–49)
MCH RBC QN AUTO: 33.1 PG (ref 26–34)
MCHC RBC AUTO-ENTMCNC: 33.7 G/DL (ref 30–36.5)
MCV RBC AUTO: 98.2 FL (ref 80–99)
MONOCYTES # BLD: 0.88 K/UL (ref 0–1)
MONOCYTES NFR BLD: 11.6 % (ref 5–13)
NEUTS SEG # BLD: 5.18 K/UL (ref 1.8–8)
NEUTS SEG NFR BLD: 68.1 % (ref 32–75)
NRBC # BLD: 0 K/UL (ref 0–0.01)
NRBC BLD-RTO: 0 PER 100 WBC
PHOSPHATE SERPL-MCNC: 2.7 MG/DL (ref 2.6–4.7)
PLATELET # BLD AUTO: 154 K/UL (ref 150–400)
PMV BLD AUTO: 11.5 FL (ref 8.9–12.9)
POTASSIUM SERPL-SCNC: 3 MMOL/L (ref 3.5–5.1)
RBC # BLD AUTO: 3.38 M/UL (ref 3.8–5.2)
SODIUM SERPL-SCNC: 137 MMOL/L (ref 136–145)
WBC # BLD AUTO: 7.6 K/UL (ref 3.6–11)

## 2025-01-08 PROCEDURE — 6370000000 HC RX 637 (ALT 250 FOR IP): Performed by: INTERNAL MEDICINE

## 2025-01-08 PROCEDURE — 6360000002 HC RX W HCPCS: Performed by: INTERNAL MEDICINE

## 2025-01-08 PROCEDURE — C8924 2D TTE W OR W/O FOL W/CON,FU: HCPCS

## 2025-01-08 PROCEDURE — 2700000000 HC OXYGEN THERAPY PER DAY

## 2025-01-08 PROCEDURE — 2500000003 HC RX 250 WO HCPCS: Performed by: INTERNAL MEDICINE

## 2025-01-08 PROCEDURE — 36415 COLL VENOUS BLD VENIPUNCTURE: CPT

## 2025-01-08 PROCEDURE — 6370000000 HC RX 637 (ALT 250 FOR IP): Performed by: PHYSICIAN ASSISTANT

## 2025-01-08 PROCEDURE — 83880 ASSAY OF NATRIURETIC PEPTIDE: CPT

## 2025-01-08 PROCEDURE — 85025 COMPLETE CBC W/AUTO DIFF WBC: CPT

## 2025-01-08 PROCEDURE — 6360000004 HC RX CONTRAST MEDICATION

## 2025-01-08 PROCEDURE — 2060000000 HC ICU INTERMEDIATE R&B

## 2025-01-08 PROCEDURE — 80069 RENAL FUNCTION PANEL: CPT

## 2025-01-08 PROCEDURE — 94761 N-INVAS EAR/PLS OXIMETRY MLT: CPT

## 2025-01-08 RX ORDER — POTASSIUM CHLORIDE 1500 MG/1
40 TABLET, EXTENDED RELEASE ORAL EVERY 4 HOURS
Status: COMPLETED | OUTPATIENT
Start: 2025-01-08 | End: 2025-01-08

## 2025-01-08 RX ADMIN — HYDRALAZINE HYDROCHLORIDE 100 MG: 50 TABLET ORAL at 15:45

## 2025-01-08 RX ADMIN — THERA TABS 1 TABLET: TAB at 08:47

## 2025-01-08 RX ADMIN — BUMETANIDE 1 MG: 0.25 INJECTION INTRAMUSCULAR; INTRAVENOUS at 08:46

## 2025-01-08 RX ADMIN — AMLODIPINE BESYLATE 10 MG: 5 TABLET ORAL at 08:47

## 2025-01-08 RX ADMIN — HYDRALAZINE HYDROCHLORIDE 100 MG: 50 TABLET ORAL at 21:18

## 2025-01-08 RX ADMIN — BUMETANIDE 1 MG: 0.25 INJECTION INTRAMUSCULAR; INTRAVENOUS at 21:18

## 2025-01-08 RX ADMIN — SODIUM CHLORIDE, PRESERVATIVE FREE 10 ML: 5 INJECTION INTRAVENOUS at 08:46

## 2025-01-08 RX ADMIN — HEPARIN SODIUM 5000 UNITS: 5000 INJECTION INTRAVENOUS; SUBCUTANEOUS at 15:45

## 2025-01-08 RX ADMIN — HEPARIN SODIUM 5000 UNITS: 5000 INJECTION INTRAVENOUS; SUBCUTANEOUS at 06:20

## 2025-01-08 RX ADMIN — PERFLUTREN 2 ML: 6.52 INJECTION, SUSPENSION INTRAVENOUS at 09:58

## 2025-01-08 RX ADMIN — HEPARIN SODIUM 5000 UNITS: 5000 INJECTION INTRAVENOUS; SUBCUTANEOUS at 21:18

## 2025-01-08 RX ADMIN — CARVEDILOL 25 MG: 12.5 TABLET, FILM COATED ORAL at 08:47

## 2025-01-08 RX ADMIN — POTASSIUM CHLORIDE 40 MEQ: 1500 TABLET, EXTENDED RELEASE ORAL at 12:35

## 2025-01-08 RX ADMIN — BUMETANIDE 1 MG: 0.25 INJECTION INTRAMUSCULAR; INTRAVENOUS at 15:45

## 2025-01-08 RX ADMIN — ISOSORBIDE DINITRATE 20 MG: 20 TABLET ORAL at 21:18

## 2025-01-08 RX ADMIN — PANTOPRAZOLE SODIUM 40 MG: 40 TABLET, DELAYED RELEASE ORAL at 08:46

## 2025-01-08 RX ADMIN — POTASSIUM CHLORIDE 40 MEQ: 1500 TABLET, EXTENDED RELEASE ORAL at 15:45

## 2025-01-08 RX ADMIN — ISOSORBIDE DINITRATE 20 MG: 20 TABLET ORAL at 15:45

## 2025-01-08 RX ADMIN — ISOSORBIDE DINITRATE 20 MG: 20 TABLET ORAL at 08:47

## 2025-01-08 RX ADMIN — ROSUVASTATIN CALCIUM 10 MG: 5 TABLET, FILM COATED ORAL at 08:47

## 2025-01-08 RX ADMIN — OLANZAPINE 2.5 MG: 5 TABLET, FILM COATED ORAL at 21:18

## 2025-01-08 RX ADMIN — SODIUM CHLORIDE, PRESERVATIVE FREE 10 ML: 5 INJECTION INTRAVENOUS at 21:19

## 2025-01-08 RX ADMIN — HYDRALAZINE HYDROCHLORIDE 100 MG: 50 TABLET ORAL at 08:46

## 2025-01-08 RX ADMIN — CARVEDILOL 25 MG: 12.5 TABLET, FILM COATED ORAL at 21:18

## 2025-01-08 RX ADMIN — AMLODIPINE BESYLATE 10 MG: 5 TABLET ORAL at 03:00

## 2025-01-08 ASSESSMENT — ENCOUNTER SYMPTOMS
SHORTNESS OF BREATH: 1
ABDOMINAL PAIN: 0
WHEEZING: 0
COUGH: 0
VOMITING: 0
NAUSEA: 0
BACK PAIN: 0

## 2025-01-08 ASSESSMENT — PAIN SCALES - GENERAL
PAINLEVEL_OUTOF10: 0
PAINLEVEL_OUTOF10: 0

## 2025-01-08 NOTE — PROGRESS NOTES
Spiritual Health History and Assessment/Progress Note  [facility]    Initial Encounter,  ,  ,      Name: Saranya Hightower MRN: [id number]    Age: 74 y.o.     Sex: female   Language: English   Faith: Jewish   Hypertensive urgency, malignant     Date: 1/8/2025            Total Time Calculated: 17 min              Spiritual Assessment began in [facility] ICU            Encounter Overview/Reason: Initial Encounter  Service Provided For: Patient, Significant other    Brittanie, Belief, Meaning:   Patient identifies as spiritual, is connected with a brittanie tradition or spiritual practice, has beliefs or practices that help with coping during difficult times, and Other: Jewish - Tenriism  Family/Friends identify as spiritual, are connected with a brittanie tradition or spiritual practice, have beliefs or practices that help with coping during difficult times, and Other: Jewish - Tenriism      Importance and Influence:  Patient has spiritual/personal beliefs that influence decisions regarding their health  Family/Friends have spiritual/personal beliefs that influence decisions regarding the patient's health    Community:  Patient is connected with a spiritual community and feels well-supported. Support system includes: Spouse/Partner and Brittanie Community  Family/Friends are connected with a spiritual community: and feel well-supported. Support system includes: Spouse/Partner and Brittanie Community    Assessment and Plan of Care:     Patient Interventions include: Facilitated expression of thoughts and feelings, Engaged in theological reflection, Affirmed coping skills/support systems, and Other: prayer, active listening, ministry of presence  Family/Friends Interventions include: Facilitated expression of thoughts and feelings, Engaged in theological reflection, Affirmed coping skills/support systems, and Other: prayer, active listening, ministry of presence    Patient Plan of Care: No spiritual needs identified for

## 2025-01-08 NOTE — PLAN OF CARE
Problem: Chronic Conditions and Co-morbidities  Goal: Patient's chronic conditions and co-morbidity symptoms are monitored and maintained or improved  1/8/2025 1736 by Alayna Purvis RN  Outcome: Progressing  1/8/2025 1553 by Alison Juan RN  Outcome: Progressing     Problem: Discharge Planning  Goal: Discharge to home or other facility with appropriate resources  Outcome: Progressing     Problem: Skin/Tissue Integrity  Goal: Absence of new skin breakdown  Description: 1.  Monitor for areas of redness and/or skin breakdown  2.  Assess vascular access sites hourly  3.  Every 4-6 hours minimum:  Change oxygen saturation probe site  4.  Every 4-6 hours:  If on nasal continuous positive airway pressure, respiratory therapy assess nares and determine need for appliance change or resting period.  1/8/2025 1736 by Alayna Purvis RN  Outcome: Progressing  1/8/2025 1553 by Alison Juan RN  Outcome: Progressing     Problem: ABCDS Injury Assessment  Goal: Absence of physical injury  1/8/2025 1736 by Alayna Purvis RN  Outcome: Progressing  1/8/2025 1553 by Alison Juan RN  Outcome: Progressing     Problem: Respiratory - Adult  Goal: Achieves optimal ventilation and oxygenation  1/8/2025 1736 by Alayna Purvis RN  Outcome: Progressing  1/8/2025 1553 by Alison Juan RN  Outcome: Progressing     Problem: Cardiovascular - Adult  Goal: Maintains optimal cardiac output and hemodynamic stability  1/8/2025 1736 by Alayna Purvis RN  Outcome: Progressing  1/8/2025 1553 by Alison Juan RN  Outcome: Progressing  Goal: Absence of cardiac dysrhythmias or at baseline  1/8/2025 1736 by Alayna Purvis RN  Outcome: Progressing  1/8/2025 1553 by Alison Juan RN  Outcome: Progressing     Problem: Metabolic/Fluid and Electrolytes - Adult  Goal: Electrolytes maintained within normal limits  Outcome: Progressing     Problem: Safety - Adult  Goal: Free from fall injury  Outcome: Progressing     Problem: Neurosensory -  Adult  Goal: Achieves stable or improved neurological status  Outcome: Progressing  Goal: Achieves maximal functionality and self care  Outcome: Progressing     Problem: Skin/Tissue Integrity - Adult  Goal: Skin integrity remains intact  Outcome: Progressing  Flowsheets (Taken 1/8/2025 1731)  Skin Integrity Remains Intact: Monitor for areas of redness and/or skin breakdown     Problem: Musculoskeletal - Adult  Goal: Return mobility to safest level of function  Outcome: Progressing  Goal: Return ADL status to a safe level of function  Outcome: Progressing

## 2025-01-08 NOTE — PROGRESS NOTES
Comprehensive Nutrition Assessment    Type and Reason for Visit:  Initial (Low BMI)    Nutrition Recommendations/Plan:   Continue diet.  Ensure Plus HP x2/d.  Continue to monitor and record PO and ONS intakes, BM in I/Os.     Malnutrition Assessment:  Malnutrition Status:  Severe malnutrition (01/07/25 1546)    Context:  Chronic Illness     Findings of the 6 clinical characteristics of malnutrition:  Energy Intake:  75% or less estimated energy requirements for 1 month or longer  Weight Loss:  Greater than 7.5% over 3 months     Body Fat Loss:  Unable to assess     Muscle Mass Loss:  Unable to assess    Fluid Accumulation:  No fluid accumulation     Strength:  Not Performed    Nutrition Assessment:    Admitted for acute SOB. Screened for low BMI. Pt familiar to nutrition team, last seen 3 weeks ago w/ noted poor appetite/PO intakes-- baseline of 2 meals/d and Ensure shake x2/d(vanilla). Pt reported w/ affinity for soft foods (HB eggs, turkey, ham) PTA d/t h/o concern for swallowing difficulty. PO diet started today, noted improved intakes w/ 51-75% of Lunch and dinner trays. +severe wt loss x3 mths trended (10.3%). Continue diet, start ONS for adequate intakes. Noted plan for transfer to medical floor. Labs unremarkable. Meds: MVI, crestor, PPI, hydralazine, bumex, coreg, heparin, isordil.    Nutrition Related Findings:    NFPE deferred, working remotely-- noted mild fat and muscle wasting 3 wks ago. No N/V/D/C nor chewing/swallowing difficulties reported. +h/o swallowing difficulty, edentulous w/ bilateral dentures at admit 1 mth ago. Last BM PTA. No edema. Wound Type: None       Current Nutrition Intake & Therapies:    Average Meal Intake: 51-75%  Average Supplements Intake: None Ordered  ADULT DIET; Regular; Low Fat/Low Chol/High Fiber/2 gm Na    Anthropometric Measures:  Height: 160 cm (5' 2.99\")  Ideal Body Weight (IBW): 115 lbs (52 kg)       Current Body Weight: 45.3 kg (99 lb 13.9 oz) (1/7), 86.8 % IBW.

## 2025-01-08 NOTE — PROGRESS NOTES
Spiritual Health History and Assessment/Progress Note  [facility]    Initial Encounter,  ,  ,      Name: Saranya Hightower MRN: [id number]    Age: 74 y.o.     Sex: female   Language: English   Latter day: Rastafari   Hypertensive urgency, malignant     Date: 1/8/2025            Total Time Calculated: 9 min              Spiritual Assessment began in [facility] ICU            Encounter Overview/Reason: Initial Encounter  Service Provided For: Family, Patient not available    Brittanie, Belief, Meaning:   Patient unable to assess at this time  Family/Friends identify as spiritual, are connected with a brittanie tradition or spiritual practice, and have beliefs or practices that help with coping during difficult times      Importance and Influence:  Patient unable to assess at this time  Family/Friends have spiritual/personal beliefs that influence decisions regarding the patient's health    Community:  Patient Other: unable to assess, but family regularly at bedside  Family/Friends are connected with a spiritual community: and feel well-supported. Support system includes: Other: family members    Assessment and Plan of Care:     Patient Interventions include: Other: n/a  Family/Friends Interventions include: Facilitated expression of thoughts and feelings and Other: active listening, ministry of presence, words of encouragement    Patient Plan of Care: No spiritual needs identified for follow-up and Spiritual Care available upon further referral  Family/Friends Plan of Care: Spiritual Care available upon further referral    Today I followed up briefly with Pt's parents, who reported coping okay and appreciating progress. I assured them  support continues to be available. I note that Pt's girlfriend--who was absent at time of attempted visit--hasn't met a  yet. Will try to monitor so we can convey support to her as well.    Electronically signed by  Corey Montoya MDiv  Chaplain Resident   on 1/8/2025

## 2025-01-08 NOTE — PROGRESS NOTES
I have received the nephrology consultation    MAKENZIE on CKD IV  Acute CHF  Uncontrolled HTN    IV bumex 1 mg tid.  Will resume Amlodipine 10 mg daily.

## 2025-01-08 NOTE — PLAN OF CARE
Problem: Chronic Conditions and Co-morbidities  Goal: Patient's chronic conditions and co-morbidity symptoms are monitored and maintained or improved  Outcome: Progressing     Problem: Skin/Tissue Integrity  Goal: Absence of new skin breakdown  Description: 1.  Monitor for areas of redness and/or skin breakdown  2.  Assess vascular access sites hourly  3.  Every 4-6 hours minimum:  Change oxygen saturation probe site  4.  Every 4-6 hours:  If on nasal continuous positive airway pressure, respiratory therapy assess nares and determine need for appliance change or resting period.  Outcome: Progressing     Problem: ABCDS Injury Assessment  Goal: Absence of physical injury  Outcome: Progressing     Problem: Respiratory - Adult  Goal: Achieves optimal ventilation and oxygenation  Outcome: Progressing     Problem: Cardiovascular - Adult  Goal: Maintains optimal cardiac output and hemodynamic stability  Outcome: Progressing

## 2025-01-08 NOTE — PROGRESS NOTES
Hospitalist Progress Note    NAME:   Saranya Hightower   : 1950   MRN: [id number]     Date/Time: 2025 12:20 PM  Patient PCP: Nubia Davila APRN - NP    Estimated discharge date:24-48 hrs  Barriers: Diuresis, improvement in blood pressure      Assessment / Plan:  CHF exacerbation  - Recent echo in November with EF 30 to 35%, repeat with limited echo  - Continue Bumex 1 mg TID  - Initiate GDMT as tolerated  - Cardiology following, appreciate recs  - Strict I's and O's, daily weights  - Repeat CXR with improved aeration of the lung bases  - Troponin negative  - BNP significantly improved     Acute hypoxic respiratory failure  - Suspected secondary to above  - Continue diuresis  - Wean as tolerated, SpO2 goal greater than 90% on room air  - ABG unremarkable     Hypertensive urgency  - Continue slow titration of blood pressure  - Continue Coreg 25 mg twice daily, hydralazine 100 mg 3 times daily, Bumex  - Nephrology following, appreciate assistance     Hyperlipidemia  - Continue statin     CKD stage IV  - Creatinine improved from 2.62-2.48, EGFR 20, BUN 23  - Avoid nephrotoxic agents, contrast  - Monitor for worsening with Bumex  - Nephrology following, appreciate recs     COPD  - Encourage smoking cessation  - Consider DuoNebs, Symbicort if failing to improve respiratory status    Code Status: Full  DVT Prophylaxis: Heparin  GI Prophylaxis: Protonix    --------------------------------------------------------------------  [x] High (any 2)    A. Problems (any 1)  [x] Acute/Chronic Illness/injury posing threat to life or bodily function: CHF exacerbation  [] Severe exacerbation of chronic illness:    ---------------------------------------------------------------------  B. Risk of Treatment (any 1)   [x] Drugs/treatments that require intensive monitoring for toxicity include:    [] IV ABX requiring serial renal monitoring for nephrotoxicity:     [] IV Narcotic analgesia for adverse drug reaction  [x]

## 2025-01-08 NOTE — PROGRESS NOTES
Received Order for Telemetry     Saranya Hightower   1950   [id number]   Hypertensive urgency, malignant [I16.0]   Bernardo Juan MD     Tele Box # [id number] placed on patient at  1708 pm  ER Room #   Admitting to Room 462  Verified with Primary Nurse ASMITA at  1708 pm

## 2025-01-08 NOTE — PROGRESS NOTES
4 Eyes Skin Assessment     NAME:  Saranya Hightower  YOB: 1950  MEDICAL RECORD NUMBER:  [id number]    The patient is being assessed for  Transfer to New Unit    I agree that at least one RN has performed a thorough Head to Toe Skin Assessment on the patient. ALL assessment sites listed below have been assessed.      Areas assessed by both nurses:    Head, Face, Ears, Shoulders, Back, Chest, Arms, Elbows, Hands, Sacrum. Buttock, Coccyx, Ischium, Legs. Feet and Heels, and Under Medical Devices         Does the Patient have a Wound? No noted wound(s)       Goyo Prevention initiated by RN: Yes  Wound Care Orders initiated by RN: No    Pressure Injury (Stage 3,4, Unstageable, DTI, NWPT, and Complex wounds) if present, place Wound referral order by RN under : No    New Ostomies, if present place, Ostomy referral order under : No     Nurse 1 eSignature: Electronically signed by Alayna Purvis RN on 1/8/25 at 5:49 PM EST    **SHARE this note so that the co-signing nurse can place an eSignature**    Nurse 2 eSignature: {Esignature:[id number]}

## 2025-01-08 NOTE — CONSULTS
Nephrology Consultation          Patient: Saranya Hightower MRN: [id number]  SSN: [id number]    YOB: 1950  Age: 74 y.o.  Sex: female      Subjective:   Reason for the consultation.  Acute kidney injury on chronic kidney stage IV, fluid overload  History of present illness.  The patient is 74-year-old woman with underlying history of CKD stage IV, follow-up with me as an outpatient, hypertension, COPD, history of congestive heart failure with LVEF 30 to 35%, AAA repair, status post carotid artery endarterectomy, peripheral vascular disease was hospitalized for worsening shortness of breath and diagnosed with decompensated CHF requiring BiPAP support initially and currently on nasal cannula 4 L.  She was also severely hypertensive on arrival and initially required nicardipine drip.  Initial chemistry showed elevated serum creatinine of 2.62 from her baseline of 2.4.  No noticed lower extremity swelling.  She is on nasal cannula 4 L.  No voiding issues so far.  Past Medical History:   Diagnosis Date    AAA (abdominal aortic aneurysm) ([facility])     S/P STENTING    Abnormal weight loss 09/11/2021    Anemia     Atherosclerosis of native coronary artery without angina pectoris 09/11/2021    Bronchitis     CAD (coronary artery disease)     MI and hx of stents Lcx and RCA 8/2017    Carotid artery stenosis 09/11/2021    Cervicalgia 09/11/2021    Chronic bronchitis ([facility])     CKD (chronic kidney disease) stage 4, GFR 15-29 ml/min ([facility])     Colon polyps     Constipation 09/11/2021    COPD (chronic obstructive pulmonary disease) ([facility])     COPD (chronic obstructive pulmonary disease) ([facility])     HTN (hypertension) 09/11/2021    Hyperlipidemia     Menopause     Personal history of colonic polyps 09/11/2021    PVD (peripheral vascular disease) ([facility]) 2019    s/p left to right fem-fem bypass    Vitamin D deficiency 09/11/2021     Past Surgical History:   Procedure Laterality Date    CARDIAC CATHETERIZATION      X 3     loop diuretics  Potassium 3.0  P.o. KCl replacement.  Goal K is 4.0.  Magnesium is 2.5.    Hypertension  Initially was with severe hypertension.  Required transiently nicardipine drip.  Better blood pressures normal.  On hydralazine 100 mL 3 times daily/amlodipine 10 mg/Isordil 20 mg 3 times daily.  Renal duplex in September 2024 < 60% BL KRISTEN.    Acute CHF  LV systolic dysfunction  LVEF 30 to 35%.  Status post AICD placement  Initially required BiPAP support.  Currently on nasal cannula 4 L.  On admission chest x-ray pulmonary edema  Clinically responding to diuretics.  IV Bumex 1 mg 3 times daily.  Uncontrolled hypertension.  Renal duplex in September 2024 < 60% BL KRISTEN.    DVT prophylaxis  Agree with unfractioned subcu heparin.    Thank you for the consultation.            Plan:       Signed By: Elian Duncan MD     January 8, 2025

## 2025-01-08 NOTE — PROGRESS NOTES
Progress Note      1/8/2025 12:28 PM  NAME: Saranya Hightower   MRN:  [id number]   Admit Diagnosis: Hypertensive urgency, malignant [I16.0]      Problem List:   Acute on chronic CHF  Cardiomyopathy with a EF 30 to 35%  AICD  Uncontrolled hypertension  Tobacco use/addiction  CKD  Peripheral vascular disease     Assessment/Plan:   IV diuretics underway, nephrology input appreciated  Echo today shows EF about at baseline  Needs lifestyle modification         []       High complexity decision making was performed in this patient at high risk for decompensation with multiple organ involvement.    Subjective:     Saranya Hightower denies chest pain, dyspnea.  Discussed with RN events overnight.     Review of Systems:   Negative except for as noted above.    Objective:      Physical Exam:    Last 24hrs VS reviewed since prior progress note. Most recent are:    BP (!) 143/67   Pulse 72   Temp 98.6 °F (37 °C) (Oral)   Resp 20   Ht 1.6 m (5' 2.99\")   Wt 45.1 kg (99 lb 6.8 oz)   SpO2 100%   BMI 17.62 kg/m²     Intake/Output Summary (Last 24 hours) at 1/8/2025 1228  Last data filed at 1/7/2025 1800  Gross per 24 hour   Intake 750 ml   Output --   Net 750 ml        General Appearance: Alert; no acute distress.  Ears/Nose/Mouth/Throat: moist mucous membranes  Neck: Supple.  Chest: Lungs clear to auscultation bilaterally.  Cardiovascular: Regular rate and rhythm, S1S2 normal  Abdomen: Soft, non-tender, bowel sounds are active.  Extremities: No edema bilaterally.  Skin: Warm and dry.      PMH/SH reviewed - no change compared to H&P    Telemetry: NSR with PVC's        []  No new EKG for review    Lab Data Personally Reviewed:    Recent Labs     01/06/25  0510   WBC 9.8   HGB 11.9   HCT 35.1        No results for input(s): \"INR\", \"APTT\" in the last 72 hours.    Invalid input(s): \"PTP\"   Recent Labs     01/06/25  0510 01/08/25  1036    137   K 4.6 3.0*    102   CO2 24 29   BUN 27* 23*   MG 2.5*  --      No  results for input(s): \"CPK\" in the last 72 hours.    Invalid input(s): \"CPKMB\", \"CKNDX\", \"TROIQ\"  Lab Results   Component Value Date/Time    CHOL 219 11/13/2024 08:16 AM    HDL 66 11/13/2024 08:16 AM    .8 11/13/2024 08:16 AM       Recent Labs     01/06/25  0510   GLOB 4.3*     No results for input(s): \"PH\", \"PCO2\", \"PO2\" in the last 72 hours.    Medications Personally Reviewed:    Current Facility-Administered Medications   Medication Dose Route Frequency    potassium chloride (KLOR-CON M) extended release tablet 40 mEq  40 mEq Oral Q4H    hydrALAZINE (APRESOLINE) injection 20 mg  20 mg IntraVENous Q4H PRN    hydrALAZINE (APRESOLINE) tablet 100 mg  100 mg Oral TID    amLODIPine (NORVASC) tablet 10 mg  10 mg Oral Daily    bumetanide (BUMEX) injection 1 mg  1 mg IntraVENous TID    sodium chloride flush 0.9 % injection 5-40 mL  5-40 mL IntraVENous 2 times per day    sodium chloride flush 0.9 % injection 5-40 mL  5-40 mL IntraVENous PRN    0.9 % sodium chloride infusion   IntraVENous PRN    ondansetron (ZOFRAN-ODT) disintegrating tablet 4 mg  4 mg Oral Q8H PRN    Or    ondansetron (ZOFRAN) injection 4 mg  4 mg IntraVENous Q6H PRN    polyethylene glycol (GLYCOLAX) packet 17 g  17 g Oral Daily PRN    acetaminophen (TYLENOL) tablet 650 mg  650 mg Oral Q6H PRN    Or    acetaminophen (TYLENOL) suppository 650 mg  650 mg Rectal Q6H PRN    heparin (porcine) injection 5,000 Units  5,000 Units SubCUTAneous 3 times per day    carvedilol (COREG) tablet 25 mg  25 mg Oral BID    isosorbide dinitrate (ISORDIL) tablet 20 mg  20 mg Oral TID    multivitamin 1 tablet  1 tablet Oral Daily    OLANZapine (ZYPREXA) tablet 2.5 mg  2.5 mg Oral Nightly    pantoprazole (PROTONIX) tablet 40 mg  40 mg Oral Daily    rosuvastatin (CRESTOR) tablet 10 mg  10 mg Oral Daily         Ariana Alston MD

## 2025-01-08 NOTE — CARE COORDINATION
0710: Chart reviewed.    Per notes; patient on supplemental oxygen and IV Bumex followed via cardiology, dietitian and nephrology.    [facility] accepted patient for resumption of services when medically cleared for discharge.    Clinicals attached in [facility].    CM will continue to follow patient and recs of medical team.    1343: Updates attached in [facility].

## 2025-01-09 ENCOUNTER — APPOINTMENT (OUTPATIENT)
Facility: HOSPITAL | Age: 75
End: 2025-01-09
Attending: INTERNAL MEDICINE
Payer: MEDICARE

## 2025-01-09 LAB
ALBUMIN SERPL-MCNC: 3.2 G/DL (ref 3.5–5)
ANION GAP SERPL CALC-SCNC: 4 MMOL/L (ref 2–12)
BUN SERPL-MCNC: 32 MG/DL (ref 6–20)
BUN/CREAT SERPL: 13 (ref 12–20)
CA-I BLD-MCNC: 9.4 MG/DL (ref 8.5–10.1)
CHLORIDE SERPL-SCNC: 102 MMOL/L (ref 97–108)
CO2 SERPL-SCNC: 31 MMOL/L (ref 21–32)
CREAT SERPL-MCNC: 2.5 MG/DL (ref 0.55–1.02)
GLUCOSE SERPL-MCNC: 106 MG/DL (ref 65–100)
PHOSPHATE SERPL-MCNC: 2.2 MG/DL (ref 2.6–4.7)
POTASSIUM SERPL-SCNC: 4.1 MMOL/L (ref 3.5–5.1)
SODIUM SERPL-SCNC: 137 MMOL/L (ref 136–145)

## 2025-01-09 PROCEDURE — 94761 N-INVAS EAR/PLS OXIMETRY MLT: CPT

## 2025-01-09 PROCEDURE — 2700000000 HC OXYGEN THERAPY PER DAY

## 2025-01-09 PROCEDURE — 6360000002 HC RX W HCPCS: Performed by: INTERNAL MEDICINE

## 2025-01-09 PROCEDURE — 2060000000 HC ICU INTERMEDIATE R&B

## 2025-01-09 PROCEDURE — 36415 COLL VENOUS BLD VENIPUNCTURE: CPT

## 2025-01-09 PROCEDURE — 6370000000 HC RX 637 (ALT 250 FOR IP): Performed by: INTERNAL MEDICINE

## 2025-01-09 PROCEDURE — 71045 X-RAY EXAM CHEST 1 VIEW: CPT

## 2025-01-09 PROCEDURE — 80069 RENAL FUNCTION PANEL: CPT

## 2025-01-09 PROCEDURE — 2500000003 HC RX 250 WO HCPCS: Performed by: INTERNAL MEDICINE

## 2025-01-09 RX ORDER — BUMETANIDE 0.25 MG/ML
1 INJECTION, SOLUTION INTRAMUSCULAR; INTRAVENOUS 2 TIMES DAILY
Status: DISCONTINUED | OUTPATIENT
Start: 2025-01-10 | End: 2025-01-10 | Stop reason: HOSPADM

## 2025-01-09 RX ADMIN — AMLODIPINE BESYLATE 10 MG: 5 TABLET ORAL at 08:20

## 2025-01-09 RX ADMIN — OLANZAPINE 2.5 MG: 5 TABLET, FILM COATED ORAL at 20:40

## 2025-01-09 RX ADMIN — HYDRALAZINE HYDROCHLORIDE 100 MG: 50 TABLET ORAL at 20:40

## 2025-01-09 RX ADMIN — HYDRALAZINE HYDROCHLORIDE 100 MG: 50 TABLET ORAL at 15:14

## 2025-01-09 RX ADMIN — SODIUM CHLORIDE, PRESERVATIVE FREE 10 ML: 5 INJECTION INTRAVENOUS at 20:40

## 2025-01-09 RX ADMIN — PANTOPRAZOLE SODIUM 40 MG: 40 TABLET, DELAYED RELEASE ORAL at 08:20

## 2025-01-09 RX ADMIN — ROSUVASTATIN CALCIUM 10 MG: 5 TABLET, FILM COATED ORAL at 08:20

## 2025-01-09 RX ADMIN — ISOSORBIDE DINITRATE 20 MG: 20 TABLET ORAL at 15:17

## 2025-01-09 RX ADMIN — HEPARIN SODIUM 5000 UNITS: 5000 INJECTION INTRAVENOUS; SUBCUTANEOUS at 15:14

## 2025-01-09 RX ADMIN — BUMETANIDE 1 MG: 0.25 INJECTION INTRAMUSCULAR; INTRAVENOUS at 15:17

## 2025-01-09 RX ADMIN — HEPARIN SODIUM 5000 UNITS: 5000 INJECTION INTRAVENOUS; SUBCUTANEOUS at 06:24

## 2025-01-09 RX ADMIN — HYDRALAZINE HYDROCHLORIDE 100 MG: 50 TABLET ORAL at 08:20

## 2025-01-09 RX ADMIN — ISOSORBIDE DINITRATE 20 MG: 20 TABLET ORAL at 08:20

## 2025-01-09 RX ADMIN — THERA TABS 1 TABLET: TAB at 08:20

## 2025-01-09 RX ADMIN — CARVEDILOL 25 MG: 12.5 TABLET, FILM COATED ORAL at 20:40

## 2025-01-09 RX ADMIN — SODIUM CHLORIDE, PRESERVATIVE FREE 10 ML: 5 INJECTION INTRAVENOUS at 08:21

## 2025-01-09 RX ADMIN — BUMETANIDE 1 MG: 0.25 INJECTION INTRAMUSCULAR; INTRAVENOUS at 08:21

## 2025-01-09 RX ADMIN — CARVEDILOL 25 MG: 12.5 TABLET, FILM COATED ORAL at 08:20

## 2025-01-09 RX ADMIN — HEPARIN SODIUM 5000 UNITS: 5000 INJECTION INTRAVENOUS; SUBCUTANEOUS at 20:40

## 2025-01-09 RX ADMIN — ISOSORBIDE DINITRATE 20 MG: 20 TABLET ORAL at 20:40

## 2025-01-09 ASSESSMENT — ENCOUNTER SYMPTOMS
COUGH: 0
NAUSEA: 0
ABDOMINAL PAIN: 0
WHEEZING: 0
SHORTNESS OF BREATH: 0
VOMITING: 0
BACK PAIN: 0

## 2025-01-09 NOTE — PROGRESS NOTES
Hospitalist Progress Note    NAME:   Saranya Hightower   : 1950   MRN: [id number]     Date/Time: 2025 12:23 PM  Patient PCP: Nubia Davila APRN - NP    Estimated discharge date:24-48 hrs  Barriers: Diuresis, improvement in blood pressure      Assessment / Plan:  CHF exacerbation  - Recent echo in November with EF 30 to 35%, repeat with limited echo  - Continue Bumex 1 mg TID  - Initiate GDMT as tolerated  - Cardiology following, appreciate recs  - Strict I's and O's, daily weights  - Repeat CXR with improved mild pulmonary edema  - Repeat echo shows EF about at baseline to 35%  - Troponin negative  - BNP significantly improved     Acute hypoxic respiratory failure  - Suspected secondary to above  - Continue diuresis  - Wean as tolerated, SpO2 goal greater than 90% on room air, currently on 2 L, baseline 0 L  - ABG unremarkable     Hypertensive urgency  - Continue slow titration of blood pressure  - Continue Coreg 25 mg twice daily, hydralazine 100 mg 3 times daily, Bumex  - Nephrology following, appreciate assistance     Hyperlipidemia  - Continue statin     CKD stage IV  - Creatinine continuing to improve  - Avoid nephrotoxic agents, contrast  - Monitor for worsening with Bumex  - Nephrology following, appreciate recs     COPD  - Encourage smoking cessation  - Consider DuoNebs, Symbicort if failing to improve respiratory status    Code Status: Full  DVT Prophylaxis: Heparin  GI Prophylaxis: Protonix    --------------------------------------------------------------------  [x] High (any 2)    A. Problems (any 1)  [x] Acute/Chronic Illness/injury posing threat to life or bodily function: CHF exacerbation  [] Severe exacerbation of chronic illness:    ---------------------------------------------------------------------  B. Risk of Treatment (any 1)   [x] Drugs/treatments that require intensive monitoring for toxicity include:    [] IV ABX requiring serial renal monitoring for nephrotoxicity:     []

## 2025-01-09 NOTE — PLAN OF CARE
Problem: Chronic Conditions and Co-morbidities  Goal: Patient's chronic conditions and co-morbidity symptoms are monitored and maintained or improved  1/9/2025 0735 by Alayna Purvis RN  Outcome: Progressing  1/9/2025 0543 by Adrianna Hallman RN  Outcome: Progressing  1/8/2025 1736 by Alayna Purvis RN  Outcome: Progressing     Problem: Discharge Planning  Goal: Discharge to home or other facility with appropriate resources  1/9/2025 0735 by Alayna Purvis RN  Outcome: Progressing  1/9/2025 0543 by Adrianna Hallman RN  Outcome: Progressing  1/8/2025 1736 by Alayna Purvis RN  Outcome: Progressing     Problem: Skin/Tissue Integrity  Goal: Absence of new skin breakdown  Description: 1.  Monitor for areas of redness and/or skin breakdown  2.  Assess vascular access sites hourly  3.  Every 4-6 hours minimum:  Change oxygen saturation probe site  4.  Every 4-6 hours:  If on nasal continuous positive airway pressure, respiratory therapy assess nares and determine need for appliance change or resting period.  1/9/2025 0735 by Alayna Purvis RN  Outcome: Progressing  1/9/2025 0543 by Adrianna Hallman RN  Outcome: Progressing  1/8/2025 1736 by Alayna Purvis RN  Outcome: Progressing     Problem: ABCDS Injury Assessment  Goal: Absence of physical injury  1/9/2025 0735 by Alayna Purvis RN  Outcome: Progressing  1/9/2025 0543 by Adrianna Hallman RN  Outcome: Progressing  1/8/2025 1736 by Alayna Purvis RN  Outcome: Progressing     Problem: Respiratory - Adult  Goal: Achieves optimal ventilation and oxygenation  1/9/2025 0735 by Alayna Purvis RN  Outcome: Progressing  1/9/2025 0543 by Adrianna Hallman RN  Outcome: Progressing  1/8/2025 1736 by Alayna Purvis RN  Outcome: Progressing     Problem: Cardiovascular - Adult  Goal: Maintains optimal cardiac output and hemodynamic stability  1/9/2025 0735 by Alayna Purvis RN  Outcome: Progressing  1/9/2025 0543 by Adrianna Hallman, RN  Outcome:  Progressing  1/8/2025 1736 by Alayna Purvis RN  Outcome: Progressing  Goal: Absence of cardiac dysrhythmias or at baseline  1/9/2025 0735 by Alayna Purvis RN  Outcome: Progressing  1/9/2025 0543 by Adrianna Hallman RN  Outcome: Progressing  1/8/2025 1736 by Alayna Purvis RN  Outcome: Progressing     Problem: Metabolic/Fluid and Electrolytes - Adult  Goal: Electrolytes maintained within normal limits  1/9/2025 0735 by Alayna Purvis RN  Outcome: Progressing  1/9/2025 0543 by Adrianna Hallman RN  Outcome: Progressing  1/8/2025 1736 by Alayna Purvis RN  Outcome: Progressing     Problem: Safety - Adult  Goal: Free from fall injury  1/9/2025 0735 by Alayna Purvis RN  Outcome: Progressing  1/9/2025 0543 by Adrianna Hallman RN  Outcome: Progressing  1/8/2025 1736 by Alayna Purvis RN  Outcome: Progressing     Problem: Neurosensory - Adult  Goal: Achieves stable or improved neurological status  1/9/2025 0735 by Alayna Purvis RN  Outcome: Progressing  1/9/2025 0543 by Adrianna Hallman RN  Outcome: Progressing  1/8/2025 1736 by Alayna Purvis RN  Outcome: Progressing  Goal: Achieves maximal functionality and self care  1/9/2025 0735 by Alayna Purvis RN  Outcome: Progressing  1/9/2025 0543 by Adrianna Hallman RN  Outcome: Progressing  1/8/2025 1736 by Alayna Pruvis RN  Outcome: Progressing     Problem: Skin/Tissue Integrity - Adult  Goal: Skin integrity remains intact  1/9/2025 0735 by Alayna Purvis RN  Outcome: Progressing  Flowsheets (Taken 1/9/2025 0733)  Skin Integrity Remains Intact: Monitor for areas of redness and/or skin breakdown  1/9/2025 0543 by Adrianna Hallman RN  Outcome: Progressing  1/8/2025 1736 by Alayna Purvis RN  Outcome: Progressing  Flowsheets (Taken 1/8/2025 8509)  Skin Integrity Remains Intact: Monitor for areas of redness and/or skin breakdown     Problem: Musculoskeletal - Adult  Goal: Return mobility to safest level of function  1/9/2025 0735 by Yaniv,

## 2025-01-09 NOTE — PROGRESS NOTES
Physician Progress Note      PATIENT:               FLORIDA RICHARDSON  CSN #:                  [id number]  :                       1950  ADMIT DATE:       2025 10:33 AM  DISCH DATE:  RESPONDING  PROVIDER #:        Jeancarlos Shook PA-C          QUERY TEXT:    Patient admitted with CHF. Noted to have Severe Malnutrition in  Dietician   consult note. If possible, please document in progress notes and discharge   summary if you are evaluating and /or treating any of the following:    The medical record reflects the following:  Risk Factors: 74 year old female, COPD, CHF, BMI 17.7, Albumin 3.2  Clinical Indicators:  Dietician consult - Malnutrition Status:  Severe   malnutrition (25 1546)  Context:  Chronic Illness  Findings of the 6 clinical characteristics of malnutrition:  Energy Intake:  75% or less estimated energy requirements for 1 month or   longer  Weight Loss:  Greater than 7.5% over 3 months  NFPE deferred, working remotely-- noted mild fat and muscle wasting 3 wks ago.  Treatment: 1. Continue diet.  2. Ensure Plus HP x2/d.  3. Continue to monitor and record PO and ONS intakes, BM in I/Os.      ASPEN Criteria:    https://aspenjournals.onlinelibrary.ta.com/doi/full/10.1177/129390287346922  5        Please email [email or web address] with any questions  Options provided:  -- Protein calorie malnutrition severe  -- Other - I will add my own diagnosis  -- Disagree - Not applicable / Not valid  -- Disagree - Clinically unable to determine / Unknown  -- Refer to Clinical Documentation Reviewer    PROVIDER RESPONSE TEXT:    This patient has severe protein calorie malnutrition.    Query created by: Jocelyn Mack on 2025 1:27 PM      Electronically signed by:  Jeancarlos Shook PA-C 2025 1:52 PM

## 2025-01-09 NOTE — PROGRESS NOTES
Progress Note      1/9/2025 12:21 PM  NAME: Saranya Hightower   MRN:  [id number]   Admit Diagnosis: Hypertensive urgency, malignant [I16.0]      Problem List:   Acute on chronic CHF  Cardiomyopathy with a EF 30 to 35%  AICD  Uncontrolled hypertension  Tobacco use/addiction  CKD  Peripheral vascular disease     Assessment/Plan:   IV diuretic Bumex  Echo today shows EF about at baseline at 35%  Needs lifestyle modification         []       High complexity decision making was performed in this patient at high risk for decompensation with multiple organ involvement.    Subjective:     Saranya Hightower denies chest pain, dyspnea.  Discussed with RN events overnight.     Review of Systems:   Negative except for as noted above.    Objective:      Physical Exam:    Last 24hrs VS reviewed since prior progress note. Most recent are:    /71   Pulse 79   Temp 98 °F (36.7 °C) (Oral)   Resp 18   Ht 1.6 m (5' 2.99\")   Wt 45.1 kg (99 lb 6.8 oz)   SpO2 100%   BMI 17.62 kg/m²     Intake/Output Summary (Last 24 hours) at 1/9/2025 1221  Last data filed at 1/9/2025 0948  Gross per 24 hour   Intake 120 ml   Output 600 ml   Net -480 ml        General Appearance: Alert; no acute distress.  Ears/Nose/Mouth/Throat: moist mucous membranes  Neck: Supple.  Chest: Lungs clear to auscultation bilaterally.  Cardiovascular: Regular rate and rhythm, S1S2 normal  Abdomen: Soft, non-tender, bowel sounds are active.  Extremities: No edema bilaterally.  Skin: Warm and dry.      PMH/SH reviewed - no change compared to H&P    Telemetry: NSR with PVC's        []  No new EKG for review    Lab Data Personally Reviewed:    Recent Labs     01/08/25  1902   WBC 7.6   HGB 11.2*   HCT 33.2*        No results for input(s): \"INR\", \"APTT\" in the last 72 hours.    Invalid input(s): \"PTP\"   Recent Labs     01/08/25  1036 01/09/25  0149    137   K 3.0* 4.1    102   CO2 29 31   BUN 23* 32*     No results for input(s): \"CPK\" in the last 72

## 2025-01-09 NOTE — PROGRESS NOTES
Patient DCP is home with her  and [facility] has accepted and will SOC is 1/13/25.       Patient currently on 3 LPM of oxygen, followed by cardiology, and nephrology. Likely another 48 hours for discharge, once BP better controlled.

## 2025-01-09 NOTE — PROGRESS NOTES
Nephrology follow-up          Patient: Saranya Hightower MRN: [id number]  SSN: [id number]    YOB: 1950  Age: 74 y.o.  Sex: female      Subjective:   The patient is seen at the bedside.  She looks comfortable and feels better  Blood pressures are normal to elevated  On nasal cannula 2 L from 4 L  Creatinine 2.5  No lower extremity swelling  Past Medical History:   Diagnosis Date    AAA (abdominal aortic aneurysm) ([facility])     S/P STENTING    Abnormal weight loss 09/11/2021    Anemia     Atherosclerosis of native coronary artery without angina pectoris 09/11/2021    Bronchitis     CAD (coronary artery disease)     MI and hx of stents Lcx and RCA 8/2017    Carotid artery stenosis 09/11/2021    Cervicalgia 09/11/2021    Chronic bronchitis ([facility])     CKD (chronic kidney disease) stage 4, GFR 15-29 ml/min ([facility])     Colon polyps     Constipation 09/11/2021    COPD (chronic obstructive pulmonary disease) ([facility])     COPD (chronic obstructive pulmonary disease) ([facility])     HTN (hypertension) 09/11/2021    Hyperlipidemia     Menopause     Personal history of colonic polyps 09/11/2021    PVD (peripheral vascular disease) ([facility]) 2019    s/p left to right fem-fem bypass    Vitamin D deficiency 09/11/2021     Past Surgical History:   Procedure Laterality Date    CARDIAC CATHETERIZATION      X 3    CARDIAC DEFIBRILLATOR PLACEMENT  09/16/2013    COLONOSCOPY N/A 10/5/2021    COLONOSCOPY ( T I V A) performed by Talib Ortega MD at [facility] ENDOSCOPY    COLONOSCOPY  2015    ORTHOPEDIC SURGERY      R leg    OTHER SURGICAL HISTORY      BILAT LOWER EXTREM VASC BYPASS SURGERY    OTHER SURGICAL HISTORY      R CAROTID ENDORECTOMY 11-4-05,   L CAROTID ENDARECTOMY 11-6-2006    PACEMAKER      6 0r 7 years ago      Family History   Problem Relation Age of Onset    No Known Problems Father     Cancer Mother     Hypertension Mother      Social History     Tobacco Use    Smoking status: Every Day     Current packs/day: 0.50     Types:  tablet 40 mg  40 mg Oral Daily Leny Dang, DO   40 mg at 01/09/25 0820    rosuvastatin (CRESTOR) tablet 10 mg  10 mg Oral Daily Leny Dang DO   10 mg at 01/09/25 0820        No Known Allergies    Review of Systems:  A comprehensive review of systems was negative except for that written in the History of Present Illness.    Objective:     Vitals:    01/09/25 0700 01/09/25 0721 01/09/25 1040 01/09/25 1449   BP:  (!) 170/82 129/71 (!) 158/85   Pulse: 81 80 79 82   Resp:  18 18 18   Temp:  98.1 °F (36.7 °C) 98 °F (36.7 °C) 97.7 °F (36.5 °C)   TempSrc:  Oral Oral Axillary   SpO2:  100% 100% 100%   Weight:       Height:            Physical Exam:  General: NAD  Eyes: sclera anicteric  Oral Cavity: No thrush or ulcers  Neck: no JVD  Chest: Fair bilateral air entry  Heart: normal sounds  Abdomen: soft and non tender   : no stewart  Lower Extremities: no edema  Skin: no rash  Neuro: intact  Psychiatric: non-depressed          Assessment/Plan:   Acute kidney injury on chronic kidney stage IV  2/2 cardiorenal syndrome  On admission creatinine was 2.6.  Creatinine is 2.5 today.  Baseline creatinine seems to be around 2.4.  Multiple recent MAKENZIE's.  Clinically volume overload.  On nasal cannula 4 L->2 L.  I will decrease IV Bumex 1 mg twice daily.    Hypokalemia  From loop diuretics  Potassium 3.0->4.1.  Received p.o. KCl replacement.  Goal K is 4.0.  Magnesium is 2.5.    Hypertension  Initially was with severe hypertension.  Required transiently nicardipine drip.  Better blood pressures normal.  On hydralazine 100 mg 3 times daily/amlodipine 10 mg/Isordil 20 mg 3 times daily.  Renal duplex in September 2024 < 60% BL KRISTEN.    Acute CHF  LV systolic dysfunction  LVEF 30 to 35%.  Status post AICD placement  Initially required BiPAP support.  Currently on nasal cannula 4-> 2 L.  On admission chest x-ray pulmonary edema  Clinically responding to diuretics.  Uncontrolled hypertension.  Renal duplex in September 2024 < 60% BL

## 2025-01-10 VITALS
HEART RATE: 86 BPM | BODY MASS INDEX: 18.48 KG/M2 | DIASTOLIC BLOOD PRESSURE: 81 MMHG | RESPIRATION RATE: 20 BRPM | WEIGHT: 104.28 LBS | OXYGEN SATURATION: 98 % | SYSTOLIC BLOOD PRESSURE: 149 MMHG | HEIGHT: 63 IN | TEMPERATURE: 97.7 F

## 2025-01-10 PROBLEM — I16.0 HYPERTENSIVE URGENCY, MALIGNANT: Status: RESOLVED | Noted: 2025-01-06 | Resolved: 2025-01-10

## 2025-01-10 PROBLEM — J96.91 HYPOXIC RESPIRATORY FAILURE: Status: ACTIVE | Noted: 2024-11-13

## 2025-01-10 PROBLEM — E43 SEVERE MALNUTRITION (HCC): Status: ACTIVE | Noted: 2025-01-10

## 2025-01-10 LAB
ALBUMIN SERPL-MCNC: 3.6 G/DL (ref 3.5–5)
ANION GAP SERPL CALC-SCNC: 7 MMOL/L (ref 2–12)
BACTERIA SPEC CULT: NORMAL
BASOPHILS # BLD: 0.03 K/UL (ref 0–0.1)
BASOPHILS NFR BLD: 0.5 % (ref 0–1)
BUN SERPL-MCNC: 38 MG/DL (ref 6–20)
BUN/CREAT SERPL: 13 (ref 12–20)
CA-I BLD-MCNC: 9.6 MG/DL (ref 8.5–10.1)
CHLORIDE SERPL-SCNC: 98 MMOL/L (ref 97–108)
CO2 SERPL-SCNC: 30 MMOL/L (ref 21–32)
CREAT SERPL-MCNC: 2.92 MG/DL (ref 0.55–1.02)
DIFFERENTIAL METHOD BLD: ABNORMAL
EOSINOPHIL # BLD: 0.13 K/UL (ref 0–0.4)
EOSINOPHIL NFR BLD: 2.1 % (ref 0–7)
ERYTHROCYTE [DISTWIDTH] IN BLOOD BY AUTOMATED COUNT: 14.1 % (ref 11.5–14.5)
GLUCOSE SERPL-MCNC: 82 MG/DL (ref 65–100)
HCT VFR BLD AUTO: 32.2 % (ref 35–47)
HGB BLD-MCNC: 10.8 G/DL (ref 11.5–16)
IMM GRANULOCYTES # BLD AUTO: 0.02 K/UL (ref 0–0.04)
IMM GRANULOCYTES NFR BLD AUTO: 0.3 % (ref 0–0.5)
LYMPHOCYTES # BLD: 1.99 K/UL (ref 0.8–3.5)
LYMPHOCYTES NFR BLD: 31.4 % (ref 12–49)
Lab: NORMAL
MCH RBC QN AUTO: 33 PG (ref 26–34)
MCHC RBC AUTO-ENTMCNC: 33.5 G/DL (ref 30–36.5)
MCV RBC AUTO: 98.5 FL (ref 80–99)
MONOCYTES # BLD: 0.69 K/UL (ref 0–1)
MONOCYTES NFR BLD: 10.9 % (ref 5–13)
NEUTS SEG # BLD: 3.47 K/UL (ref 1.8–8)
NEUTS SEG NFR BLD: 54.8 % (ref 32–75)
NRBC # BLD: 0 K/UL (ref 0–0.01)
NRBC BLD-RTO: 0 PER 100 WBC
PHOSPHATE SERPL-MCNC: 2.9 MG/DL (ref 2.6–4.7)
PLATELET # BLD AUTO: 174 K/UL (ref 150–400)
PMV BLD AUTO: 12.3 FL (ref 8.9–12.9)
POTASSIUM SERPL-SCNC: 3.4 MMOL/L (ref 3.5–5.1)
RBC # BLD AUTO: 3.27 M/UL (ref 3.8–5.2)
SODIUM SERPL-SCNC: 135 MMOL/L (ref 136–145)
WBC # BLD AUTO: 6.3 K/UL (ref 3.6–11)

## 2025-01-10 PROCEDURE — 80069 RENAL FUNCTION PANEL: CPT

## 2025-01-10 PROCEDURE — 6360000002 HC RX W HCPCS: Performed by: INTERNAL MEDICINE

## 2025-01-10 PROCEDURE — 36415 COLL VENOUS BLD VENIPUNCTURE: CPT

## 2025-01-10 PROCEDURE — 6370000000 HC RX 637 (ALT 250 FOR IP): Performed by: INTERNAL MEDICINE

## 2025-01-10 PROCEDURE — 2500000003 HC RX 250 WO HCPCS: Performed by: INTERNAL MEDICINE

## 2025-01-10 PROCEDURE — 6370000000 HC RX 637 (ALT 250 FOR IP): Performed by: PHYSICIAN ASSISTANT

## 2025-01-10 PROCEDURE — 85025 COMPLETE CBC W/AUTO DIFF WBC: CPT

## 2025-01-10 PROCEDURE — 94640 AIRWAY INHALATION TREATMENT: CPT

## 2025-01-10 PROCEDURE — 94761 N-INVAS EAR/PLS OXIMETRY MLT: CPT

## 2025-01-10 RX ORDER — IPRATROPIUM BROMIDE AND ALBUTEROL SULFATE 2.5; .5 MG/3ML; MG/3ML
1 SOLUTION RESPIRATORY (INHALATION) EVERY 4 HOURS PRN
Status: DISCONTINUED | OUTPATIENT
Start: 2025-01-10 | End: 2025-01-10 | Stop reason: HOSPADM

## 2025-01-10 RX ORDER — POTASSIUM CHLORIDE 1500 MG/1
40 TABLET, EXTENDED RELEASE ORAL ONCE
Status: DISCONTINUED | OUTPATIENT
Start: 2025-01-10 | End: 2025-01-10

## 2025-01-10 RX ORDER — NEBULIZER ACCESSORIES
1 KIT MISCELLANEOUS DAILY PRN
Qty: 1 KIT | Refills: 0 | Status: SHIPPED | OUTPATIENT
Start: 2025-01-10

## 2025-01-10 RX ORDER — POTASSIUM CHLORIDE 1500 MG/1
40 TABLET, EXTENDED RELEASE ORAL ONCE
Status: COMPLETED | OUTPATIENT
Start: 2025-01-10 | End: 2025-01-10

## 2025-01-10 RX ORDER — IPRATROPIUM BROMIDE AND ALBUTEROL SULFATE 2.5; .5 MG/3ML; MG/3ML
1 SOLUTION RESPIRATORY (INHALATION)
Status: DISCONTINUED | OUTPATIENT
Start: 2025-01-10 | End: 2025-01-10

## 2025-01-10 RX ORDER — BUDESONIDE AND FORMOTEROL FUMARATE DIHYDRATE 160; 4.5 UG/1; UG/1
2 AEROSOL RESPIRATORY (INHALATION)
Status: DISCONTINUED | OUTPATIENT
Start: 2025-01-10 | End: 2025-01-10 | Stop reason: HOSPADM

## 2025-01-10 RX ADMIN — Medication 2 PUFF: at 12:15

## 2025-01-10 RX ADMIN — ROSUVASTATIN CALCIUM 10 MG: 5 TABLET, FILM COATED ORAL at 10:16

## 2025-01-10 RX ADMIN — HYDRALAZINE HYDROCHLORIDE 100 MG: 50 TABLET ORAL at 14:37

## 2025-01-10 RX ADMIN — SODIUM CHLORIDE, PRESERVATIVE FREE 10 ML: 5 INJECTION INTRAVENOUS at 10:17

## 2025-01-10 RX ADMIN — POLYETHYLENE GLYCOL 3350 17 G: 17 POWDER, FOR SOLUTION ORAL at 13:11

## 2025-01-10 RX ADMIN — Medication 2 PUFF: at 12:16

## 2025-01-10 RX ADMIN — THERA TABS 1 TABLET: TAB at 10:16

## 2025-01-10 RX ADMIN — HEPARIN SODIUM 5000 UNITS: 5000 INJECTION INTRAVENOUS; SUBCUTANEOUS at 06:45

## 2025-01-10 RX ADMIN — AMLODIPINE BESYLATE 10 MG: 5 TABLET ORAL at 10:16

## 2025-01-10 RX ADMIN — POTASSIUM CHLORIDE 40 MEQ: 1500 TABLET, EXTENDED RELEASE ORAL at 10:16

## 2025-01-10 RX ADMIN — PANTOPRAZOLE SODIUM 40 MG: 40 TABLET, DELAYED RELEASE ORAL at 10:16

## 2025-01-10 RX ADMIN — CARVEDILOL 25 MG: 12.5 TABLET, FILM COATED ORAL at 10:16

## 2025-01-10 RX ADMIN — ISOSORBIDE DINITRATE 20 MG: 20 TABLET ORAL at 14:37

## 2025-01-10 RX ADMIN — BUMETANIDE 1 MG: 0.25 INJECTION INTRAMUSCULAR; INTRAVENOUS at 10:17

## 2025-01-10 RX ADMIN — ISOSORBIDE DINITRATE 20 MG: 20 TABLET ORAL at 10:16

## 2025-01-10 RX ADMIN — HYDRALAZINE HYDROCHLORIDE 100 MG: 50 TABLET ORAL at 10:16

## 2025-01-10 NOTE — PROGRESS NOTES
Ambulatory Pulse Oximetry done  Patient ambulated for 5 minutes on room air   O2 Sat %  No oxygen needs

## 2025-01-10 NOTE — PLAN OF CARE
Problem: Chronic Conditions and Co-morbidities  Goal: Patient's chronic conditions and co-morbidity symptoms are monitored and maintained or improved  1/10/2025 1319 by Lucy Jonas RN  Outcome: Progressing  1/10/2025 0110 by Adrianna Hallman RN  Outcome: Progressing     Problem: Discharge Planning  Goal: Discharge to home or other facility with appropriate resources  1/10/2025 1319 by Lucy Jonas RN  Outcome: Progressing  1/10/2025 0110 by Adrianna Hallman RN  Outcome: Progressing     Problem: Skin/Tissue Integrity  Goal: Absence of new skin breakdown  Description: 1.  Monitor for areas of redness and/or skin breakdown  2.  Assess vascular access sites hourly  3.  Every 4-6 hours minimum:  Change oxygen saturation probe site  4.  Every 4-6 hours:  If on nasal continuous positive airway pressure, respiratory therapy assess nares and determine need for appliance change or resting period.  1/10/2025 1319 by Lucy Jonas RN  Outcome: Progressing  1/10/2025 0110 by Adrianna Hallman RN  Outcome: Progressing     Problem: ABCDS Injury Assessment  Goal: Absence of physical injury  1/10/2025 1319 by Lucy Jonas RN  Outcome: Progressing  1/10/2025 0110 by Adrianna Hallman RN  Outcome: Progressing     Problem: Respiratory - Adult  Goal: Achieves optimal ventilation and oxygenation  1/10/2025 1319 by Lucy Jonas RN  Outcome: Progressing  1/10/2025 0110 by Adrianna Hallman RN  Outcome: Progressing     Problem: Cardiovascular - Adult  Goal: Maintains optimal cardiac output and hemodynamic stability  1/10/2025 1319 by Lucy Jonas RN  Outcome: Progressing  1/10/2025 0110 by Adrianna Hallman RN  Outcome: Progressing  Goal: Absence of cardiac dysrhythmias or at baseline  1/10/2025 1319 by Lucy Jonas RN  Outcome: Progressing  1/10/2025 0110 by Adrianna Hallman RN  Outcome: Progressing     Problem: Metabolic/Fluid and Electrolytes - Adult  Goal: Electrolytes

## 2025-01-10 NOTE — PROGRESS NOTES
Comprehensive Nutrition Assessment    Type and Reason for Visit:  Reassess (interim)    Nutrition Recommendations/Plan:   Encourage continued intakes >75% of meals and ONS   Monitor wt, intakes, labs, fluid status, and bowel fxn     Malnutrition Assessment:  Malnutrition Status:  Severe malnutrition (01/07/25 1546)    Context:  Chronic Illness       Nutrition Assessment:    Admitted for acute SOB. Screened for low BMI. Pt familiar to nutrition team, last seen 3 weeks ago w/ noted poor appetite/PO intakes-- baseline of 2 meals/d and Ensure shake x2/d(vanilla). Pt reported w/ affinity for soft foods (HB eggs, turkey, ham) PTA d/t h/o concern for swallowing difficulty. PO diet started today, noted improved intakes w/ 51-75% of Lunch and dinner trays. +severe wt loss x3 mths trended (10.3%). Continue diet, start ONS for adequate intakes. Noted plan for transfer to medical floor. (1/10) Pt wts today variable, will reassess at follow up. Good intakes 50-75% on cardiac diet, enjoying ONS. Labs and meds reviewed. Na 135, K 3.4, BUN 38, Cr 2.9, on multivitamin, protonix.    Nutrition Related Findings:    NFPE w/ mil fat loss and mod muscle wasting. No n/v/d, reports some feelings of constipation, LBM 1/8. No issues c/s reported, hx of dysphagia, edentulous w/ b/l dentures 1 month ago. No edema. Wound Type: None       Current Nutrition Intake & Therapies:    Average Meal Intake: 51-75%  Average Supplements Intake: None Ordered  ADULT DIET; Regular; Low Fat/Low Chol/High Fiber/2 gm Na  ADULT ORAL NUTRITION SUPPLEMENT; Breakfast, Dinner; Standard High Calorie/High Protein Oral Supplement    Anthropometric Measures:  Height: 160 cm (5' 2.99\")  Ideal Body Weight (IBW): 115 lbs (52 kg)    Current Body Weight: 45.3 kg (99 lb 13.9 oz) (1/7), 86.8 % IBW. Weight Source: Bed scale  Current BMI (kg/m2): 17.7  Usual Body Weight: 50.5 kg (111 lb 5.3 oz) (3 mths ago per EMR)  % Weight Change (Calculated): -10.3  Weight Adjustment For: No  Adjustment  BMI Categories: Underweight (BMI less than 22) age over 65    Estimated Daily Nutrient Needs:  Energy Requirements Based On: Kcal/kg  Weight Used for Energy Requirements: Current  Energy (kcal/day): 2960-9102 kcal/d (30-35 kcal/kg, underweight)  Weight Used for Protein Requirements: Current  Protein (g/day): 54-68 gm/d (1.2-1.5 gm/kg, underweight, elderly)  Method Used for Fluid Requirements: 1 ml/kcal  Fluid (ml/day): 1589 mL/d    Nutrition Diagnosis:   Underweight related to inadequate protein-energy intake as evidenced by BMI    Nutrition Interventions:   Food and/or Nutrient Delivery: Continue Current Diet, Continue Oral Nutrition Supplement  Nutrition Education/Counseling: No recommendation at this time  Coordination of Nutrition Care: Continue to monitor while inpatient     Goals:  Goals: Meet at least 75% of estimated needs, PO intake 75% or greater, within 7 days  Type of Goal: Continue current goal  Previous Goal Met: Progressing toward Goal(s)    Nutrition Monitoring and Evaluation:   Behavioral-Environmental Outcomes: None Identified  Food/Nutrient Intake Outcomes: Food and Nutrient Intake, Supplement Intake  Physical Signs/Symptoms Outcomes: Biochemical Data, Meal Time Behavior, Weight    Discharge Planning:    Continue current diet, Continue Oral Nutrition Supplement     Vandana Goldman RD  Contact: [phone]

## 2025-01-10 NOTE — PROGRESS NOTES
CM reviewed chart and noted patient lives at home with her  and has been set up with [facility] services at discharge.  has also asked CM to send referral for patient to [employer] Whe[employer].     Barriers to discharge- continue to wean oxygen from 2 LPM. Continue diuresis and BP control.       1145 am  CM noted discharge order. Patient currently on oxygen and may need at discharge. Awaiting a walking oximetry test to determine oxygen need.     1410 pm  Patient does not qualify for home oxygen.   Meals on Wheels application given to patient and her .    Transition of Care Plan:    RUR: 28%  Prior Level of Functioning: dependent with some aDLs  Disposition: home with   RAKEL: today  If SNF or IPR: Date FOC offered: n/a  Date FOC received: n/a  Accepting facility: n/a  Date authorization started with reference number: n/a  Date authorization received and expires: n/a  Follow up appointments: yes  DME needed: awaiting walk study  Transportation at discharge: family  IM/IMM Medicare/ letter given: yes  Is patient a [place] and connected with [facility]? N/a   If yes, was  transfer form completed and [facility] notified?   Caregiver Contact: n/a  Discharge Caregiver contacted prior to discharge? N/a  Care Conference needed? N/a  Barriers to discharge: n/a

## 2025-01-10 NOTE — DISCHARGE SUMMARY
Discharge Summary    Name: Saranya Hightower  [id number]  YOB: 1950 (Age: 74 y.o.)   Date of Admission: 1/6/2025  Date of Discharge: 1/10/2025  Attending Physician: Bernardo Juan MD    Discharge Diagnosis:   Principal Problem (Resolved):    Hypertensive urgency, malignant  Active Problems:    Hypoxic respiratory failure    COPD (chronic obstructive pulmonary disease) (HCC)    Heart failure (HCC)       Consultations:  IP CONSULT TO NEPHROLOGY  IP CONSULT TO CARDIOLOGY    Brief Hospital Course by Main Problems:   Saranya Hightower is a 74 y.o. female with PMH of HTN, HLD, COPD, CAD, CHF with EF 30 to 35%, AAA status post EVAR 5/2024, chronic malnutrition, status post CEA for carotid artery stenosis, PVD, CKD stage IV who is being seen for acute shortness of breath.  She reports it came on rather suddenly on January 6, consistent with previous episodes of CHF exacerbations.  In the emergency room she was given DuoNebs and Lasix, placed on BiPAP, subsequently admitted to the ICU.  After improvement in respiratory status BiPAP was discontinued however nicardipine infusion initiated for hypertensive urgency.  Following improvement in blood pressure after initiating home medicines, nicardipine discontinued.  Hospital medicine assumed care 1/7.  Nephrology and cardiology consulted.  Bumex 1 mg 3 times daily started for diuresis with improvement.  Serial CXRs with significant improvement in edema.  Blood pressure trending down.  Patient remains on 2 L via nasal cannula.  Will trial COPD treatments including Symbicort and Spiriva.  Patient reports significant improvement in respiratory status.  If she is unable to wean off of oxygen will do ambulatory O2 test to determine if she qualifies for home oxygen.  At this time patient is felt to be medically stable for discharge, understands and agrees with the plan.  Final discharge is pending oxygen test after treatment with Symbicort  Aepb inhaler  Generic drug: fluticasone-umeclidin-vilant            STOP taking these medications      ferrous sulfate 325 (65 Fe) MG tablet  Commonly known as: IRON 325     furosemide 20 MG tablet  Commonly known as: LASIX     mirtazapine 15 MG tablet  Commonly known as: REMERON     nicotine 7 MG/24HR  Commonly known as: NICODERM CQ     potassium chloride 20 MEQ Tbcr extended release tablet  Commonly known as: K-TAB                  DISPOSITION:    Home with Family:       Home with HH/PT/OT/RN: x   SNF/LTC:    NISSA:    OTHER:            Code status: Full  Recommended diet: cardiac diet  Recommended activity: activity as tolerated  Wound care: None      Follow up with:   PCP : Nubia Davila APRN - NP Rawlings, Leanne W, APRN - NP  [address] [facility] [address] [phone]    Schedule an appointment as soon as possible for a visit in 1 week(s)  Patient will need to make follow up appointment.     Called office multiple times with no answer.    [facility]  [address] [place], [place] [id number] [phone]  Follow up  As needed, If symptoms worsen    Jesse Levy MD  [address] [facility] [address]  [phone]    Follow up in 1 week(s)  For continued outpatient cardiac care    Elian Duncan MD  [address] [facility] [id number] [phone]    Follow up in 1 week(s)  For continued outpatient nephrologic care    Ankush Rodriguez DO  [address] [facility] [address]  [phone]    Follow up in 1 week(s)  New patient referral for COPD, hypoxic respiratory failure          Total time in minutes spent coordinating this discharge (includes going over instructions, follow-up, prescriptions, and preparing report for sign off to her PCP) :  35 minutes

## 2025-01-10 NOTE — PROGRESS NOTES
Nephrology Consultation          Patient: Saranya Hightower MRN: [id number]  SSN: [id number]    YOB: 1950  Age: 74 y.o.  Sex: female      Subjective:   Pt seen at the beside.   She is awake and appears comfortable  Remains on 2L NC from 4L   Creat 2.92 today  No lower extremity edema      No Known Allergies    Review of Systems:  A comprehensive review of systems was negative except for that written in the History of Present Illness.    Objective:     Vitals:    01/10/25 0600 01/10/25 0900 01/10/25 0915 01/10/25 1048   BP:   139/67 (!) 149/81   Pulse:  80 76 84   Resp:   18 20   Temp:   98.1 °F (36.7 °C) 97.7 °F (36.5 °C)   TempSrc:   Oral Oral   SpO2:   98% 99%   Weight: 43 kg (94 lb 12.8 oz)      Height:            Physical Exam:  General: NAD  Eyes: sclera anicteric  Oral Cavity: No thrush or ulcers  Neck: no JVD  Chest: Fair bilateral air entry  Heart: normal sounds  Abdomen: soft and non tender   : no stewart  Lower Extremities: no edema  Skin: no rash  Neuro: intact  Psychiatric: non-depressed          Assessment/Plan:   Acute kidney injury on chronic kidney stage IV  2/2 cardiorenal syndrome  On admission creatinine was 2.6.  Creatinine is 2.92 today.  Baseline creatinine seems to be around 2.4.  Multiple recent MAKENZIE's.  Clinically no evidence of volume overload  Remains on 2L NC -->4L  Will continue IV Bumex 1 mg twice daily.     Hypokalemia  From loop diuretics  Potassium 4.1-->3.4  Received p.o. KCl replacement.  Goal K is 4.0.  Will order 40 meq PO x 1 dose  Magnesium is 2.5.     Hypertension  Initially was with severe hypertension.  Required transiently nicardipine drip.  Better blood pressures normal.  Continue hydralazine 100 mg 3 times daily/amlodipine 10 mg/Isordil 20 mg 3 times daily.  Renal duplex in September 2024 < 60% BL KRISTEN.     Acute CHF  LV systolic dysfunction  LVEF 30 to 35%.  Status post AICD placement  Initially required BiPAP support.  Currently on nasal cannula 4-> 2  L.  On admission chest x-ray pulmonary edema  Clinically responding to diuretics.  Uncontrolled hypertension.  Will continue IV Bumex 1 mg twice daily.     DVT prophylaxis  Agree with unfractioned subcu heparin.    Plan:       Signed By: SAMRA Melchor - NP     January 10, 2025       I have seen patient at the bedside with NP  Labs reviewed  Creatinine 2.9  On nasal cannula 2 L  Plan to send her home on nasal cannula 2 L  Plan to send 1 mg Bumex twice a day  Follow-up with me in 2 weeks.

## 2025-01-21 ENCOUNTER — HOSPITAL ENCOUNTER (INPATIENT)
Facility: HOSPITAL | Age: 75
LOS: 4 days | Discharge: HOME HEALTH | End: 2025-01-25
Attending: FAMILY MEDICINE | Admitting: FAMILY MEDICINE
Payer: MEDICARE

## 2025-01-21 ENCOUNTER — HOSPITAL ENCOUNTER (EMERGENCY)
Facility: HOSPITAL | Age: 75
Discharge: ANOTHER HOSPITAL | End: 2025-01-21
Attending: EMERGENCY MEDICINE
Payer: MEDICARE

## 2025-01-21 ENCOUNTER — APPOINTMENT (OUTPATIENT)
Facility: HOSPITAL | Age: 75
End: 2025-01-21
Payer: MEDICARE

## 2025-01-21 VITALS
SYSTOLIC BLOOD PRESSURE: 156 MMHG | HEIGHT: 62 IN | BODY MASS INDEX: 20.09 KG/M2 | HEART RATE: 81 BPM | TEMPERATURE: 97.6 F | WEIGHT: 109.2 LBS | RESPIRATION RATE: 14 BRPM | DIASTOLIC BLOOD PRESSURE: 72 MMHG | OXYGEN SATURATION: 98 %

## 2025-01-21 DIAGNOSIS — I16.1 HYPERTENSIVE EMERGENCY: ICD-10-CM

## 2025-01-21 DIAGNOSIS — J81.0 ACUTE PULMONARY EDEMA: Primary | ICD-10-CM

## 2025-01-21 LAB
ALBUMIN SERPL-MCNC: 3.3 G/DL (ref 3.5–5)
ALBUMIN SERPL-MCNC: 3.6 G/DL (ref 3.5–5)
ALBUMIN/GLOB SERPL: 0.8 (ref 1.1–2.2)
ALBUMIN/GLOB SERPL: 0.9 (ref 1.1–2.2)
ALP SERPL-CCNC: 65 U/L (ref 45–117)
ALP SERPL-CCNC: 83 U/L (ref 45–117)
ALT SERPL-CCNC: 32 U/L (ref 12–78)
ALT SERPL-CCNC: 42 U/L (ref 12–78)
ANION GAP SERPL CALC-SCNC: 12 MMOL/L (ref 2–12)
ANION GAP SERPL CALC-SCNC: 12 MMOL/L (ref 2–12)
ARTERIAL PATENCY WRIST A: YES
ARTERIAL PATENCY WRIST A: YES
AST SERPL W P-5'-P-CCNC: 30 U/L (ref 15–37)
AST SERPL W P-5'-P-CCNC: 58 U/L (ref 15–37)
BASE DEFICIT BLDA-SCNC: 1.9 MMOL/L
BASE EXCESS BLDA CALC-SCNC: 0.7 MMOL/L (ref 0–3)
BASOPHILS # BLD: 0.04 K/UL (ref 0–0.1)
BASOPHILS NFR BLD: 0.5 % (ref 0–1)
BDY SITE: ABNORMAL
BDY SITE: ABNORMAL
BILIRUB SERPL-MCNC: 0.7 MG/DL (ref 0.2–1)
BILIRUB SERPL-MCNC: 0.7 MG/DL (ref 0.2–1)
BNP SERPL-MCNC: ABNORMAL PG/ML
BUN SERPL-MCNC: 41 MG/DL (ref 6–20)
BUN SERPL-MCNC: 47 MG/DL (ref 6–20)
BUN/CREAT SERPL: 16 (ref 12–20)
BUN/CREAT SERPL: 17 (ref 12–20)
CA-I BLD-MCNC: 9.4 MG/DL (ref 8.5–10.1)
CA-I BLD-MCNC: 9.5 MG/DL (ref 8.5–10.1)
CHLORIDE SERPL-SCNC: 102 MMOL/L (ref 97–108)
CHLORIDE SERPL-SCNC: 104 MMOL/L (ref 97–108)
CO2 SERPL-SCNC: 24 MMOL/L (ref 21–32)
CO2 SERPL-SCNC: 25 MMOL/L (ref 21–32)
COHGB MFR BLD: 0.5 % (ref 1–2)
COHGB MFR BLD: 2.3 % (ref 1–2)
CREAT SERPL-MCNC: 2.64 MG/DL (ref 0.55–1.02)
CREAT SERPL-MCNC: 2.69 MG/DL (ref 0.55–1.02)
DIFFERENTIAL METHOD BLD: ABNORMAL
EOSINOPHIL # BLD: 0.08 K/UL (ref 0–0.4)
EOSINOPHIL NFR BLD: 1.1 % (ref 0–7)
ERYTHROCYTE [DISTWIDTH] IN BLOOD BY AUTOMATED COUNT: 14.6 % (ref 11.5–14.5)
FIO2 ON VENT: 0.45 %
GAS FLOW.O2 O2 DELIVERY SYS: 3 L/MIN
GLOBULIN SER CALC-MCNC: 3.9 G/DL (ref 2–4)
GLOBULIN SER CALC-MCNC: 4.2 G/DL (ref 2–4)
GLUCOSE BLD STRIP.AUTO-MCNC: 106 MG/DL (ref 65–100)
GLUCOSE SERPL-MCNC: 113 MG/DL (ref 65–100)
GLUCOSE SERPL-MCNC: 141 MG/DL (ref 65–100)
HCO3 BLDA-SCNC: 22 MMOL/L (ref 22–26)
HCO3 BLDA-SCNC: 24 MMOL/L (ref 22–26)
HCT VFR BLD AUTO: 31.7 % (ref 35–47)
HGB BLD-MCNC: 10.6 G/DL (ref 11.5–16)
IMM GRANULOCYTES # BLD AUTO: 0.08 K/UL (ref 0–0.04)
IMM GRANULOCYTES NFR BLD AUTO: 1.1 % (ref 0–0.5)
IPAP/PIP: 12
LYMPHOCYTES # BLD: 2.51 K/UL (ref 0.8–3.5)
LYMPHOCYTES NFR BLD: 33.5 % (ref 12–49)
MCH RBC QN AUTO: 33.7 PG (ref 26–34)
MCHC RBC AUTO-ENTMCNC: 33.4 G/DL (ref 30–36.5)
MCV RBC AUTO: 100.6 FL (ref 80–99)
METHGB MFR BLD: 0.2 % (ref 0–1.4)
METHGB MFR BLD: 0.2 % (ref 0–1.4)
MONOCYTES # BLD: 0.96 K/UL (ref 0–1)
MONOCYTES NFR BLD: 12.8 % (ref 5–13)
NEUTS SEG # BLD: 3.82 K/UL (ref 1.8–8)
NEUTS SEG NFR BLD: 51 % (ref 32–75)
NRBC # BLD: 0 K/UL (ref 0–0.01)
NRBC BLD-RTO: 0 PER 100 WBC
OXYHGB MFR BLD: 92.6 % (ref 95–99)
OXYHGB MFR BLD: 93.3 % (ref 95–99)
PCO2 BLDA: 35 MMHG (ref 35–45)
PCO2 BLDA: 36 MMHG (ref 35–45)
PEEP RESPIRATORY: 6
PERFORMED BY:: ABNORMAL
PH BLDA: 7.41 (ref 7.35–7.45)
PH BLDA: 7.46 (ref 7.35–7.45)
PLATELET # BLD AUTO: 221 K/UL (ref 150–400)
PMV BLD AUTO: 12.4 FL (ref 8.9–12.9)
PO2 BLDA: 71 MMHG (ref 80–100)
PO2 BLDA: 79 MMHG (ref 80–100)
POTASSIUM SERPL-SCNC: 3.4 MMOL/L (ref 3.5–5.1)
POTASSIUM SERPL-SCNC: 4.3 MMOL/L (ref 3.5–5.1)
PROT SERPL-MCNC: 7.2 G/DL (ref 6.4–8.2)
PROT SERPL-MCNC: 7.8 G/DL (ref 6.4–8.2)
RBC # BLD AUTO: 3.15 M/UL (ref 3.8–5.2)
SAO2% DEVICE SAO2% SENSOR NAME: ABNORMAL
SAO2% DEVICE SAO2% SENSOR NAME: ABNORMAL
SODIUM SERPL-SCNC: 139 MMOL/L (ref 136–145)
SODIUM SERPL-SCNC: 140 MMOL/L (ref 136–145)
SPECIMEN SITE: ABNORMAL
SPECIMEN SITE: ABNORMAL
TROPONIN I SERPL HS-MCNC: 43 NG/L (ref 0–51)
TROPONIN I SERPL HS-MCNC: 49 NG/L (ref 0–51)
VENTILATION MODE VENT: ABNORMAL
WBC # BLD AUTO: 7.5 K/UL (ref 3.6–11)

## 2025-01-21 PROCEDURE — 96366 THER/PROPH/DIAG IV INF ADDON: CPT

## 2025-01-21 PROCEDURE — 99291 CRITICAL CARE FIRST HOUR: CPT

## 2025-01-21 PROCEDURE — 6370000000 HC RX 637 (ALT 250 FOR IP): Performed by: FAMILY MEDICINE

## 2025-01-21 PROCEDURE — 96374 THER/PROPH/DIAG INJ IV PUSH: CPT

## 2025-01-21 PROCEDURE — 36415 COLL VENOUS BLD VENIPUNCTURE: CPT

## 2025-01-21 PROCEDURE — 6360000002 HC RX W HCPCS: Performed by: EMERGENCY MEDICINE

## 2025-01-21 PROCEDURE — 96365 THER/PROPH/DIAG IV INF INIT: CPT

## 2025-01-21 PROCEDURE — 71045 X-RAY EXAM CHEST 1 VIEW: CPT

## 2025-01-21 PROCEDURE — 80053 COMPREHEN METABOLIC PANEL: CPT

## 2025-01-21 PROCEDURE — 94640 AIRWAY INHALATION TREATMENT: CPT

## 2025-01-21 PROCEDURE — 83880 ASSAY OF NATRIURETIC PEPTIDE: CPT

## 2025-01-21 PROCEDURE — 36600 WITHDRAWAL OF ARTERIAL BLOOD: CPT

## 2025-01-21 PROCEDURE — 2500000003 HC RX 250 WO HCPCS: Performed by: EMERGENCY MEDICINE

## 2025-01-21 PROCEDURE — 94761 N-INVAS EAR/PLS OXIMETRY MLT: CPT

## 2025-01-21 PROCEDURE — 99285 EMERGENCY DEPT VISIT HI MDM: CPT

## 2025-01-21 PROCEDURE — 82962 GLUCOSE BLOOD TEST: CPT

## 2025-01-21 PROCEDURE — 2060000000 HC ICU INTERMEDIATE R&B

## 2025-01-21 PROCEDURE — 96375 TX/PRO/DX INJ NEW DRUG ADDON: CPT

## 2025-01-21 PROCEDURE — 6370000000 HC RX 637 (ALT 250 FOR IP): Performed by: EMERGENCY MEDICINE

## 2025-01-21 PROCEDURE — 82803 BLOOD GASES ANY COMBINATION: CPT

## 2025-01-21 PROCEDURE — 85025 COMPLETE CBC W/AUTO DIFF WBC: CPT

## 2025-01-21 PROCEDURE — 83036 HEMOGLOBIN GLYCOSYLATED A1C: CPT

## 2025-01-21 PROCEDURE — 6360000002 HC RX W HCPCS: Performed by: FAMILY MEDICINE

## 2025-01-21 PROCEDURE — 94660 CPAP INITIATION&MGMT: CPT

## 2025-01-21 PROCEDURE — 93005 ELECTROCARDIOGRAM TRACING: CPT | Performed by: EMERGENCY MEDICINE

## 2025-01-21 PROCEDURE — 84484 ASSAY OF TROPONIN QUANT: CPT

## 2025-01-21 PROCEDURE — 2500000003 HC RX 250 WO HCPCS: Performed by: FAMILY MEDICINE

## 2025-01-21 RX ORDER — BUDESONIDE AND FORMOTEROL FUMARATE DIHYDRATE 80; 4.5 UG/1; UG/1
2 AEROSOL RESPIRATORY (INHALATION)
Status: DISCONTINUED | OUTPATIENT
Start: 2025-01-21 | End: 2025-01-25 | Stop reason: HOSPADM

## 2025-01-21 RX ORDER — AMLODIPINE BESYLATE 5 MG/1
10 TABLET ORAL DAILY
Status: DISCONTINUED | OUTPATIENT
Start: 2025-01-22 | End: 2025-01-25 | Stop reason: HOSPADM

## 2025-01-21 RX ORDER — PANTOPRAZOLE SODIUM 40 MG/1
40 TABLET, DELAYED RELEASE ORAL
Status: DISCONTINUED | OUTPATIENT
Start: 2025-01-22 | End: 2025-01-25 | Stop reason: HOSPADM

## 2025-01-21 RX ORDER — ACETAMINOPHEN 650 MG/1
650 SUPPOSITORY RECTAL EVERY 6 HOURS PRN
Status: DISCONTINUED | OUTPATIENT
Start: 2025-01-21 | End: 2025-01-25 | Stop reason: HOSPADM

## 2025-01-21 RX ORDER — POLYETHYLENE GLYCOL 3350 17 G/17G
17 POWDER, FOR SOLUTION ORAL DAILY PRN
Status: DISCONTINUED | OUTPATIENT
Start: 2025-01-21 | End: 2025-01-25 | Stop reason: HOSPADM

## 2025-01-21 RX ORDER — GLUCAGON 1 MG/ML
1 KIT INJECTION PRN
Status: DISCONTINUED | OUTPATIENT
Start: 2025-01-21 | End: 2025-01-25 | Stop reason: HOSPADM

## 2025-01-21 RX ORDER — METHYLPREDNISOLONE SODIUM SUCCINATE 40 MG/ML
40 INJECTION INTRAMUSCULAR; INTRAVENOUS EVERY 6 HOURS
Status: DISCONTINUED | OUTPATIENT
Start: 2025-01-21 | End: 2025-01-21

## 2025-01-21 RX ORDER — FUROSEMIDE 10 MG/ML
60 INJECTION INTRAMUSCULAR; INTRAVENOUS
Status: COMPLETED | OUTPATIENT
Start: 2025-01-21 | End: 2025-01-21

## 2025-01-21 RX ORDER — CARVEDILOL 12.5 MG/1
25 TABLET ORAL 2 TIMES DAILY WITH MEALS
Status: DISCONTINUED | OUTPATIENT
Start: 2025-01-21 | End: 2025-01-25 | Stop reason: HOSPADM

## 2025-01-21 RX ORDER — ISOSORBIDE DINITRATE 20 MG/1
20 TABLET ORAL 3 TIMES DAILY
Status: DISCONTINUED | OUTPATIENT
Start: 2025-01-21 | End: 2025-01-25 | Stop reason: HOSPADM

## 2025-01-21 RX ORDER — MULTIVITAMIN WITH IRON
1 TABLET ORAL DAILY
Status: DISCONTINUED | OUTPATIENT
Start: 2025-01-22 | End: 2025-01-25 | Stop reason: HOSPADM

## 2025-01-21 RX ORDER — IPRATROPIUM BROMIDE AND ALBUTEROL SULFATE 2.5; .5 MG/3ML; MG/3ML
1 SOLUTION RESPIRATORY (INHALATION) EVERY 4 HOURS PRN
Status: DISCONTINUED | OUTPATIENT
Start: 2025-01-21 | End: 2025-01-25 | Stop reason: HOSPADM

## 2025-01-21 RX ORDER — ACETAMINOPHEN 325 MG/1
650 TABLET ORAL EVERY 6 HOURS PRN
Status: DISCONTINUED | OUTPATIENT
Start: 2025-01-21 | End: 2025-01-25 | Stop reason: HOSPADM

## 2025-01-21 RX ORDER — ASPIRIN 81 MG/1
81 TABLET, CHEWABLE ORAL DAILY
Status: DISCONTINUED | OUTPATIENT
Start: 2025-01-22 | End: 2025-01-25 | Stop reason: HOSPADM

## 2025-01-21 RX ORDER — SODIUM CHLORIDE 9 MG/ML
INJECTION, SOLUTION INTRAVENOUS PRN
Status: DISCONTINUED | OUTPATIENT
Start: 2025-01-21 | End: 2025-01-25 | Stop reason: HOSPADM

## 2025-01-21 RX ORDER — ISOSORBIDE DINITRATE 10 MG/1
20 TABLET ORAL 3 TIMES DAILY
Status: DISCONTINUED | OUTPATIENT
Start: 2025-01-21 | End: 2025-01-21 | Stop reason: HOSPADM

## 2025-01-21 RX ORDER — IPRATROPIUM BROMIDE AND ALBUTEROL SULFATE 2.5; .5 MG/3ML; MG/3ML
1 SOLUTION RESPIRATORY (INHALATION)
Status: COMPLETED | OUTPATIENT
Start: 2025-01-21 | End: 2025-01-21

## 2025-01-21 RX ORDER — FUROSEMIDE 10 MG/ML
40 INJECTION INTRAMUSCULAR; INTRAVENOUS DAILY
Status: DISCONTINUED | OUTPATIENT
Start: 2025-01-21 | End: 2025-01-21

## 2025-01-21 RX ORDER — HYDRALAZINE HYDROCHLORIDE 20 MG/ML
10 INJECTION INTRAMUSCULAR; INTRAVENOUS
Status: COMPLETED | OUTPATIENT
Start: 2025-01-21 | End: 2025-01-21

## 2025-01-21 RX ORDER — NITROGLYCERIN 20 MG/100ML
5-200 INJECTION INTRAVENOUS CONTINUOUS
Status: DISCONTINUED | OUTPATIENT
Start: 2025-01-21 | End: 2025-01-21 | Stop reason: HOSPADM

## 2025-01-21 RX ORDER — HYDRALAZINE HYDROCHLORIDE 50 MG/1
50 TABLET, FILM COATED ORAL 3 TIMES DAILY
Status: DISCONTINUED | OUTPATIENT
Start: 2025-01-21 | End: 2025-01-25 | Stop reason: HOSPADM

## 2025-01-21 RX ORDER — HYDRALAZINE HYDROCHLORIDE 50 MG/1
50 TABLET, FILM COATED ORAL EVERY 8 HOURS SCHEDULED
Status: DISCONTINUED | OUTPATIENT
Start: 2025-01-21 | End: 2025-01-21 | Stop reason: HOSPADM

## 2025-01-21 RX ORDER — HEPARIN SODIUM 5000 [USP'U]/ML
5000 INJECTION, SOLUTION INTRAVENOUS; SUBCUTANEOUS 2 TIMES DAILY
Status: DISCONTINUED | OUTPATIENT
Start: 2025-01-21 | End: 2025-01-25 | Stop reason: HOSPADM

## 2025-01-21 RX ORDER — FUROSEMIDE 10 MG/ML
40 INJECTION INTRAMUSCULAR; INTRAVENOUS 2 TIMES DAILY
Status: DISCONTINUED | OUTPATIENT
Start: 2025-01-21 | End: 2025-01-25

## 2025-01-21 RX ORDER — LABETALOL HYDROCHLORIDE 5 MG/ML
20 INJECTION, SOLUTION INTRAVENOUS
Status: COMPLETED | OUTPATIENT
Start: 2025-01-21 | End: 2025-01-21

## 2025-01-21 RX ORDER — INSULIN LISPRO 100 [IU]/ML
0-16 INJECTION, SOLUTION INTRAVENOUS; SUBCUTANEOUS
Status: DISCONTINUED | OUTPATIENT
Start: 2025-01-21 | End: 2025-01-25 | Stop reason: HOSPADM

## 2025-01-21 RX ORDER — SODIUM CHLORIDE 0.9 % (FLUSH) 0.9 %
5-40 SYRINGE (ML) INJECTION EVERY 12 HOURS SCHEDULED
Status: DISCONTINUED | OUTPATIENT
Start: 2025-01-21 | End: 2025-01-25 | Stop reason: HOSPADM

## 2025-01-21 RX ORDER — ONDANSETRON 4 MG/1
4 TABLET, ORALLY DISINTEGRATING ORAL EVERY 8 HOURS PRN
Status: DISCONTINUED | OUTPATIENT
Start: 2025-01-21 | End: 2025-01-25 | Stop reason: HOSPADM

## 2025-01-21 RX ORDER — DEXTROSE MONOHYDRATE 100 MG/ML
INJECTION, SOLUTION INTRAVENOUS CONTINUOUS PRN
Status: DISCONTINUED | OUTPATIENT
Start: 2025-01-21 | End: 2025-01-25 | Stop reason: HOSPADM

## 2025-01-21 RX ORDER — ROSUVASTATIN CALCIUM 5 MG/1
10 TABLET, COATED ORAL DAILY
Status: DISCONTINUED | OUTPATIENT
Start: 2025-01-22 | End: 2025-01-25 | Stop reason: HOSPADM

## 2025-01-21 RX ORDER — AMLODIPINE BESYLATE 10 MG/1
10 TABLET ORAL
Status: COMPLETED | OUTPATIENT
Start: 2025-01-21 | End: 2025-01-21

## 2025-01-21 RX ORDER — ONDANSETRON 2 MG/ML
4 INJECTION INTRAMUSCULAR; INTRAVENOUS EVERY 6 HOURS PRN
Status: DISCONTINUED | OUTPATIENT
Start: 2025-01-21 | End: 2025-01-25 | Stop reason: HOSPADM

## 2025-01-21 RX ORDER — SODIUM CHLORIDE 0.9 % (FLUSH) 0.9 %
5-40 SYRINGE (ML) INJECTION PRN
Status: DISCONTINUED | OUTPATIENT
Start: 2025-01-21 | End: 2025-01-25 | Stop reason: HOSPADM

## 2025-01-21 RX ORDER — CARVEDILOL 12.5 MG/1
25 TABLET ORAL 2 TIMES DAILY
Status: DISCONTINUED | OUTPATIENT
Start: 2025-01-21 | End: 2025-01-21 | Stop reason: HOSPADM

## 2025-01-21 RX ORDER — OLANZAPINE 2.5 MG/1
2.5 TABLET, FILM COATED ORAL NIGHTLY
Status: DISCONTINUED | OUTPATIENT
Start: 2025-01-21 | End: 2025-01-25 | Stop reason: HOSPADM

## 2025-01-21 RX ADMIN — NITROGLYCERIN 20 MCG/MIN: 20 INJECTION INTRAVENOUS at 05:03

## 2025-01-21 RX ADMIN — METHYLPREDNISOLONE SODIUM SUCCINATE 60 MG: 125 INJECTION INTRAMUSCULAR; INTRAVENOUS at 07:25

## 2025-01-21 RX ADMIN — ISOSORBIDE DINITRATE 20 MG: 20 TABLET ORAL at 23:11

## 2025-01-21 RX ADMIN — METHYLPREDNISOLONE SODIUM SUCCINATE 40 MG: 40 INJECTION INTRAMUSCULAR; INTRAVENOUS at 23:13

## 2025-01-21 RX ADMIN — Medication 2 PUFF: at 20:39

## 2025-01-21 RX ADMIN — HYDRALAZINE HYDROCHLORIDE 50 MG: 50 TABLET ORAL at 12:53

## 2025-01-21 RX ADMIN — ISOSORBIDE DINITRATE 20 MG: 10 TABLET ORAL at 12:52

## 2025-01-21 RX ADMIN — HYDRALAZINE HYDROCHLORIDE 50 MG: 50 TABLET ORAL at 23:11

## 2025-01-21 RX ADMIN — HYDRALAZINE HYDROCHLORIDE 10 MG: 20 INJECTION INTRAMUSCULAR; INTRAVENOUS at 07:27

## 2025-01-21 RX ADMIN — SODIUM CHLORIDE, PRESERVATIVE FREE 10 ML: 5 INJECTION INTRAVENOUS at 23:13

## 2025-01-21 RX ADMIN — AMLODIPINE BESYLATE 10 MG: 10 TABLET ORAL at 12:53

## 2025-01-21 RX ADMIN — CARVEDILOL 25 MG: 12.5 TABLET, FILM COATED ORAL at 23:11

## 2025-01-21 RX ADMIN — FUROSEMIDE 60 MG: 10 INJECTION, SOLUTION INTRAMUSCULAR; INTRAVENOUS at 06:31

## 2025-01-21 RX ADMIN — IPRATROPIUM BROMIDE AND ALBUTEROL SULFATE 1 DOSE: .5; 3 SOLUTION RESPIRATORY (INHALATION) at 07:07

## 2025-01-21 RX ADMIN — FUROSEMIDE 40 MG: 10 INJECTION, SOLUTION INTRAMUSCULAR; INTRAVENOUS at 23:12

## 2025-01-21 RX ADMIN — LABETALOL HYDROCHLORIDE 20 MG: 5 INJECTION, SOLUTION INTRAVENOUS at 06:30

## 2025-01-21 RX ADMIN — OLANZAPINE 2.5 MG: 2.5 TABLET, FILM COATED ORAL at 23:12

## 2025-01-21 RX ADMIN — CARVEDILOL 25 MG: 12.5 TABLET, FILM COATED ORAL at 12:53

## 2025-01-21 ASSESSMENT — PAIN SCALES - GENERAL
PAINLEVEL_OUTOF10: 0

## 2025-01-21 ASSESSMENT — ENCOUNTER SYMPTOMS
EYES NEGATIVE: 1
GASTROINTESTINAL NEGATIVE: 1
SHORTNESS OF BREATH: 1
SHORTNESS OF BREATH: 1

## 2025-01-21 ASSESSMENT — HEART SCORE: ECG: NORMAL

## 2025-01-21 NOTE — PROGRESS NOTES
4 Eyes Skin Assessment     NAME:  Saranya Hightower  YOB: 1950  MEDICAL RECORD NUMBER:  [id number]    The patient is being assessed for  Admission    I agree that at least one RN has performed a thorough Head to Toe Skin Assessment on the patient. ALL assessment sites listed below have been assessed.      Areas assessed by both nurses:    Head, Face, Ears, Shoulders, Back, Chest, Arms, Elbows, Hands, Sacrum. Buttock, Coccyx, Ischium, Legs. Feet and Heels, and Under Medical Devices         Does the Patient have a Wound? No noted wound(s)       Goyo Prevention initiated by RN: Yes  Wound Care Orders initiated by RN: No    Pressure Injury (Stage 3,4, Unstageable, DTI, NWPT, and Complex wounds) if present, place Wound referral order by RN under : No    New Ostomies, if present place, Ostomy referral order under : No     Nurse 1 eSignature: Electronically signed by Kamilla Milton RN on 1/21/25 at 6:10 PM EST    **SHARE this note so that the co-signing nurse can place an eSignature**    Nurse 2 eSignature: Electronically signed by Catherine Peña RN on 1/21/25 at 6:10 PM EST

## 2025-01-21 NOTE — H&P
Hospitalist Admission Note    NAME: Saranya Hightower   :  1950   MRN:  [id number]     Date/Time:  2025 6:15 PM    Patient PCP: Nubia Davila, SAMRA Cortes NP    ______________________________________________________________________  Given the patient's current clinical presentation, I have a high level of concern for decompensation if discharged from the emergency department.  Complex decision making was performed, which includes reviewing the patient's available past medical records, laboratory results, and x-ray films.       My assessment of this patient's clinical condition and my plan of care is as follows.    Assessment / Plan:    Acute respiratory failure with mild pulmonary edema  HFrEF with ejection fraction of 30 to 35%  COPD   Hypertension  CAD  CKD stage IV  Hyperlipidemia  PVD s/p left to right fem-fem bypass  Anemia    Admit to inpatient telemetry  Consult pulmonology  Consult cardiology  Consult nephrology  Start DuoNeb  Start Solumedrol  Start furosemide 40 mg  Continue nasal cannula 2 L  Consult nutrition is due to chronic malnutrition  Continue home meds                Medical Decision Making:   I personally reviewed labs: CBC BMP  I personally reviewed imaging: X-ray  Toxic drug monitoring: None  Discussed case with: ED provider. After discussion I am in agreement that acuity of patient's medical condition necessitates hospital stay.      Code Status: Full code  DVT Prophylaxis: Lovenox  GI Prophylaxis: None      Subjective:   CHIEF COMPLAINT: Shortness of breath    HISTORY OF PRESENT ILLNESS:     Saranya Hightower is a 74 y.o.  female with PMHx significant for hypertension, hyperlipidemia, COPD, CAD, CHF with ejection fraction 30 to 35%, malnutrition, PVD, CKD stage IV, carotid artery stenosis, went to ED of [facility] with complaint of shortness of breath.  She was transferred to [facility] for management of this patient.  Patient is currently on 2 L nasal cannula  Patient lives with  pneumothorax. The bones and upper abdomen are stable.     Mild pulmonary edema Electronically signed by Douglas Chinchilla        _______________________________________________________________________    TOTAL TIME:  50 Minutes    Critical Care Provided     Minutes non procedure based    Signed: Tom Reyes    Procedures: see electronic medical records for all procedures/Xrays and details which were not copied into this note but were reviewed prior to creation of Plan.

## 2025-01-21 NOTE — PROGRESS NOTES
Received Order for Telemetry     Saranya Hightower   1950   [id number]   hypertensive emergency, acute hypoxic respiratory failure   Marina Hudson MD     Tele Box # [id number] placed on patient at  1732 pm  ER Room # DIRECT ADMITT  Admitting to Room 487  Verified with Primary Nurse ERICKA at  1732 pm

## 2025-01-21 NOTE — ED PROVIDER NOTES
ED Course as of 01/21/25 1434   Tue Jan 21, 2025   0855 Patient accepted at [facility] however pending discharges from the ICU to have room available [KK]   1053 Acceptance at [facility] was rescinded due to ICU boarding.  They had discussed with [facility] is an open ICU beds.    Does work with Dr. Davison-intensivist at [facility].  He would like patient to go to the ER to be trialed off BiPAP and then they can decide if she can go to the floor out of the ICU.  Patient is currently on 12/6 of BiPAP, 20 mcg/min nitro and tolerating well [KK]   1240 Transport arrived to take patient to [facility].   is upset as he did not want her to go that far away.  I had a long conversation with him that the hospitals are full and ICU [place] and she is currently needing ICU level care as she is on a nitro drip and BiPAP.  He was very angry and kept saying that she is \"breathing fine\".  We try to explain to them that she is breathing fine on the machine and the nitro drip.  He was saying he would just take hurt himself to another hospital.  Again discussed with him that ICUs were full at [facility], [facility], [facility] is on diversion.    Patient then asked to make a phone call.  When I stepped back in the room he was on the phone with the [facility] trying to get her a [facility] there.  I tried to explain to him that the [facility] would not [facility] as she is not a .  He snapped at me and said \"I am on the phone\" and continued to phone call.  Ultimately will trial off BiPAP and nitro drip will continue to monitor in the ER however suspect patient still to be transferred to another facility that has ICU capabilities if she deteriorates [KK]   1433 Patient has been off oxygen for 2 hours and stable on nasal cannula.  Repeat ABG after an hour and a half off of BiPAP and nitro drip is stable.  Discussed with [facility] and will be transferred there [KK]      ED Course User Index  [KK] Ana Green MD

## 2025-01-21 NOTE — ED TRIAGE NOTES
Patient presents by EMS due to c/oo shortness of breath. Patient arrives with a duoneb in progress. Patient states that symptoms began around 0400. Patient noted to be hypertensive upon arrival.

## 2025-01-21 NOTE — PROGRESS NOTES
Called pt  Dalton Reis ( [phone]). Informed him the pt had arrived to the hospital and asked him if he could bring the pt home medication list in so we could put it in our system. He stated his daughter will bring it tomorrow.

## 2025-01-21 NOTE — ED NOTES
[facility] EMERGENCY DEPARTMENT  EMERGENCY DEPARTMENT ENCOUNTER      Pt Name: Saranya Hightower  MRN: [id number]  Birthdate 1950  Date of evaluation: 1/21/2025  Provider: Lacho Seymour MD  5:39 AM    CHIEF COMPLAINT       Chief Complaint   Patient presents with   • Shortness of Breath         HISTORY OF PRESENT ILLNESS    Saranya Hightower is a 74 y.o. female with history of CHF and CKD, hypertension who presents to the emergency department with complaint of sudden onset of shortness of breath that started within the hour.  She denies chest pain.    HPI    Nursing Notes were reviewed.    REVIEW OF SYSTEMS       Review of Systems   Constitutional: Negative.    HENT: Negative.     Eyes: Negative.    Respiratory:  Positive for shortness of breath.    Cardiovascular: Negative.    Gastrointestinal: Negative.        Except as noted above the remainder of the review of systems was reviewed and negative.       PAST MEDICAL HISTORY     Past Medical History:   Diagnosis Date   • AAA (abdominal aortic aneurysm) (Formerly [facility])     S/P STENTING   • Abnormal weight loss 09/11/2021   • Anemia    • Atherosclerosis of native coronary artery without angina pectoris 09/11/2021   • Bronchitis    • CAD (coronary artery disease)     MI and hx of stents Lcx and RCA 8/2017   • Carotid artery stenosis 09/11/2021   • Cervicalgia 09/11/2021   • Chronic bronchitis (Formerly [facility])    • CKD (chronic kidney disease) stage 4, GFR 15-29 ml/min (Formerly [facility])    • Colon polyps    • Constipation 09/11/2021   • COPD (chronic obstructive pulmonary disease) (Formerly [facility])    • COPD (chronic obstructive pulmonary disease) (Formerly [facility])    • HTN (hypertension) 09/11/2021   • Hyperlipidemia    • Menopause    • Personal history of colonic polyps 09/11/2021   • PVD (peripheral vascular disease) (Formerly [facility]) 2019    s/p left to right fem-fem bypass   • Vitamin D deficiency 09/11/2021         SURGICAL HISTORY       Past Surgical History:   Procedure Laterality Date   • CARDIAC CATHETERIZATION      X 3   •

## 2025-01-21 NOTE — ED NOTES
Pt's  at bedside and is adamant that he dose not want patient to go to [facility]. Attempted to inform pt that there are multiple ICU holds throughout the hospital system and that [facility] was willing to accept patient however he does not want patient to go because he does not want to travel that far. MD at bedside and attempting to speak with .  MD wants to attempt to stop nitroglycerin and take patient off BIPAP. Respiratory called.

## 2025-01-21 NOTE — H&P
Hospitalist Admission Note    NAME: Saranya Hightower   :  1950   MRN:  [id number]     Date/Time:  2025 6:54 PM    Patient PCP: Nubia Davila APRN - NP    ______________________________________________________________________  Given the patient's current clinical presentation, I have a high level of concern for decompensation if discharged from the emergency department.  Complex decision making was performed, which includes reviewing the patient's available past medical records, laboratory results, and x-ray films.       My assessment of this patient's clinical condition and my plan of care is as follows.    Assessment / Plan:    Acute respiratory failure with mild pulmonary edema  Acute systolic heart failure rEF with ejection fraction of 30 to 35%  COPD /exacerbation  Hypertension  CAD  CKD stage IV  Hyperlipidemia  PVD s/p left to right fem-fem bypass  Anemia    Admit to inpatient telemetry  Consult pulmonology  Consult cardiology  Consult nephrology  Start DuoNeb every 4 as needed  Start Solumedrol 40 every 6  Start furosemide 40 mg twice a day  Continue nasal cannula 2 L  Consult nutrition is due to chronic malnutrition  Continue home meds  Repeat BNP in the morning      Current Facility-Administered Medications:     carvedilol (COREG) tablet 25 mg, 25 mg, Oral, BID WC, Marina Hudson MD    hydrALAZINE (APRESOLINE) tablet 50 mg, 50 mg, Oral, TID, Marina Hudson MD    isosorbide dinitrate (ISORDIL) tablet 20 mg, 20 mg, Oral, TID, Marina Hudson MD    thera/beta-carotene 1 tablet, 1 tablet, Oral, Daily, Marina Hudson MD    OLANZapine (ZYPREXA) tablet 2.5 mg, 2.5 mg, Oral, Nightly, Marina Hudson MD    amLODIPine (NORVASC) tablet 10 mg, 10 mg, Oral, Daily, Marina Hudson MD    aspirin chewable tablet 81 mg, 81 mg, Oral, Daily, Marina Hudson MD    pantoprazole (PROTONIX) tablet 40 mg, 40 mg, Oral, Daily, Marina Hudson MD    Rosuvastatin Calcium CPSP 10 mg, 10 mg, Oral,

## 2025-01-22 LAB
BASOPHILS # BLD: 0.01 K/UL (ref 0–0.1)
BASOPHILS NFR BLD: 0.1 % (ref 0–1)
BNP SERPL-MCNC: ABNORMAL PG/ML
DIFFERENTIAL METHOD BLD: ABNORMAL
EKG ATRIAL RATE: 106 BPM
EKG DIAGNOSIS: NORMAL
EKG P AXIS: 54 DEGREES
EKG P-R INTERVAL: 186 MS
EKG Q-T INTERVAL: 342 MS
EKG QRS DURATION: 108 MS
EKG QTC CALCULATION (BAZETT): 453 MS
EKG R AXIS: 32 DEGREES
EKG T AXIS: 1 DEGREES
EKG VENTRICULAR RATE: 105 BPM
EOSINOPHIL # BLD: 0 K/UL (ref 0–0.4)
EOSINOPHIL NFR BLD: 0 % (ref 0–7)
ERYTHROCYTE [DISTWIDTH] IN BLOOD BY AUTOMATED COUNT: 14.3 % (ref 11.5–14.5)
GLUCOSE BLD STRIP.AUTO-MCNC: 155 MG/DL (ref 65–100)
GLUCOSE BLD STRIP.AUTO-MCNC: 160 MG/DL (ref 65–100)
GLUCOSE BLD STRIP.AUTO-MCNC: 197 MG/DL (ref 65–100)
GLUCOSE BLD STRIP.AUTO-MCNC: 227 MG/DL (ref 65–100)
HCT VFR BLD AUTO: 28.8 % (ref 35–47)
HGB BLD-MCNC: 9.9 G/DL (ref 11.5–16)
IMM GRANULOCYTES # BLD AUTO: 0.02 K/UL (ref 0–0.04)
IMM GRANULOCYTES NFR BLD AUTO: 0.3 % (ref 0–0.5)
LYMPHOCYTES # BLD: 0.52 K/UL (ref 0.8–3.5)
LYMPHOCYTES NFR BLD: 7.6 % (ref 12–49)
MCH RBC QN AUTO: 34 PG (ref 26–34)
MCHC RBC AUTO-ENTMCNC: 34.4 G/DL (ref 30–36.5)
MCV RBC AUTO: 99 FL (ref 80–99)
MONOCYTES # BLD: 0.25 K/UL (ref 0–1)
MONOCYTES NFR BLD: 3.6 % (ref 5–13)
NEUTS SEG # BLD: 6.08 K/UL (ref 1.8–8)
NEUTS SEG NFR BLD: 88.4 % (ref 32–75)
NRBC # BLD: 0 K/UL (ref 0–0.01)
NRBC BLD-RTO: 0 PER 100 WBC
PERFORMED BY:: ABNORMAL
PLATELET # BLD AUTO: 210 K/UL (ref 150–400)
PMV BLD AUTO: 12 FL (ref 8.9–12.9)
RBC # BLD AUTO: 2.91 M/UL (ref 3.8–5.2)
WBC # BLD AUTO: 6.9 K/UL (ref 3.6–11)

## 2025-01-22 PROCEDURE — 94640 AIRWAY INHALATION TREATMENT: CPT

## 2025-01-22 PROCEDURE — 82962 GLUCOSE BLOOD TEST: CPT

## 2025-01-22 PROCEDURE — 94761 N-INVAS EAR/PLS OXIMETRY MLT: CPT

## 2025-01-22 PROCEDURE — 85025 COMPLETE CBC W/AUTO DIFF WBC: CPT

## 2025-01-22 PROCEDURE — 6370000000 HC RX 637 (ALT 250 FOR IP): Performed by: FAMILY MEDICINE

## 2025-01-22 PROCEDURE — 36415 COLL VENOUS BLD VENIPUNCTURE: CPT

## 2025-01-22 PROCEDURE — 6360000002 HC RX W HCPCS: Performed by: FAMILY MEDICINE

## 2025-01-22 PROCEDURE — 6370000000 HC RX 637 (ALT 250 FOR IP): Performed by: INTERNAL MEDICINE

## 2025-01-22 PROCEDURE — 82272 OCCULT BLD FECES 1-3 TESTS: CPT

## 2025-01-22 PROCEDURE — 2500000003 HC RX 250 WO HCPCS: Performed by: FAMILY MEDICINE

## 2025-01-22 PROCEDURE — 83880 ASSAY OF NATRIURETIC PEPTIDE: CPT

## 2025-01-22 PROCEDURE — 2060000000 HC ICU INTERMEDIATE R&B

## 2025-01-22 RX ORDER — POTASSIUM CHLORIDE 1500 MG/1
40 TABLET, EXTENDED RELEASE ORAL ONCE
Status: DISCONTINUED | OUTPATIENT
Start: 2025-01-22 | End: 2025-01-22

## 2025-01-22 RX ADMIN — ROSUVASTATIN CALCIUM 10 MG: 5 TABLET, FILM COATED ORAL at 08:35

## 2025-01-22 RX ADMIN — ASPIRIN 81 MG CHEWABLE TABLET 81 MG: 81 TABLET CHEWABLE at 08:35

## 2025-01-22 RX ADMIN — HEPARIN SODIUM 5000 UNITS: 5000 INJECTION INTRAVENOUS; SUBCUTANEOUS at 21:46

## 2025-01-22 RX ADMIN — Medication 2 PUFF: at 20:52

## 2025-01-22 RX ADMIN — THERA TABS 1 TABLET: TAB at 08:35

## 2025-01-22 RX ADMIN — ISOSORBIDE DINITRATE 20 MG: 20 TABLET ORAL at 14:00

## 2025-01-22 RX ADMIN — SODIUM CHLORIDE, PRESERVATIVE FREE 10 ML: 5 INJECTION INTRAVENOUS at 21:53

## 2025-01-22 RX ADMIN — CARVEDILOL 25 MG: 12.5 TABLET, FILM COATED ORAL at 08:35

## 2025-01-22 RX ADMIN — PANTOPRAZOLE SODIUM 40 MG: 40 TABLET, DELAYED RELEASE ORAL at 06:47

## 2025-01-22 RX ADMIN — AMLODIPINE BESYLATE 10 MG: 5 TABLET ORAL at 08:35

## 2025-01-22 RX ADMIN — HYDRALAZINE HYDROCHLORIDE 50 MG: 50 TABLET ORAL at 21:46

## 2025-01-22 RX ADMIN — FUROSEMIDE 40 MG: 10 INJECTION, SOLUTION INTRAMUSCULAR; INTRAVENOUS at 17:07

## 2025-01-22 RX ADMIN — HYDRALAZINE HYDROCHLORIDE 50 MG: 50 TABLET ORAL at 08:35

## 2025-01-22 RX ADMIN — METHYLPREDNISOLONE SODIUM SUCCINATE 40 MG: 40 INJECTION INTRAMUSCULAR; INTRAVENOUS at 14:00

## 2025-01-22 RX ADMIN — POTASSIUM BICARBONATE 40 MEQ: 782 TABLET, EFFERVESCENT ORAL at 18:41

## 2025-01-22 RX ADMIN — INSULIN LISPRO 4 UNITS: 100 INJECTION, SOLUTION INTRAVENOUS; SUBCUTANEOUS at 11:24

## 2025-01-22 RX ADMIN — HEPARIN SODIUM 5000 UNITS: 5000 INJECTION INTRAVENOUS; SUBCUTANEOUS at 08:35

## 2025-01-22 RX ADMIN — CARVEDILOL 25 MG: 12.5 TABLET, FILM COATED ORAL at 17:06

## 2025-01-22 RX ADMIN — INSULIN LISPRO 4 UNITS: 100 INJECTION, SOLUTION INTRAVENOUS; SUBCUTANEOUS at 17:06

## 2025-01-22 RX ADMIN — ISOSORBIDE DINITRATE 20 MG: 20 TABLET ORAL at 21:46

## 2025-01-22 RX ADMIN — OLANZAPINE 2.5 MG: 2.5 TABLET, FILM COATED ORAL at 21:46

## 2025-01-22 RX ADMIN — ISOSORBIDE DINITRATE 20 MG: 20 TABLET ORAL at 08:35

## 2025-01-22 RX ADMIN — METHYLPREDNISOLONE SODIUM SUCCINATE 40 MG: 40 INJECTION INTRAMUSCULAR; INTRAVENOUS at 06:47

## 2025-01-22 RX ADMIN — Medication 2 PUFF: at 10:13

## 2025-01-22 RX ADMIN — FUROSEMIDE 40 MG: 10 INJECTION, SOLUTION INTRAMUSCULAR; INTRAVENOUS at 08:35

## 2025-01-22 RX ADMIN — METHYLPREDNISOLONE SODIUM SUCCINATE 40 MG: 40 INJECTION INTRAMUSCULAR; INTRAVENOUS at 21:53

## 2025-01-22 RX ADMIN — SODIUM CHLORIDE, PRESERVATIVE FREE 10 ML: 5 INJECTION INTRAVENOUS at 08:35

## 2025-01-22 RX ADMIN — HYDRALAZINE HYDROCHLORIDE 50 MG: 50 TABLET ORAL at 14:00

## 2025-01-22 ASSESSMENT — ENCOUNTER SYMPTOMS
COUGH: 1
SHORTNESS OF BREATH: 1
GASTROINTESTINAL NEGATIVE: 1

## 2025-01-22 ASSESSMENT — PAIN SCALES - GENERAL: PAINLEVEL_OUTOF10: 0

## 2025-01-22 NOTE — CONSULTS
Cardiology Consult    NAME: Saranya Hightower   :  1950   MRN:  [id number]     Date/Time:  2025 11:44 AM    Patient PCP: Nubia Davila APRN - NP  ________________________________________________________________________     Assessment:   Acute on chronic CHF  Cardiomyopathy with baseline EF 30-35%  CAD status post PTCA/ALEJANDRA of RCA,  and    PAD  AICD  AAA status post EVAR  Labile hypertension  Chronic respiratory failure  Stage IV CKD  Tobacco abuse  COPD      Plan:   Continue diuretic therapy  Follow renal function  Plan on medical management of CHF/COPD exacerbation  Encourage lifestyle modification and smoking cessation      []        High complexity decision making was performed        Subjective:   CHIEF COMPLAINT: SOB      REASON FOR CONSULT: CHF      HISTORY OF PRESENT ILLNESS:     Saranya Hightower is a 74 y.o. Black /  female who has CAD, CHF, PAD, s/p bilateral CEA and severe PAD with ischemic cardiomyopathy.     Unfortunately, she is an active smoker.  She has underlying COPD, baseline EF 35%, with ICD.     She presented yesterday acute shortness of breath over 1-2 hours  She reports compliance with her meds although she does not pay much attention to her salt intake.  Denies any chest pain.  No swelling in her legs.     Labs show troponin 49>43>44 BNP 30, 067        Past Medical History:   Diagnosis Date    AAA (abdominal aortic aneurysm) (formerly [facility])     S/P STENTING    Abnormal weight loss 2021    Anemia     Atherosclerosis of native coronary artery without angina pectoris 2021    Bronchitis     CAD (coronary artery disease)     MI and hx of stents Lcx and RCA 2017    Carotid artery stenosis 2021    Cervicalgia 2021    CHF (congestive heart failure) (formerly [facility])     Chronic bronchitis (formerly [facility])     CKD (chronic kidney disease) stage 4, GFR 15-29 ml/min (formerly [facility])     Colon polyps     Constipation 2021    COPD (chronic obstructive pulmonary disease)

## 2025-01-22 NOTE — CONSULTS
PULMONARY CONSULT  [facility]    Name: Saranya Hightower MRN: [id number]   : 1950 Hospital: [facility]   Date: 2025  Admission date: 2025 Hospital Day: 2       HPI:     Patient Active Problem List   Diagnosis    Carotid artery stenosis    Hypertensive heart disease without congestive heart failure    Abnormal weight loss    Constipation    HTN (hypertension)    Hyperlipidemia    Vitamin D deficiency    Atherosclerosis of native coronary artery without angina pectoris    History of colonic polyps    Cervicalgia    Pulmonary edema cardiac cause (HCC)    Hypertensive emergency    Hypertensive urgency    CHF (congestive heart failure), NYHA class I, acute on chronic, combined (HCC)    Systolic CHF, acute on chronic ([facility])    Tobacco user    CKD (chronic kidney disease) stage 4, GFR 15-29 ml/min ([facility])    Acute respiratory failure with hypoxia    Pleural effusion, bilateral    Leukocytosis    Hypoxic respiratory failure    COPD (chronic obstructive pulmonary disease) ([facility])    Heart failure ([facility])    Severe malnutrition ([facility])             [x] High complexity decision making was performed  [x] See my orders for details      Subjective/Initial History:     I was asked by Marina Hudson MD to see Saranya Hightower  a 74 y.o.   female in consultation     Excerpts from admission 2025 or consult notes as follows:   74-year-old lady came in because of shortness of breath and dyspnea she continues to smoke quit smoking 4 days ago past medical history of peripheral vascular disease chronic kidney disease stage IV COPD coronary artery disease [facility] she was wheezing on oxygen via nasal cannula BNP was elevated on Lasix chest x-ray shows mild pulmonary edema she is on inhalers at home.      No Known Allergies     MAR reviewed and pertinent medications noted or modified as needed     Current Facility-Administered

## 2025-01-22 NOTE — PLAN OF CARE
Problem: Chronic Conditions and Co-morbidities  Goal: Patient's chronic conditions and co-morbidity symptoms are monitored and maintained or improved  1/22/2025 1151 by Catherine Peña RN  Outcome: Progressing  1/22/2025 0259 by Candace Cooper RN  Outcome: Progressing  Flowsheets (Taken 1/21/2025 2041)  Care Plan - Patient's Chronic Conditions and Co-Morbidity Symptoms are Monitored and Maintained or Improved: Monitor and assess patient's chronic conditions and comorbid symptoms for stability, deterioration, or improvement     Problem: Discharge Planning  Goal: Discharge to home or other facility with appropriate resources  1/22/2025 1151 by Catherine Peña RN  Outcome: Progressing  1/22/2025 0259 by Candace Cooper RN  Outcome: Progressing  Flowsheets (Taken 1/21/2025 2041)  Discharge to home or other facility with appropriate resources: Identify barriers to discharge with patient and caregiver     Problem: Safety - Adult  Goal: Free from fall injury  1/22/2025 1151 by Catherine Peña RN  Outcome: Progressing  1/22/2025 0259 by Candace Cooper RN  Outcome: Progressing     Problem: Respiratory - Adult  Goal: Achieves optimal ventilation and oxygenation  1/22/2025 1151 by Catherine Peña RN  Outcome: Progressing  1/22/2025 0259 by Candace Cooper RN  Outcome: Progressing  Flowsheets (Taken 1/21/2025 2041)  Achieves optimal ventilation and oxygenation:   Assess for changes in mentation and behavior   Assess for changes in respiratory status   Oxygen supplementation based on oxygen saturation or arterial blood gases     Problem: Cardiovascular - Adult  Goal: Maintains optimal cardiac output and hemodynamic stability  1/22/2025 1151 by Catherine Peña RN  Outcome: Progressing  1/22/2025 0259 by Candace Cooper RN  Outcome: Progressing  Flowsheets (Taken 1/21/2025 2041)  Maintains optimal cardiac output and hemodynamic stability:   Monitor blood pressure and heart rate   Assess for signs of decreased cardiac output   Monitor  Progressing  Flowsheets (Taken 1/21/2025 2041)  Maintains hematologic stability: Assess for signs and symptoms of bleeding or hemorrhage

## 2025-01-22 NOTE — CONSULTS
Nutrition Education    Educated on Renal/Diabetes Diet Guidelines  Learners: Patient  Readiness: Acceptance  Method: Explanation and Handout  Response: Verbalizes Understanding  Discussed w/ pt diet guidelines. Reviewed typical daily intake: BF-2 HB eggs, orange juice L-sandwich D-baked chicken/pork chop+rice. Discussed w/ pt ways to boost protein calorie intake and potential modifications for renal/DM diet. Pt expressed understanding; reviewing handouts when I left.   Contact name and number provided.    Vandana Goldman RD  Contact Number: [phone]

## 2025-01-22 NOTE — PROGRESS NOTES
Hospitalist Progress Note    NAME: Saranya Hightower   : 1950   MRN: [id number]     Date/Time: 2025 8:36 AM  Patient PCP: Nubia Davila APRN - NP    Assessment / Plan:       Acute respiratory failure with mild pulmonary edema  Acute systolic heart failure rEF with ejection fraction of 30 to 35%  COPD /exacerbation  -CXR: Mild pulmonary edema  -NT proBNP 30K  -Recent TTE: EF 30-35%, mild to moderate MR, no AS  -Baseline weight 2 weeks PTA 94-100lbs.  Admission weight 109lbs  -Continue IV Lasix 40 mg twice daily  -Continue IV Solu-Medrol  -2LNC --> 97%RA today  -pulmonary following    CAD  Hypertension  Hyperlipidemia  -Continue aspirin and Crestor  -Continue amlodipine, Coreg, hydralazine, Isordil    CKD stage IV  -Follow creatinine closely while on IV diuretics    PVD s/p left to right fem-fem bypass  -Continue aspirin and statin as above    Anemia  -Hemoglobin 10.6-->9.9.  Baseline of 2024  -Normocytic pattern.  Will check stool guaiacs, iron studies, B12.  Recent TSH normal  -continue PPI        Medical Decision Making:    [x] High (any 2)     A. Problems (any 1)  [x] Acute/Chronic Illness/injury posing threat to life or bodily function: Respiratory failure, CHF exacerbation  [] Severe exacerbation of chronic illness:    ---------------------------------------------------------------------  B. Risk of Treatment (any 1)   [x] Drugs/treatments that require intensive monitoring for toxicity include:    [] IV ABX requiring serial renal monitoring for nephrotoxicity:     [] IV Narcotic analgesia for adverse drug reaction  [x] Aggressive IV diuresis requiring serial monitoring for renal impairment and electrolyte derangements  [] Critical electrolyte abnormalities requiring IV replacement and close serial monitoring  [] Insulin - monitoring serial FSBS for Hypoglycemic adverse drug reaction  [] Other -   [] Change in code status:    [] Decision to escalate care:    [] Major  H&P    ________________________________________________________________________  Care Plan discussed with:    Comments   Patient x    Family      RN     Care Manager     Consultant                        Multidiciplinary team rounds were held today with , nursing, pharmacist and clinical coordinator.  Patient's plan of care was discussed; medications were reviewed and discharge planning was addressed.     ________________________________________________________________________  Total NON critical care TIME:   35  Minutes    Total CRITICAL CARE TIME Spent:   Minutes non procedure based      Comments   >50% of visit spent in counseling and coordination of care x     This includes time during multidisciplinary rounds if indicated above   ________________________________________________________________________  Cam Mancilla MD     Procedures: see electronic medical records for all procedures/Xrays and details which were not copied into this note but were reviewed prior to creation of Plan.      LABS:  I reviewed today's most current labs and imaging studies.  Pertinent labs include:  Recent Labs     01/21/25  0450 01/22/25  0158   WBC 7.5 6.9   HGB 10.6* 9.9*   HCT 31.7* 28.8*    210     Recent Labs     01/21/25  0450 01/21/25  1922    140   K 4.3 3.4*    104   CO2 25 24   BUN 41* 47*   ALT 42 32

## 2025-01-22 NOTE — PLAN OF CARE
Problem: Chronic Conditions and Co-morbidities  Goal: Patient's chronic conditions and co-morbidity symptoms are monitored and maintained or improved  1/22/2025 0259 by Candace Cooper RN  Outcome: Progressing  1/21/2025 1737 by Kamilla Milton RN  Outcome: Progressing     Problem: Discharge Planning  Goal: Discharge to home or other facility with appropriate resources  1/22/2025 0259 by Candace Cooper RN  Outcome: Progressing  1/21/2025 1737 by Kamilla Milton RN  Outcome: Progressing     Problem: Safety - Adult  Goal: Free from fall injury  1/22/2025 0259 by Candace Cooper RN  Outcome: Progressing  1/21/2025 1737 by Kamilla Milton RN  Outcome: Progressing     Problem: Respiratory - Adult  Goal: Achieves optimal ventilation and oxygenation  Outcome: Progressing     Problem: Cardiovascular - Adult  Goal: Maintains optimal cardiac output and hemodynamic stability  Outcome: Progressing  Goal: Absence of cardiac dysrhythmias or at baseline  Outcome: Progressing     Problem: Genitourinary - Adult  Goal: Absence of urinary retention  Outcome: Progressing     Problem: Metabolic/Fluid and Electrolytes - Adult  Goal: Electrolytes maintained within normal limits  Outcome: Progressing  Goal: Hemodynamic stability and optimal renal function maintained  Outcome: Progressing  Goal: Glucose maintained within prescribed range  Outcome: Progressing     Problem: Hematologic - Adult  Goal: Maintains hematologic stability  Outcome: Progressing

## 2025-01-22 NOTE — PROGRESS NOTES
4 Eyes Skin Assessment     NAME:  Saranya Hightower  YOB: 1950  MEDICAL RECORD NUMBER:  [id number]    The patient is being assessed for  Shift Handoff    I agree that at least one RN has performed a thorough Head to Toe Skin Assessment on the patient. ALL assessment sites listed below have been assessed.      Areas assessed by both nurses:    Head, Face, Ears, Shoulders, Back, Chest, Arms, Elbows, Hands, Sacrum. Buttock, Coccyx, Ischium, Legs. Feet and Heels, Under Medical Devices , and Other NA        Does the Patient have a Wound? No noted wound(s)       Goyo Prevention initiated by RN: Yes  Wound Care Orders initiated by RN: No    Pressure Injury (Stage 3,4, Unstageable, DTI, NWPT, and Complex wounds) if present, place Wound referral order by RN under : No    New Ostomies, if present place, Ostomy referral order under : No     Nurse 1 eSignature: Electronically signed by Candace Cooper RN on 1/22/25 at 6:03 AM EST    **SHARE this note so that the co-signing nurse can place an eSignature**    Nurse 2 eSignature: Electronically signed by Alayna Purvis RN on 1/22/25 at 7:50 AM EST

## 2025-01-22 NOTE — NURSE NAVIGATOR
Cardiology/Nephrology  Activity/Exercise  Support Groups and family support  Local Resources    RN-NN defers ONGOING Education to Nursing Staff.        Discharge Disposition Determined to be: Home with Home Health vs SNF?    Time Spent with patient: 15mins    RECOMMENDATIONS:  Smoking Cessation  PT/OT  Patient Follow-up with Cardiology within 1 week, if appointment available, if not schedule soonest appointment available.  Patient should follow-up with PCP within 1 week if Cardiology is not available and within 1-2 weeks if cardiology is available.  Patient should follow-up with Nephrology and Pulmonology within 1-2 weeks.  GDMTS need to be reviewed. Can we remove Amlodipine? Change for another med? Add Spironolactone? Add Jardiance?   Advanced Care Planning should be discussed with the patient. Patient does meet Palliative Care Criteria.

## 2025-01-22 NOTE — CONSULTS
Nephrology Consultation          Patient: Saranya Hightower MRN: [id number]  SSN: [id number]    YOB: 1950  Age: 74 y.o.  Sex: female      Subjective:   Reason for the consultation.  Chronic kidney stage IV/fluid overload  History of present illness.  The patient is 74-year-old woman with underlying history of CKD stage IV, follow-up with me as an outpatient, hypertension, COPD, history of congestive heart failure with LVEF 30 to 35%, AAA repair, status post carotid artery endarterectomy, peripheral vascular disease was hospitalized for worsening shortness of breath and diagnosed with decompensated CHF requiring on nasal cannula 2 L.   Initial chemistry showed elevated serum creatinine of 2.62 from her baseline of 2.4.  No noticed lower extremity swelling.  She is on RA.  No voiding issues so far.  Past Medical History:   Diagnosis Date    AAA (abdominal aortic aneurysm) ([facility])     S/P STENTING    Abnormal weight loss 09/11/2021    Anemia     Atherosclerosis of native coronary artery without angina pectoris 09/11/2021    Bronchitis     CAD (coronary artery disease)     MI and hx of stents Lcx and RCA 8/2017    Carotid artery stenosis 09/11/2021    Cervicalgia 09/11/2021    CHF (congestive heart failure) ([facility])     Chronic bronchitis ([facility])     CKD (chronic kidney disease) stage 4, GFR 15-29 ml/min ([facility])     Colon polyps     Constipation 09/11/2021    COPD (chronic obstructive pulmonary disease) ([facility])     COPD (chronic obstructive pulmonary disease) ([facility])     HTN (hypertension) 09/11/2021    Hyperlipidemia     Menopause     Personal history of colonic polyps 09/11/2021    PVD (peripheral vascular disease) ([facility]) 2019    s/p left to right fem-fem bypass    Vitamin D deficiency 09/11/2021     Past Surgical History:   Procedure Laterality Date    CARDIAC CATHETERIZATION      X 3    CARDIAC DEFIBRILLATOR PLACEMENT  09/16/2013    COLONOSCOPY N/A 10/5/2021    COLONOSCOPY ( T I V A) performed by Jordan

## 2025-01-22 NOTE — CARE COORDINATION
01/22/25 1140   Service Assessment   Patient Orientation Alert and Oriented   Cognition Alert   History Provided By Patient   Primary Caregiver Self   Accompanied By/Relationship alone in room   Support Systems Spouse/Significant Other;Children   Patient's Healthcare Decision Maker is: Legal Next of Kin   PCP Verified by CM Yes  (Lola seen last week)   Last Visit to PCP Within last 3 months   Prior Functional Level Independent in ADLs/IADLs   Current Functional Level Independent in ADLs/IADLs   Can patient return to prior living arrangement Yes   Ability to make needs known: Good   Family able to assist with home care needs: Yes   Would you like for me to discuss the discharge plan with any other family members/significant others, and if so, who? Yes   Financial Resources Medicare   Social/Functional History   Lives With Spouse;Son   Type of Home House   Home Layout One level   Home Access Stairs to enter with rails   Entrance Stairs - Number of Steps 4 steps   Home Equipment Cane   Receives Help From Family   Prior Level of Assist for ADLs Independent   Prior Level of Assist for Homemaking Independent   Ambulation Assistance Independent   Prior Level of Assist for Transfers Independent   Active  No   Patient's  Info  takes her to appointments   Mode of Transportation Car   Discharge Planning   Type of Residence House   Living Arrangements Spouse/Significant Other;Children   Current Services Prior To Admission None   DME Ordered? No   Potential Assistance Purchasing Medications No   Type of Home Care Services None   Patient expects to be discharged to: House   One/Two Story Residence One story   History of falls? 0   Services At/After Discharge   Transition of Care Consult (CM Consult) N/A   Services At/After Discharge None    Resource Information Provided? No     Patient lives with her  and her son in a one story home with 4 steps to entrance. Patient has a cane and no other

## 2025-01-23 LAB
ALBUMIN SERPL-MCNC: 3.3 G/DL (ref 3.5–5)
ANION GAP SERPL CALC-SCNC: 10 MMOL/L (ref 2–12)
BASOPHILS # BLD: 0.01 K/UL (ref 0–0.1)
BASOPHILS NFR BLD: 0.1 % (ref 0–1)
BNP SERPL-MCNC: ABNORMAL PG/ML
BUN SERPL-MCNC: 59 MG/DL (ref 6–20)
BUN/CREAT SERPL: 18 (ref 12–20)
CA-I BLD-MCNC: 9 MG/DL (ref 8.5–10.1)
CHLORIDE SERPL-SCNC: 100 MMOL/L (ref 97–108)
CO2 SERPL-SCNC: 26 MMOL/L (ref 21–32)
COLLECT DATE STL: NORMAL
CREAT SERPL-MCNC: 3.24 MG/DL (ref 0.55–1.02)
DIFFERENTIAL METHOD BLD: ABNORMAL
EOSINOPHIL # BLD: 0 K/UL (ref 0–0.4)
EOSINOPHIL NFR BLD: 0 % (ref 0–7)
ERYTHROCYTE [DISTWIDTH] IN BLOOD BY AUTOMATED COUNT: 14.2 % (ref 11.5–14.5)
EST. AVERAGE GLUCOSE BLD GHB EST-MCNC: 68 MG/DL
FERRITIN SERPL-MCNC: 53 NG/ML (ref 8–252)
GLUCOSE BLD STRIP.AUTO-MCNC: 137 MG/DL (ref 65–100)
GLUCOSE BLD STRIP.AUTO-MCNC: 170 MG/DL (ref 65–100)
GLUCOSE BLD STRIP.AUTO-MCNC: 245 MG/DL (ref 65–100)
GLUCOSE BLD STRIP.AUTO-MCNC: 302 MG/DL (ref 65–100)
GLUCOSE SERPL-MCNC: 127 MG/DL (ref 65–100)
HBA1C MFR BLD: 4 % (ref 4–5.6)
HCT VFR BLD AUTO: 28.5 % (ref 35–47)
HEMOCCULT SP1 STL QL: NEGATIVE
HGB BLD-MCNC: 9.7 G/DL (ref 11.5–16)
IMM GRANULOCYTES # BLD AUTO: 0.04 K/UL (ref 0–0.04)
IMM GRANULOCYTES NFR BLD AUTO: 0.4 % (ref 0–0.5)
IRON SATN MFR SERPL: 9 % (ref 20–50)
IRON SERPL-MCNC: 30 UG/DL (ref 35–150)
LYMPHOCYTES # BLD: 0.64 K/UL (ref 0.8–3.5)
LYMPHOCYTES NFR BLD: 5.9 % (ref 12–49)
MAGNESIUM SERPL-MCNC: 2.1 MG/DL (ref 1.6–2.4)
MCH RBC QN AUTO: 33.6 PG (ref 26–34)
MCHC RBC AUTO-ENTMCNC: 34 G/DL (ref 30–36.5)
MCV RBC AUTO: 98.6 FL (ref 80–99)
MONOCYTES # BLD: 0.15 K/UL (ref 0–1)
MONOCYTES NFR BLD: 1.4 % (ref 5–13)
NEUTS SEG # BLD: 10.1 K/UL (ref 1.8–8)
NEUTS SEG NFR BLD: 92.2 % (ref 32–75)
NRBC # BLD: 0 K/UL (ref 0–0.01)
NRBC BLD-RTO: 0 PER 100 WBC
PERFORMED BY:: ABNORMAL
PHOSPHATE SERPL-MCNC: 2.9 MG/DL (ref 2.6–4.7)
PLATELET # BLD AUTO: 219 K/UL (ref 150–400)
PMV BLD AUTO: 12.3 FL (ref 8.9–12.9)
POTASSIUM SERPL-SCNC: 3.7 MMOL/L (ref 3.5–5.1)
RBC # BLD AUTO: 2.89 M/UL (ref 3.8–5.2)
SODIUM SERPL-SCNC: 136 MMOL/L (ref 136–145)
TIBC SERPL-MCNC: 330 UG/DL (ref 250–450)
VIT B12 SERPL-MCNC: 1960 PG/ML (ref 193–986)
WBC # BLD AUTO: 10.9 K/UL (ref 3.6–11)

## 2025-01-23 PROCEDURE — 94640 AIRWAY INHALATION TREATMENT: CPT

## 2025-01-23 PROCEDURE — 6370000000 HC RX 637 (ALT 250 FOR IP): Performed by: FAMILY MEDICINE

## 2025-01-23 PROCEDURE — 83735 ASSAY OF MAGNESIUM: CPT

## 2025-01-23 PROCEDURE — 36415 COLL VENOUS BLD VENIPUNCTURE: CPT

## 2025-01-23 PROCEDURE — 83540 ASSAY OF IRON: CPT

## 2025-01-23 PROCEDURE — 2060000000 HC ICU INTERMEDIATE R&B

## 2025-01-23 PROCEDURE — 80069 RENAL FUNCTION PANEL: CPT

## 2025-01-23 PROCEDURE — 82962 GLUCOSE BLOOD TEST: CPT

## 2025-01-23 PROCEDURE — 82607 VITAMIN B-12: CPT

## 2025-01-23 PROCEDURE — 85025 COMPLETE CBC W/AUTO DIFF WBC: CPT

## 2025-01-23 PROCEDURE — 6360000002 HC RX W HCPCS: Performed by: INTERNAL MEDICINE

## 2025-01-23 PROCEDURE — 94761 N-INVAS EAR/PLS OXIMETRY MLT: CPT

## 2025-01-23 PROCEDURE — 82728 ASSAY OF FERRITIN: CPT

## 2025-01-23 PROCEDURE — 2500000003 HC RX 250 WO HCPCS: Performed by: FAMILY MEDICINE

## 2025-01-23 PROCEDURE — 6360000002 HC RX W HCPCS: Performed by: FAMILY MEDICINE

## 2025-01-23 PROCEDURE — 83880 ASSAY OF NATRIURETIC PEPTIDE: CPT

## 2025-01-23 PROCEDURE — 2500000003 HC RX 250 WO HCPCS: Performed by: INTERNAL MEDICINE

## 2025-01-23 RX ADMIN — METHYLPREDNISOLONE SODIUM SUCCINATE 40 MG: 40 INJECTION INTRAMUSCULAR; INTRAVENOUS at 01:37

## 2025-01-23 RX ADMIN — CARVEDILOL 25 MG: 12.5 TABLET, FILM COATED ORAL at 17:18

## 2025-01-23 RX ADMIN — INSULIN LISPRO 12 UNITS: 100 INJECTION, SOLUTION INTRAVENOUS; SUBCUTANEOUS at 20:40

## 2025-01-23 RX ADMIN — THERA TABS 1 TABLET: TAB at 08:38

## 2025-01-23 RX ADMIN — HEPARIN SODIUM 5000 UNITS: 5000 INJECTION INTRAVENOUS; SUBCUTANEOUS at 08:37

## 2025-01-23 RX ADMIN — SODIUM CHLORIDE, PRESERVATIVE FREE 10 ML: 5 INJECTION INTRAVENOUS at 08:38

## 2025-01-23 RX ADMIN — HEPARIN SODIUM 5000 UNITS: 5000 INJECTION INTRAVENOUS; SUBCUTANEOUS at 20:43

## 2025-01-23 RX ADMIN — FUROSEMIDE 40 MG: 10 INJECTION, SOLUTION INTRAMUSCULAR; INTRAVENOUS at 08:37

## 2025-01-23 RX ADMIN — ASPIRIN 81 MG CHEWABLE TABLET 81 MG: 81 TABLET CHEWABLE at 08:38

## 2025-01-23 RX ADMIN — Medication 2 PUFF: at 20:34

## 2025-01-23 RX ADMIN — HYDRALAZINE HYDROCHLORIDE 50 MG: 50 TABLET ORAL at 08:38

## 2025-01-23 RX ADMIN — ISOSORBIDE DINITRATE 20 MG: 20 TABLET ORAL at 08:38

## 2025-01-23 RX ADMIN — PANTOPRAZOLE SODIUM 40 MG: 40 TABLET, DELAYED RELEASE ORAL at 05:52

## 2025-01-23 RX ADMIN — AMLODIPINE BESYLATE 10 MG: 5 TABLET ORAL at 08:38

## 2025-01-23 RX ADMIN — ISOSORBIDE DINITRATE 20 MG: 20 TABLET ORAL at 20:46

## 2025-01-23 RX ADMIN — ROSUVASTATIN CALCIUM 10 MG: 5 TABLET, FILM COATED ORAL at 08:43

## 2025-01-23 RX ADMIN — CARVEDILOL 25 MG: 12.5 TABLET, FILM COATED ORAL at 08:38

## 2025-01-23 RX ADMIN — Medication 2 PUFF: at 07:51

## 2025-01-23 RX ADMIN — HYDRALAZINE HYDROCHLORIDE 50 MG: 50 TABLET ORAL at 20:48

## 2025-01-23 RX ADMIN — ISOSORBIDE DINITRATE 20 MG: 20 TABLET ORAL at 15:04

## 2025-01-23 RX ADMIN — SODIUM CHLORIDE, PRESERVATIVE FREE 5 ML: 5 INJECTION INTRAVENOUS at 20:48

## 2025-01-23 RX ADMIN — METHYLPREDNISOLONE SODIUM SUCCINATE 20 MG: 40 INJECTION INTRAMUSCULAR; INTRAVENOUS at 20:43

## 2025-01-23 RX ADMIN — INSULIN LISPRO 4 UNITS: 100 INJECTION, SOLUTION INTRAVENOUS; SUBCUTANEOUS at 11:30

## 2025-01-23 RX ADMIN — HYDRALAZINE HYDROCHLORIDE 50 MG: 50 TABLET ORAL at 15:04

## 2025-01-23 RX ADMIN — OLANZAPINE 2.5 MG: 2.5 TABLET, FILM COATED ORAL at 20:48

## 2025-01-23 RX ADMIN — METHYLPREDNISOLONE SODIUM SUCCINATE 40 MG: 40 INJECTION INTRAMUSCULAR; INTRAVENOUS at 05:52

## 2025-01-23 ASSESSMENT — PAIN SCALES - GENERAL: PAINLEVEL_OUTOF10: 0

## 2025-01-23 ASSESSMENT — ENCOUNTER SYMPTOMS
SHORTNESS OF BREATH: 1
GASTROINTESTINAL NEGATIVE: 1
COUGH: 1

## 2025-01-23 NOTE — PROGRESS NOTES
Nephrology follow-up          Patient: Saranya Hightower MRN: [id number]  SSN: [id number]    YOB: 1950  Age: 74 y.o.  Sex: female      Subjective:   Patient is seen at the bedside  She looks comfortable  She is on room air  Blood pressures are okay    Worsening MAKENZIE  Holding IV Lasix    Past Medical History:   Diagnosis Date    AAA (abdominal aortic aneurysm) ([facility])     S/P STENTING    Abnormal weight loss 09/11/2021    Anemia     Atherosclerosis of native coronary artery without angina pectoris 09/11/2021    Bronchitis     CAD (coronary artery disease)     MI and hx of stents Lcx and RCA 8/2017    Carotid artery stenosis 09/11/2021    Cervicalgia 09/11/2021    CHF (congestive heart failure) ([facility])     Chronic bronchitis ([facility])     CKD (chronic kidney disease) stage 4, GFR 15-29 ml/min ([facility])     Colon polyps     Constipation 09/11/2021    COPD (chronic obstructive pulmonary disease) ([facility])     COPD (chronic obstructive pulmonary disease) ([facility])     HTN (hypertension) 09/11/2021    Hyperlipidemia     Menopause     Personal history of colonic polyps 09/11/2021    PVD (peripheral vascular disease) ([facility]) 2019    s/p left to right fem-fem bypass    Vitamin D deficiency 09/11/2021     Past Surgical History:   Procedure Laterality Date    CARDIAC CATHETERIZATION      X 3    CARDIAC DEFIBRILLATOR PLACEMENT  09/16/2013    COLONOSCOPY N/A 10/5/2021    COLONOSCOPY ( T I V A) performed by Talib Ortega MD at [facility] ENDOSCOPY    COLONOSCOPY  2015    ORTHOPEDIC SURGERY      R leg    OTHER SURGICAL HISTORY      BILAT LOWER EXTREM VASC BYPASS SURGERY    OTHER SURGICAL HISTORY      R CAROTID ENDORECTOMY 11-4-05,   L CAROTID ENDARECTOMY 11-6-2006    PACEMAKER      6 0r 7 years ago      Family History   Problem Relation Age of Onset    No Known Problems Father     Cancer Mother     Hypertension Mother      Social History     Tobacco Use    Smoking status: Every Day     Current packs/day: 0.50     Types: Cigarettes

## 2025-01-23 NOTE — PROGRESS NOTES
CM reviewed chart and noted patient DCP is to return home with her family.     Patient was discharged within the last 30 days with [facility] HH services, but when assessment completed yesterday, patient did not mention HH services currently. CM will reach out today to family to confirm HH services are still wanted at discharge.

## 2025-01-23 NOTE — PROGRESS NOTES
Progress Note      1/23/2025 10:46 AM  NAME: Saranya Hightower   MRN:  [id number]   Admit Diagnosis: CHF (congestive heart failure), NYHA class I, acute on chronic, combined (HCC) [I50.43]      Problem List:   Acute on chronic CHF  Cardiomyopathy with baseline EF 30-35%  CAD status post PTCA/ALEJANDRA of RCA, 8-2017 and 2-2019   PAD  AICD  AAA status post EVAR  Labile hypertension  Chronic respiratory failure  Stage IV CKD  Tobacco abuse  COPD     Assessment/Plan:   Mixed picture of COPD and CHF  Medical management CAD, no evidence of acute MI  Creatinine up to 3.24 today, will hold diuretics         []       High complexity decision making was performed in this patient at high risk for decompensation with multiple organ involvement.    Subjective:     Saranya Hightower with mild dyspnea.  Discussed with RN events overnight.     Review of Systems:   Negative except for as noted above.    Objective:      Physical Exam:    Last 24hrs VS reviewed since prior progress note. Most recent are:    BP (!) 149/78   Pulse 75   Temp 98.2 °F (36.8 °C) (Oral)   Resp 16   Ht 1.575 m (5' 2\")   Wt 96.9 kg (213 lb 10 oz)   SpO2 97%   BMI 39.07 kg/m²     Intake/Output Summary (Last 24 hours) at 1/23/2025 1046  Last data filed at 1/23/2025 0945  Gross per 24 hour   Intake 950 ml   Output 1100 ml   Net -150 ml        General Appearance: Alert; no acute distress.  Ears/Nose/Mouth/Throat: moist mucous membranes  Neck: Supple.  Chest: Lungs with dullness at the bases  Cardiovascular: Regular rate and rhythm, S1S2 normal  Abdomen: Soft, non-tender, bowel sounds are active.  Extremities: No edema bilaterally.  Skin: Warm and dry.      PMH/SH reviewed - no change compared to H&P    Telemetry: NSR    EKG:     No valid procedures specified.    No results found for this or any previous visit.    []  No new EKG for review    Lab Data Personally Reviewed:    Recent Labs     01/22/25  0158 01/23/25  0040   WBC 6.9 10.9   HGB 9.9* 9.7*   HCT 28.8*  infusion   IntraVENous PRN    heparin (porcine) injection 5,000 Units  5,000 Units SubCUTAneous BID    ondansetron (ZOFRAN-ODT) disintegrating tablet 4 mg  4 mg Oral Q8H PRN    Or    ondansetron (ZOFRAN) injection 4 mg  4 mg IntraVENous Q6H PRN    polyethylene glycol (GLYCOLAX) packet 17 g  17 g Oral Daily PRN    acetaminophen (TYLENOL) tablet 650 mg  650 mg Oral Q6H PRN    Or    acetaminophen (TYLENOL) suppository 650 mg  650 mg Rectal Q6H PRN    budesonide-formoterol (SYMBICORT) 80-4.5 MCG/ACT inhaler 2 puff  2 puff Inhalation BID RT    ipratropium 0.5 mg-albuterol 2.5 mg (DUONEB) nebulizer solution 1 Dose  1 Dose Inhalation Q4H PRN    furosemide (LASIX) injection 40 mg  40 mg IntraVENous BID         Ariana Alston MD

## 2025-01-23 NOTE — PROGRESS NOTES
Hospitalist Progress Note    NAME:   Saranya Hightower   : 1950   MRN: [id number]     Date/Time: 2025 6:58 AM  Patient PCP: Nubia Davila APRN - NP    Estimated discharge date:  Barriers:       Assessment / Plan:    Acute respiratory failure with mild pulmonary edema  Acute systolic heart failure rEF with ejection fraction of 30 to 35%  COPD /exacerbation  -CXR: Mild pulmonary edema  -NT proBNP 30K  -Recent TTE: EF 30-35%, mild to moderate MR, no AS  -Baseline weight 2 weeks PTA 94-100lbs.  Admission weight 109lbs  -Continue IV Lasix 40 mg twice daily    -  MAKENZIE noted and may have to hold Lasix today based on nephrology ruminations.  -Continue IV Solu-Medrol, transition to prednisone if pulm agrees  -2LNC --> 97%RA today  -Nephrology and pulmonary following     CAD  Hypertension  Hyperlipidemia  -Continue aspirin and Crestor  -Continue amlodipine, Coreg, hydralazine, Isordil     CKD stage IV  -Follow creatinine closely while on IV diuretics    -  MAKENZIE noted and may have to hold Lasix today based on nephrology ruminations.     PVD s/p left to right fem-fem bypass  -Continue aspirin and statin as above     Anemia  -Hemoglobin 10.6-->9.9.  Baseline of 2024  -Normocytic pattern.  Will check stool guaiacs, iron studies, B12.  Recent TSH normal  -continue PPI    Due to prophylaxis  Heparin SC    Full CODE STATUS      Medical Decision Making:   I personally reviewed labs: BMP  I personally reviewed imaging:  I personally reviewed EKG:  Toxic drug monitoring:   Discussed case with: Patient, nursing    Total time 35 minutes    Subjective:     Chief Complaint / Reason for Physician Visit  \" Shortness of breath\".  Discussed with RN events overnight.      --  Admission H&P  74 y.o.  female with PMHx significant for hypertension, hyperlipidemia, COPD, CAD, CHF with ejection fraction 30 to 35%, malnutrition, PVD, CKD stage IV, carotid artery stenosis, went to ED of [facility] with

## 2025-01-23 NOTE — PROGRESS NOTES
PULMONARY NOTE  [facility]    Name: Saranya Hightower MRN: [id number]   : 1950 Hospital: [facility]   Date: 2025  Admission date: 2025 Hospital Day: 3       HPI:     Patient Active Problem List   Diagnosis    Carotid artery stenosis    Hypertensive heart disease without congestive heart failure    Abnormal weight loss    Constipation    HTN (hypertension)    Hyperlipidemia    Vitamin D deficiency    Atherosclerosis of native coronary artery without angina pectoris    History of colonic polyps    Cervicalgia    Pulmonary edema cardiac cause (HCC)    Hypertensive emergency    Hypertensive urgency    CHF (congestive heart failure), NYHA class I, acute on chronic, combined (HCC)    Systolic CHF, acute on chronic ([facility])    Tobacco user    CKD (chronic kidney disease) stage 4, GFR 15-29 ml/min ([facility])    Acute respiratory failure with hypoxia    Pleural effusion, bilateral    Leukocytosis    Hypoxic respiratory failure    COPD (chronic obstructive pulmonary disease) ([facility])    Heart failure ([facility])    Severe malnutrition ([facility])             [x] High complexity decision making was performed  [x] See my orders for details      Subjective/Initial History:     I was asked by Marina Hudson MD to see Saranya Hightower  a 74 y.o.   female in consultation     Excerpts from admission 2025 or consult notes as follows:   74-year-old lady came in because of shortness of breath and dyspnea she continues to smoke quit smoking 4 days ago past medical history of peripheral vascular disease chronic kidney disease stage IV COPD coronary artery disease she was at [facility] transferred to [facility] she was wheezing on oxygen via nasal cannula BNP was elevated on Lasix chest x-ray shows mild pulmonary edema she is on inhalers at home.      No Known Allergies     MAR reviewed and pertinent medications noted or modified as needed     Current Facility-Administered Medications  79* 71*   [id number] 22 24   BEART  --  0.7   FIO2A 0.45  --    OXYHEM 92.6* 93.3*   CARBOXHGBART 2.3* 0.5*   METHGBART 0.2 0.2     No results found for this or any previous visit.    IMPRESSION:   Acute hypoxic respiratory failure resolved  Acute systolic heart failure  Combination of CHF and chronic Obstructive Pulmonary Disease with Severe Acute Exacerbation   HFrEF with EF of 35  Body mass index is 39.07 kg/m².  Chronic kidney disease stage IV  History of hypertension  Peripheral vascular disease status post left to right Be pop bypass      RECOMMENDATIONS/PLAN:     74-year-old lady came in regarding shortness of dyspnea she was in pulmonary edema fluid overload history of chronic kidney disease quit smoking about 4 days ago used to smoke 1 pack/day  Patient was on oxygen 2 L nasal cannula which has been weaned out I saw her today she was on room air  IV Solu-Medrol nebulizer treatment  She is on inhaler at home agree with DuoNeb and Symbicort inhaler  Chest x-ray shows mild pulmonary edema     1/23 alert awake on room air arterial blood gases done previously shows pCO2 36 pO2 79 continue with the diuresis patient has CHF volume overload cardiorenal syndrome with history of chronic kidney disease and peripheral vascular disease along with COPD will decrease Solu-Medrol to every 12 hours          Ramon Shields MD

## 2025-01-24 LAB
ANION GAP SERPL CALC-SCNC: 9 MMOL/L (ref 2–12)
BNP SERPL-MCNC: ABNORMAL PG/ML
BUN SERPL-MCNC: 81 MG/DL (ref 6–20)
BUN/CREAT SERPL: 24 (ref 12–20)
CA-I BLD-MCNC: 9.2 MG/DL (ref 8.5–10.1)
CHLORIDE SERPL-SCNC: 100 MMOL/L (ref 97–108)
CO2 SERPL-SCNC: 27 MMOL/L (ref 21–32)
CREAT SERPL-MCNC: 3.42 MG/DL (ref 0.55–1.02)
GLUCOSE BLD STRIP.AUTO-MCNC: 171 MG/DL (ref 65–100)
GLUCOSE BLD STRIP.AUTO-MCNC: 208 MG/DL (ref 65–100)
GLUCOSE BLD STRIP.AUTO-MCNC: 311 MG/DL (ref 65–100)
GLUCOSE BLD STRIP.AUTO-MCNC: 89 MG/DL (ref 65–100)
GLUCOSE SERPL-MCNC: 78 MG/DL (ref 65–100)
PERFORMED BY:: ABNORMAL
PERFORMED BY:: NORMAL
POTASSIUM SERPL-SCNC: 3.2 MMOL/L (ref 3.5–5.1)
SODIUM SERPL-SCNC: 136 MMOL/L (ref 136–145)

## 2025-01-24 PROCEDURE — 6370000000 HC RX 637 (ALT 250 FOR IP): Performed by: FAMILY MEDICINE

## 2025-01-24 PROCEDURE — 2500000003 HC RX 250 WO HCPCS: Performed by: FAMILY MEDICINE

## 2025-01-24 PROCEDURE — 82962 GLUCOSE BLOOD TEST: CPT

## 2025-01-24 PROCEDURE — 36415 COLL VENOUS BLD VENIPUNCTURE: CPT

## 2025-01-24 PROCEDURE — 94761 N-INVAS EAR/PLS OXIMETRY MLT: CPT

## 2025-01-24 PROCEDURE — 83880 ASSAY OF NATRIURETIC PEPTIDE: CPT

## 2025-01-24 PROCEDURE — 6370000000 HC RX 637 (ALT 250 FOR IP): Performed by: INTERNAL MEDICINE

## 2025-01-24 PROCEDURE — 6360000002 HC RX W HCPCS: Performed by: INTERNAL MEDICINE

## 2025-01-24 PROCEDURE — 2060000000 HC ICU INTERMEDIATE R&B

## 2025-01-24 PROCEDURE — 2580000003 HC RX 258: Performed by: INTERNAL MEDICINE

## 2025-01-24 PROCEDURE — 80048 BASIC METABOLIC PNL TOTAL CA: CPT

## 2025-01-24 PROCEDURE — 94664 DEMO&/EVAL PT USE INHALER: CPT

## 2025-01-24 PROCEDURE — 6360000002 HC RX W HCPCS: Performed by: FAMILY MEDICINE

## 2025-01-24 PROCEDURE — 94640 AIRWAY INHALATION TREATMENT: CPT

## 2025-01-24 PROCEDURE — 51798 US URINE CAPACITY MEASURE: CPT

## 2025-01-24 RX ORDER — SODIUM CHLORIDE 9 MG/ML
INJECTION, SOLUTION INTRAVENOUS CONTINUOUS
Status: DISCONTINUED | OUTPATIENT
Start: 2025-01-24 | End: 2025-01-25

## 2025-01-24 RX ORDER — PREDNISONE 20 MG/1
20 TABLET ORAL DAILY
Status: DISCONTINUED | OUTPATIENT
Start: 2025-01-24 | End: 2025-01-25 | Stop reason: HOSPADM

## 2025-01-24 RX ADMIN — HYDRALAZINE HYDROCHLORIDE 50 MG: 50 TABLET ORAL at 20:59

## 2025-01-24 RX ADMIN — CARVEDILOL 25 MG: 12.5 TABLET, FILM COATED ORAL at 08:50

## 2025-01-24 RX ADMIN — SODIUM CHLORIDE 125 MG: 9 INJECTION, SOLUTION INTRAVENOUS at 16:58

## 2025-01-24 RX ADMIN — SODIUM CHLORIDE: 9 INJECTION, SOLUTION INTRAVENOUS at 15:39

## 2025-01-24 RX ADMIN — HYDRALAZINE HYDROCHLORIDE 50 MG: 50 TABLET ORAL at 08:57

## 2025-01-24 RX ADMIN — HEPARIN SODIUM 5000 UNITS: 5000 INJECTION INTRAVENOUS; SUBCUTANEOUS at 20:58

## 2025-01-24 RX ADMIN — SODIUM CHLORIDE, PRESERVATIVE FREE 10 ML: 5 INJECTION INTRAVENOUS at 21:00

## 2025-01-24 RX ADMIN — Medication 2 PUFF: at 20:17

## 2025-01-24 RX ADMIN — INSULIN LISPRO 12 UNITS: 100 INJECTION, SOLUTION INTRAVENOUS; SUBCUTANEOUS at 20:58

## 2025-01-24 RX ADMIN — HEPARIN SODIUM 5000 UNITS: 5000 INJECTION INTRAVENOUS; SUBCUTANEOUS at 09:04

## 2025-01-24 RX ADMIN — ISOSORBIDE DINITRATE 20 MG: 20 TABLET ORAL at 08:59

## 2025-01-24 RX ADMIN — POTASSIUM BICARBONATE 40 MEQ: 782 TABLET, EFFERVESCENT ORAL at 11:07

## 2025-01-24 RX ADMIN — HYDRALAZINE HYDROCHLORIDE 50 MG: 50 TABLET ORAL at 15:06

## 2025-01-24 RX ADMIN — ROSUVASTATIN CALCIUM 10 MG: 5 TABLET, FILM COATED ORAL at 09:00

## 2025-01-24 RX ADMIN — ISOSORBIDE DINITRATE 20 MG: 20 TABLET ORAL at 20:59

## 2025-01-24 RX ADMIN — Medication 2 PUFF: at 09:39

## 2025-01-24 RX ADMIN — INSULIN LISPRO 4 UNITS: 100 INJECTION, SOLUTION INTRAVENOUS; SUBCUTANEOUS at 17:00

## 2025-01-24 RX ADMIN — POTASSIUM BICARBONATE 40 MEQ: 782 TABLET, EFFERVESCENT ORAL at 09:02

## 2025-01-24 RX ADMIN — ISOSORBIDE DINITRATE 20 MG: 20 TABLET ORAL at 15:06

## 2025-01-24 RX ADMIN — PANTOPRAZOLE SODIUM 40 MG: 40 TABLET, DELAYED RELEASE ORAL at 05:52

## 2025-01-24 RX ADMIN — PREDNISONE 20 MG: 20 TABLET ORAL at 11:07

## 2025-01-24 RX ADMIN — OLANZAPINE 2.5 MG: 2.5 TABLET, FILM COATED ORAL at 20:59

## 2025-01-24 RX ADMIN — CARVEDILOL 25 MG: 12.5 TABLET, FILM COATED ORAL at 17:38

## 2025-01-24 RX ADMIN — THERA TABS 1 TABLET: TAB at 08:53

## 2025-01-24 RX ADMIN — AMLODIPINE BESYLATE 10 MG: 5 TABLET ORAL at 08:58

## 2025-01-24 RX ADMIN — ASPIRIN 81 MG CHEWABLE TABLET 81 MG: 81 TABLET CHEWABLE at 08:56

## 2025-01-24 ASSESSMENT — ENCOUNTER SYMPTOMS
COUGH: 1
GASTROINTESTINAL NEGATIVE: 1
SHORTNESS OF BREATH: 1

## 2025-01-24 ASSESSMENT — PAIN SCALES - GENERAL
PAINLEVEL_OUTOF10: 0

## 2025-01-24 NOTE — PROGRESS NOTES
Patient continues to receive IV steroids, but lasix currently being held. Patient has been weaned to room air.     DCP is home with her  and [facility].     Patient and her  requested yesterday that patient be set up for home oxygen. Last admission patient did not qualify, but CM will check again prior to discharge.

## 2025-01-24 NOTE — PROGRESS NOTES
Progress Note      1/24/2025 1:31 PM  NAME: Saranya Hightower   MRN:  [id number]   Admit Diagnosis: CHF (congestive heart failure), NYHA class I, acute on chronic, combined (HCC) [I50.43]          Assessment/Plan:     Shortness of breath, CHF/COPD.  Improving.    Acute on chronic CHF, known heart failure with reduced ejection fraction, currently without edema, clear lung fields, patient does feels better.    COPD, tobacco use,?  Stop smoking since hospitalization.    Chronic kidney disease,?  Being evaluated for renal revascularization by Dr. Márquez, as per patient and .    Coronary disease, occluded distal circumflex, status post PCI and stenting of RCA, without chest pain.    Extensive peripheral vascular disease, status post left to right femoral to femoral and  and right femoral to popliteal bypass as well as bilateral carotid endarterectomy.           []       High complexity decision making was performed in this patient at high risk for decompensation with multiple organ involvement.    Subjective:     Saranya Hightower denies chest pain, dyspnea.  Discussed with RN events overnight.     Review of Systems:    Symptom Y/N Comments  Symptom Y/N Comments   Fever/Chills N   Chest Pain N    Poor Appetite N   Edema N    Cough N   Abdominal Pain N    Sputum N   Joint Pain N    SOB/KAUR N   Pruritis/Rash N    Nausea/vomit N   Tolerating PT/OT Y    Diarrhea N   Tolerating Diet Y    Constipation N   Other       Could NOT obtain due to:      Objective:      Physical Exam:    Last 24hrs VS reviewed since prior progress note. Most recent are:    /64   Pulse 75   Temp 97.6 °F (36.4 °C) (Oral)   Resp 18   Ht 1.575 m (5' 2\")   Wt 95.5 kg (210 lb 8.6 oz)   SpO2 94%   BMI 38.51 kg/m²     Intake/Output Summary (Last 24 hours) at 1/24/2025 1331  Last data filed at 1/24/2025 0850  Gross per 24 hour   Intake 580 ml   Output 1350 ml   Net -770 ml        General Appearance: Thinly built female, alert; no acute

## 2025-01-24 NOTE — PLAN OF CARE
Problem: Chronic Conditions and Co-morbidities  Goal: Patient's chronic conditions and co-morbidity symptoms are monitored and maintained or improved  Outcome: Progressing     Problem: Discharge Planning  Goal: Discharge to home or other facility with appropriate resources  Outcome: Progressing     Problem: Safety - Adult  Goal: Free from fall injury  Outcome: Progressing     Problem: Respiratory - Adult  Goal: Achieves optimal ventilation and oxygenation  Outcome: Progressing     Problem: Cardiovascular - Adult  Goal: Maintains optimal cardiac output and hemodynamic stability  Outcome: Progressing  Goal: Absence of cardiac dysrhythmias or at baseline  Outcome: Progressing     Problem: Genitourinary - Adult  Goal: Absence of urinary retention  Outcome: Progressing  Flowsheets (Taken 1/23/2025 2000)  Absence of urinary retention: Assess patient’s ability to void and empty bladder     Problem: Metabolic/Fluid and Electrolytes - Adult  Goal: Electrolytes maintained within normal limits  Outcome: Progressing  Goal: Hemodynamic stability and optimal renal function maintained  Outcome: Progressing  Goal: Glucose maintained within prescribed range  Outcome: Progressing     Problem: Hematologic - Adult  Goal: Maintains hematologic stability  Outcome: Progressing

## 2025-01-24 NOTE — PROGRESS NOTES
01/24/25 1001   Resting (Room Air)   SpO2 100   HR 92   During Walk (Room Air)   SpO2 97   HR 96   Walk/Assistance Device Ambulation   Rate of Dyspnea 0   After Walk   Does the Patient Qualify for Home O2 No   Does the Patient Need Portable Oxygen Tanks No

## 2025-01-24 NOTE — PROGRESS NOTES
Nephrology follow-up          Patient: Saranya Hightower MRN: [id number]  SSN: [id number]    YOB: 1950  Age: 74 y.o.  Sex: female      Subjective:   Patient is seen at the bedside  She looks comfortable  She is on room air  Blood pressures are okay    Worsening MAKENZIE  Holding IV Lasix    Hypokalemia  Gentle IV hydration for 24 hours    Past Medical History:   Diagnosis Date    AAA (abdominal aortic aneurysm) ([facility])     S/P STENTING    Abnormal weight loss 09/11/2021    Anemia     Atherosclerosis of native coronary artery without angina pectoris 09/11/2021    Bronchitis     CAD (coronary artery disease)     MI and hx of stents Lcx and RCA 8/2017    Carotid artery stenosis 09/11/2021    Cervicalgia 09/11/2021    CHF (congestive heart failure) ([facility])     Chronic bronchitis ([facility])     CKD (chronic kidney disease) stage 4, GFR 15-29 ml/min ([facility])     Colon polyps     Constipation 09/11/2021    COPD (chronic obstructive pulmonary disease) ([facility])     COPD (chronic obstructive pulmonary disease) ([facility])     HTN (hypertension) 09/11/2021    Hyperlipidemia     Menopause     Personal history of colonic polyps 09/11/2021    PVD (peripheral vascular disease) ([facility]) 2019    s/p left to right fem-fem bypass    Vitamin D deficiency 09/11/2021     Past Surgical History:   Procedure Laterality Date    CARDIAC CATHETERIZATION      X 3    CARDIAC DEFIBRILLATOR PLACEMENT  09/16/2013    COLONOSCOPY N/A 10/5/2021    COLONOSCOPY ( T I V A) performed by Talib Ortega MD at [facility] ENDOSCOPY    COLONOSCOPY  2015    ORTHOPEDIC SURGERY      R leg    OTHER SURGICAL HISTORY      BILAT LOWER EXTREM VASC BYPASS SURGERY    OTHER SURGICAL HISTORY      R CAROTID ENDORECTOMY 11-4-05,   L CAROTID ENDARECTOMY 11-6-2006    PACEMAKER      6 0r 7 years ago      Family History   Problem Relation Age of Onset    No Known Problems Father     Cancer Mother     Hypertension Mother      Social History     Tobacco Use    Smoking status: Every Day      01/23/25 2048    sodium chloride flush 0.9 % injection 5-40 mL  5-40 mL IntraVENous PRN Marina Hudson MD        0.9 % sodium chloride infusion   IntraVENous PRN Marina Hudson MD        heparin (porcine) injection 5,000 Units  5,000 Units SubCUTAneous BID Marina Hudosn MD   5,000 Units at 01/24/25 0904    ondansetron (ZOFRAN-ODT) disintegrating tablet 4 mg  4 mg Oral Q8H PRN Marina Hudson MD        Or    ondansetron (ZOFRAN) injection 4 mg  4 mg IntraVENous Q6H PRN Marina Hudson MD        polyethylene glycol (GLYCOLAX) packet 17 g  17 g Oral Daily PRN Marina Hudson MD        acetaminophen (TYLENOL) tablet 650 mg  650 mg Oral Q6H PRN Marina Hudson MD        Or    acetaminophen (TYLENOL) suppository 650 mg  650 mg Rectal Q6H PRN Marina Hudson MD        budesonide-formoterol (SYMBICORT) 80-4.5 MCG/ACT inhaler 2 puff  2 puff Inhalation BID RT Marina Hudson MD   2 puff at 01/24/25 0939    ipratropium 0.5 mg-albuterol 2.5 mg (DUONEB) nebulizer solution 1 Dose  1 Dose Inhalation Q4H PRN Marina Hudson MD        [Held by provider] furosemide (LASIX) injection 40 mg  40 mg IntraVENous BID Marina Hudson MD   40 mg at 01/23/25 0837        No Known Allergies    Review of Systems:  A comprehensive review of systems was negative except for that written in the History of Present Illness.    Objective:     Vitals:    01/24/25 0850 01/24/25 0939 01/24/25 1115 01/24/25 1443   BP: (!) 166/85  121/64 138/68   Pulse: 78  75 84   Resp:   18    Temp:   97.6 °F (36.4 °C) 98.1 °F (36.7 °C)   TempSrc:   Oral    SpO2:  100% 94% 96%   Weight:       Height:            Physical Exam:  General: NAD  Eyes: sclera anicteric  Oral Cavity: No thrush or ulcers  Neck: no JVD  Chest: Fair bilateral air entry  Heart: normal sounds  Abdomen: soft and non tender   : no stewart  Lower Extremities: no edema  Skin: no rash  Neuro: intact  Psychiatric: non-depressed          Assessment/Plan:   Acute kidney injury on

## 2025-01-24 NOTE — PROGRESS NOTES
Hospitalist Progress Note    NAME:   Saranya Hightower   : 1950   MRN: [id number]     Date/Time: 2025 11:03 AM  Patient PCP: Nubia Davila APRN - NP    Estimated discharge date:  v   Barriers: improvement in serum creatinine, Nephrology clearance      Assessment / Plan:    Acute respiratory failure with mild pulmonary edema  Acute systolic heart failure rEF with ejection fraction of 30 to 35%  COPD /exacerbation  -CXR: Mild pulmonary edema  -NT proBNP 30K  -Recent TTE: EF 30-35%, mild to moderate MR, no AS  -Baseline weight 2 weeks PTA 94-100lbs.  Admission weight 109lbs  -Continue IV Lasix 40 mg twice daily    -  MAKENZIE noted and may have to hold Lasix today based on nephrology ruminations.  -Continue IV Solu-Medrol, transition to prednisone if pulm agrees  -2LNC --> 97%RA today  -Nephrology and pulmonary following     patient currently doing well, now on room air, continue to hold Lasix in setting of acute kidney injury  Continue to monitor respiratory status  Pulmonology consult appreciated, continue to follow recommendations     CAD  Hypertension  Hyperlipidemia  -Continue aspirin and Crestor  -Continue amlodipine, Coreg, hydralazine, Isordil     Acute on CKD stage IV  -Follow creatinine closely while on IV diuretics    -  MAKENZIE noted and may have to hold Lasix today based on nephrology ruminations.   of note patient serum creatinine continues to uptrend  Continue to hold nephrotoxic medications  Continue to trend serum creatinine  Nephrology consult appreciated, continue to follow recommendations       PVD s/p left to right fem-fem bypass  -Continue aspirin and statin as above     Anemia  -Hemoglobin 10.6-->9.9.  Baseline of 2024  -Normocytic pattern.  Will check stool guaiacs, iron studies, B12.  Recent TSH normal  -continue PPI    Due to prophylaxis  Heparin SC    Full CODE STATUS      Medical Decision Making:   I personally reviewed labs: CBC/BMP/Magnesium  I

## 2025-01-24 NOTE — PROGRESS NOTES
PULMONARY NOTE  [facility]    Name: Saranya Hightower MRN: [id number]   : 1950 Hospital: [facility]   Date: 2025  Admission date: 2025 Hospital Day: 4       HPI:     Patient Active Problem List   Diagnosis    Carotid artery stenosis    Hypertensive heart disease without congestive heart failure    Abnormal weight loss    Constipation    HTN (hypertension)    Hyperlipidemia    Vitamin D deficiency    Atherosclerosis of native coronary artery without angina pectoris    History of colonic polyps    Cervicalgia    Pulmonary edema cardiac cause (HCC)    Hypertensive emergency    Hypertensive urgency    CHF (congestive heart failure), NYHA class I, acute on chronic, combined (HCC)    Systolic CHF, acute on chronic ([facility])    Tobacco user    CKD (chronic kidney disease) stage 4, GFR 15-29 ml/min ([facility])    Acute respiratory failure with hypoxia    Pleural effusion, bilateral    Leukocytosis    Hypoxic respiratory failure    COPD (chronic obstructive pulmonary disease) ([facility])    Heart failure ([facility])    Severe malnutrition ([facility])             [x] High complexity decision making was performed  [x] See my orders for details      Subjective/Initial History:     I was asked by Marina Hudson MD to see Saranya Hightower  a 74 y.o.   female in consultation     Excerpts from admission 2025 or consult notes as follows:   74-year-old lady came in because of shortness of breath and dyspnea she continues to smoke quit smoking 4 days ago past medical history of peripheral vascular disease chronic kidney disease stage IV COPD coronary artery disease she was at [facility] transferred to [facility] she was wheezing on oxygen via nasal cannula BNP was elevated on Lasix chest x-ray shows mild pulmonary edema she is on inhalers at home.      No Known Allergies     MAR reviewed and pertinent medications noted or modified as needed     Current Facility-Administered Medications

## 2025-01-25 VITALS
BODY MASS INDEX: 19.15 KG/M2 | SYSTOLIC BLOOD PRESSURE: 169 MMHG | WEIGHT: 104.06 LBS | HEART RATE: 81 BPM | DIASTOLIC BLOOD PRESSURE: 74 MMHG | TEMPERATURE: 98.2 F | OXYGEN SATURATION: 100 % | HEIGHT: 62 IN | RESPIRATION RATE: 15 BRPM

## 2025-01-25 LAB
ALBUMIN SERPL-MCNC: 3.1 G/DL (ref 3.5–5)
ANION GAP SERPL CALC-SCNC: 10 MMOL/L (ref 2–12)
BASOPHILS # BLD: 0.01 K/UL (ref 0–0.1)
BASOPHILS NFR BLD: 0.1 % (ref 0–1)
BNP SERPL-MCNC: ABNORMAL PG/ML
BUN SERPL-MCNC: 83 MG/DL (ref 6–20)
BUN/CREAT SERPL: 27 (ref 12–20)
CA-I BLD-MCNC: 8.6 MG/DL (ref 8.5–10.1)
CHLORIDE SERPL-SCNC: 98 MMOL/L (ref 97–108)
CO2 SERPL-SCNC: 27 MMOL/L (ref 21–32)
CREAT SERPL-MCNC: 3.1 MG/DL (ref 0.55–1.02)
DIFFERENTIAL METHOD BLD: ABNORMAL
EOSINOPHIL # BLD: 0 K/UL (ref 0–0.4)
EOSINOPHIL NFR BLD: 0 % (ref 0–7)
ERYTHROCYTE [DISTWIDTH] IN BLOOD BY AUTOMATED COUNT: 13.6 % (ref 11.5–14.5)
GLUCOSE BLD STRIP.AUTO-MCNC: 93 MG/DL (ref 65–100)
GLUCOSE SERPL-MCNC: 86 MG/DL (ref 65–100)
HCT VFR BLD AUTO: 29.2 % (ref 35–47)
HGB BLD-MCNC: 9.9 G/DL (ref 11.5–16)
IMM GRANULOCYTES # BLD AUTO: 0.07 K/UL (ref 0–0.04)
IMM GRANULOCYTES NFR BLD AUTO: 0.5 % (ref 0–0.5)
LYMPHOCYTES # BLD: 0.87 K/UL (ref 0.8–3.5)
LYMPHOCYTES NFR BLD: 6.8 % (ref 12–49)
MCH RBC QN AUTO: 33.2 PG (ref 26–34)
MCHC RBC AUTO-ENTMCNC: 33.9 G/DL (ref 30–36.5)
MCV RBC AUTO: 98 FL (ref 80–99)
MONOCYTES # BLD: 1.36 K/UL (ref 0–1)
MONOCYTES NFR BLD: 10.6 % (ref 5–13)
NEUTS SEG # BLD: 10.56 K/UL (ref 1.8–8)
NEUTS SEG NFR BLD: 82 % (ref 32–75)
NRBC # BLD: 0 K/UL (ref 0–0.01)
NRBC BLD-RTO: 0 PER 100 WBC
PERFORMED BY:: NORMAL
PHOSPHATE SERPL-MCNC: 1.8 MG/DL (ref 2.6–4.7)
PLATELET # BLD AUTO: 233 K/UL (ref 150–400)
PMV BLD AUTO: 11.7 FL (ref 8.9–12.9)
POTASSIUM SERPL-SCNC: 4.2 MMOL/L (ref 3.5–5.1)
RBC # BLD AUTO: 2.98 M/UL (ref 3.8–5.2)
SODIUM SERPL-SCNC: 135 MMOL/L (ref 136–145)
WBC # BLD AUTO: 12.9 K/UL (ref 3.6–11)

## 2025-01-25 PROCEDURE — 94761 N-INVAS EAR/PLS OXIMETRY MLT: CPT

## 2025-01-25 PROCEDURE — 6370000000 HC RX 637 (ALT 250 FOR IP): Performed by: INTERNAL MEDICINE

## 2025-01-25 PROCEDURE — 6360000002 HC RX W HCPCS: Performed by: FAMILY MEDICINE

## 2025-01-25 PROCEDURE — 82962 GLUCOSE BLOOD TEST: CPT

## 2025-01-25 PROCEDURE — 94640 AIRWAY INHALATION TREATMENT: CPT

## 2025-01-25 PROCEDURE — 6370000000 HC RX 637 (ALT 250 FOR IP): Performed by: FAMILY MEDICINE

## 2025-01-25 PROCEDURE — 83880 ASSAY OF NATRIURETIC PEPTIDE: CPT

## 2025-01-25 PROCEDURE — 2500000003 HC RX 250 WO HCPCS: Performed by: FAMILY MEDICINE

## 2025-01-25 PROCEDURE — 80069 RENAL FUNCTION PANEL: CPT

## 2025-01-25 PROCEDURE — 2580000003 HC RX 258: Performed by: INTERNAL MEDICINE

## 2025-01-25 PROCEDURE — 36415 COLL VENOUS BLD VENIPUNCTURE: CPT

## 2025-01-25 PROCEDURE — 85025 COMPLETE CBC W/AUTO DIFF WBC: CPT

## 2025-01-25 RX ORDER — BUMETANIDE 2 MG/1
2 TABLET ORAL DAILY
Qty: 30 TABLET | Refills: 1 | Status: SHIPPED | OUTPATIENT
Start: 2025-01-29

## 2025-01-25 RX ORDER — BUDESONIDE AND FORMOTEROL FUMARATE DIHYDRATE 80; 4.5 UG/1; UG/1
2 AEROSOL RESPIRATORY (INHALATION)
Qty: 10.2 G | Refills: 3 | Status: SHIPPED | OUTPATIENT
Start: 2025-01-25 | End: 2025-01-25

## 2025-01-25 RX ORDER — BUDESONIDE AND FORMOTEROL FUMARATE DIHYDRATE 80; 4.5 UG/1; UG/1
2 AEROSOL RESPIRATORY (INHALATION)
Qty: 10.2 G | Refills: 3 | Status: SHIPPED | OUTPATIENT
Start: 2025-01-25

## 2025-01-25 RX ORDER — PREDNISONE 20 MG/1
20 TABLET ORAL DAILY
Qty: 5 TABLET | Refills: 0 | Status: SHIPPED | OUTPATIENT
Start: 2025-01-26 | End: 2025-01-31

## 2025-01-25 RX ORDER — FLUTICASONE FUROATE, UMECLIDINIUM BROMIDE AND VILANTEROL TRIFENATATE 100; 62.5; 25 UG/1; UG/1; UG/1
1 POWDER RESPIRATORY (INHALATION) DAILY
Qty: 28 EACH | Refills: 0 | Status: SHIPPED | OUTPATIENT
Start: 2025-01-25 | End: 2025-01-25

## 2025-01-25 RX ORDER — AMLODIPINE BESYLATE 10 MG/1
10 TABLET ORAL DAILY
Qty: 90 TABLET | Refills: 0 | Status: SHIPPED | OUTPATIENT
Start: 2025-01-25

## 2025-01-25 RX ORDER — ASPIRIN 81 MG/1
81 TABLET, CHEWABLE ORAL DAILY
Qty: 30 TABLET | Refills: 0 | Status: SHIPPED | OUTPATIENT
Start: 2025-01-25

## 2025-01-25 RX ORDER — ASPIRIN 81 MG/1
81 TABLET, CHEWABLE ORAL DAILY
Qty: 30 TABLET | Refills: 0 | Status: SHIPPED | OUTPATIENT
Start: 2025-01-25 | End: 2025-01-25

## 2025-01-25 RX ORDER — PREDNISONE 20 MG/1
20 TABLET ORAL DAILY
Qty: 5 TABLET | Refills: 0 | Status: SHIPPED | OUTPATIENT
Start: 2025-01-26 | End: 2025-01-25

## 2025-01-25 RX ORDER — FERROUS SULFATE 325(65) MG
325 TABLET ORAL 2 TIMES DAILY
Qty: 180 TABLET | Refills: 1 | Status: SHIPPED | OUTPATIENT
Start: 2025-01-25

## 2025-01-25 RX ORDER — BUMETANIDE 2 MG/1
2 TABLET ORAL DAILY
Qty: 30 TABLET | Refills: 1 | Status: SHIPPED | OUTPATIENT
Start: 2025-01-29 | End: 2025-01-25

## 2025-01-25 RX ORDER — SODIUM CHLORIDE 9 MG/ML
INJECTION, SOLUTION INTRAVENOUS CONTINUOUS
Status: DISCONTINUED | OUTPATIENT
Start: 2025-01-25 | End: 2025-01-25 | Stop reason: HOSPADM

## 2025-01-25 RX ORDER — FLUTICASONE FUROATE, UMECLIDINIUM BROMIDE AND VILANTEROL TRIFENATATE 100; 62.5; 25 UG/1; UG/1; UG/1
1 POWDER RESPIRATORY (INHALATION) DAILY
Qty: 28 EACH | Refills: 0 | Status: SHIPPED | OUTPATIENT
Start: 2025-01-25

## 2025-01-25 RX ADMIN — ROSUVASTATIN CALCIUM 10 MG: 5 TABLET, FILM COATED ORAL at 08:37

## 2025-01-25 RX ADMIN — SODIUM CHLORIDE: 9 INJECTION, SOLUTION INTRAVENOUS at 05:16

## 2025-01-25 RX ADMIN — HEPARIN SODIUM 5000 UNITS: 5000 INJECTION INTRAVENOUS; SUBCUTANEOUS at 08:38

## 2025-01-25 RX ADMIN — PANTOPRAZOLE SODIUM 40 MG: 40 TABLET, DELAYED RELEASE ORAL at 05:15

## 2025-01-25 RX ADMIN — PREDNISONE 20 MG: 20 TABLET ORAL at 08:38

## 2025-01-25 RX ADMIN — CARVEDILOL 25 MG: 12.5 TABLET, FILM COATED ORAL at 08:38

## 2025-01-25 RX ADMIN — THERA TABS 1 TABLET: TAB at 08:38

## 2025-01-25 RX ADMIN — SODIUM CHLORIDE, PRESERVATIVE FREE 10 ML: 5 INJECTION INTRAVENOUS at 08:38

## 2025-01-25 RX ADMIN — ISOSORBIDE DINITRATE 20 MG: 20 TABLET ORAL at 08:38

## 2025-01-25 RX ADMIN — Medication 2 PUFF: at 08:50

## 2025-01-25 RX ADMIN — HYDRALAZINE HYDROCHLORIDE 50 MG: 50 TABLET ORAL at 08:38

## 2025-01-25 RX ADMIN — SODIUM CHLORIDE: 9 INJECTION, SOLUTION INTRAVENOUS at 09:51

## 2025-01-25 RX ADMIN — ASPIRIN 81 MG CHEWABLE TABLET 81 MG: 81 TABLET CHEWABLE at 08:38

## 2025-01-25 RX ADMIN — AMLODIPINE BESYLATE 10 MG: 5 TABLET ORAL at 08:38

## 2025-01-25 ASSESSMENT — ENCOUNTER SYMPTOMS
GASTROINTESTINAL NEGATIVE: 1
SHORTNESS OF BREATH: 1
COUGH: 1

## 2025-01-25 NOTE — PROGRESS NOTES
Current packs/day: 0.50     Types: Cigarettes     Passive exposure: Never    Smokeless tobacco: Never   Substance Use Topics    Alcohol use: Never      No current facility-administered medications for this encounter.     Current Outpatient Medications   Medication Sig Dispense Refill    amLODIPine (NORVASC) 10 MG tablet Take 1 tablet by mouth daily 90 tablet 0    ferrous sulfate (IRON 325) 325 (65 Fe) MG tablet Take 1 tablet by mouth 2 times daily 180 tablet 1    aspirin 81 MG chewable tablet Take 1 tablet by mouth daily 30 tablet 0    budesonide-formoterol (SYMBICORT) 80-4.5 MCG/ACT AERO Inhale 2 puffs into the lungs in the morning and 2 puffs in the evening. 10.2 g 3    [START ON 1/29/2025] bumetanide (BUMEX) 2 MG tablet Take 1 tablet by mouth daily 30 tablet 1    fluticasone-umeclidin-vilant (TRELEGY ELLIPTA) 100-62.5-25 MCG/ACT AEPB inhaler Inhale 1 puff into the lungs daily 28 each 0    [START ON 1/26/2025] predniSONE (DELTASONE) 20 MG tablet Take 1 tablet by mouth daily for 5 days 5 tablet 0    Respiratory Therapy Supplies (NEBULIZER/TUBING/MOUTHPIECE) KIT 1 kit by Does not apply route daily as needed (for nebulized meds) (Patient not taking: Reported on 1/21/2025) 1 kit 0    Multiple Vitamin (THERA/BETA-CAROTENE) TABS Take 1 tablet by mouth daily      OLANZapine (ZYPREXA) 2.5 MG tablet Take 1 tablet by mouth nightly      hydrALAZINE (APRESOLINE) 50 MG tablet Take 1 tablet by mouth in the morning, at noon, and at bedtime 120 tablet 3    carvedilol (COREG) 25 MG tablet Take 1 tablet by mouth 2 times daily (with meals) 120 tablet 3    isosorbide dinitrate (ISORDIL) 20 MG tablet Take 1 tablet by mouth in the morning, at noon, and at bedtime 90 tablet 3    Rosuvastatin Calcium 10 MG CPSP Take 10 mg by mouth daily      nitroGLYCERIN (NITROSTAT) 0.3 MG SL tablet Place 1 tablet under the tongue every 5 minutes as needed for Chest pain up to max of 3 total doses. If no relief after 1 dose, call 911. 12 tablet 0

## 2025-01-25 NOTE — CARE COORDINATION
Transition of Care Plan:    RUR: 33%  Prior Level of Functioning: ind with ADL's  Disposition: home health  RAKEL: today 1/25/25  If SNF or IPR: Date FOC offered: n/a  Date FOC received: n/a  Accepting facility: n/a  Date authorization started with reference number: n/a  Date authorization received and expires: n/a  Follow up appointments: PCP  DME needed: none  Transportation at discharge:   IM/IMM Medicare/ letter given: yes, 1/25/25  Is patient a  and connected with [facility]?    If yes, was [place] transfer form completed and [facility] notified?   Caregiver Contact: at the bedside  Discharge Caregiver contacted prior to discharge? yes  Care Conference needed? no  Barriers to discharge:  none    DC orders sent to [facility].

## 2025-01-25 NOTE — PLAN OF CARE
Problem: Chronic Conditions and Co-morbidities  Goal: Patient's chronic conditions and co-morbidity symptoms are monitored and maintained or improved  Outcome: Progressing     Problem: Discharge Planning  Goal: Discharge to home or other facility with appropriate resources  Outcome: Progressing     Problem: Safety - Adult  Goal: Free from fall injury  Outcome: Progressing     Problem: Respiratory - Adult  Goal: Achieves optimal ventilation and oxygenation  Outcome: Progressing  Flowsheets (Taken 1/25/2025 0842)  Achieves optimal ventilation and oxygenation: Assess for changes in respiratory status     Problem: Cardiovascular - Adult  Goal: Maintains optimal cardiac output and hemodynamic stability  Outcome: Progressing  Flowsheets (Taken 1/25/2025 0842)  Maintains optimal cardiac output and hemodynamic stability: Monitor blood pressure and heart rate  Goal: Absence of cardiac dysrhythmias or at baseline  Outcome: Progressing     Problem: Genitourinary - Adult  Goal: Absence of urinary retention  Outcome: Progressing     Problem: Metabolic/Fluid and Electrolytes - Adult  Goal: Electrolytes maintained within normal limits  Outcome: Progressing  Goal: Hemodynamic stability and optimal renal function maintained  Outcome: Progressing  Goal: Glucose maintained within prescribed range  Outcome: Progressing     Problem: Hematologic - Adult  Goal: Maintains hematologic stability  Outcome: Progressing

## 2025-01-25 NOTE — PROGRESS NOTES
Patient cleared by nephrology to discharge. Patient and  received discharge instructions and informed to make follow up appts. IV and tele removed. Patient will discharge home with home health

## 2025-01-25 NOTE — DISCHARGE SUMMARY
Hospitalist Discharge Summary     Patient ID:  Saranya Hightower  [id number]  74 y.o.  1950 1/21/2025    PCP on record: Nubia Davila APRN - NP    Admit date: 1/21/2025  Discharge date and time: 1/25/2025    DISCHARGE DIAGNOSIS:    Acute respiratory failure with hypoxia  Pulmonary edema  Acute decompensated heart failure with systolic dysfunction  COPD exacerbation  History of CAD  Hypertension  Hyperlipidemia  Acute on chronic kidney disease stage IV  Peripheral vascular disease status post left to right femorofemoral bypass  Anemia      CONSULTATIONS:  IP CONSULT TO CARDIOLOGY  IP CONSULT TO NEPHROLOGY  IP CONSULT TO PULMONOLOGY  IP CONSULT TO RESPIRATORY CARE  IP CONSULT TO DIETITIAN    Excerpted HPI from H&P of Marina Hudson MD:  CHIEF COMPLAINT: Shortness of breath     HISTORY OF PRESENT ILLNESS:     Saranya Hightower is a 74 y.o.  female with PMHx significant for hypertension, hyperlipidemia, COPD, CAD, CHF with ejection fraction 30 to 35%, malnutrition, PVD, CKD stage IV, carotid artery stenosis, went to ED of [facility] with complaint of shortness of breath.  She was transferred to [place] for management of this patient.     Patient was discharged recently with CHF COPD and hypertensive urgency  Patient is currently on 2 L nasal cannula  Patient lives with her  and ambulates by herself with a cane  She was discharged from [place] on 1/6/2025  Her current BNP is 19,000        We were asked to admit for work up and evaluation of the above problems.     ______________________________________________________________________  DISCHARGE SUMMARY/HOSPITAL COURSE:  for full details see H&P, daily progress notes, labs, consult notes.     Chief Complaint / Reason for Physician Visit  \" Shortness of breath\".  Discussed with RN events overnight.      1/21 --  Admission H&P  74 y.o.  female with PMHx significant for hypertension, hyperlipidemia, COPD, CAD, CHF with ejection fraction 30

## 2025-02-07 ENCOUNTER — FOLLOWUP TELEPHONE ENCOUNTER (OUTPATIENT)
Facility: HOSPITAL | Age: 75
End: 2025-02-07

## 2025-02-07 NOTE — TELEPHONE ENCOUNTER
RN-NN calls the patient and speaks to her about her discharge call follow-up. Pt denies new or worsening symptoms at time of call. PT confirms receipt and pick-up of prescriptions. Pt verbalizes understanding to call Cardiology with new or worsening symptoms.

## 2025-02-10 ENCOUNTER — HOSPITAL ENCOUNTER (EMERGENCY)
Facility: HOSPITAL | Age: 75
Discharge: HOME | End: 2025-02-10
Attending: EMERGENCY MEDICINE
Payer: MEDICARE

## 2025-02-10 VITALS
HEIGHT: 60 IN | WEIGHT: 96 LBS | TEMPERATURE: 97 F | BODY MASS INDEX: 18.85 KG/M2 | DIASTOLIC BLOOD PRESSURE: 82 MMHG | OXYGEN SATURATION: 100 % | HEART RATE: 76 BPM | SYSTOLIC BLOOD PRESSURE: 142 MMHG | RESPIRATION RATE: 18 BRPM

## 2025-02-10 DIAGNOSIS — E87.6 HYPOKALEMIA: Primary | ICD-10-CM

## 2025-02-10 DIAGNOSIS — E86.0 DEHYDRATION: ICD-10-CM

## 2025-02-10 LAB
ALBUMIN SERPL-MCNC: 3.3 G/DL (ref 3.5–5)
ALBUMIN/GLOB SERPL: 0.8 (ref 1.1–2.2)
ALP SERPL-CCNC: 76 U/L (ref 45–117)
ALT SERPL-CCNC: 10 U/L (ref 12–78)
ANION GAP SERPL CALC-SCNC: 12 MMOL/L (ref 2–12)
APPEARANCE UR: CLEAR
AST SERPL W P-5'-P-CCNC: 27 U/L (ref 15–37)
BACTERIA URNS QL MICRO: ABNORMAL /HPF
BASOPHILS # BLD: 0.04 K/UL (ref 0–0.1)
BASOPHILS NFR BLD: 0.4 % (ref 0–1)
BILIRUB SERPL-MCNC: 0.6 MG/DL (ref 0.2–1)
BILIRUB UR QL: NEGATIVE
BUN SERPL-MCNC: 87 MG/DL (ref 6–20)
BUN/CREAT SERPL: 25 (ref 12–20)
CA-I BLD-MCNC: 9.4 MG/DL (ref 8.5–10.1)
CHLORIDE SERPL-SCNC: 92 MMOL/L (ref 97–108)
CO2 SERPL-SCNC: 30 MMOL/L (ref 21–32)
COLOR UR: ABNORMAL
CREAT SERPL-MCNC: 3.43 MG/DL (ref 0.55–1.02)
DIFFERENTIAL METHOD BLD: ABNORMAL
EOSINOPHIL # BLD: 0.03 K/UL (ref 0–0.4)
EOSINOPHIL NFR BLD: 0.3 % (ref 0–7)
ERYTHROCYTE [DISTWIDTH] IN BLOOD BY AUTOMATED COUNT: 13.4 % (ref 11.5–14.5)
FLUAV RNA SPEC QL NAA+PROBE: NOT DETECTED
FLUBV RNA SPEC QL NAA+PROBE: NOT DETECTED
GLOBULIN SER CALC-MCNC: 4.1 G/DL (ref 2–4)
GLUCOSE SERPL-MCNC: 94 MG/DL (ref 65–100)
GLUCOSE UR STRIP.AUTO-MCNC: NEGATIVE MG/DL
HCT VFR BLD AUTO: 33.7 % (ref 35–47)
HGB BLD-MCNC: 11.8 G/DL (ref 11.5–16)
HGB UR QL STRIP: ABNORMAL
IMM GRANULOCYTES # BLD AUTO: 0.04 K/UL (ref 0–0.04)
IMM GRANULOCYTES NFR BLD AUTO: 0.4 % (ref 0–0.5)
KETONES UR QL STRIP.AUTO: NEGATIVE MG/DL
LACTATE SERPL-SCNC: 1.2 MMOL/L (ref 0.4–2)
LEUKOCYTE ESTERASE UR QL STRIP.AUTO: ABNORMAL
LYMPHOCYTES # BLD: 1.15 K/UL (ref 0.8–3.5)
LYMPHOCYTES NFR BLD: 12.8 % (ref 12–49)
MAGNESIUM SERPL-MCNC: 2.4 MG/DL (ref 1.6–2.4)
MCH RBC QN AUTO: 33.6 PG (ref 26–34)
MCHC RBC AUTO-ENTMCNC: 35 G/DL (ref 30–36.5)
MCV RBC AUTO: 96 FL (ref 80–99)
MONOCYTES # BLD: 0.95 K/UL (ref 0–1)
MONOCYTES NFR BLD: 10.6 % (ref 5–13)
NEUTS SEG # BLD: 6.78 K/UL (ref 1.8–8)
NEUTS SEG NFR BLD: 75.5 % (ref 32–75)
NITRITE UR QL STRIP.AUTO: NEGATIVE
NRBC # BLD: 0 K/UL (ref 0–0.01)
NRBC BLD-RTO: 0 PER 100 WBC
PH UR STRIP: 6 (ref 5–8)
PLATELET # BLD AUTO: 133 K/UL (ref 150–400)
PMV BLD AUTO: 11 FL (ref 8.9–12.9)
POTASSIUM SERPL-SCNC: 2.3 MMOL/L (ref 3.5–5.1)
PROT SERPL-MCNC: 7.4 G/DL (ref 6.4–8.2)
PROT UR STRIP-MCNC: ABNORMAL MG/DL
RBC # BLD AUTO: 3.51 M/UL (ref 3.8–5.2)
RBC #/AREA URNS HPF: ABNORMAL /HPF (ref 0–3)
SARS-COV-2 RNA RESP QL NAA+PROBE: NOT DETECTED
SODIUM SERPL-SCNC: 134 MMOL/L (ref 136–145)
SP GR UR REFRACTOMETRY: 1.01 (ref 1–1.03)
UROBILINOGEN UR QL STRIP.AUTO: 0.2 EU/DL (ref 0.2–1)
WBC # BLD AUTO: 9 K/UL (ref 3.6–11)
WBC URNS QL MICRO: ABNORMAL /HPF (ref 0–5)

## 2025-02-10 PROCEDURE — 6370000000 HC RX 637 (ALT 250 FOR IP): Performed by: EMERGENCY MEDICINE

## 2025-02-10 PROCEDURE — 85025 COMPLETE CBC W/AUTO DIFF WBC: CPT

## 2025-02-10 PROCEDURE — 87636 SARSCOV2 & INF A&B AMP PRB: CPT

## 2025-02-10 PROCEDURE — 93005 ELECTROCARDIOGRAM TRACING: CPT | Performed by: EMERGENCY MEDICINE

## 2025-02-10 PROCEDURE — 83735 ASSAY OF MAGNESIUM: CPT

## 2025-02-10 PROCEDURE — 80053 COMPREHEN METABOLIC PANEL: CPT

## 2025-02-10 PROCEDURE — 83605 ASSAY OF LACTIC ACID: CPT

## 2025-02-10 PROCEDURE — 36415 COLL VENOUS BLD VENIPUNCTURE: CPT

## 2025-02-10 PROCEDURE — 81001 URINALYSIS AUTO W/SCOPE: CPT

## 2025-02-10 PROCEDURE — 99284 EMERGENCY DEPT VISIT MOD MDM: CPT

## 2025-02-10 RX ADMIN — POTASSIUM BICARBONATE 40 MEQ: 782 TABLET, EFFERVESCENT ORAL at 13:11

## 2025-02-10 RX ADMIN — POTASSIUM BICARBONATE 40 MEQ: 782 TABLET, EFFERVESCENT ORAL at 12:53

## 2025-02-10 ASSESSMENT — PAIN SCALES - GENERAL: PAINLEVEL_OUTOF10: 0

## 2025-02-10 ASSESSMENT — PAIN - FUNCTIONAL ASSESSMENT: PAIN_FUNCTIONAL_ASSESSMENT: 0-10

## 2025-02-10 NOTE — ED PROVIDER NOTES
[facility] EMERGENCY DEPT  EMERGENCY DEPARTMENT HISTORY AND PHYSICAL EXAM      Date: 2/10/2025  Patient Name: Saranya Hightower  MRN: [id number]  YOB: 1950  Date of evaluation: 2/10/2025  Provider: Gloria Tyler MD   Note Started: 10:59 AM EST 2/10/25    HISTORY OF PRESENT ILLNESS     Chief Complaint   Patient presents with    Fatigue       History Provided By: Patient    HPI: Saranya Hightower is a 74 y.o. female     PAST MEDICAL HISTORY   Past Medical History:  Past Medical History:   Diagnosis Date    AAA (abdominal aortic aneurysm) ([facility])     S/P STENTING    Abnormal weight loss 09/11/2021    Anemia     Atherosclerosis of native coronary artery without angina pectoris 09/11/2021    Bronchitis     CAD (coronary artery disease)     MI and hx of stents Lcx and RCA 8/2017    Carotid artery stenosis 09/11/2021    Cervicalgia 09/11/2021    CHF (congestive heart failure) ([facility])     Chronic bronchitis ([facility])     CKD (chronic kidney disease) stage 4, GFR 15-29 ml/min ([facility])     Colon polyps     Constipation 09/11/2021    COPD (chronic obstructive pulmonary disease) ([facility])     COPD (chronic obstructive pulmonary disease) ([facility])     HTN (hypertension) 09/11/2021    Hyperlipidemia     Menopause     Personal history of colonic polyps 09/11/2021    PVD (peripheral vascular disease) ([facility]) 2019    s/p left to right fem-fem bypass    Vitamin D deficiency 09/11/2021       Past Surgical History:  Past Surgical History:   Procedure Laterality Date    CARDIAC CATHETERIZATION      X 3    CARDIAC DEFIBRILLATOR PLACEMENT  09/16/2013    COLONOSCOPY N/A 10/5/2021    COLONOSCOPY ( T I V A) performed by Talib Ortega MD at [facility] ENDOSCOPY    COLONOSCOPY  2015    ORTHOPEDIC SURGERY      R leg    OTHER SURGICAL HISTORY      BILAT LOWER EXTREM VASC BYPASS SURGERY    OTHER SURGICAL HISTORY      R CAROTID ENDORECTOMY 11-4-05,   L CAROTID ENDARECTOMY 11-6-2006    PACEMAKER      6 0r 7 years ago       Family History:  Family History   Problem

## 2025-02-10 NOTE — ED NOTES
Patient reports she is feeling better at this time patient made aware of plan for discharge at this time and is in agree ance with same.  Patients  called at this time for disharge ride and made aware to bring patient pants

## 2025-02-10 NOTE — ED NOTES
Patient accidentally dislodge purewick and soaked bed in urine. Patient cleansed of uurine and dry linen placed on bed patient placed in pull up and and purewick repositioned at this time

## 2025-02-12 LAB
EKG ATRIAL RATE: 69 BPM
EKG DIAGNOSIS: NORMAL
EKG P AXIS: 70 DEGREES
EKG P-R INTERVAL: 197 MS
EKG Q-T INTERVAL: 433 MS
EKG QRS DURATION: 116 MS
EKG QTC CALCULATION (BAZETT): 464 MS
EKG R AXIS: 57 DEGREES
EKG T AXIS: 142 DEGREES
EKG VENTRICULAR RATE: 69 BPM

## 2025-03-17 ENCOUNTER — OFFICE VISIT (OUTPATIENT)
Age: 75
End: 2025-03-17
Payer: MEDICARE

## 2025-03-17 VITALS
TEMPERATURE: 97.6 F | SYSTOLIC BLOOD PRESSURE: 149 MMHG | HEIGHT: 63 IN | HEART RATE: 90 BPM | BODY MASS INDEX: 16.62 KG/M2 | RESPIRATION RATE: 16 BRPM | WEIGHT: 93.8 LBS | DIASTOLIC BLOOD PRESSURE: 78 MMHG | OXYGEN SATURATION: 98 %

## 2025-03-17 DIAGNOSIS — K59.09 CHRONIC CONSTIPATION: Primary | ICD-10-CM

## 2025-03-17 DIAGNOSIS — R63.4 ABNORMAL WEIGHT LOSS: ICD-10-CM

## 2025-03-17 DIAGNOSIS — R63.0 ANOREXIA: ICD-10-CM

## 2025-03-17 DIAGNOSIS — Z86.0100 HISTORY OF COLON POLYPS: ICD-10-CM

## 2025-03-17 PROCEDURE — 4004F PT TOBACCO SCREEN RCVD TLK: CPT | Performed by: INTERNAL MEDICINE

## 2025-03-17 PROCEDURE — 3017F COLORECTAL CA SCREEN DOC REV: CPT | Performed by: INTERNAL MEDICINE

## 2025-03-17 PROCEDURE — G8400 PT W/DXA NO RESULTS DOC: HCPCS | Performed by: INTERNAL MEDICINE

## 2025-03-17 PROCEDURE — 1090F PRES/ABSN URINE INCON ASSESS: CPT | Performed by: INTERNAL MEDICINE

## 2025-03-17 PROCEDURE — 3077F SYST BP >= 140 MM HG: CPT | Performed by: INTERNAL MEDICINE

## 2025-03-17 PROCEDURE — G8419 CALC BMI OUT NRM PARAM NOF/U: HCPCS | Performed by: INTERNAL MEDICINE

## 2025-03-17 PROCEDURE — 99214 OFFICE O/P EST MOD 30 MIN: CPT | Performed by: INTERNAL MEDICINE

## 2025-03-17 PROCEDURE — 1124F ACP DISCUSS-NO DSCNMKR DOCD: CPT | Performed by: INTERNAL MEDICINE

## 2025-03-17 PROCEDURE — 3078F DIAST BP <80 MM HG: CPT | Performed by: INTERNAL MEDICINE

## 2025-03-17 PROCEDURE — G8427 DOCREV CUR MEDS BY ELIG CLIN: HCPCS | Performed by: INTERNAL MEDICINE

## 2025-03-17 RX ORDER — LACTULOSE 10 G/15ML
20 SOLUTION ORAL EVERY EVENING
Qty: 946 ML | Refills: 5 | Status: SHIPPED | OUTPATIENT
Start: 2025-03-17

## 2025-03-29 ASSESSMENT — ENCOUNTER SYMPTOMS
ANAL BLEEDING: 0
ALLERGIC/IMMUNOLOGIC NEGATIVE: 1
ABDOMINAL PAIN: 0
RESPIRATORY NEGATIVE: 1
VOMITING: 0
DIARRHEA: 0
BLOOD IN STOOL: 0
CONSTIPATION: 1
BACK PAIN: 1
NAUSEA: 0
ABDOMINAL DISTENTION: 0
RECTAL PAIN: 0

## 2025-03-29 NOTE — PROGRESS NOTES
Chief Complaint   Patient presents with    Follow-up     6 month      \"Have you been to the ER, urgent care clinic since your last visit?  Hospitalized since your last visit?\"    NO    “Have you seen or consulted any other health care providers outside our system since your last visit?”    NO            
Sitting, BP Cuff Size: Medium Adult)   Pulse 90   Temp 97.6 °F (36.4 °C) (Temporal)   Resp 16   Ht 1.6 m (5' 3\")   Wt 42.5 kg (93 lb 12.8 oz)   SpO2 98%   BMI 16.62 kg/m²     Physical Exam  Vitals and nursing note reviewed.   Constitutional:       Appearance: Normal appearance. She is underweight.   HENT:      Head: Normocephalic and atraumatic.      Nose: Nose normal.   Eyes:      General: No scleral icterus.  Cardiovascular:      Rate and Rhythm: Normal rate and regular rhythm.      Pulses: Normal pulses.      Heart sounds: Normal heart sounds.   Pulmonary:      Effort: Pulmonary effort is normal.      Breath sounds: Normal breath sounds.   Abdominal:      General: Abdomen is flat. There is no distension.      Palpations: Abdomen is soft. There is no mass.      Tenderness: There is abdominal tenderness. There is no right CVA tenderness, left CVA tenderness, guarding or rebound.      Hernia: No hernia is present.   Musculoskeletal:      Right lower leg: No edema.      Left lower leg: No edema.   Skin:     General: Skin is warm and dry.   Neurological:      General: No focal deficit present.      Mental Status: She is alert and oriented to person, place, and time. Mental status is at baseline.   Psychiatric:         Mood and Affect: Mood normal.         Behavior: Behavior normal.         Thought Content: Thought content normal.         Judgment: Judgment normal.        1. Chronic constipation  We will give patient trial of Chronulac 30 mL every evening.  - lactulose (CHRONULAC) 10 GM/15ML solution; Take 30 mLs by mouth every evening  Dispense: 946 mL; Refill: 5    2. Abnormal weight loss  Secondary to poor oral intake.  Patient has been encouraged to increase intake.    3. Anorexia      4. History of colon polyps

## 2025-06-25 ENCOUNTER — OFFICE VISIT (OUTPATIENT)
Age: 75
End: 2025-06-25
Payer: MEDICARE

## 2025-06-25 VITALS
HEIGHT: 63 IN | TEMPERATURE: 97.8 F | OXYGEN SATURATION: 98 % | BODY MASS INDEX: 16.83 KG/M2 | RESPIRATION RATE: 18 BRPM | DIASTOLIC BLOOD PRESSURE: 71 MMHG | HEART RATE: 86 BPM | SYSTOLIC BLOOD PRESSURE: 132 MMHG | WEIGHT: 95 LBS

## 2025-06-25 DIAGNOSIS — Z86.0100 HISTORY OF COLONIC POLYPS: ICD-10-CM

## 2025-06-25 DIAGNOSIS — Z72.0 TOBACCO USER: ICD-10-CM

## 2025-06-25 DIAGNOSIS — K59.04 CHRONIC IDIOPATHIC CONSTIPATION: Primary | ICD-10-CM

## 2025-06-25 PROCEDURE — 1124F ACP DISCUSS-NO DSCNMKR DOCD: CPT | Performed by: INTERNAL MEDICINE

## 2025-06-25 PROCEDURE — 3078F DIAST BP <80 MM HG: CPT | Performed by: INTERNAL MEDICINE

## 2025-06-25 PROCEDURE — 1090F PRES/ABSN URINE INCON ASSESS: CPT | Performed by: INTERNAL MEDICINE

## 2025-06-25 PROCEDURE — G8419 CALC BMI OUT NRM PARAM NOF/U: HCPCS | Performed by: INTERNAL MEDICINE

## 2025-06-25 PROCEDURE — 1160F RVW MEDS BY RX/DR IN RCRD: CPT | Performed by: INTERNAL MEDICINE

## 2025-06-25 PROCEDURE — 4004F PT TOBACCO SCREEN RCVD TLK: CPT | Performed by: INTERNAL MEDICINE

## 2025-06-25 PROCEDURE — G8427 DOCREV CUR MEDS BY ELIG CLIN: HCPCS | Performed by: INTERNAL MEDICINE

## 2025-06-25 PROCEDURE — G8400 PT W/DXA NO RESULTS DOC: HCPCS | Performed by: INTERNAL MEDICINE

## 2025-06-25 PROCEDURE — 3075F SYST BP GE 130 - 139MM HG: CPT | Performed by: INTERNAL MEDICINE

## 2025-06-25 PROCEDURE — 99214 OFFICE O/P EST MOD 30 MIN: CPT | Performed by: INTERNAL MEDICINE

## 2025-06-25 PROCEDURE — 3017F COLORECTAL CA SCREEN DOC REV: CPT | Performed by: INTERNAL MEDICINE

## 2025-06-25 PROCEDURE — 1159F MED LIST DOCD IN RCRD: CPT | Performed by: INTERNAL MEDICINE

## 2025-06-25 NOTE — PROGRESS NOTES
Chief Complaint   Patient presents with    Follow-up     3 month fup     Have you been to the ER, urgent care clinic since your last visit?  Hospitalized since your last visit?   NO    Have you seen or consulted any other health care providers outside our system since your last visit?   NO

## 2025-08-29 ENCOUNTER — APPOINTMENT (OUTPATIENT)
Facility: HOSPITAL | Age: 75
End: 2025-08-29
Payer: MEDICARE

## 2025-08-29 ENCOUNTER — HOSPITAL ENCOUNTER (EMERGENCY)
Facility: HOSPITAL | Age: 75
Discharge: HOME | End: 2025-08-29
Attending: FAMILY MEDICINE
Payer: MEDICARE

## 2025-08-29 VITALS
OXYGEN SATURATION: 100 % | DIASTOLIC BLOOD PRESSURE: 103 MMHG | WEIGHT: 113 LBS | TEMPERATURE: 97.3 F | RESPIRATION RATE: 20 BRPM | BODY MASS INDEX: 20.02 KG/M2 | SYSTOLIC BLOOD PRESSURE: 169 MMHG | HEIGHT: 63 IN | HEART RATE: 108 BPM

## 2025-08-29 DIAGNOSIS — I10 ESSENTIAL HYPERTENSION: ICD-10-CM

## 2025-08-29 DIAGNOSIS — R00.0 TACHYCARDIA: ICD-10-CM

## 2025-08-29 DIAGNOSIS — N18.9 CHRONIC KIDNEY DISEASE, UNSPECIFIED CKD STAGE: Primary | ICD-10-CM

## 2025-08-29 DIAGNOSIS — R53.82 CHRONIC FATIGUE: ICD-10-CM

## 2025-08-29 LAB
ALBUMIN SERPL-MCNC: 3.8 G/DL (ref 3.5–5)
ALBUMIN/GLOB SERPL: 1.1 (ref 1.1–2.2)
ALP SERPL-CCNC: 80 U/L (ref 45–117)
ALT SERPL-CCNC: 25 U/L (ref 12–78)
ANION GAP SERPL CALC-SCNC: 12 MMOL/L (ref 2–12)
AST SERPL W P-5'-P-CCNC: 24 U/L (ref 15–37)
BASOPHILS # BLD: 0.04 K/UL (ref 0–0.1)
BASOPHILS NFR BLD: 0.4 % (ref 0–1)
BILIRUB SERPL-MCNC: 0.5 MG/DL (ref 0.2–1)
BUN SERPL-MCNC: 97 MG/DL (ref 6–20)
BUN/CREAT SERPL: 28 (ref 12–20)
CA-I BLD-MCNC: 9.6 MG/DL (ref 8.5–10.1)
CHLORIDE SERPL-SCNC: 94 MMOL/L (ref 97–108)
CO2 SERPL-SCNC: 25 MMOL/L (ref 21–32)
CREAT SERPL-MCNC: 3.48 MG/DL (ref 0.55–1.02)
DIFFERENTIAL METHOD BLD: ABNORMAL
EKG ATRIAL RATE: 99 BPM
EKG DIAGNOSIS: NORMAL
EKG P AXIS: 77 DEGREES
EKG P-R INTERVAL: 177 MS
EKG Q-T INTERVAL: 337 MS
EKG QRS DURATION: 93 MS
EKG QTC CALCULATION (BAZETT): 431 MS
EKG R AXIS: 19 DEGREES
EKG T AXIS: 137 DEGREES
EKG VENTRICULAR RATE: 98 BPM
EOSINOPHIL # BLD: 0 K/UL (ref 0–0.4)
EOSINOPHIL NFR BLD: 0 % (ref 0–7)
ERYTHROCYTE [DISTWIDTH] IN BLOOD BY AUTOMATED COUNT: 11.9 % (ref 11.5–14.5)
GLOBULIN SER CALC-MCNC: 3.6 G/DL (ref 2–4)
GLUCOSE SERPL-MCNC: 84 MG/DL (ref 65–100)
HCT VFR BLD AUTO: 36.9 % (ref 35–47)
HGB BLD-MCNC: 13.1 G/DL (ref 11.5–16)
IMM GRANULOCYTES # BLD AUTO: 0.02 K/UL (ref 0–0.04)
IMM GRANULOCYTES NFR BLD AUTO: 0.2 % (ref 0–0.5)
LYMPHOCYTES # BLD: 1.39 K/UL (ref 0.8–3.5)
LYMPHOCYTES NFR BLD: 14.4 % (ref 12–49)
MCH RBC QN AUTO: 34.1 PG (ref 26–34)
MCHC RBC AUTO-ENTMCNC: 35.5 G/DL (ref 30–36.5)
MCV RBC AUTO: 96.1 FL (ref 80–99)
MONOCYTES # BLD: 1.15 K/UL (ref 0–1)
MONOCYTES NFR BLD: 11.9 % (ref 5–13)
NEUTS SEG # BLD: 7.06 K/UL (ref 1.8–8)
NEUTS SEG NFR BLD: 73.1 % (ref 32–75)
NRBC # BLD: 0 K/UL (ref 0–0.01)
NRBC BLD-RTO: 0 PER 100 WBC
PLATELET # BLD AUTO: 140 K/UL (ref 150–400)
PMV BLD AUTO: 11 FL (ref 8.9–12.9)
POTASSIUM SERPL-SCNC: 4.9 MMOL/L (ref 3.5–5.1)
PROT SERPL-MCNC: 7.4 G/DL (ref 6.4–8.2)
RBC # BLD AUTO: 3.84 M/UL (ref 3.8–5.2)
SODIUM SERPL-SCNC: 131 MMOL/L (ref 136–145)
TROPONIN I SERPL HS-MCNC: 86 NG/L (ref 0–51)
TROPONIN I SERPL HS-MCNC: 89 NG/L (ref 0–51)
WBC # BLD AUTO: 9.7 K/UL (ref 3.6–11)

## 2025-08-29 PROCEDURE — 84484 ASSAY OF TROPONIN QUANT: CPT

## 2025-08-29 PROCEDURE — 99285 EMERGENCY DEPT VISIT HI MDM: CPT

## 2025-08-29 PROCEDURE — 36415 COLL VENOUS BLD VENIPUNCTURE: CPT

## 2025-08-29 PROCEDURE — 96360 HYDRATION IV INFUSION INIT: CPT

## 2025-08-29 PROCEDURE — 80053 COMPREHEN METABOLIC PANEL: CPT

## 2025-08-29 PROCEDURE — 85025 COMPLETE CBC W/AUTO DIFF WBC: CPT

## 2025-08-29 PROCEDURE — 96361 HYDRATE IV INFUSION ADD-ON: CPT

## 2025-08-29 PROCEDURE — 71045 X-RAY EXAM CHEST 1 VIEW: CPT

## 2025-08-29 PROCEDURE — 2580000003 HC RX 258: Performed by: FAMILY MEDICINE

## 2025-08-29 RX ORDER — SODIUM CHLORIDE 9 MG/ML
INJECTION, SOLUTION INTRAVENOUS CONTINUOUS
Status: DISCONTINUED | OUTPATIENT
Start: 2025-08-29 | End: 2025-08-29 | Stop reason: HOSPADM

## 2025-08-29 RX ADMIN — SODIUM CHLORIDE: 0.9 INJECTION, SOLUTION INTRAVENOUS at 18:28

## 2025-08-29 ASSESSMENT — PAIN SCALES - GENERAL: PAINLEVEL_OUTOF10: 0

## 2025-08-29 ASSESSMENT — PAIN - FUNCTIONAL ASSESSMENT: PAIN_FUNCTIONAL_ASSESSMENT: 0-10

## 2025-08-30 ENCOUNTER — APPOINTMENT (OUTPATIENT)
Facility: HOSPITAL | Age: 75
End: 2025-08-30
Payer: MEDICARE

## 2025-08-30 ENCOUNTER — HOSPITAL ENCOUNTER (EMERGENCY)
Facility: HOSPITAL | Age: 75
Discharge: HOME | End: 2025-08-30
Attending: EMERGENCY MEDICINE
Payer: MEDICARE

## 2025-08-30 VITALS
HEART RATE: 95 BPM | HEIGHT: 63 IN | OXYGEN SATURATION: 100 % | DIASTOLIC BLOOD PRESSURE: 61 MMHG | BODY MASS INDEX: 20.02 KG/M2 | WEIGHT: 113 LBS | RESPIRATION RATE: 17 BRPM | TEMPERATURE: 97.8 F | SYSTOLIC BLOOD PRESSURE: 135 MMHG

## 2025-08-30 DIAGNOSIS — N19 UREMIA: Primary | ICD-10-CM

## 2025-08-30 DIAGNOSIS — R00.0 TACHYCARDIA: ICD-10-CM

## 2025-08-30 DIAGNOSIS — R79.89 ELEVATED TROPONIN: ICD-10-CM

## 2025-08-30 DIAGNOSIS — I95.9 HYPOTENSION, UNSPECIFIED HYPOTENSION TYPE: ICD-10-CM

## 2025-08-30 LAB
ALBUMIN SERPL-MCNC: 3.5 G/DL (ref 3.5–5)
ALBUMIN/GLOB SERPL: 0.9 (ref 1.1–2.2)
ALP SERPL-CCNC: 80 U/L (ref 45–117)
ALT SERPL-CCNC: 24 U/L (ref 12–78)
ANION GAP SERPL CALC-SCNC: 14 MMOL/L (ref 2–12)
AST SERPL W P-5'-P-CCNC: 32 U/L (ref 15–37)
BASOPHILS # BLD: 0.03 K/UL (ref 0–0.1)
BASOPHILS NFR BLD: 0.3 % (ref 0–1)
BILIRUB SERPL-MCNC: 0.5 MG/DL (ref 0.2–1)
BNP SERPL-MCNC: 7998 PG/ML
BUN SERPL-MCNC: 104 MG/DL (ref 6–20)
BUN/CREAT SERPL: 29 (ref 12–20)
CA-I BLD-MCNC: 9 MG/DL (ref 8.5–10.1)
CHLORIDE SERPL-SCNC: 94 MMOL/L (ref 97–108)
CO2 SERPL-SCNC: 23 MMOL/L (ref 21–32)
CREAT SERPL-MCNC: 3.61 MG/DL (ref 0.55–1.02)
DIFFERENTIAL METHOD BLD: ABNORMAL
EKG ATRIAL RATE: 108 BPM
EKG DIAGNOSIS: NORMAL
EKG P AXIS: 63 DEGREES
EKG P-R INTERVAL: 167 MS
EKG Q-T INTERVAL: 330 MS
EKG QRS DURATION: 98 MS
EKG QTC CALCULATION (BAZETT): 441 MS
EKG R AXIS: -3 DEGREES
EKG T AXIS: 140 DEGREES
EKG VENTRICULAR RATE: 107 BPM
EOSINOPHIL # BLD: 0.01 K/UL (ref 0–0.4)
EOSINOPHIL NFR BLD: 0.1 % (ref 0–7)
ERYTHROCYTE [DISTWIDTH] IN BLOOD BY AUTOMATED COUNT: 11.8 % (ref 11.5–14.5)
GLOBULIN SER CALC-MCNC: 4 G/DL (ref 2–4)
GLUCOSE SERPL-MCNC: 183 MG/DL (ref 65–100)
HCT VFR BLD AUTO: 35.9 % (ref 35–47)
HGB BLD-MCNC: 13 G/DL (ref 11.5–16)
IMM GRANULOCYTES # BLD AUTO: 0.02 K/UL (ref 0–0.04)
IMM GRANULOCYTES NFR BLD AUTO: 0.2 % (ref 0–0.5)
LACTATE SERPL-SCNC: 1.9 MMOL/L (ref 0.4–2)
LYMPHOCYTES # BLD: 1.04 K/UL (ref 0.8–3.5)
LYMPHOCYTES NFR BLD: 11.2 % (ref 12–49)
MCH RBC QN AUTO: 34.3 PG (ref 26–34)
MCHC RBC AUTO-ENTMCNC: 36.2 G/DL (ref 30–36.5)
MCV RBC AUTO: 94.7 FL (ref 80–99)
MONOCYTES # BLD: 0.65 K/UL (ref 0–1)
MONOCYTES NFR BLD: 7 % (ref 5–13)
NEUTS SEG # BLD: 7.5 K/UL (ref 1.8–8)
NEUTS SEG NFR BLD: 81.2 % (ref 32–75)
NRBC # BLD: 0 K/UL (ref 0–0.01)
NRBC BLD-RTO: 0 PER 100 WBC
PLATELET # BLD AUTO: 141 K/UL (ref 150–400)
PMV BLD AUTO: 11.5 FL (ref 8.9–12.9)
POTASSIUM SERPL-SCNC: 4.1 MMOL/L (ref 3.5–5.1)
PROT SERPL-MCNC: 7.5 G/DL (ref 6.4–8.2)
RBC # BLD AUTO: 3.79 M/UL (ref 3.8–5.2)
SODIUM SERPL-SCNC: 131 MMOL/L (ref 136–145)
TROPONIN I SERPL HS-MCNC: 168 NG/L (ref 0–51)
TROPONIN I SERPL HS-MCNC: 205 NG/L (ref 0–51)
WBC # BLD AUTO: 9.3 K/UL (ref 3.6–11)

## 2025-08-30 PROCEDURE — 80053 COMPREHEN METABOLIC PANEL: CPT

## 2025-08-30 PROCEDURE — 93005 ELECTROCARDIOGRAM TRACING: CPT | Performed by: EMERGENCY MEDICINE

## 2025-08-30 PROCEDURE — 96367 TX/PROPH/DG ADDL SEQ IV INF: CPT

## 2025-08-30 PROCEDURE — 83880 ASSAY OF NATRIURETIC PEPTIDE: CPT

## 2025-08-30 PROCEDURE — 2580000003 HC RX 258: Performed by: EMERGENCY MEDICINE

## 2025-08-30 PROCEDURE — 71045 X-RAY EXAM CHEST 1 VIEW: CPT

## 2025-08-30 PROCEDURE — 96368 THER/DIAG CONCURRENT INF: CPT

## 2025-08-30 PROCEDURE — 83605 ASSAY OF LACTIC ACID: CPT

## 2025-08-30 PROCEDURE — 6360000002 HC RX W HCPCS: Performed by: EMERGENCY MEDICINE

## 2025-08-30 PROCEDURE — 96366 THER/PROPH/DIAG IV INF ADDON: CPT

## 2025-08-30 PROCEDURE — 84145 PROCALCITONIN (PCT): CPT

## 2025-08-30 PROCEDURE — 87040 BLOOD CULTURE FOR BACTERIA: CPT

## 2025-08-30 PROCEDURE — 85025 COMPLETE CBC W/AUTO DIFF WBC: CPT

## 2025-08-30 PROCEDURE — 99285 EMERGENCY DEPT VISIT HI MDM: CPT

## 2025-08-30 PROCEDURE — 96365 THER/PROPH/DIAG IV INF INIT: CPT

## 2025-08-30 PROCEDURE — 84484 ASSAY OF TROPONIN QUANT: CPT

## 2025-08-30 RX ADMIN — PIPERACILLIN SODIUM AND TAZOBACTAM SODIUM 4500 MG: 4; .5 INJECTION, POWDER, LYOPHILIZED, FOR SOLUTION INTRAVENOUS at 17:06

## 2025-08-30 RX ADMIN — SODIUM CHLORIDE 1250 MG: 0.9 INJECTION, SOLUTION INTRAVENOUS at 17:59

## 2025-08-30 ASSESSMENT — PAIN SCALES - GENERAL: PAINLEVEL_OUTOF10: 0

## 2025-08-30 ASSESSMENT — LIFESTYLE VARIABLES
HOW MANY STANDARD DRINKS CONTAINING ALCOHOL DO YOU HAVE ON A TYPICAL DAY: PATIENT DECLINED
HOW OFTEN DO YOU HAVE A DRINK CONTAINING ALCOHOL: PATIENT DECLINED

## 2025-08-30 ASSESSMENT — PAIN - FUNCTIONAL ASSESSMENT: PAIN_FUNCTIONAL_ASSESSMENT: 0-10

## 2025-08-30 ASSESSMENT — HEART SCORE: ECG: SIGNIFICANT ST-DEVIATION

## 2025-08-31 LAB — PROCALCITONIN SERPL-MCNC: 0.42 NG/ML

## 2025-09-02 LAB
EKG ATRIAL RATE: 108 BPM
EKG ATRIAL RATE: 108 BPM
EKG ATRIAL RATE: 99 BPM
EKG DIAGNOSIS: NORMAL
EKG P AXIS: 63 DEGREES
EKG P AXIS: 63 DEGREES
EKG P AXIS: 77 DEGREES
EKG P-R INTERVAL: 167 MS
EKG P-R INTERVAL: 167 MS
EKG P-R INTERVAL: 177 MS
EKG Q-T INTERVAL: 330 MS
EKG Q-T INTERVAL: 330 MS
EKG Q-T INTERVAL: 337 MS
EKG QRS DURATION: 93 MS
EKG QRS DURATION: 98 MS
EKG QRS DURATION: 98 MS
EKG QTC CALCULATION (BAZETT): 431 MS
EKG QTC CALCULATION (BAZETT): 441 MS
EKG QTC CALCULATION (BAZETT): 441 MS
EKG R AXIS: -3 DEGREES
EKG R AXIS: -3 DEGREES
EKG R AXIS: 19 DEGREES
EKG T AXIS: 137 DEGREES
EKG T AXIS: 140 DEGREES
EKG T AXIS: 140 DEGREES
EKG VENTRICULAR RATE: 107 BPM
EKG VENTRICULAR RATE: 107 BPM
EKG VENTRICULAR RATE: 98 BPM

## 2025-09-02 PROCEDURE — 93010 ELECTROCARDIOGRAM REPORT: CPT | Performed by: SPECIALIST

## 2025-09-03 PROBLEM — R65.10 SIRS (SYSTEMIC INFLAMMATORY RESPONSE SYNDROME) (HCC): Status: ACTIVE | Noted: 2025-09-03

## 2025-09-05 PROBLEM — I95.1 ORTHOSTATIC HYPOTENSION: Status: ACTIVE | Noted: 2025-09-05

## 2025-09-05 LAB
BACTERIA SPEC CULT: NORMAL
Lab: NORMAL

## 2025-09-07 PROBLEM — R42 POSITIONAL LIGHTHEADEDNESS: Status: ACTIVE | Noted: 2025-09-07

## (undated) DEVICE — GLOVE ORANGE PI 7 1/2   MSG9075

## (undated) DEVICE — JELLY,LUBE,STERILE,FLIP TOP,TUBE,4-OZ: Brand: MEDLINE

## (undated) DEVICE — 1200CC GUARDIAN II: Brand: GUARDIAN

## (undated) DEVICE — TUBING, SUCTION, 9/32" X 10', STRAIGHT: Brand: MEDLINE

## (undated) DEVICE — FORCEPS BX L240CM WRK CHN 2.8MM STD CAP W/ NDL MIC MESH

## (undated) DEVICE — SOLUTION IRRIG 1000ML STRL H2O USP PLAS POUR BTL

## (undated) DEVICE — PAD,PREPPING,CUFFED,24X48,7",NONSTERILE: Brand: MEDLINE

## (undated) DEVICE — WASH CLOTH INCONT LO LINT PREM 12X13 IN LF DISP

## (undated) DEVICE — SPONGE GZ W4XL4IN COT 12 PLY TYP VII WVN C FLD DSGN